# Patient Record
Sex: MALE | Race: WHITE | NOT HISPANIC OR LATINO | Employment: OTHER | URBAN - METROPOLITAN AREA
[De-identification: names, ages, dates, MRNs, and addresses within clinical notes are randomized per-mention and may not be internally consistent; named-entity substitution may affect disease eponyms.]

---

## 2017-01-03 RX ORDER — POTASSIUM CHLORIDE 20 MEQ/1
TABLET, EXTENDED RELEASE ORAL
Qty: 30 TABLET | Refills: 0 | Status: SHIPPED | OUTPATIENT
Start: 2017-01-03 | End: 2017-02-03 | Stop reason: SDUPTHER

## 2017-01-20 ENCOUNTER — PROCEDURE VISIT (OUTPATIENT)
Dept: NEUROLOGY | Facility: CLINIC | Age: 62
End: 2017-01-20
Payer: MEDICARE

## 2017-01-20 DIAGNOSIS — R25.2 SPASTICITY: ICD-10-CM

## 2017-01-20 DIAGNOSIS — G81.90 HEMIPARESIS: ICD-10-CM

## 2017-01-20 DIAGNOSIS — G43.719 CHRONIC MIGRAINE WITHOUT AURA, INTRACTABLE, WITHOUT STATUS MIGRAINOSUS: Primary | ICD-10-CM

## 2017-01-20 PROCEDURE — 64642 CHEMODENERV 1 EXTREMITY 1-4: CPT | Mod: 59,S$GLB,, | Performed by: PSYCHIATRY & NEUROLOGY

## 2017-01-20 PROCEDURE — 64615 CHEMODENERV MUSC MIGRAINE: CPT | Mod: S$GLB,,, | Performed by: PSYCHIATRY & NEUROLOGY

## 2017-01-20 NOTE — PROCEDURES
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 Two patient identifiers were confirmed with the patient prior to performing injections. A time out to determine correct patient and and agreement on procedure performed was conducted prior to the injections  The patient's head and upper neck and upper right arm was cleansed with alcohol rub and 200 Units of Botox (diluted 1:1) was injected in the following bilateral muscles:    10 units in corregator* (total over 2 sites), 5 units in Procerus, 20 units Frontalis* (over 4 sites), 40 units in Temporalis* (over 4 sites), 30 units in occipitalis* (over 6 sites), 20 units in the cervical paraspinal muscles* (over 4 sites), and 30 units in Trapezius* (over 4 sites). 20 units were added to the right deltoid and 20 units in the right levator scapulae for upper limb spasticity from prior CVA. 5 units given to right pectoral major muscle for spasticity as well.    *= denotes bilateral injections        Medications used: botulinum toxin 200 units diluted 1:1 with normal saline used to dilute Botox       The patient tolerated the procedure well with no more than 0.50cc of blood loss. He was observed for several minutes post injection and given a handout from [facility] regarding when and where to seek help if side effects are experienced       Continue Primidone for tremor, mood treatments via PCP continue, and he failed to follow up clinic visit as he states all current treatments are helping well including Botox which helps chronic migraine for 3 months.   Currently Botox is helping symptoms and patient declined sleep study  RTC in 12 weeks or more for more Botox

## 2017-01-31 RX ORDER — METOPROLOL TARTRATE 100 MG/1
TABLET ORAL
Qty: 60 TABLET | Refills: 10 | Status: SHIPPED | OUTPATIENT
Start: 2017-01-31 | End: 2017-12-12 | Stop reason: SDUPTHER

## 2017-02-07 RX ORDER — POTASSIUM CHLORIDE 20 MEQ/1
TABLET, EXTENDED RELEASE ORAL
Qty: 30 TABLET | Refills: 0 | Status: SHIPPED | OUTPATIENT
Start: 2017-02-07 | End: 2017-03-03 | Stop reason: SDUPTHER

## 2017-02-08 ENCOUNTER — OFFICE VISIT (OUTPATIENT)
Dept: INTERNAL MEDICINE | Facility: CLINIC | Age: 62
End: 2017-02-08
Payer: MEDICARE

## 2017-02-08 VITALS
TEMPERATURE: 101 F | DIASTOLIC BLOOD PRESSURE: 82 MMHG | RESPIRATION RATE: 18 BRPM | OXYGEN SATURATION: 95 % | HEART RATE: 78 BPM | SYSTOLIC BLOOD PRESSURE: 122 MMHG | BODY MASS INDEX: 39.76 KG/M2 | WEIGHT: 262.38 LBS | HEIGHT: 68 IN

## 2017-02-08 DIAGNOSIS — J10.1 INFLUENZA A: Primary | ICD-10-CM

## 2017-02-08 DIAGNOSIS — R50.9 FEVER, UNSPECIFIED FEVER CAUSE: ICD-10-CM

## 2017-02-08 PROCEDURE — 99213 OFFICE O/P EST LOW 20 MIN: CPT | Mod: S$GLB,,, | Performed by: NURSE PRACTITIONER

## 2017-02-08 PROCEDURE — 99999 PR PBB SHADOW E&M-EST. PATIENT-LVL IV: CPT | Mod: PBBFAC,,, | Performed by: NURSE PRACTITIONER

## 2017-02-08 PROCEDURE — 3074F SYST BP LT 130 MM HG: CPT | Mod: S$GLB,,, | Performed by: NURSE PRACTITIONER

## 2017-02-08 PROCEDURE — 3079F DIAST BP 80-89 MM HG: CPT | Mod: S$GLB,,, | Performed by: NURSE PRACTITIONER

## 2017-02-08 RX ORDER — OSELTAMIVIR PHOSPHATE 75 MG/1
75 CAPSULE ORAL 2 TIMES DAILY
Qty: 10 CAPSULE | Refills: 0 | Status: SHIPPED | OUTPATIENT
Start: 2017-02-08 | End: 2017-02-13

## 2017-02-08 RX ORDER — PROMETHAZINE HYDROCHLORIDE AND CODEINE PHOSPHATE 6.25; 1 MG/5ML; MG/5ML
5 SOLUTION ORAL EVERY 4 HOURS PRN
Qty: 240 ML | Refills: 0 | Status: SHIPPED | OUTPATIENT
Start: 2017-02-08 | End: 2017-02-14 | Stop reason: SDUPTHER

## 2017-02-08 NOTE — PROGRESS NOTES
Subjective:       Patient ID: Reymundo Dang Sr. is a 61 y.o. male.    Chief Complaint: Sinus Problem and Sore Throat    HPI: Pt presents to clinic today known to me with c/o not feeling well since the weekend. He reports that he has fever. Coughing but nothing coming up. Very sore throat. No N/V/D. He reports that he has no appetite. He is taking robitussin OTC. He reports that he feels weak. He reports that he fell twice. He has no strength. He denies LOC or passing out just feels weak and falls. He reports that he has to have help to get up.   Review of Systems   Constitutional: Positive for chills, fatigue and fever. Negative for activity change.        Low grade fever   HENT: Positive for sore throat. Negative for congestion, postnasal drip and rhinorrhea.    Eyes: Negative for pain, discharge and visual disturbance.   Respiratory: Positive for cough and wheezing. Negative for shortness of breath.    Cardiovascular: Negative for chest pain, palpitations and leg swelling.   Gastrointestinal: Negative for abdominal distention, abdominal pain, constipation, diarrhea, nausea and vomiting.   Musculoskeletal: Negative for back pain and joint swelling.   Skin: Negative for rash and wound.   Neurological: Positive for weakness and headaches. Negative for dizziness, syncope and light-headedness.   Psychiatric/Behavioral: Negative for confusion.       Objective:      Physical Exam   Constitutional: He is oriented to person, place, and time. He appears well-developed and well-nourished.   HENT:   Head: Normocephalic and atraumatic.   Left Ear: External ear normal.   Nose: Nose normal.   Mouth/Throat: Oropharynx is clear and moist. No oropharyngeal exudate.   Right ear with wax   Neck: Normal range of motion. No thyromegaly present.   Cardiovascular: Normal rate, regular rhythm and normal heart sounds.    Pulmonary/Chest: Effort normal and breath sounds normal. He has no wheezes. He has no rales.   Loose cough    Abdominal: Soft. Bowel sounds are normal. There is no tenderness.   Musculoskeletal: He exhibits no edema.   Lymphadenopathy:     He has no cervical adenopathy.   Neurological: He is alert and oriented to person, place, and time.   Skin: Skin is warm and dry.   Psychiatric: He has a normal mood and affect.   Nursing note and vitals reviewed.      Assessment:       1. Influenza A        Plan:   Reymundo was seen today for sinus problem and sore throat.    Diagnoses and all orders for this visit:    Influenza A  -     oseltamivir (TAMIFLU) 75 MG capsule; Take 1 capsule (75 mg total) by mouth 2 (two) times daily.  -     promethazine-codeine 6.25-10 mg/5 ml (PHENERGAN WITH CODEINE) 6.25-10 mg/5 mL syrup; Take 5 mLs by mouth every 4 (four) hours as needed for Cough.  Flu A+    RT 1 week for reassessment of weakness. HYDRATE and REST. Tylenol/ibuprofen for fever

## 2017-02-08 NOTE — MR AVS SNAPSHOT
[facility]  [address] [facility] [address]  Phone: [phone]  Fax: [phone]                  Reymundo Dang .   2017 2:45 PM   Office Visit    Description:  Male : 1955   Provider:  Rosalinda Villalba NP   Department:  [facility]           Reason for Visit     Sinus Problem     Sore Throat           Diagnoses this Visit        Comments    Influenza A    -  Primary     Fever, unspecified fever cause                To Do List           Future Appointments        Provider Department Dept Phone    2017 1:00 PM Kamlesh Ramos MD [place] [facility] - Neurology [phone]      Goals (5 Years of Data)     None      Follow-Up and Disposition     Return in about 1 week (around 2/15/2017), or if symptoms worsen or fail to improve.       These Medications        Disp Refills Start End    oseltamivir (TAMIFLU) 75 MG capsule 10 capsule 0 2017    Take 1 capsule (75 mg total) by mouth 2 (two) times daily. - Oral    Pharmacy: [id number] - [place], [place] - [address] [facility] Ph #: [phone]       promethazine-codeine 6.25-10 mg/5 ml (PHENERGAN WITH CODEINE) 6.25-10 mg/5 mL syrup 240 mL 0 2017    Take 5 mLs by mouth every 4 (four) hours as needed for Cough. - Oral    Pharmacy: [facility]/pharmacy #[id number] - [place], [place] - [address] [address] Ph #: [phone]         [facility]     [facility] On Call Nurse Care Line -  Assistance  Registered nurses in the [facility] provide clinical advisement, health education, appointment booking, and other advisory services.  Call for this free service at [phone].             Medications           Message regarding Medications     Verify the changes and/or additions to your medication regime listed below are the same as discussed with your clinician today.  If any of these changes or additions are incorrect, please notify your healthcare provider.        START taking  these NEW medications        Refills    oseltamivir (TAMIFLU) 75 MG capsule 0    Sig: Take 1 capsule (75 mg total) by mouth 2 (two) times daily.    Class: Normal    Route: Oral    promethazine-codeine 6.25-10 mg/5 ml (PHENERGAN WITH CODEINE) 6.25-10 mg/5 mL syrup 0    Sig: Take 5 mLs by mouth every 4 (four) hours as needed for Cough.    Class: Print    Route: Oral           Verify that the below list of medications is an accurate representation of the medications you are currently taking.  If none reported, the list may be blank. If incorrect, please contact your healthcare provider. Carry this list with you in case of emergency.           Current Medications     ACETAMINOPHEN (TYLENOL 8 HOUR ORAL) Take by mouth.    alprazolam (XANAX) 0.5 MG tablet Take 1 tablet (0.5 mg total) by mouth 3 (three) times daily as needed.    buPROPion (WELLBUTRIN XL) 300 MG 24 hr tablet Take 300 mg by mouth once daily.    diphenoxylate-atropine 2.5-0.025 mg (LOMOTIL) 2.5-0.025 mg per tablet     docusate sodium (STOOL SOFTENER) 100 MG capsule Take 200 mg by mouth every evening.    gentamicin (GARAMYCIN) 0.3 % ophthalmic solution Place 1 drop into both eyes every 4 (four) hours as needed.    GLUCOSAMINE HCL/CHONDR ALVARES A NA (OSTEO BI-FLEX ORAL) Take 2 tablets by mouth once daily.    hydrocodone-acetaminophen 5-325mg (NORCO) 5-325 mg per tablet Take 1-2 tablets by mouth every 6 (six) hours as needed for Pain.    mesalamine (ASACOL) 400 mg EC tablet Take 1 tablet (400 mg total) by mouth 3 (three) times daily.    metoprolol tartrate (LOPRESSOR) 100 MG tablet TAKE 1 TABLET BY MOUTH TWICE A DAY    MULTIVITAMIN W-MINERALS/LUTEIN (CENTRUM SILVER ORAL) Take 1 tablet by mouth once daily.      omeprazole (PRILOSEC) 40 MG capsule Take 40 mg by mouth once daily.    oxycodone-acetaminophen (PERCOCET) 7.5-325 mg per tablet Take 1 tablet by mouth 4 (four) times daily as needed for Pain.    potassium chloride SA (K-DUR,KLOR-CON) 20 MEQ tablet TAKE 1 TABLET  "BY MOUTH EVERY DAY    potassium chloride SA (K-DUR,KLOR-CON) 20 MEQ tablet TAKE 1 TABLET BY MOUTH EVERY DAY    primidone (MYSOLINE) 50 MG Tab TAKE 1 TABLET BY MOUTH 3 TIMES A DAY    simvastatin (ZOCOR) 20 MG tablet Take 1 tablet (20 mg total) by mouth every evening.    VESICARE 5 mg tablet Take 5 mg by mouth once daily.    VITAMIN B COMPLEX ORAL Take 1 tablet by mouth Daily.    vitamin E 1000 UNIT capsule Take 1 capsule by mouth Daily.    vortioxetine (BRINTELLIX) 10 mg Tab Take 10 mg by mouth once daily.    amlodipine (NORVASC) 10 MG tablet Take 1 tablet (10 mg total) by mouth once daily.    hydrochlorothiazide (HYDRODIURIL) 25 MG tablet Take 1 tablet (25 mg total) by mouth once daily.    lisinopril 10 MG tablet Take 2 tablets (20 mg total) by mouth once daily.    oseltamivir (TAMIFLU) 75 MG capsule Take 1 capsule (75 mg total) by mouth 2 (two) times daily.    promethazine-codeine 6.25-10 mg/5 ml (PHENERGAN WITH CODEINE) 6.25-10 mg/5 mL syrup Take 5 mLs by mouth every 4 (four) hours as needed for Cough.           Clinical Reference Information           Your Vitals Were     BP Pulse Temp Resp Height Weight    122/82 78 100.8 °F (38.2 °C) (Tympanic) 18 5' 8" (1.727 m) 119 kg (262 lb 5.6 oz)    SpO2 BMI             95% 39.89 kg/m2         Blood Pressure          Most Recent Value    BP  122/82      Allergies as of 2/8/2017     Aspirin    Sulfa (Sulfonamide Antibiotics)      Immunizations Administered on Date of Encounter - 2/8/2017     None      Orders Placed During Today's Visit      Normal Orders This Visit    POCT Influenza A/B       Language Assistance Services     ATTENTION: Language assistance services are available, free of charge. Please call [phone].      ATENCIÓN: Si habla kimberly, tiene a [place] disposición servicios gratuitos de asistencia lingüística. Llame al [phone].     CHÚ Ý: N?u b?n nói Ti?ng Vi?t, có các d?ch v? h? tr? ngôn ng? mi?n phí dành cho b?n. G?i s? [phone].         [facility] complies with applicable Federal civil rights laws and does not discriminate on the basis of race, color, national origin, age, disability, or sex.

## 2017-02-14 ENCOUNTER — HOSPITAL ENCOUNTER (OUTPATIENT)
Dept: RADIOLOGY | Facility: HOSPITAL | Age: 62
Discharge: HOME | End: 2017-02-14
Attending: NURSE PRACTITIONER
Payer: MEDICARE

## 2017-02-14 ENCOUNTER — OFFICE VISIT (OUTPATIENT)
Dept: INTERNAL MEDICINE | Facility: CLINIC | Age: 62
End: 2017-02-14
Payer: MEDICARE

## 2017-02-14 VITALS
DIASTOLIC BLOOD PRESSURE: 72 MMHG | OXYGEN SATURATION: 95 % | BODY MASS INDEX: 38.76 KG/M2 | HEART RATE: 74 BPM | HEIGHT: 68 IN | RESPIRATION RATE: 18 BRPM | WEIGHT: 255.75 LBS | SYSTOLIC BLOOD PRESSURE: 138 MMHG

## 2017-02-14 DIAGNOSIS — R05.9 COUGH: Primary | ICD-10-CM

## 2017-02-14 DIAGNOSIS — R05.9 COUGH: ICD-10-CM

## 2017-02-14 PROCEDURE — 3075F SYST BP GE 130 - 139MM HG: CPT | Mod: S$GLB,,, | Performed by: NURSE PRACTITIONER

## 2017-02-14 PROCEDURE — 99214 OFFICE O/P EST MOD 30 MIN: CPT | Mod: S$GLB,,, | Performed by: NURSE PRACTITIONER

## 2017-02-14 PROCEDURE — 71020 XR CHEST PA AND LATERAL: CPT | Mod: TC

## 2017-02-14 PROCEDURE — 99999 PR PBB SHADOW E&M-EST. PATIENT-LVL V: CPT | Mod: PBBFAC,,, | Performed by: NURSE PRACTITIONER

## 2017-02-14 PROCEDURE — 71020 XR CHEST PA AND LATERAL: CPT | Mod: 26,,, | Performed by: RADIOLOGY

## 2017-02-14 PROCEDURE — 3078F DIAST BP <80 MM HG: CPT | Mod: S$GLB,,, | Performed by: NURSE PRACTITIONER

## 2017-02-14 RX ORDER — ALBUTEROL SULFATE 1.25 MG/3ML
1.25 SOLUTION RESPIRATORY (INHALATION) EVERY 6 HOURS PRN
Qty: 50 ML | Refills: 0 | Status: SHIPPED | OUTPATIENT
Start: 2017-02-14 | End: 2018-07-17 | Stop reason: SDUPTHER

## 2017-02-14 RX ORDER — PROMETHAZINE HYDROCHLORIDE AND CODEINE PHOSPHATE 6.25; 1 MG/5ML; MG/5ML
5 SOLUTION ORAL EVERY 4 HOURS PRN
Qty: 240 ML | Refills: 0 | Status: SHIPPED | OUTPATIENT
Start: 2017-02-14 | End: 2017-02-24

## 2017-02-14 NOTE — PROGRESS NOTES
Subjective:       Patient ID: Reymundo Dang Sr. is a 61 y.o. male.    Chief Complaint: Follow-up    HPI: Pt presents to clinic today known to me with c.o coughing. I saw him last week with c/o flu. He reports that he is still coughing. Can't bring up anything. Still running low grade fever. No N/V/D.   Review of Systems   Constitutional: Positive for fatigue. Negative for activity change, chills and fever.        Low grade fever   HENT: Negative for congestion, postnasal drip, rhinorrhea and sore throat.    Eyes: Negative for pain, discharge and visual disturbance.   Respiratory: Positive for cough, shortness of breath and wheezing.    Cardiovascular: Negative for chest pain, palpitations and leg swelling.   Gastrointestinal: Negative for abdominal distention, abdominal pain, constipation, diarrhea, nausea and vomiting.   Musculoskeletal: Negative for back pain and joint swelling.   Skin: Negative for rash and wound.   Neurological: Negative for dizziness, syncope, weakness, light-headedness and headaches.   Psychiatric/Behavioral: Negative for confusion.       Objective:      Physical Exam   Constitutional: He appears well-developed and well-nourished.   HENT:   Head: Normocephalic and atraumatic.   Right Ear: External ear normal.   Left Ear: External ear normal.   Nose: Nose normal.   Mouth/Throat: Oropharynx is clear and moist. No oropharyngeal exudate.   Neck: Normal range of motion. Neck supple. No thyromegaly present.   Cardiovascular: Normal rate, regular rhythm, normal heart sounds and intact distal pulses.    No murmur heard.  Pulmonary/Chest: Effort normal and breath sounds normal. No respiratory distress. He has no wheezes. He has no rales.   No wheezing noted but chronic dry cough harsh in exam room   Abdominal: Soft. Bowel sounds are normal. He exhibits no distension and no mass. There is no tenderness. There is no rebound and no guarding.   Musculoskeletal: He exhibits no edema.    Lymphadenopathy:     He has no cervical adenopathy.   Neurological: He is alert.   Skin: Skin is warm and dry.   Psychiatric: He has a normal mood and affect.   Nursing note and vitals reviewed.      Assessment:       1. Cough        Plan:   Reymundo was seen today for follow-up.    Diagnoses and all orders for this visit:    Cough  -     X-Ray Chest PA And Lateral; Future  -     albuterol (ACCUNEB) 1.25 mg/3 mL Nebu; Take 3 mLs (1.25 mg total) by nebulization every 6 (six) hours as needed. Rescue  -     promethazine-codeine 6.25-10 mg/5 ml (PHENERGAN WITH CODEINE) 6.25-10 mg/5 mL syrup; Take 5 mLs by mouth every 4 (four) hours as needed for Cough.  robitussin during the day. Check cxr, no audible pneumonia. If clear cont with nebs and cough meds. F/u 1-2 weeks.

## 2017-02-14 NOTE — MR AVS SNAPSHOT
[facility]  [address] [facility] [address]  Phone: [phone]  Fax: [phone]                  Reymundo Dang .   2017 1:30 PM   Office Visit    Description:  Male : 1955   Provider:  Rosalinda Villalba NP   Department:  [facility]           Reason for Visit     Follow-up           Diagnoses this Visit        Comments    Cough    -  Primary            To Do List           Future Appointments        Provider Department Dept Phone    2017 1:00 PM Kamlesh Ramos MD [facility] - Neurology [phone]      Goals (5 Years of Data)     None      Follow-Up and Disposition     Return in about 2 weeks (around 2017), or if symptoms worsen or fail to improve.       These Medications        Disp Refills Start End    albuterol (ACCUNEB) 1.25 mg/3 mL Nebu 50 mL 0 2017    Take 3 mLs (1.25 mg total) by nebulization every 6 (six) hours as needed. Rescue - Nebulization    Pharmacy: [facility]/pharmacy #[id number] - [place], [place] - [address] [facility] Ph #: [phone]       promethazine-codeine 6.25-10 mg/5 ml (PHENERGAN WITH CODEINE) 6.25-10 mg/5 mL syrup 240 mL 0 2017    Take 5 mLs by mouth every 4 (four) hours as needed for Cough. - Oral    Pharmacy: [facility]/pharmacy #[id number] - [place], [place] - [address] [facility] [facility] Ph #: [phone]         [facility]     [facility] On Call Nurse Care Line -  Assistance  Registered nurses in the [facility] On Call Center provide clinical advisement, health education, appointment booking, and other advisory services.  Call for this free service at [phone].             Medications           Message regarding Medications     Verify the changes and/or additions to your medication regime listed below are the same as discussed with your clinician today.  If any of these changes or additions are incorrect, please notify your healthcare provider.        START taking these NEW medications         Refills    albuterol (ACCUNEB) 1.25 mg/3 mL Nebu 0    Sig: Take 3 mLs (1.25 mg total) by nebulization every 6 (six) hours as needed. Rescue    Class: Normal    Route: Nebulization           Verify that the below list of medications is an accurate representation of the medications you are currently taking.  If none reported, the list may be blank. If incorrect, please contact your healthcare provider. Carry this list with you in case of emergency.           Current Medications     ACETAMINOPHEN (TYLENOL 8 HOUR ORAL) Take by mouth.    alprazolam (XANAX) 0.5 MG tablet Take 1 tablet (0.5 mg total) by mouth 3 (three) times daily as needed.    buPROPion (WELLBUTRIN XL) 300 MG 24 hr tablet Take 300 mg by mouth once daily.    diphenoxylate-atropine 2.5-0.025 mg (LOMOTIL) 2.5-0.025 mg per tablet     docusate sodium (STOOL SOFTENER) 100 MG capsule Take 200 mg by mouth every evening.    gentamicin (GARAMYCIN) 0.3 % ophthalmic solution Place 1 drop into both eyes every 4 (four) hours as needed.    GLUCOSAMINE HCL/CHONDR ALVARES A NA (OSTEO BI-FLEX ORAL) Take 2 tablets by mouth once daily.    hydrocodone-acetaminophen 5-325mg (NORCO) 5-325 mg per tablet Take 1-2 tablets by mouth every 6 (six) hours as needed for Pain.    mesalamine (ASACOL) 400 mg EC tablet Take 1 tablet (400 mg total) by mouth 3 (three) times daily.    metoprolol tartrate (LOPRESSOR) 100 MG tablet TAKE 1 TABLET BY MOUTH TWICE A DAY    MULTIVITAMIN W-MINERALS/LUTEIN (CENTRUM SILVER ORAL) Take 1 tablet by mouth once daily.      omeprazole (PRILOSEC) 40 MG capsule Take 40 mg by mouth once daily.    oxycodone-acetaminophen (PERCOCET) 7.5-325 mg per tablet Take 1 tablet by mouth 4 (four) times daily as needed for Pain.    potassium chloride SA (K-DUR,KLOR-CON) 20 MEQ tablet TAKE 1 TABLET BY MOUTH EVERY DAY    potassium chloride SA (K-DUR,KLOR-CON) 20 MEQ tablet TAKE 1 TABLET BY MOUTH EVERY DAY    primidone (MYSOLINE) 50 MG Tab TAKE 1 TABLET BY MOUTH 3 TIMES A DAY     "promethazine-codeine 6.25-10 mg/5 ml (PHENERGAN WITH CODEINE) 6.25-10 mg/5 mL syrup Take 5 mLs by mouth every 4 (four) hours as needed for Cough.    simvastatin (ZOCOR) 20 MG tablet Take 1 tablet (20 mg total) by mouth every evening.    VESICARE 5 mg tablet Take 5 mg by mouth once daily.    VITAMIN B COMPLEX ORAL Take 1 tablet by mouth Daily.    vitamin E 1000 UNIT capsule Take 1 capsule by mouth Daily.    vortioxetine (BRINTELLIX) 10 mg Tab Take 10 mg by mouth once daily.    albuterol (ACCUNEB) 1.25 mg/3 mL Nebu Take 3 mLs (1.25 mg total) by nebulization every 6 (six) hours as needed. Rescue    amlodipine (NORVASC) 10 MG tablet Take 1 tablet (10 mg total) by mouth once daily.    hydrochlorothiazide (HYDRODIURIL) 25 MG tablet Take 1 tablet (25 mg total) by mouth once daily.    lisinopril 10 MG tablet Take 2 tablets (20 mg total) by mouth once daily.           Clinical Reference Information           Your Vitals Were     BP Pulse Resp Height Weight SpO2    138/72 74 18 5' 8" (1.727 m) 116 kg (255 lb 11.7 oz) 95%    BMI                38.88 kg/m2          Blood Pressure          Most Recent Value    BP  138/72      Allergies as of 2/14/2017     Aspirin    Sulfa (Sulfonamide Antibiotics)      Immunizations Administered on Date of Encounter - 2/14/2017     None      Orders Placed During Today's Visit     Future Labs/Procedures Expected by Expires    X-Ray Chest PA And Lateral  2/14/2017 2/14/2018      Language Assistance Services     ATTENTION: Language assistance services are available, free of charge. Please call [phone].      ATENCIÓN: Si habla español, tiene a [place] disposición servicios gratuitos de asistencia lingüística. Llame al [phone].     [facility] Ý: N?u b?n nói Ti?ng Vi?t, có các d?ch v? h? tr? ngôn ng? mi?n phí dành cho b?n. G?i s? [phone].         [facility] complies with applicable Federal civil rights laws and does not discriminate on the basis of race, color, national " origin, age, disability, or sex.

## 2017-03-01 ENCOUNTER — OFFICE VISIT (OUTPATIENT)
Dept: INTERNAL MEDICINE | Facility: CLINIC | Age: 62
End: 2017-03-01
Payer: MEDICARE

## 2017-03-01 ENCOUNTER — HOSPITAL ENCOUNTER (OUTPATIENT)
Dept: RADIOLOGY | Facility: HOSPITAL | Age: 62
Discharge: HOME | End: 2017-03-01
Attending: NURSE PRACTITIONER
Payer: MEDICARE

## 2017-03-01 VITALS
BODY MASS INDEX: 39.79 KG/M2 | HEART RATE: 68 BPM | RESPIRATION RATE: 22 BRPM | HEIGHT: 68 IN | DIASTOLIC BLOOD PRESSURE: 80 MMHG | TEMPERATURE: 99 F | SYSTOLIC BLOOD PRESSURE: 108 MMHG | OXYGEN SATURATION: 96 % | WEIGHT: 262.56 LBS

## 2017-03-01 DIAGNOSIS — R05.9 COUGH: ICD-10-CM

## 2017-03-01 DIAGNOSIS — R05.9 COUGH: Primary | ICD-10-CM

## 2017-03-01 PROCEDURE — 1160F RVW MEDS BY RX/DR IN RCRD: CPT | Mod: S$GLB,,, | Performed by: NURSE PRACTITIONER

## 2017-03-01 PROCEDURE — 71020 XR CHEST PA AND LATERAL: CPT | Mod: TC

## 2017-03-01 PROCEDURE — 99213 OFFICE O/P EST LOW 20 MIN: CPT | Mod: S$GLB,,, | Performed by: NURSE PRACTITIONER

## 2017-03-01 PROCEDURE — 99999 PR PBB SHADOW E&M-EST. PATIENT-LVL V: CPT | Mod: PBBFAC,,, | Performed by: NURSE PRACTITIONER

## 2017-03-01 PROCEDURE — 3079F DIAST BP 80-89 MM HG: CPT | Mod: S$GLB,,, | Performed by: NURSE PRACTITIONER

## 2017-03-01 PROCEDURE — 71020 XR CHEST PA AND LATERAL: CPT | Mod: 26,,, | Performed by: RADIOLOGY

## 2017-03-01 PROCEDURE — 3074F SYST BP LT 130 MM HG: CPT | Mod: S$GLB,,, | Performed by: NURSE PRACTITIONER

## 2017-03-01 RX ORDER — BENZONATATE 200 MG/1
200 CAPSULE ORAL 3 TIMES DAILY PRN
Qty: 30 CAPSULE | Refills: 0 | Status: SHIPPED | OUTPATIENT
Start: 2017-03-01 | End: 2017-03-11

## 2017-03-01 NOTE — PROGRESS NOTES
Subjective:       Patient ID: Reymundo Dang Sr. is a 61 y.o. male.    Chief Complaint: Follow-up and Cough    HPI: Pt presents to clinic today known to me with c/o still coughing. He reports low grade fever at night. He reports that he is still taking mucinex and cough syrup at night. He reports other than that no sore throat, nasal congestion or headache. Has some chest and back discomfort from being sore from cough  Review of Systems   Constitutional: Positive for fatigue. Negative for activity change, chills and fever.        Low grade fever   HENT: Negative for congestion, postnasal drip, rhinorrhea and sore throat.    Eyes: Negative for pain, discharge and visual disturbance.   Respiratory: Positive for cough, shortness of breath and wheezing.    Cardiovascular: Negative for chest pain, palpitations and leg swelling.   Gastrointestinal: Negative for abdominal distention, abdominal pain, constipation, diarrhea, nausea and vomiting.   Musculoskeletal: Negative for back pain and joint swelling.   Skin: Negative for rash and wound.   Neurological: Negative for dizziness, syncope, weakness, light-headedness and headaches.   Psychiatric/Behavioral: Negative for confusion.       Objective:      Physical Exam   Constitutional: He is oriented to person, place, and time. He appears well-developed and well-nourished.   HENT:   Head: Normocephalic and atraumatic.   Right Ear: External ear normal.   Left Ear: External ear normal.   Nose: Nose normal.   Mouth/Throat: Oropharynx is clear and moist.   Neck: Normal range of motion. Neck supple. No thyromegaly present.   Cardiovascular: Normal rate, regular rhythm and normal heart sounds.    No murmur heard.  Pulmonary/Chest: Effort normal. He has no wheezes. He has no rales.   Abdominal: Soft. Bowel sounds are normal. There is no tenderness.   Musculoskeletal: He exhibits no edema.   Neurological: He is alert and oriented to person, place, and time.   Skin: Skin is warm  and dry.   Psychiatric: He has a normal mood and affect.   Nursing note and vitals reviewed.      Assessment:       1. Cough        Plan:   Reymundo was seen today for follow-up and cough.    Diagnoses and all orders for this visit:    Cough  -     benzonatate (TESSALON) 200 MG capsule; Take 1 capsule (200 mg total) by mouth 3 (three) times daily as needed for Cough.  -     X-Ray Chest PA And Lateral; Future  may need course of abx from bronchitis following the flu. Repeat CXR 3 weeks after flu. May d/c neb treatments and robitussin.

## 2017-03-01 NOTE — MR AVS SNAPSHOT
[place] - Internal Medicine  [address] [facility] [address]  Phone: [phone]  Fax: [phone]                  Reymundo Dang [facility]   3/1/2017 11:00 AM   Office Visit    Description:  Male : 1955   Provider:  Rosalinda Villalba NP   Department:  [facility]           Reason for Visit     Follow-up     Cough           Diagnoses this Visit        Comments    Cough    -  Primary            To Do List           Future Appointments        Provider Department Dept Phone    3/1/2017 11:00 AM Rosalinda Villalba NP [facility] [phone]    2017 1:00 PM Kamlesh Ramos MD [facility]. - Neurology [phone]      Goals (5 Years of Data)     None      Follow-Up and Disposition     Return if symptoms worsen or fail to improve.       These Medications        Disp Refills Start End    benzonatate (TESSALON) 200 MG capsule 30 capsule 0 3/1/2017 3/11/2017    Take 1 capsule (200 mg total) by mouth 3 (three) times daily as needed for Cough. - Oral    Pharmacy: [facility]/pharmacy #[id number] - [place], [place] - [address] #: [phone]         [facility]     [facility] On Call Nurse Care Line -  Assistance  Registered nurses in the [facility] On Call Center provide clinical advisement, health education, appointment booking, and other advisory services.  Call for this free service at [phone].             Medications           Message regarding Medications     Verify the changes and/or additions to your medication regime listed below are the same as discussed with your clinician today.  If any of these changes or additions are incorrect, please notify your healthcare provider.        START taking these NEW medications        Refills    benzonatate (TESSALON) 200 MG capsule 0    Sig: Take 1 capsule (200 mg total) by mouth 3 (three) times daily as needed for Cough.    Class: Normal    Route: Oral           Verify that the below list of medications is  an accurate representation of the medications you are currently taking.  If none reported, the list may be blank. If incorrect, please contact your healthcare provider. Carry this list with you in case of emergency.           Current Medications     ACETAMINOPHEN (TYLENOL 8 HOUR ORAL) Take by mouth.    albuterol (ACCUNEB) 1.25 mg/3 mL Nebu Take 3 mLs (1.25 mg total) by nebulization every 6 (six) hours as needed. Rescue    alprazolam (XANAX) 0.5 MG tablet Take 1 tablet (0.5 mg total) by mouth 3 (three) times daily as needed.    buPROPion (WELLBUTRIN XL) 300 MG 24 hr tablet Take 300 mg by mouth once daily.    diphenoxylate-atropine 2.5-0.025 mg (LOMOTIL) 2.5-0.025 mg per tablet     docusate sodium (STOOL SOFTENER) 100 MG capsule Take 200 mg by mouth every evening.    gentamicin (GARAMYCIN) 0.3 % ophthalmic solution Place 1 drop into both eyes every 4 (four) hours as needed.    GLUCOSAMINE HCL/CHONDR ALVARES A NA (OSTEO BI-FLEX ORAL) Take 2 tablets by mouth once daily.    hydrocodone-acetaminophen 5-325mg (NORCO) 5-325 mg per tablet Take 1-2 tablets by mouth every 6 (six) hours as needed for Pain.    mesalamine (ASACOL) 400 mg EC tablet Take 1 tablet (400 mg total) by mouth 3 (three) times daily.    metoprolol tartrate (LOPRESSOR) 100 MG tablet TAKE 1 TABLET BY MOUTH TWICE A DAY    MULTIVITAMIN W-MINERALS/LUTEIN (CENTRUM SILVER ORAL) Take 1 tablet by mouth once daily.      omeprazole (PRILOSEC) 40 MG capsule Take 40 mg by mouth once daily.    oxycodone-acetaminophen (PERCOCET) 7.5-325 mg per tablet Take 1 tablet by mouth 4 (four) times daily as needed for Pain.    potassium chloride SA (K-DUR,KLOR-CON) 20 MEQ tablet TAKE 1 TABLET BY MOUTH EVERY DAY    potassium chloride SA (K-DUR,KLOR-CON) 20 MEQ tablet TAKE 1 TABLET BY MOUTH EVERY DAY    primidone (MYSOLINE) 50 MG Tab TAKE 1 TABLET BY MOUTH 3 TIMES A DAY    simvastatin (ZOCOR) 20 MG tablet Take 1 tablet (20 mg total) by mouth every evening.    VESICARE 5 mg tablet Take 5  mg by mouth once daily.    VITAMIN B COMPLEX ORAL Take 1 tablet by mouth Daily.    vitamin E 1000 UNIT capsule Take 1 capsule by mouth Daily.    vortioxetine (BRINTELLIX) 10 mg Tab Take 10 mg by mouth once daily.    amlodipine (NORVASC) 10 MG tablet Take 1 tablet (10 mg total) by mouth once daily.    benzonatate (TESSALON) 200 MG capsule Take 1 capsule (200 mg total) by mouth 3 (three) times daily as needed for Cough.    hydrochlorothiazide (HYDRODIURIL) 25 MG tablet Take 1 tablet (25 mg total) by mouth once daily.    lisinopril 10 MG tablet Take 2 tablets (20 mg total) by mouth once daily.           Clinical Reference Information           Your Vitals Were     BP                   108/80           Blood Pressure          Most Recent Value    BP  108/80      Allergies as of 3/1/2017     Aspirin    Sulfa (Sulfonamide Antibiotics)      Immunizations Administered on Date of Encounter - 3/1/2017     None      Orders Placed During Today's Visit     Future Labs/Procedures Expected by Expires    X-Ray Chest PA And Lateral  3/1/2017 3/1/2018      Language Assistance Services     ATTENTION: Language assistance services are available, free of charge. Please call [phone].      ATENCIÓN: Si habla español, tiene a [place] disposición servicios gratuitos de asistencia lingüística. Llame al [phone].     AZALIA Ý: N?u b?n nói Ti?ng Vi?t, có các d?ch v? h? tr? ngôn ng? mi?n phí dành cho b?n. G?i s? [phone].         [facility] complies with applicable Federal civil rights laws and does not discriminate on the basis of race, color, national origin, age, disability, or sex.

## 2017-03-03 RX ORDER — POTASSIUM CHLORIDE 20 MEQ/1
TABLET, EXTENDED RELEASE ORAL
Qty: 30 TABLET | Refills: 0 | Status: SHIPPED | OUTPATIENT
Start: 2017-03-03 | End: 2017-04-03 | Stop reason: SDUPTHER

## 2017-04-04 RX ORDER — POTASSIUM CHLORIDE 20 MEQ/1
TABLET, EXTENDED RELEASE ORAL
Qty: 30 TABLET | Refills: 0 | Status: SHIPPED | OUTPATIENT
Start: 2017-04-04 | End: 2017-05-01 | Stop reason: SDUPTHER

## 2017-04-21 ENCOUNTER — PROCEDURE VISIT (OUTPATIENT)
Dept: NEUROLOGY | Facility: CLINIC | Age: 62
End: 2017-04-21
Payer: MEDICARE

## 2017-04-21 DIAGNOSIS — R25.2 SPASTICITY: ICD-10-CM

## 2017-04-21 DIAGNOSIS — G43.719 CHRONIC MIGRAINE WITHOUT AURA, INTRACTABLE, WITHOUT STATUS MIGRAINOSUS: Primary | ICD-10-CM

## 2017-04-21 PROCEDURE — 64646 CHEMODENERV TRUNK MUSC 1-5: CPT | Mod: 59,S$GLB,, | Performed by: PSYCHIATRY & NEUROLOGY

## 2017-04-21 PROCEDURE — 64615 CHEMODENERV MUSC MIGRAINE: CPT | Mod: S$GLB,,, | Performed by: PSYCHIATRY & NEUROLOGY

## 2017-04-21 NOTE — PROCEDURES
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 Two patient identifiers were confirmed with the patient prior to performing injections. A time out to determine correct patient and and agreement on procedure performed was conducted prior to the injections  The patient's head and upper neck and upper right arm was cleansed with alcohol rub and 200 Units of Botox (diluted 1:1) was injected in the following bilateral muscles:    10 units in corregator* (total over 2 sites), 5 units in Procerus, 20 units Frontalis* (over 4 sites), 40 units in Temporalis* (over 4 sites), 30 units in occipitalis* (over 6 sites), 20 units in the cervical paraspinal muscles* (over 4 sites), and 30 units in Trapezius* (over 4 sites). 20 units were added to the right deltoid and 20 units in the right levator scapulae for upper limb spasticity from prior CVA. 5 units given to right pectoral major muscle for spasticity as well.    *= denotes bilateral injections        Medications used: botulinum toxin 200 units diluted 1:1 with normal saline used to dilute Botox       The patient tolerated the procedure well with no more than 0.50cc of blood loss. He was observed for several minutes post injection and given a handout from [facility] regarding when and where to seek help if side effects are experienced     Patient had a slip and fall out of his bed resulting in 1 cm abrasion on the right dorsal hand. Well healing- I instructed him to apply tipple antibiotic ointment to this area and see PCP for any redness/ drainage/ increased pain  Wife states patient has had a few falls in the past few months due to chronic right sided symptoms: Use can on the left sided and consider PT for any further falls discussed today.   Continue Primidone for tremor, mood  treatments via PCP Botox helps chronic migraine for 3 months and then chronic headache returns.   Currently Botox is helping symptoms (he notes a few more headaches leading up to this Botox and will notify me if not better soon) and patient declined sleep study prior  RTC in 12 weeks or more for more Botox

## 2017-05-01 RX ORDER — POTASSIUM CHLORIDE 20 MEQ/1
TABLET, EXTENDED RELEASE ORAL
Qty: 30 TABLET | Refills: 0 | Status: SHIPPED | OUTPATIENT
Start: 2017-05-01 | End: 2017-06-04 | Stop reason: SDUPTHER

## 2017-06-05 RX ORDER — POTASSIUM CHLORIDE 20 MEQ/1
TABLET, EXTENDED RELEASE ORAL
Qty: 30 TABLET | Refills: 0 | Status: SHIPPED | OUTPATIENT
Start: 2017-06-05 | End: 2017-09-08 | Stop reason: SDUPTHER

## 2017-06-06 RX ORDER — PRIMIDONE 50 MG/1
TABLET ORAL
Qty: 90 TABLET | Refills: 8 | Status: SHIPPED | OUTPATIENT
Start: 2017-06-06 | End: 2018-10-26 | Stop reason: SDUPTHER

## 2017-06-09 ENCOUNTER — TELEPHONE (OUTPATIENT)
Dept: INTERNAL MEDICINE | Facility: CLINIC | Age: 62
End: 2017-06-09

## 2017-06-09 RX ORDER — BACLOFEN 10 MG/1
10 TABLET ORAL 3 TIMES DAILY
Qty: 30 TABLET | Refills: 0 | Status: SHIPPED | OUTPATIENT
Start: 2017-06-09 | End: 2018-10-26

## 2017-06-09 NOTE — TELEPHONE ENCOUNTER
----- Message from Davina Wharton sent at 2017 12:42 PM CDT -----  Contact: yfn wife  Reymundo Dang .  MRN: [id number]  : 1955  PCP: Radha Rhodes  Home Phone      [phone]  Work Phone      Not on file.  Mobile          [phone]      MESSAGE: want meds for hiccups-----[facility]  Cutoff-------985-0

## 2017-06-09 NOTE — TELEPHONE ENCOUNTER
Done. Baclofen sent in. But he hasn't had them for several days he should try to wait it out. If doesn't get better needs to see his PCP

## 2017-07-20 ENCOUNTER — PROCEDURE VISIT (OUTPATIENT)
Dept: NEUROLOGY | Facility: CLINIC | Age: 62
End: 2017-07-20
Payer: MEDICARE

## 2017-07-20 DIAGNOSIS — G43.719 CHRONIC MIGRAINE WITHOUT AURA, INTRACTABLE, WITHOUT STATUS MIGRAINOSUS: Primary | ICD-10-CM

## 2017-07-20 DIAGNOSIS — I69.90 LATE EFFECTS OF CVA (CEREBROVASCULAR ACCIDENT): ICD-10-CM

## 2017-07-20 DIAGNOSIS — G81.90 HEMIPARESIS: ICD-10-CM

## 2017-07-20 DIAGNOSIS — R25.2 SPASTICITY: ICD-10-CM

## 2017-07-20 PROCEDURE — 64615 CHEMODENERV MUSC MIGRAINE: CPT | Mod: S$GLB,,, | Performed by: PSYCHIATRY & NEUROLOGY

## 2017-07-20 NOTE — PROCEDURES
Procedures   BOTOX PROCEDURE NOTE       Date of Procedure: 7/20/17      Reason for Proceedure: Chronic Migraine and post CVA spasticity on the right          Procedure Details:  Informed consent was obtained prior to performing this procedure. Two patient identifiers were confirmed with the patient prior to performing injections. A time out to determine correct patient and and agreement on procedure performed was conducted prior to the injections    The patient's head and upper neck and upper right arm was cleansed with alcohol rub and 200 Units of Botox (diluted 1:1) was injected in the following bilateral muscles:    10 units in corregator* (total over 2 sites), 5 units in Procerus, 20 units Frontalis* (over 4 sites), 40 units in Temporalis* (over 4 sites), 30 units in occipitalis* (over 6 sites), 20 units in the cervical paraspinal muscles* (over 4 sites), and 30 units in Trapezius* (over 4 sites). 20 units were added to the right deltoid and 20 units in the right levator scapulae for upper limb spasticity from prior CVA. 5 units given to right pectoral major muscle for spasticity as well.    *= denotes bilateral injections        Medications used: botulinum toxin 200 units diluted 1:1 with normal saline used to dilute Botox       The patient tolerated the procedure well with no more than 0.50cc of blood loss. He was observed for several minutes post injection and given a handout from [employer] regarding when and where to seek help if side effects are experienced     Continue Primidone for tremor and mood treatments are ongoing per  PCP   Botox helps chronic migraine for 3 months and then chronic headache returns.     RTC in 12 weeks or more for more Botox

## 2017-09-08 RX ORDER — POTASSIUM CHLORIDE 20 MEQ/1
TABLET, EXTENDED RELEASE ORAL
Qty: 30 TABLET | Refills: 0 | Status: SHIPPED | OUTPATIENT
Start: 2017-09-08 | End: 2017-10-05 | Stop reason: SDUPTHER

## 2017-10-02 ENCOUNTER — TELEPHONE (OUTPATIENT)
Dept: INTERNAL MEDICINE | Facility: CLINIC | Age: 62
End: 2017-10-02

## 2017-10-05 RX ORDER — POTASSIUM CHLORIDE 20 MEQ/1
TABLET, EXTENDED RELEASE ORAL
Qty: 30 TABLET | Refills: 0 | Status: SHIPPED | OUTPATIENT
Start: 2017-10-05 | End: 2017-10-06 | Stop reason: SDUPTHER

## 2017-10-09 RX ORDER — POTASSIUM CHLORIDE 20 MEQ/1
TABLET, EXTENDED RELEASE ORAL
Qty: 30 TABLET | Refills: 0 | Status: SHIPPED | OUTPATIENT
Start: 2017-10-09 | End: 2017-11-26 | Stop reason: SDUPTHER

## 2017-10-19 ENCOUNTER — PROCEDURE VISIT (OUTPATIENT)
Dept: NEUROLOGY | Facility: CLINIC | Age: 62
End: 2017-10-19
Payer: MEDICARE

## 2017-10-19 DIAGNOSIS — G43.719 CHRONIC MIGRAINE WITHOUT AURA, INTRACTABLE, WITHOUT STATUS MIGRAINOSUS: Primary | ICD-10-CM

## 2017-10-19 PROCEDURE — 64615 CHEMODENERV MUSC MIGRAINE: CPT | Mod: S$GLB,,, | Performed by: PSYCHIATRY & NEUROLOGY

## 2017-10-19 NOTE — PROCEDURES
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 Two patient identifiers were confirmed with the patient prior to performing injections. A time out to determine correct patient and and agreement on procedure performed was conducted prior to the injections     The patient's head and upper neck and upper right arm was cleansed with alcohol rub and 200 Units of Botox (diluted 1:1) was injected in the following bilateral muscles:    10 units in corregator* (total over 2 sites), 5 units in Procerus, 20 units Frontalis* (over 4 sites), 40 units in Temporalis* (over 4 sites), 30 units in occipitalis* (over 6 sites), 20 units in the cervical paraspinal muscles* (over 4 sites), and 30 units in Trapezius* (over 4 sites). 20 units were added to the right deltoid and 20 units in the right levator scapulae for upper limb spasticity from prior CVA. 5 units given to right pectoral major muscle for spasticity as well.    *= denotes bilateral injections        Medications used: botulinum toxin 200 units diluted 1:1 with normal saline used to dilute Botox       The patient tolerated the procedure well with no more than 0.50cc of blood loss. He was observed for several minutes post injection and given a handout from [employer] regarding when and where to seek help if side effects are experienced      Continue Primidone for tremor and mood treatments are ongoing per  PCP   Botox helps chronic migraine for 3 months and then chronic headache returns per prior pattern- only in the past 2-3 weeks this cycle.    Reviewed CT head and neck from last month (patient reports he had lower facial cellulitis which was treated by PCP). CT head unremarkable. Cellulitis, per report of soft tissue neck CT, was likely from poor dentition. See Dentist advised.    RTC in 12 weeks or more for more Botox

## 2017-11-27 RX ORDER — POTASSIUM CHLORIDE 20 MEQ/1
TABLET, EXTENDED RELEASE ORAL
Qty: 30 TABLET | Refills: 0 | Status: SHIPPED | OUTPATIENT
Start: 2017-11-27 | End: 2018-01-10 | Stop reason: SDUPTHER

## 2017-12-12 RX ORDER — PRIMIDONE 50 MG/1
TABLET ORAL
Qty: 90 TABLET | Refills: 11 | Status: ON HOLD | OUTPATIENT
Start: 2017-12-12 | End: 2019-05-31 | Stop reason: HOSPADM

## 2017-12-12 RX ORDER — METOPROLOL TARTRATE 100 MG/1
TABLET ORAL
Qty: 60 TABLET | Refills: 10 | Status: SHIPPED | OUTPATIENT
Start: 2017-12-12 | End: 2018-08-15 | Stop reason: SDUPTHER

## 2018-01-10 RX ORDER — POTASSIUM CHLORIDE 20 MEQ/1
TABLET, EXTENDED RELEASE ORAL
Qty: 30 TABLET | Refills: 0 | Status: SHIPPED | OUTPATIENT
Start: 2018-01-10 | End: 2018-02-08 | Stop reason: SDUPTHER

## 2018-02-08 RX ORDER — POTASSIUM CHLORIDE 20 MEQ/1
TABLET, EXTENDED RELEASE ORAL
Qty: 30 TABLET | Refills: 0 | Status: SHIPPED | OUTPATIENT
Start: 2018-02-08 | End: 2018-03-17 | Stop reason: SDUPTHER

## 2018-03-18 RX ORDER — POTASSIUM CHLORIDE 20 MEQ/1
TABLET, EXTENDED RELEASE ORAL
Qty: 30 TABLET | Refills: 0 | Status: SHIPPED | OUTPATIENT
Start: 2018-03-18 | End: 2018-04-18 | Stop reason: SDUPTHER

## 2018-04-18 RX ORDER — POTASSIUM CHLORIDE 20 MEQ/1
TABLET, EXTENDED RELEASE ORAL
Qty: 30 TABLET | Refills: 0 | Status: SHIPPED | OUTPATIENT
Start: 2018-04-18 | End: 2018-05-17 | Stop reason: SDUPTHER

## 2018-05-03 ENCOUNTER — PROCEDURE VISIT (OUTPATIENT)
Dept: NEUROLOGY | Facility: CLINIC | Age: 63
End: 2018-05-03
Payer: MEDICARE

## 2018-05-03 DIAGNOSIS — G43.719 CHRONIC MIGRAINE WITHOUT AURA, INTRACTABLE, WITHOUT STATUS MIGRAINOSUS: Primary | ICD-10-CM

## 2018-05-03 PROCEDURE — 64615 CHEMODENERV MUSC MIGRAINE: CPT | Mod: S$GLB,,, | Performed by: PSYCHIATRY & NEUROLOGY

## 2018-05-03 NOTE — PROCEDURES
Procedures     BOTOX PROCEDURE NOTE       Date of Procedure: 5/3/2017      Reason for Proceedure: Chronic Migraine and post CVA spasticity on the right          Procedure Details:  Informed consent was obtained prior to performing this procedure. Two patient identifiers were confirmed with the patient prior to performing injections. A time out to determine correct patient and and agreement on procedure performed was conducted prior to the injections     The patient's head and upper neck and upper right arm was cleansed with alcohol rub and 200 Units of Botox (diluted 1:1) was injected in the following bilateral muscles:    10 units in corregator* (total over 2 sites), 5 units in Procerus, 20 units Frontalis* (over 4 sites), 40 units in Temporalis* (over 4 sites), 30 units in occipitalis* (over 6 sites), 20 units in the cervical paraspinal muscles* (over 4 sites), and 30 units in Trapezius* (over 4 sites). 20 units were added to the right deltoid and 20 units in the right levator scapulae for upper limb spasticity from prior CVA. 5 units given to right pectoral major muscle for spasticity as well.    *= denotes bilateral injections        Medications used: botulinum toxin 200 units diluted 1:1 with normal saline used to dilute Botox       The patient tolerated the procedure well with no more than 0.50cc of blood loss. He was observed for several minutes post injection and given a handout from [employer] regarding when and where to seek help if side effects are experienced      Continue Primidone for tremor which is stable and mood treatments are ongoing per  PCP   Botox helps chronic migraine for 3 months about he is behind on treatments due to his wife's recent illness. Will resume q 3 months treatments  Facial issues resolved with tooth removal at Dentist per patient.   RTC in 12 weeks or more for more Botox

## 2018-05-17 RX ORDER — POTASSIUM CHLORIDE 20 MEQ/1
TABLET, EXTENDED RELEASE ORAL
Qty: 30 TABLET | Refills: 0 | Status: SHIPPED | OUTPATIENT
Start: 2018-05-17 | End: 2018-06-14 | Stop reason: SDUPTHER

## 2018-06-14 RX ORDER — POTASSIUM CHLORIDE 20 MEQ/1
TABLET, EXTENDED RELEASE ORAL
Qty: 30 TABLET | Refills: 0 | Status: SHIPPED | OUTPATIENT
Start: 2018-06-14 | End: 2018-07-24 | Stop reason: SDUPTHER

## 2018-06-14 RX ORDER — PRIMIDONE 50 MG/1
TABLET ORAL
Qty: 90 TABLET | Refills: 11 | Status: SHIPPED | OUTPATIENT
Start: 2018-06-14 | End: 2018-10-26 | Stop reason: SDUPTHER

## 2018-07-17 DIAGNOSIS — R05.9 COUGH: ICD-10-CM

## 2018-07-18 RX ORDER — ALBUTEROL SULFATE 1.25 MG/3ML
SOLUTION RESPIRATORY (INHALATION)
Qty: 75 ML | Refills: 0 | Status: SHIPPED | OUTPATIENT
Start: 2018-07-18 | End: 2018-10-26

## 2018-07-24 RX ORDER — POTASSIUM CHLORIDE 20 MEQ/1
TABLET, EXTENDED RELEASE ORAL
Qty: 30 TABLET | Refills: 0 | Status: SHIPPED | OUTPATIENT
Start: 2018-07-24 | End: 2018-08-15 | Stop reason: SDUPTHER

## 2018-07-26 ENCOUNTER — PROCEDURE VISIT (OUTPATIENT)
Dept: NEUROLOGY | Facility: CLINIC | Age: 63
End: 2018-07-26
Payer: MEDICARE

## 2018-07-26 DIAGNOSIS — R25.2 SPASTICITY: ICD-10-CM

## 2018-07-26 DIAGNOSIS — G81.91 RIGHT HEMIPARESIS: ICD-10-CM

## 2018-07-26 DIAGNOSIS — G43.719 CHRONIC MIGRAINE WITHOUT AURA, INTRACTABLE, WITHOUT STATUS MIGRAINOSUS: Primary | ICD-10-CM

## 2018-07-26 DIAGNOSIS — I69.90 LATE EFFECTS OF CVA (CEREBROVASCULAR ACCIDENT): ICD-10-CM

## 2018-07-26 PROCEDURE — 64615 CHEMODENERV MUSC MIGRAINE: CPT | Mod: S$GLB,,, | Performed by: PSYCHIATRY & NEUROLOGY

## 2018-07-26 NOTE — PROCEDURES
Procedures   BOTOX PROCEDURE NOTE       Date of Procedure: 7/26/18      Reason for Procedure: Chronic Migraine and post CVA spasticity on the right          Procedure Details:  Informed consent was obtained prior to performing this procedure. Two patient identifiers were confirmed with the patient prior to performing injections. A time out to determine correct patient and and agreement on procedure performed was conducted prior to the injections     The patient's head and upper neck and upper right arm was cleansed with alcohol rub and 200 Units of Botox (diluted 1:1) was injected in the following bilateral muscles:    10 units in corregator* (total over 2 sites), 5 units in Procerus, 20 units Frontalis* (over 4 sites), 40 units in Temporalis* (over 4 sites), 30 units in occipitalis* (over 6 sites), 20 units in the cervical paraspinal muscles* (over 4 sites), and 30 units in Trapezius* (over 4 sites). 20 units were added to the right deltoid and 20 units in the right levator scapulae for upper limb spasticity from prior CVA. 5 units given to right pectoral major muscle for spasticity as well.    *= denotes bilateral injections        Medications used: botulinum toxin 200 units diluted 1:1 with normal saline used to dilute Botox       The patient tolerated the procedure well with no more than 0.50cc of blood loss. He was observed for several minutes post injection and given a handout from [employer] regarding when and where to seek help if side effects are experienced      Continue Primidone for tremor which is stable and mood treatments are ongoing per  PCP  Botox helps chronic migraine for 3 months which helps headache prevention and spasticity- symptoms all improved again since last visit  RTC in 12 weeks or more for more Botox

## 2018-08-15 RX ORDER — METOPROLOL TARTRATE 100 MG/1
100 TABLET ORAL 2 TIMES DAILY
Qty: 60 TABLET | Refills: 10 | Status: SHIPPED | OUTPATIENT
Start: 2018-08-15 | End: 2018-08-16 | Stop reason: SDUPTHER

## 2018-08-15 RX ORDER — POTASSIUM CHLORIDE 20 MEQ/1
20 TABLET, EXTENDED RELEASE ORAL DAILY
Qty: 30 TABLET | Refills: 0 | Status: SHIPPED | OUTPATIENT
Start: 2018-08-15 | End: 2018-10-26

## 2018-08-16 RX ORDER — POTASSIUM CHLORIDE 20 MEQ/1
20 TABLET, EXTENDED RELEASE ORAL DAILY
Qty: 90 TABLET | Refills: 1 | Status: SHIPPED | OUTPATIENT
Start: 2018-08-16 | End: 2019-04-02 | Stop reason: SDUPTHER

## 2018-08-16 RX ORDER — METOPROLOL TARTRATE 100 MG/1
100 TABLET ORAL 2 TIMES DAILY
Qty: 90 TABLET | Refills: 3 | Status: SHIPPED | OUTPATIENT
Start: 2018-08-16 | End: 2021-02-25 | Stop reason: SDUPTHER

## 2018-08-16 NOTE — TELEPHONE ENCOUNTER
----- Message from Shiv Ordoñez sent at 2018  2:19 PM CDT -----  Contact: [facility] PHARMACY CUT OFF  Reymundo Martinezhaider Blanco.  MRN: [id number]  : 1955  PCP: Radha Rhodes  Home Phone      [phone]  Work Phone      Not on file.  Mobile          [phone]      MESSAGE: PT'S METOPROLOL AND POTASSIUM NEED TO BE RESENT AS 90 DAY SUPPLY.     PHONE: [phone]    [facility]  PHARMACY CUT OFF

## 2018-10-25 ENCOUNTER — PROCEDURE VISIT (OUTPATIENT)
Dept: NEUROLOGY | Facility: CLINIC | Age: 63
End: 2018-10-25
Payer: MEDICARE

## 2018-10-25 DIAGNOSIS — R25.2 SPASTICITY: ICD-10-CM

## 2018-10-25 DIAGNOSIS — G43.719 CHRONIC MIGRAINE WITHOUT AURA, INTRACTABLE, WITHOUT STATUS MIGRAINOSUS: Primary | ICD-10-CM

## 2018-10-25 PROCEDURE — 64615 CHEMODENERV MUSC MIGRAINE: CPT | Mod: PBBFAC | Performed by: PSYCHIATRY & NEUROLOGY

## 2018-10-25 PROCEDURE — 64615 CHEMODENERV MUSC MIGRAINE: CPT | Mod: S$PBB,,, | Performed by: PSYCHIATRY & NEUROLOGY

## 2018-10-25 RX ADMIN — ONABOTULINUMTOXINA 200 UNITS: 100 INJECTION, POWDER, LYOPHILIZED, FOR SOLUTION INTRADERMAL; INTRAMUSCULAR at 11:10

## 2018-10-25 NOTE — PROCEDURES
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 Two patient identifiers were confirmed with the patient prior to performing injections. A time out to determine correct patient and and agreement on procedure performed was conducted prior to the injections     The patient's head and upper neck and upper right arm was cleansed with alcohol rub and 200 Units of Botox (diluted 1:1) was injected in the following bilateral muscles:    10 units in corregator* (total over 2 sites), 5 units in Procerus, 20 units Frontalis* (over 4 sites), 40 units in Temporalis* (over 4 sites), 30 units in occipitalis* (over 6 sites), 20 units in the cervical paraspinal muscles* (over 4 sites), and 30 units in Trapezius* (over 4 sites). 20 units were added to the right deltoid and 20 units in the right levator scapulae for upper limb spasticity from prior CVA. 5 units given to right pectoral major muscle for spasticity as well.    *= denotes bilateral injections        Medications used: botulinum toxin 200 units diluted 1:1 with normal saline used to dilute Botox       The patient tolerated the procedure well with no more than 0.50cc of blood loss. He was observed for several minutes post injection and given a handout from [employer] regarding when and where to seek help if side effects are experienced      Continue Primidone for tremor which is stable and mood is now treated by PCP and he states is good.   Botox helps chronic migraine for 3 months which helps headache prevention and spasticity- will continue current treatment  Wife states patient has episodic non-daily short term memory loss which seems a bit worse again. His prior neuropsychological testing was consistent with loss consistent with CVA/aneurysm. At this point I asked to review his med list which  is not updated in our chart to be sure polypharmacy is not contributing. Wife states she will get this list to me.   RTC in 12 weeks or more for more Botox

## 2018-10-26 ENCOUNTER — TELEPHONE (OUTPATIENT)
Dept: NEUROLOGY | Facility: CLINIC | Age: 63
End: 2018-10-26

## 2018-10-26 RX ORDER — LANOLIN ALCOHOL/MO/W.PET/CERES
1 CREAM (GRAM) TOPICAL DAILY
COMMUNITY

## 2018-10-26 RX ORDER — LISINOPRIL 20 MG/1
1 TABLET ORAL DAILY
Refills: 3 | Status: ON HOLD | COMMUNITY
Start: 2018-10-05 | End: 2019-05-31 | Stop reason: HOSPADM

## 2018-10-26 RX ORDER — VORTIOXETINE 10 MG/1
1 TABLET, FILM COATED ORAL DAILY
COMMUNITY

## 2018-10-26 RX ORDER — VITAMIN E 268 MG
400 CAPSULE ORAL DAILY
COMMUNITY

## 2018-10-26 NOTE — TELEPHONE ENCOUNTER
Patient brought med list to clinic to review.  No concerns about polypharmacy.  Will monitor memory over time given this is a prior complaint for him.  May need to repeat work up again soon if this worsens but his complaint is the same and he is functioning well/ no restrictions. .

## 2019-01-18 ENCOUNTER — PROCEDURE VISIT (OUTPATIENT)
Dept: NEUROLOGY | Facility: CLINIC | Age: 64
End: 2019-01-18
Payer: MEDICARE

## 2019-01-18 DIAGNOSIS — G43.719 CHRONIC MIGRAINE WITHOUT AURA, INTRACTABLE, WITHOUT STATUS MIGRAINOSUS: Primary | ICD-10-CM

## 2019-01-18 PROCEDURE — 64615 CHEMODENERV MUSC MIGRAINE: CPT | Mod: S$GLB,,, | Performed by: PSYCHIATRY & NEUROLOGY

## 2019-01-18 PROCEDURE — 64615 PR CHEMODENERVATION OF MUSCLE FOR CHRONIC MIGRAINE: ICD-10-PCS | Mod: S$GLB,,, | Performed by: PSYCHIATRY & NEUROLOGY

## 2019-01-18 NOTE — PROCEDURES
BOTOX PROCEDURE NOTE       Date of Procedure: 1/18/19      Reason for Procedure: Chronic Migraine and post CVA spasticity on the right          Procedure Details:  Informed consent was obtained prior to performing this procedure. Two patient identifiers were confirmed with the patient prior to performing injections. A time out to determine correct patient and and agreement on procedure performed was conducted prior to the injections     The patient's head and upper neck and upper right arm was cleansed with alcohol rub and 200 Units of Botox (diluted 1:1) was injected in the following bilateral muscles:    10 units in corregator* (total over 2 sites), 5 units in Procerus, 20 units Frontalis* (over 4 sites), 40 units in Temporalis* (over 4 sites), 30 units in occipitalis* (over 6 sites), 20 units in the cervical paraspinal muscles* (over 4 sites), and 30 units in Trapezius* (over 4 sites). 20 units were added to the right deltoid and 20 units in the right levator scapulae for upper limb spasticity from prior CVA. 5 units given to right pectoral major muscle for spasticity as well.    *= denotes bilateral injections        Medications used: botulinum toxin 200 units diluted 1:1 with normal saline used to dilute Botox       The patient tolerated the procedure well with no more than 0.50cc of blood loss. He was observed for several minutes post injection and given a handout from [employer] regarding when and where to seek help if side effects are experienced      Continue Primidone for tremor which is stable and mood is now treated by PCP and he states is good.   Botox helps chronic migraine for 3 months which helps headache prevention and spasticity- will continue current treatment  Patient has had chronic mild memory loss complaint His prior neuropsychological testing was consistent with loss consistent with CVA/aneurysm- monitor,but his complaint is the same/stable now and he is functioning well/ no restrictions.  .     RTC in 12 weeks or more for more Botox

## 2019-01-30 ENCOUNTER — OFFICE VISIT (OUTPATIENT)
Dept: INTERNAL MEDICINE | Facility: CLINIC | Age: 64
End: 2019-01-30
Payer: MEDICARE

## 2019-01-30 VITALS
BODY MASS INDEX: 42.27 KG/M2 | DIASTOLIC BLOOD PRESSURE: 80 MMHG | SYSTOLIC BLOOD PRESSURE: 124 MMHG | HEART RATE: 62 BPM | RESPIRATION RATE: 20 BRPM | WEIGHT: 278.88 LBS | HEIGHT: 68 IN | OXYGEN SATURATION: 98 %

## 2019-01-30 DIAGNOSIS — I10 HYPERTENSION, UNSPECIFIED TYPE: ICD-10-CM

## 2019-01-30 DIAGNOSIS — G43.719 INTRACTABLE CHRONIC MIGRAINE WITHOUT AURA AND WITHOUT STATUS MIGRAINOSUS: Primary | ICD-10-CM

## 2019-01-30 DIAGNOSIS — I69.359 HEMIPARESIS AFFECTING DOMINANT SIDE AS LATE EFFECT OF CEREBROVASCULAR ACCIDENT (CVA): ICD-10-CM

## 2019-01-30 DIAGNOSIS — I25.119 CORONARY ARTERY DISEASE INVOLVING NATIVE CORONARY ARTERY OF NATIVE HEART WITH ANGINA PECTORIS: ICD-10-CM

## 2019-01-30 DIAGNOSIS — K50.90 CROHN'S DISEASE WITHOUT COMPLICATION, UNSPECIFIED GASTROINTESTINAL TRACT LOCATION: ICD-10-CM

## 2019-01-30 DIAGNOSIS — F32.A ANXIETY AND DEPRESSION: ICD-10-CM

## 2019-01-30 DIAGNOSIS — F41.9 ANXIETY AND DEPRESSION: ICD-10-CM

## 2019-01-30 DIAGNOSIS — R79.9 ABNORMAL FINDING OF BLOOD CHEMISTRY: ICD-10-CM

## 2019-01-30 DIAGNOSIS — R60.9 EDEMA, UNSPECIFIED TYPE: ICD-10-CM

## 2019-01-30 DIAGNOSIS — E78.5 HYPERLIPIDEMIA, UNSPECIFIED HYPERLIPIDEMIA TYPE: ICD-10-CM

## 2019-01-30 DIAGNOSIS — Z12.5 SCREENING FOR PROSTATE CANCER: ICD-10-CM

## 2019-01-30 PROCEDURE — 3079F PR MOST RECENT DIASTOLIC BLOOD PRESSURE 80-89 MM HG: ICD-10-PCS | Mod: CPTII,S$GLB,, | Performed by: NURSE PRACTITIONER

## 2019-01-30 PROCEDURE — 99499 RISK ADDL DX/OHS AUDIT: ICD-10-PCS | Mod: S$GLB,,, | Performed by: NURSE PRACTITIONER

## 2019-01-30 PROCEDURE — 3008F BODY MASS INDEX DOCD: CPT | Mod: CPTII,S$GLB,, | Performed by: NURSE PRACTITIONER

## 2019-01-30 PROCEDURE — 3074F PR MOST RECENT SYSTOLIC BLOOD PRESSURE < 130 MM HG: ICD-10-PCS | Mod: CPTII,S$GLB,, | Performed by: NURSE PRACTITIONER

## 2019-01-30 PROCEDURE — 3079F DIAST BP 80-89 MM HG: CPT | Mod: CPTII,S$GLB,, | Performed by: NURSE PRACTITIONER

## 2019-01-30 PROCEDURE — 99214 OFFICE O/P EST MOD 30 MIN: CPT | Mod: S$GLB,,, | Performed by: NURSE PRACTITIONER

## 2019-01-30 PROCEDURE — 3074F SYST BP LT 130 MM HG: CPT | Mod: CPTII,S$GLB,, | Performed by: NURSE PRACTITIONER

## 2019-01-30 PROCEDURE — 99214 PR OFFICE/OUTPT VISIT, EST, LEVL IV, 30-39 MIN: ICD-10-PCS | Mod: S$GLB,,, | Performed by: NURSE PRACTITIONER

## 2019-01-30 PROCEDURE — 99499 UNLISTED E&M SERVICE: CPT | Mod: S$GLB,,, | Performed by: NURSE PRACTITIONER

## 2019-01-30 PROCEDURE — 99999 PR PBB SHADOW E&M-EST. PATIENT-LVL III: ICD-10-PCS | Mod: PBBFAC,,, | Performed by: NURSE PRACTITIONER

## 2019-01-30 PROCEDURE — 99999 PR PBB SHADOW E&M-EST. PATIENT-LVL III: CPT | Mod: PBBFAC,,, | Performed by: NURSE PRACTITIONER

## 2019-01-30 PROCEDURE — 3008F PR BODY MASS INDEX (BMI) DOCUMENTED: ICD-10-PCS | Mod: CPTII,S$GLB,, | Performed by: NURSE PRACTITIONER

## 2019-01-30 RX ORDER — ALPRAZOLAM 0.5 MG/1
0.5 TABLET ORAL 2 TIMES DAILY PRN
Qty: 60 TABLET | Refills: 0
Start: 2019-01-30 | End: 2019-02-20 | Stop reason: SDUPTHER

## 2019-01-30 RX ORDER — MESALAMINE 800 MG/1
800 TABLET, DELAYED RELEASE ORAL DAILY
Qty: 30 TABLET | Refills: 11
Start: 2019-01-30 | End: 2022-06-06 | Stop reason: SDUPTHER

## 2019-01-30 NOTE — PROGRESS NOTES
Patient, Reymundo Dang Sr. (MRN #[id number]), presented with a recorded BMI of 42.4 kg/m^2 consistent with the definition of morbid obesity (ICD-10 E66.01). The patient's morbid obesity was monitored, evaluated, addressed and/or treated. This addendum to the medical record is made on 01/30/2019.

## 2019-01-30 NOTE — PROGRESS NOTES
Subjective:       Patient ID: Reymundo Dang Sr. is a 63 y.o. male.    Chief Complaint: Establish Care    HPI: Pt presents to clinic today known to me from me seeing his wife. He is here because his PCP is retiring and would like to establish. He has extensive hx. s/p Right Cerebral Craniotomy with Aneurysm Coiling, with chronic migraine headaches post craniotomy and Left CVA with mild right hemiparesis apparently unknown date. Neuopsychological testing showed loss consistent with prior CVA/ aneurysm. - per neurology notes    Sees Dr Ramos neurology every 3 month he has botox for the headaches  Has not seen cardiology in some time. Can not remember whom they saw. Has flunked 2 stress test in past. Never had heart cath per their reports.     Past Medical History:   Diagnosis Date    Aneurysm      Crohn's disease     CVA (cerebral vascular accident)- - used to see GI but not seeing anyone now. Has under control with ascol - does samples for stool yearly     Edema     History of organic brain syndrome     Hyperlipidemia     Hypertension     Restless legs syndrome (RLS)        Past Surgical History:   Procedure Laterality Date    BRAIN SURGERY      Stent Aneurysm    CERVICAL SPINE SURGERY      CHOLECYSTECTOMY      HERNIA REPAIR      SHOULDER SURGERY      Right       Family History   Problem Relation Age of Onset    Glaucoma Mother     Clotting disorder Mother     Aneurysm Father     Lung cancer Brother        Social History     Socioeconomic History    Marital status:      Spouse name: None    Number of children: None    Years of education: None    Highest education level: None   Social Needs    Financial resource strain: None    Food insecurity - worry: None    Food insecurity - inability: None    Transportation needs - medical: None    Transportation needs - non-medical: None   Occupational History    None   Tobacco Use    Smoking status: Never Smoker    Smokeless tobacco:  Never Used   Substance and Sexual Activity    Alcohol use: No    Drug use: No    Sexual activity: None   Other Topics Concern    None   Social History Narrative    None       Current Outpatient Medications   Medication Sig Dispense Refill    acetaminophen/diphenhydramine (ACETAMINOPHEN PM ORAL) Take 2 tablets by mouth nightly as needed.      alprazolam (XANAX) 0.5 MG tablet Take 1 tablet (0.5 mg total) by BID as needed. 90 tablet 0    amlodipine (NORVASC) 10 MG tablet Take 1 tablet (10 mg total) by mouth once daily. 90 tablet 3    buPROPion (WELLBUTRIN XL) 300 MG 24 hr tablet Take 300 mg by mouth once daily.      cyanocobalamin (VITAMIN B-12) 1000 MCG tablet Take 1 tablet by mouth once daily.      hydrochlorothiazide (HYDRODIURIL) 25 MG tablet Take 1 tablet (25 mg total) by mouth once daily. 90 tablet 3    lisinopril (PRINIVIL,ZESTRIL) 20 MG tablet Take 1 tablet by mouth once daily.  3    mesalamine (ASACOL) 400 mg EC tablet Take 1 tablet (400 mg total) by mouth 3 (three) times daily. (Patient taking differently: Take 800 mg by mouth 3 (three) times daily. ) 90 tablet 3    metoprolol tartrate (LOPRESSOR) 100 MG tablet Take 1 tablet (100 mg total) by mouth 2 (two) times daily. 90 tablet 3    MULTIVITAMIN W-MINERALS/LUTEIN (CENTRUM SILVER ORAL) Take 1 tablet by mouth once daily.        omeprazole (PRILOSEC) 40 MG capsule Take 40 mg by mouth once daily.      potassium chloride SA (K-DUR,KLOR-CON) 20 MEQ tablet Take 1 tablet (20 mEq total) by mouth once daily. 90 tablet 1    primidone (MYSOLINE) 50 MG Tab TAKE 1 TABLET BY MOUTH 3 TIMES A DAY (Patient taking differently: TAKE 2 TABLET BY MOUTH BID) 90 tablet 11    pyridoxine HCl, vitamin B6, (VITAMIN B-6 ORAL) Take 1 tablet by mouth once daily.      simvastatin (ZOCOR) 20 MG tablet Take 1 tablet (20 mg total) by mouth every evening. 30 tablet 11    VESICARE 5 mg tablet Take 5 mg by mouth once daily.  0    vitamin E 400 UNIT capsule Take 400 Units by  mouth once daily.      vortioxetine (TRINTELLIX) 10 mg Tab Take 1 tablet by mouth once daily.       No current facility-administered medications for this visit.        Review of patient's allergies indicates:   Allergen Reactions    Aspirin      Other reaction(s): Hx of Aneurysm    Sulfa (sulfonamide antibiotics) Other (See Comments)     Other reaction(s): dizzy       Review of Systems   Constitutional: Negative for chills and fever.   HENT: Negative for congestion, postnasal drip and sore throat.    Eyes: Negative for photophobia.   Respiratory: Negative for chest tightness and shortness of breath.    Cardiovascular: Negative for chest pain.   Gastrointestinal: Negative for abdominal distention, abdominal pain, blood in stool and vomiting.   Genitourinary: Negative for dysuria, flank pain and hematuria.   Musculoskeletal: Negative for back pain.   Skin: Negative for pallor.   Neurological: Negative for dizziness, seizures, facial asymmetry, speech difficulty and numbness.   Hematological: Does not bruise/bleed easily.   Psychiatric/Behavioral: Negative for agitation and suicidal ideas. The patient is not nervous/anxious.        Objective:      Physical Exam   Constitutional: He is oriented to person, place, and time. He appears well-developed and well-nourished.   HENT:   Head: Normocephalic and atraumatic.   Right Ear: External ear normal.   Left Ear: External ear normal.   Nose: Nose normal.   Mouth/Throat: Oropharynx is clear and moist. No oropharyngeal exudate.   Eyes: Conjunctivae and EOM are normal. Pupils are equal, round, and reactive to light.   Neck: Normal range of motion. Neck supple. No JVD present. No thyromegaly present.   Cardiovascular: Normal rate, regular rhythm, normal heart sounds and intact distal pulses.   No murmur heard.  Pulmonary/Chest: Effort normal and breath sounds normal. No respiratory distress.   Abdominal: Soft. Bowel sounds are normal. He exhibits no distension and no mass.  There is no tenderness. There is no guarding.   Musculoskeletal: Normal range of motion. He exhibits edema.   1+ edema bilateral lower ext   Lymphadenopathy:     He has no cervical adenopathy.   Neurological: He is alert and oriented to person, place, and time. He has normal reflexes. No cranial nerve deficit.   No tremor noted  Weak on right side   Skin: Skin is warm and dry. Capillary refill takes less than 2 seconds. No rash noted. No pallor.   Psychiatric: He has a normal mood and affect.   Nursing note and vitals reviewed.      Assessment:       1. Intractable chronic migraine without aura and without status migrainosus    2. Coronary artery disease involving native coronary artery of native heart with angina pectoris    3. Edema, unspecified type    4. Crohn's disease without complication, unspecified gastrointestinal tract location    5. Hyperlipidemia, unspecified hyperlipidemia type    6. Hypertension, unspecified type    7. Hemiparesis affecting dominant side as late effect of cerebrovascular accident (CVA)    8. Anxiety and depression    9. Screening for prostate cancer    10. Abnormal finding of blood chemistry         Plan:     Problem List Items Addressed This Visit     Intractable chronic migraine without aura - Primary    Crohn's disease    Relevant Medications    mesalamine (ASACOL HD) 800 mg TbEC    Other Relevant Orders    Ambulatory Referral to Gastroenterology    Edema    Hemiparesis affecting dominant side as late effect of cerebrovascular accident (CVA)    Hyperlipidemia    Relevant Orders    Lipid panel    Hypertension    Relevant Orders    CBC auto differential    Comprehensive metabolic panel    Coronary artery disease involving native coronary artery of native heart with angina pectoris    Relevant Orders    Hemoglobin A1c      Other Visit Diagnoses     Anxiety and depression        Relevant Medications    ALPRAZolam (XANAX) 0.5 MG tablet    Other Relevant Orders    TSH    Screening for  prostate cancer        Relevant Orders    PSA, Screening    Abnormal finding of blood chemistry         Relevant Orders    Hemoglobin A1c        Check labs and GI, f/u here after labs to review any med changes to be made then

## 2019-01-30 NOTE — PROGRESS NOTES
Demographics, clinic note and referral faxed to Dr. Morrison at [phone]. Fax confirmation received. Patient notified of appointment at [facility] 2/11/19 at 9:30AM. Patient verbalized understanding.

## 2019-02-05 ENCOUNTER — CLINICAL SUPPORT (OUTPATIENT)
Dept: INTERNAL MEDICINE | Facility: CLINIC | Age: 64
End: 2019-02-05
Payer: MEDICARE

## 2019-02-05 DIAGNOSIS — Z12.5 SCREENING FOR PROSTATE CANCER: ICD-10-CM

## 2019-02-05 DIAGNOSIS — F41.9 ANXIETY AND DEPRESSION: ICD-10-CM

## 2019-02-05 DIAGNOSIS — F32.A ANXIETY AND DEPRESSION: ICD-10-CM

## 2019-02-05 DIAGNOSIS — R79.9 ABNORMAL FINDING OF BLOOD CHEMISTRY: ICD-10-CM

## 2019-02-05 DIAGNOSIS — I10 HYPERTENSION, UNSPECIFIED TYPE: ICD-10-CM

## 2019-02-05 DIAGNOSIS — I25.119 CORONARY ARTERY DISEASE INVOLVING NATIVE CORONARY ARTERY OF NATIVE HEART WITH ANGINA PECTORIS: ICD-10-CM

## 2019-02-05 DIAGNOSIS — E78.5 HYPERLIPIDEMIA, UNSPECIFIED HYPERLIPIDEMIA TYPE: ICD-10-CM

## 2019-02-05 LAB
ALBUMIN SERPL BCP-MCNC: 3.8 G/DL
ALP SERPL-CCNC: 69 U/L
ALT SERPL W/O P-5'-P-CCNC: 24 U/L
ANION GAP SERPL CALC-SCNC: 13 MMOL/L
AST SERPL-CCNC: 20 U/L
BASOPHILS # BLD AUTO: 0.03 K/UL
BASOPHILS NFR BLD: 0.5 %
BILIRUB SERPL-MCNC: 0.5 MG/DL
BUN SERPL-MCNC: 17 MG/DL
CALCIUM SERPL-MCNC: 9.8 MG/DL
CHLORIDE SERPL-SCNC: 104 MMOL/L
CHOLEST SERPL-MCNC: 146 MG/DL
CHOLEST/HDLC SERPL: 3 {RATIO}
CO2 SERPL-SCNC: 29 MMOL/L
COMPLEXED PSA SERPL-MCNC: 0.38 NG/ML
CREAT SERPL-MCNC: 1 MG/DL
DIFFERENTIAL METHOD: NORMAL
EOSINOPHIL # BLD AUTO: 0.2 K/UL
EOSINOPHIL NFR BLD: 3.2 %
ERYTHROCYTE [DISTWIDTH] IN BLOOD BY AUTOMATED COUNT: 12.6 %
EST. GFR  (AFRICAN AMERICAN): >60 ML/MIN/1.73 M^2
EST. GFR  (NON AFRICAN AMERICAN): >60 ML/MIN/1.73 M^2
ESTIMATED AVG GLUCOSE: 103 MG/DL
GLUCOSE SERPL-MCNC: 82 MG/DL
HBA1C MFR BLD HPLC: 5.2 %
HCT VFR BLD AUTO: 47.6 %
HDLC SERPL-MCNC: 49 MG/DL
HDLC SERPL: 33.6 %
HGB BLD-MCNC: 15.7 G/DL
LDLC SERPL CALC-MCNC: 69 MG/DL
LYMPHOCYTES # BLD AUTO: 2.3 K/UL
LYMPHOCYTES NFR BLD: 37.4 %
MCH RBC QN AUTO: 30 PG
MCHC RBC AUTO-ENTMCNC: 33 G/DL
MCV RBC AUTO: 91 FL
MONOCYTES # BLD AUTO: 0.8 K/UL
MONOCYTES NFR BLD: 13 %
NEUTROPHILS # BLD AUTO: 2.8 K/UL
NEUTROPHILS NFR BLD: 45.9 %
NONHDLC SERPL-MCNC: 97 MG/DL
PLATELET # BLD AUTO: 244 K/UL
PMV BLD AUTO: 11.3 FL
POTASSIUM SERPL-SCNC: 3.5 MMOL/L
PROT SERPL-MCNC: 7.4 G/DL
RBC # BLD AUTO: 5.24 M/UL
SODIUM SERPL-SCNC: 146 MMOL/L
TRIGL SERPL-MCNC: 140 MG/DL
TSH SERPL DL<=0.005 MIU/L-ACNC: 2.54 UIU/ML
WBC # BLD AUTO: 6.02 K/UL

## 2019-02-05 PROCEDURE — 80061 LIPID PANEL: CPT

## 2019-02-05 PROCEDURE — 85025 COMPLETE CBC W/AUTO DIFF WBC: CPT

## 2019-02-05 PROCEDURE — 99999 PR PBB SHADOW E&M-EST. PATIENT-LVL I: ICD-10-PCS | Mod: PBBFAC,,,

## 2019-02-05 PROCEDURE — 84443 ASSAY THYROID STIM HORMONE: CPT

## 2019-02-05 PROCEDURE — 80053 COMPREHEN METABOLIC PANEL: CPT

## 2019-02-05 PROCEDURE — 99999 PR PBB SHADOW E&M-EST. PATIENT-LVL I: CPT | Mod: PBBFAC,,,

## 2019-02-05 PROCEDURE — 84153 ASSAY OF PSA TOTAL: CPT

## 2019-02-05 PROCEDURE — 83036 HEMOGLOBIN GLYCOSYLATED A1C: CPT

## 2019-02-13 ENCOUNTER — OFFICE VISIT (OUTPATIENT)
Dept: INTERNAL MEDICINE | Facility: CLINIC | Age: 64
End: 2019-02-13
Payer: MEDICARE

## 2019-02-13 VITALS
OXYGEN SATURATION: 98 % | HEART RATE: 62 BPM | WEIGHT: 278.69 LBS | RESPIRATION RATE: 20 BRPM | HEIGHT: 68 IN | BODY MASS INDEX: 42.24 KG/M2 | SYSTOLIC BLOOD PRESSURE: 120 MMHG | DIASTOLIC BLOOD PRESSURE: 74 MMHG

## 2019-02-13 DIAGNOSIS — E78.5 HYPERLIPIDEMIA, UNSPECIFIED HYPERLIPIDEMIA TYPE: ICD-10-CM

## 2019-02-13 DIAGNOSIS — Z13.29 SCREENING FOR HYPOTHYROIDISM: ICD-10-CM

## 2019-02-13 DIAGNOSIS — G25.81 RESTLESS LEGS SYNDROME (RLS): ICD-10-CM

## 2019-02-13 DIAGNOSIS — F41.1 GENERALIZED ANXIETY DISORDER: ICD-10-CM

## 2019-02-13 DIAGNOSIS — I10 HYPERTENSION, UNSPECIFIED TYPE: ICD-10-CM

## 2019-02-13 DIAGNOSIS — E66.01 MORBID OBESITY WITH BMI OF 40.0-44.9, ADULT: ICD-10-CM

## 2019-02-13 DIAGNOSIS — K50.90 CROHN'S DISEASE WITHOUT COMPLICATION, UNSPECIFIED GASTROINTESTINAL TRACT LOCATION: ICD-10-CM

## 2019-02-13 DIAGNOSIS — I69.359 HEMIPARESIS AFFECTING DOMINANT SIDE AS LATE EFFECT OF CEREBROVASCULAR ACCIDENT (CVA): ICD-10-CM

## 2019-02-13 DIAGNOSIS — Z86.59: Primary | ICD-10-CM

## 2019-02-13 PROCEDURE — 99214 PR OFFICE/OUTPT VISIT, EST, LEVL IV, 30-39 MIN: ICD-10-PCS | Mod: S$GLB,,, | Performed by: NURSE PRACTITIONER

## 2019-02-13 PROCEDURE — 3074F SYST BP LT 130 MM HG: CPT | Mod: CPTII,S$GLB,, | Performed by: NURSE PRACTITIONER

## 2019-02-13 PROCEDURE — 3074F PR MOST RECENT SYSTOLIC BLOOD PRESSURE < 130 MM HG: ICD-10-PCS | Mod: CPTII,S$GLB,, | Performed by: NURSE PRACTITIONER

## 2019-02-13 PROCEDURE — 99999 PR PBB SHADOW E&M-EST. PATIENT-LVL III: ICD-10-PCS | Mod: PBBFAC,,, | Performed by: NURSE PRACTITIONER

## 2019-02-13 PROCEDURE — 99999 PR PBB SHADOW E&M-EST. PATIENT-LVL III: CPT | Mod: PBBFAC,,, | Performed by: NURSE PRACTITIONER

## 2019-02-13 PROCEDURE — 3078F PR MOST RECENT DIASTOLIC BLOOD PRESSURE < 80 MM HG: ICD-10-PCS | Mod: CPTII,S$GLB,, | Performed by: NURSE PRACTITIONER

## 2019-02-13 PROCEDURE — 3008F PR BODY MASS INDEX (BMI) DOCUMENTED: ICD-10-PCS | Mod: CPTII,S$GLB,, | Performed by: NURSE PRACTITIONER

## 2019-02-13 PROCEDURE — 3078F DIAST BP <80 MM HG: CPT | Mod: CPTII,S$GLB,, | Performed by: NURSE PRACTITIONER

## 2019-02-13 PROCEDURE — 99214 OFFICE O/P EST MOD 30 MIN: CPT | Mod: S$GLB,,, | Performed by: NURSE PRACTITIONER

## 2019-02-13 PROCEDURE — 3008F BODY MASS INDEX DOCD: CPT | Mod: CPTII,S$GLB,, | Performed by: NURSE PRACTITIONER

## 2019-02-13 NOTE — PROGRESS NOTES
Subjective:       Patient ID: Reymundo Dang Sr. is a 63 y.o. male.    Chief Complaint: Follow-up    HPI: Pt presents to clinic today known to me for f/u. He was last seen and had labs ordered as well as GI referral for Crohn's.      they did see Dr Morrison and he is following up 6 weeks after records reviewed  Lab Results   Component Value Date    WBC 6.02 02/05/2019    HGB 15.7 02/05/2019    HCT 47.6 02/05/2019    MCV 91 02/05/2019     02/05/2019     Lab Results   Component Value Date    TSH 2.538 02/05/2019     Lab Results   Component Value Date    PSA 0.38 02/05/2019     Lab Results   Component Value Date    HGBA1C 5.2 02/05/2019     BMP  Lab Results   Component Value Date     (H) 02/05/2019    K 3.5 02/05/2019     02/05/2019    CO2 29 02/05/2019    BUN 17 02/05/2019    CREATININE 1.0 02/05/2019    CALCIUM 9.8 02/05/2019    ANIONGAP 13 02/05/2019    ESTGFRAFRICA >60 02/05/2019    EGFRNONAA >60 02/05/2019     Lab Results   Component Value Date    ALT 24 02/05/2019    AST 20 02/05/2019    ALKPHOS 69 02/05/2019    BILITOT 0.5 02/05/2019     Lab Results   Component Value Date    CHOL 146 02/05/2019     Lab Results   Component Value Date    HDL 49 02/05/2019     Lab Results   Component Value Date    LDLCALC 69.0 02/05/2019     Lab Results   Component Value Date    TRIG 140 02/05/2019     Lab Results   Component Value Date    CHOLHDL 33.6 02/05/2019         . He has extensive hx. s/p Right Cerebral Craniotomy with Aneurysm Coiling, with chronic migraine headaches post craniotomy and Left CVA with mild right hemiparesis apparently unknown date. Neuopsychological testing showed loss consistent with prior CVA/ aneurysm. - per neurology notes     Sees Dr Ramos neurology every 3 month he has botox for the headaches  Has not seen cardiology in some time. Can not remember whom they saw. Has flunked 2 stress test in past. Never had heart cath per their reports.   Review of Systems   Constitutional:  Negative for chills and fever.   HENT: Negative for congestion, postnasal drip and sore throat.    Eyes: Negative for photophobia.   Respiratory: Negative for chest tightness and shortness of breath.    Cardiovascular: Negative for chest pain.   Gastrointestinal: Negative for abdominal distention, abdominal pain, blood in stool and vomiting.   Genitourinary: Negative for dysuria, flank pain and hematuria.   Musculoskeletal: Negative for back pain.   Skin: Negative for pallor.   Neurological: Negative for dizziness, seizures, facial asymmetry, speech difficulty and numbness.   Hematological: Does not bruise/bleed easily.   Psychiatric/Behavioral: Negative for agitation and suicidal ideas. The patient is not nervous/anxious.        Objective:      Physical Exam   Constitutional: He is oriented to person, place, and time. He appears well-developed and well-nourished.   HENT:   Head: Normocephalic and atraumatic.   Neck: Normal range of motion. Neck supple. No JVD present. No thyromegaly present.   Cardiovascular: Normal rate, regular rhythm and normal heart sounds.   No murmur heard.  Pulmonary/Chest: Effort normal and breath sounds normal. No stridor. No respiratory distress. He has no wheezes. He has no rales.   Abdominal: Soft. Bowel sounds are normal. He exhibits no distension and no mass. There is no tenderness. There is no guarding.   Musculoskeletal: He exhibits no edema.   Neurological: He is alert and oriented to person, place, and time.   Skin: Skin is warm and dry.   Psychiatric: He has a normal mood and affect. His behavior is normal. Judgment and thought content normal.   Nursing note and vitals reviewed.      Assessment:       1. History of organic brain syndrome    2. Crohn's disease without complication, unspecified gastrointestinal tract location    3. Restless legs syndrome (RLS)    4. Hemiparesis affecting dominant side as late effect of cerebrovascular accident (CVA)    5. Hyperlipidemia,  unspecified hyperlipidemia type    6. Hypertension, unspecified type    7. Morbid obesity with BMI of 40.0-44.9, adult    8. Screening for hypothyroidism    9. Generalized anxiety disorder         Plan:     Problem List Items Addressed This Visit     History of organic brain syndrome - Primary    Crohn's disease    Restless legs syndrome (RLS)    Hemiparesis affecting dominant side as late effect of cerebrovascular accident (CVA)    Hyperlipidemia    Relevant Orders    Lipid panel    Hypertension    Relevant Orders    CBC auto differential    Comprehensive metabolic panel    Microalbumin/creatinine urine ratio    Morbid obesity with BMI of 40.0-44.9, adult      Other Visit Diagnoses     Screening for hypothyroidism        Relevant Orders    TSH    Generalized anxiety disorder         Relevant Orders    TSH        Cont same meds and treatment  F/u 6 months with labs.   Cont f/u with Dr Morrison   Labs look great. No SOB/chest pain/SAEED. no cards referral at this time. NEEDS DIET AND EXCERISE

## 2019-02-14 ENCOUNTER — TELEPHONE (OUTPATIENT)
Dept: INTERNAL MEDICINE | Facility: CLINIC | Age: 64
End: 2019-02-14

## 2019-02-14 NOTE — TELEPHONE ENCOUNTER
Patient was prescribed Trintellix 10mg by Dr. Yue Rhodes. States he is still currently taking medication. States Dermatologist would like to prescribe Terbinafine 10mg for fungal infection, however these two medications have a interaction. Patient is requesting recommendations. States he has been on Trintellix for a long time and would not want a medication change. No alternative medications were discussed with Dermatologist. Please advise.

## 2019-02-14 NOTE — TELEPHONE ENCOUNTER
Patient notified per Rosalinda Villalba NP Terbinafine will not be changed because he has been on this medication with relief. Notified since fungus is under nails only her may use OTC Penlac. Patient verbalized understanding. States he will contact Dermatologist with information. No further questions or concerns.

## 2019-02-14 NOTE — TELEPHONE ENCOUNTER
----- Message from Kanika Ray sent at 2019  1:23 PM CST -----  Contact: Yareli (wife)  Reymundo Dang Sr.  MRN: [id number]  : 1955  PCP: Rosalinda Villalba  Home Phone      [phone]  Work Phone      Not on file.  Mobile          [phone]    MESSAGE:   He saw the dermatologist and was prescribed a new medication. The pt was told it would interfere with one of his present medications.  The new med - terbinafine 250 mg / conflict med -  trintellix 10 mg     Phone #  [phone]    [facility] #[id number] - [place], [place] - [address] [facility]

## 2019-02-15 DIAGNOSIS — Z12.11 COLON CANCER SCREENING: ICD-10-CM

## 2019-02-20 ENCOUNTER — TELEPHONE (OUTPATIENT)
Dept: INTERNAL MEDICINE | Facility: CLINIC | Age: 64
End: 2019-02-20

## 2019-02-20 DIAGNOSIS — F41.9 ANXIETY AND DEPRESSION: ICD-10-CM

## 2019-02-20 DIAGNOSIS — F32.A ANXIETY AND DEPRESSION: ICD-10-CM

## 2019-02-20 RX ORDER — ALPRAZOLAM 0.5 MG/1
0.5 TABLET ORAL 2 TIMES DAILY PRN
Qty: 60 TABLET | Refills: 0 | Status: SHIPPED | OUTPATIENT
Start: 2019-02-20 | End: 2019-04-02 | Stop reason: SDUPTHER

## 2019-02-20 NOTE — TELEPHONE ENCOUNTER
Contacted [facility] in [place], stated that pt last refill on xanax was from Dr. Rhodes on 1/7/18 qty 90. Pt is now calling requesting a refill.     I see on his med list you tried to send it on 1/30, but pharmacy did not receive it. Please advise, thank you.

## 2019-02-20 NOTE — TELEPHONE ENCOUNTER
----- Message from Kanika Ray sent at 2019 10:14 AM CST -----  Contact: Yareli (wife)  Reymundo Dang Sr.  MRN: [id number]  : 1955  PCP: Rosalinda Villalba  Home Phone      [phone]  Work Phone      Not on file.  Mobile          [phone]    MESSAGE:   She contacted the pharmacy for refill of medication   ALPRAZolam (XANAX) 0.5 MG tablet - She requested a # 90 day Rx  The pharmacy instructed the pt she had to contact the PCP no request could be sent.     Phone # [phone]    [id number] - [place], [place] - [address] [facility]

## 2019-02-25 ENCOUNTER — TELEPHONE (OUTPATIENT)
Dept: INTERNAL MEDICINE | Facility: CLINIC | Age: 64
End: 2019-02-25

## 2019-02-25 NOTE — TELEPHONE ENCOUNTER
Patient's wife Yareli states Mr. Dwyer received a letter stating Vesicare 5mg will no longer be covered. Last prescription of Vesicae listed in chart is from 2015. Yareli states patient has been taking medication daily prescribed from this office. Per [employer]/Medicare web site no alternatives are covered. Please advise.

## 2019-02-25 NOTE — TELEPHONE ENCOUNTER
----- Message from Kanika Ray sent at 2019 12:27 PM CST -----  Contact: Yareli (wife)  Reymundo Dang Sr.  MRN: [id number]  : 1955  PCP: Rosalinda Villalba  Home Phone      [phone]  Work Phone      Not on file.  Mobile          [phone]    MESSAGE:   The pt's insurance sent a letter to the pt stating they do not cover the medication any longer.   VESICARE 5 mg tablet  She is requesting a substitute of the medication in which the insurance will pay.   90 day Rx requested of new medication.     Phone # [phone]    [id number] - [place], [place] - [address] [facility]

## 2019-03-08 DIAGNOSIS — Z11.59 NEED FOR HEPATITIS C SCREENING TEST: ICD-10-CM

## 2019-03-08 DIAGNOSIS — Z12.11 COLON CANCER SCREENING: ICD-10-CM

## 2019-04-02 ENCOUNTER — OFFICE VISIT (OUTPATIENT)
Dept: INTERNAL MEDICINE | Facility: CLINIC | Age: 64
End: 2019-04-02
Payer: MEDICARE

## 2019-04-02 VITALS
SYSTOLIC BLOOD PRESSURE: 122 MMHG | HEART RATE: 68 BPM | OXYGEN SATURATION: 99 % | HEIGHT: 68 IN | TEMPERATURE: 99 F | WEIGHT: 276.44 LBS | DIASTOLIC BLOOD PRESSURE: 84 MMHG | BODY MASS INDEX: 41.9 KG/M2 | RESPIRATION RATE: 16 BRPM

## 2019-04-02 DIAGNOSIS — R11.14 BILIOUS VOMITING WITH NAUSEA: ICD-10-CM

## 2019-04-02 DIAGNOSIS — F41.9 ANXIETY AND DEPRESSION: ICD-10-CM

## 2019-04-02 DIAGNOSIS — F32.A ANXIETY AND DEPRESSION: ICD-10-CM

## 2019-04-02 DIAGNOSIS — K52.9 GASTROENTERITIS: Primary | ICD-10-CM

## 2019-04-02 PROCEDURE — 3074F SYST BP LT 130 MM HG: CPT | Mod: CPTII,S$GLB,, | Performed by: NURSE PRACTITIONER

## 2019-04-02 PROCEDURE — 3079F DIAST BP 80-89 MM HG: CPT | Mod: CPTII,S$GLB,, | Performed by: NURSE PRACTITIONER

## 2019-04-02 PROCEDURE — 99213 OFFICE O/P EST LOW 20 MIN: CPT | Mod: 25,S$GLB,, | Performed by: NURSE PRACTITIONER

## 2019-04-02 PROCEDURE — 96372 THER/PROPH/DIAG INJ SC/IM: CPT | Mod: S$GLB,,, | Performed by: NURSE PRACTITIONER

## 2019-04-02 PROCEDURE — 3008F BODY MASS INDEX DOCD: CPT | Mod: CPTII,S$GLB,, | Performed by: NURSE PRACTITIONER

## 2019-04-02 PROCEDURE — 99213 PR OFFICE/OUTPT VISIT, EST, LEVL III, 20-29 MIN: ICD-10-PCS | Mod: 25,S$GLB,, | Performed by: NURSE PRACTITIONER

## 2019-04-02 PROCEDURE — 99999 PR PBB SHADOW E&M-EST. PATIENT-LVL III: ICD-10-PCS | Mod: PBBFAC,,, | Performed by: NURSE PRACTITIONER

## 2019-04-02 PROCEDURE — 99999 PR PBB SHADOW E&M-EST. PATIENT-LVL III: CPT | Mod: PBBFAC,,, | Performed by: NURSE PRACTITIONER

## 2019-04-02 PROCEDURE — 3079F PR MOST RECENT DIASTOLIC BLOOD PRESSURE 80-89 MM HG: ICD-10-PCS | Mod: CPTII,S$GLB,, | Performed by: NURSE PRACTITIONER

## 2019-04-02 PROCEDURE — 3074F PR MOST RECENT SYSTOLIC BLOOD PRESSURE < 130 MM HG: ICD-10-PCS | Mod: CPTII,S$GLB,, | Performed by: NURSE PRACTITIONER

## 2019-04-02 PROCEDURE — 3008F PR BODY MASS INDEX (BMI) DOCUMENTED: ICD-10-PCS | Mod: CPTII,S$GLB,, | Performed by: NURSE PRACTITIONER

## 2019-04-02 PROCEDURE — 96372 PR INJECTION,THERAP/PROPH/DIAG2ST, IM OR SUBCUT: ICD-10-PCS | Mod: S$GLB,,, | Performed by: NURSE PRACTITIONER

## 2019-04-02 RX ORDER — PROMETHAZINE HYDROCHLORIDE 25 MG/ML
25 INJECTION, SOLUTION INTRAMUSCULAR; INTRAVENOUS
Status: DISCONTINUED | OUTPATIENT
Start: 2019-04-02 | End: 2019-04-02 | Stop reason: HOSPADM

## 2019-04-02 RX ORDER — PROMETHAZINE HYDROCHLORIDE 25 MG/1
25 TABLET ORAL EVERY 6 HOURS PRN
Qty: 20 TABLET | Refills: 0 | Status: SHIPPED | OUTPATIENT
Start: 2019-04-02 | End: 2022-06-06

## 2019-04-02 RX ORDER — POTASSIUM CHLORIDE 20 MEQ/1
20 TABLET, EXTENDED RELEASE ORAL DAILY
Qty: 90 TABLET | Refills: 1 | Status: ON HOLD | OUTPATIENT
Start: 2019-04-02 | End: 2019-05-31 | Stop reason: HOSPADM

## 2019-04-02 RX ORDER — ONDANSETRON 8 MG/1
8 TABLET, ORALLY DISINTEGRATING ORAL EVERY 8 HOURS PRN
Qty: 20 TABLET | Refills: 0 | Status: SHIPPED | OUTPATIENT
Start: 2019-04-02 | End: 2019-04-09

## 2019-04-02 RX ADMIN — PROMETHAZINE HYDROCHLORIDE 25 MG: 25 INJECTION, SOLUTION INTRAMUSCULAR; INTRAVENOUS at 02:04

## 2019-04-02 NOTE — PROGRESS NOTES
Subjective:       Patient ID: Reymundo Dnag Sr. is a 63 y.o. male.    Chief Complaint: Nausea; Emesis; Hiccups; and Diarrhea    HPI: Pt presents to clinic today known to me with c/o N/V/D. He report sthat yesterday he had doughnuts and coffee and it did not sit right. He reports that was his last meal. He tried zofran under the tongue but it did not help. No fever. No chest pain. SOB with vomiting only. VSS. Also having diarrhea. All started yesterday. Hiccups just prior to vomiting. Denies eating anything out of ordinary or undercooked. No one sick around him that he knows of. No bloody or black stool or vomit.   Review of Systems   Constitutional: Positive for activity change, appetite change and fatigue. Negative for chills and fever.   Respiratory: Negative for cough, chest tightness and shortness of breath.    Cardiovascular: Negative for chest pain and palpitations.   Gastrointestinal: Positive for abdominal distention, abdominal pain, diarrhea, nausea and vomiting.   Genitourinary: Negative for dysuria, frequency and urgency.   Musculoskeletal: Negative for arthralgias and myalgias.   Skin: Negative for rash and wound.   Neurological: Negative for headaches.       Objective:      Physical Exam   Constitutional: He is oriented to person, place, and time. He appears well-developed.   HENT:   Head: Normocephalic and atraumatic.   Neck: Normal range of motion. Neck supple.   Cardiovascular: Normal rate, regular rhythm, normal heart sounds and intact distal pulses.   No murmur heard.  Pulmonary/Chest: Effort normal and breath sounds normal. No stridor. No respiratory distress. He has no wheezes. He has no rales.   Abdominal: Soft. He exhibits no distension and no mass. There is tenderness. There is no rebound and no guarding.   Hyperactive BS, tenderness in right upper and abd mid left abd with deep palp. Always has distended abd. It is soft though   Musculoskeletal: Normal range of motion. He exhibits no edema  or tenderness.   Neurological: He is alert and oriented to person, place, and time.   Skin: Skin is warm and dry.   Nursing note and vitals reviewed.      Assessment:       1. Gastroenteritis    2. Bilious vomiting with nausea        Plan:     Problem List Items Addressed This Visit     None      Visit Diagnoses     Gastroenteritis    -  Primary    Relevant Medications    promethazine (PHENERGAN) 25 MG tablet    ondansetron (ZOFRAN-ODT) 8 MG TbDL    Bilious vomiting with nausea        Relevant Medications    promethazine injection 25 mg (Completed)          Pt vomited in the room after hiccups. Green bilious   After vomiting he report that he felt better. No more abd pain. No more hiccups. Still feels like he needs to have diarrhea. Phenergan given ion office IM. He ambulated out of office NAD with wife at side. I told her if he starts with chest pain or SOB ER. Also if still not able to hold down fluids with phenergan and zofran they will need ER for hydration. Hold HCTZ till holding down fluids. BRAT diet

## 2019-04-02 NOTE — PROGRESS NOTES
Phenergan given LUOQ per order. No reaction noted. Patient instructed to wait 20 minutes after injection administration. Also instructed not to drive. Patient verbalized understanding.

## 2019-04-03 ENCOUNTER — TELEPHONE (OUTPATIENT)
Dept: INTERNAL MEDICINE | Facility: CLINIC | Age: 64
End: 2019-04-03

## 2019-04-03 ENCOUNTER — HOSPITAL ENCOUNTER (INPATIENT)
Facility: HOSPITAL | Age: 64
LOS: 4 days | Discharge: HOME | DRG: 389 | End: 2019-04-07
Attending: SURGERY | Admitting: FAMILY MEDICINE
Payer: MEDICARE

## 2019-04-03 DIAGNOSIS — K56.609 SBO (SMALL BOWEL OBSTRUCTION): Primary | ICD-10-CM

## 2019-04-03 DIAGNOSIS — I25.10 CARDIOVASCULAR DISEASE: ICD-10-CM

## 2019-04-03 PROBLEM — F33.42 RECURRENT MAJOR DEPRESSIVE DISORDER, IN FULL REMISSION: Status: ACTIVE | Noted: 2019-04-03

## 2019-04-03 LAB
ALBUMIN SERPL BCP-MCNC: 3.7 G/DL (ref 3.5–5.2)
ALBUMIN SERPL BCP-MCNC: 4.1 G/DL (ref 3.5–5.2)
ALP SERPL-CCNC: 65 U/L (ref 55–135)
ALP SERPL-CCNC: 73 U/L (ref 55–135)
ALT SERPL W/O P-5'-P-CCNC: 33 U/L (ref 10–44)
ALT SERPL W/O P-5'-P-CCNC: 33 U/L (ref 10–44)
ANION GAP SERPL CALC-SCNC: 13 MMOL/L (ref 8–16)
ANION GAP SERPL CALC-SCNC: 15 MMOL/L (ref 8–16)
AST SERPL-CCNC: 22 U/L (ref 10–40)
AST SERPL-CCNC: 23 U/L (ref 10–40)
BASOPHILS # BLD AUTO: 0.02 K/UL (ref 0–0.2)
BASOPHILS # BLD AUTO: 0.03 K/UL (ref 0–0.2)
BASOPHILS NFR BLD: 0.1 % (ref 0–1.9)
BASOPHILS NFR BLD: 0.2 % (ref 0–1.9)
BILIRUB SERPL-MCNC: 0.8 MG/DL (ref 0.1–1)
BILIRUB SERPL-MCNC: 0.9 MG/DL (ref 0.1–1)
BUN SERPL-MCNC: 23 MG/DL (ref 8–23)
BUN SERPL-MCNC: 23 MG/DL (ref 8–23)
CALCIUM SERPL-MCNC: 10.1 MG/DL (ref 8.7–10.5)
CALCIUM SERPL-MCNC: 9.4 MG/DL (ref 8.7–10.5)
CHLORIDE SERPL-SCNC: 100 MMOL/L (ref 95–110)
CHLORIDE SERPL-SCNC: 103 MMOL/L (ref 95–110)
CO2 SERPL-SCNC: 28 MMOL/L (ref 23–29)
CO2 SERPL-SCNC: 31 MMOL/L (ref 23–29)
CREAT SERPL-MCNC: 1.1 MG/DL (ref 0.5–1.4)
CREAT SERPL-MCNC: 1.2 MG/DL (ref 0.5–1.4)
DIFFERENTIAL METHOD: ABNORMAL
DIFFERENTIAL METHOD: ABNORMAL
EOSINOPHIL # BLD AUTO: 0.1 K/UL (ref 0–0.5)
EOSINOPHIL # BLD AUTO: 0.1 K/UL (ref 0–0.5)
EOSINOPHIL NFR BLD: 0.5 % (ref 0–8)
EOSINOPHIL NFR BLD: 0.9 % (ref 0–8)
ERYTHROCYTE [DISTWIDTH] IN BLOOD BY AUTOMATED COUNT: 12.9 % (ref 11.5–14.5)
ERYTHROCYTE [DISTWIDTH] IN BLOOD BY AUTOMATED COUNT: 13.1 % (ref 11.5–14.5)
EST. GFR  (AFRICAN AMERICAN): >60 ML/MIN/1.73 M^2
EST. GFR  (AFRICAN AMERICAN): >60 ML/MIN/1.73 M^2
EST. GFR  (NON AFRICAN AMERICAN): >60 ML/MIN/1.73 M^2
EST. GFR  (NON AFRICAN AMERICAN): >60 ML/MIN/1.73 M^2
GLUCOSE SERPL-MCNC: 102 MG/DL (ref 70–110)
GLUCOSE SERPL-MCNC: 89 MG/DL (ref 70–110)
HCT VFR BLD AUTO: 46.7 % (ref 40–54)
HCT VFR BLD AUTO: 49.2 % (ref 40–54)
HGB BLD-MCNC: 15.8 G/DL (ref 14–18)
HGB BLD-MCNC: 16.6 G/DL (ref 14–18)
LYMPHOCYTES # BLD AUTO: 2.3 K/UL (ref 1–4.8)
LYMPHOCYTES # BLD AUTO: 2.4 K/UL (ref 1–4.8)
LYMPHOCYTES NFR BLD: 13.2 % (ref 18–48)
LYMPHOCYTES NFR BLD: 14.8 % (ref 18–48)
MCH RBC QN AUTO: 30.5 PG (ref 27–31)
MCH RBC QN AUTO: 30.6 PG (ref 27–31)
MCHC RBC AUTO-ENTMCNC: 33.7 G/DL (ref 32–36)
MCHC RBC AUTO-ENTMCNC: 33.8 G/DL (ref 32–36)
MCV RBC AUTO: 90 FL (ref 82–98)
MCV RBC AUTO: 91 FL (ref 82–98)
MONOCYTES # BLD AUTO: 1.9 K/UL (ref 0.3–1)
MONOCYTES # BLD AUTO: 2.3 K/UL (ref 0.3–1)
MONOCYTES NFR BLD: 10.9 % (ref 4–15)
MONOCYTES NFR BLD: 14.1 % (ref 4–15)
NEUTROPHILS # BLD AUTO: 11.2 K/UL (ref 1.8–7.7)
NEUTROPHILS # BLD AUTO: 13 K/UL (ref 1.8–7.7)
NEUTROPHILS NFR BLD: 70 % (ref 38–73)
NEUTROPHILS NFR BLD: 75.3 % (ref 38–73)
PLATELET # BLD AUTO: 217 K/UL (ref 150–350)
PLATELET # BLD AUTO: 251 K/UL (ref 150–350)
PMV BLD AUTO: 10.4 FL (ref 9.2–12.9)
PMV BLD AUTO: 11.1 FL (ref 9.2–12.9)
POTASSIUM SERPL-SCNC: 3.7 MMOL/L (ref 3.5–5.1)
POTASSIUM SERPL-SCNC: 3.7 MMOL/L (ref 3.5–5.1)
PROT SERPL-MCNC: 7 G/DL (ref 6–8.4)
PROT SERPL-MCNC: 7.8 G/DL (ref 6–8.4)
RBC # BLD AUTO: 5.16 M/UL (ref 4.6–6.2)
RBC # BLD AUTO: 5.44 M/UL (ref 4.6–6.2)
SODIUM SERPL-SCNC: 144 MMOL/L (ref 136–145)
SODIUM SERPL-SCNC: 146 MMOL/L (ref 136–145)
WBC # BLD AUTO: 16.06 K/UL (ref 3.9–12.7)
WBC # BLD AUTO: 17.21 K/UL (ref 3.9–12.7)

## 2019-04-03 PROCEDURE — 85027 COMPLETE CBC AUTOMATED: CPT

## 2019-04-03 PROCEDURE — 36415 COLL VENOUS BLD VENIPUNCTURE: CPT

## 2019-04-03 PROCEDURE — 63600175 PHARM REV CODE 636 W HCPCS: Performed by: FAMILY MEDICINE

## 2019-04-03 PROCEDURE — 99223 PR INITIAL HOSPITAL CARE,LEVL III: ICD-10-PCS | Mod: AI,,, | Performed by: FAMILY MEDICINE

## 2019-04-03 PROCEDURE — 99223 1ST HOSP IP/OBS HIGH 75: CPT | Mod: AI,,, | Performed by: FAMILY MEDICINE

## 2019-04-03 PROCEDURE — 96374 THER/PROPH/DIAG INJ IV PUSH: CPT

## 2019-04-03 PROCEDURE — 25500020 PHARM REV CODE 255: Performed by: SURGERY

## 2019-04-03 PROCEDURE — 94760 N-INVAS EAR/PLS OXIMETRY 1: CPT

## 2019-04-03 PROCEDURE — 85025 COMPLETE CBC W/AUTO DIFF WBC: CPT

## 2019-04-03 PROCEDURE — 80053 COMPREHEN METABOLIC PANEL: CPT | Mod: 91

## 2019-04-03 PROCEDURE — 85007 BL SMEAR W/DIFF WBC COUNT: CPT

## 2019-04-03 PROCEDURE — 25000003 PHARM REV CODE 250: Performed by: SURGERY

## 2019-04-03 PROCEDURE — 25000003 PHARM REV CODE 250: Performed by: FAMILY MEDICINE

## 2019-04-03 PROCEDURE — 80053 COMPREHEN METABOLIC PANEL: CPT

## 2019-04-03 PROCEDURE — 63600175 PHARM REV CODE 636 W HCPCS: Performed by: SURGERY

## 2019-04-03 PROCEDURE — 11000001 HC ACUTE MED/SURG PRIVATE ROOM

## 2019-04-03 PROCEDURE — 96361 HYDRATE IV INFUSION ADD-ON: CPT

## 2019-04-03 PROCEDURE — 99285 EMERGENCY DEPT VISIT HI MDM: CPT | Mod: 25

## 2019-04-03 RX ORDER — MORPHINE SULFATE 2 MG/ML
2 INJECTION, SOLUTION INTRAMUSCULAR; INTRAVENOUS EVERY 4 HOURS PRN
Status: DISCONTINUED | OUTPATIENT
Start: 2019-04-03 | End: 2019-04-07 | Stop reason: HOSPADM

## 2019-04-03 RX ORDER — AMLODIPINE BESYLATE 10 MG/1
10 TABLET ORAL DAILY
Status: DISCONTINUED | OUTPATIENT
Start: 2019-04-04 | End: 2019-04-07 | Stop reason: HOSPADM

## 2019-04-03 RX ORDER — ALPRAZOLAM 0.5 MG/1
TABLET ORAL
Qty: 60 TABLET | Refills: 0 | Status: SHIPPED | OUTPATIENT
Start: 2019-04-03 | End: 2019-05-17 | Stop reason: SDUPTHER

## 2019-04-03 RX ORDER — ONDANSETRON 2 MG/ML
4 INJECTION INTRAMUSCULAR; INTRAVENOUS
Status: COMPLETED | OUTPATIENT
Start: 2019-04-03 | End: 2019-04-03

## 2019-04-03 RX ORDER — SODIUM CHLORIDE 9 MG/ML
INJECTION, SOLUTION INTRAVENOUS CONTINUOUS
Status: DISCONTINUED | OUTPATIENT
Start: 2019-04-03 | End: 2019-04-03

## 2019-04-03 RX ORDER — MESALAMINE 400 MG/1
800 CAPSULE, DELAYED RELEASE ORAL DAILY
Status: DISCONTINUED | OUTPATIENT
Start: 2019-04-04 | End: 2019-04-07 | Stop reason: HOSPADM

## 2019-04-03 RX ORDER — BUPROPION HYDROCHLORIDE 150 MG/1
300 TABLET ORAL DAILY
Status: DISCONTINUED | OUTPATIENT
Start: 2019-04-04 | End: 2019-04-07 | Stop reason: HOSPADM

## 2019-04-03 RX ORDER — LISINOPRIL 20 MG/1
20 TABLET ORAL DAILY
Status: DISCONTINUED | OUTPATIENT
Start: 2019-04-04 | End: 2019-04-07 | Stop reason: HOSPADM

## 2019-04-03 RX ORDER — METOPROLOL TARTRATE 100 MG/1
100 TABLET ORAL 2 TIMES DAILY
Status: DISCONTINUED | OUTPATIENT
Start: 2019-04-03 | End: 2019-04-07 | Stop reason: HOSPADM

## 2019-04-03 RX ORDER — SODIUM CHLORIDE 9 MG/ML
1000 INJECTION, SOLUTION INTRAVENOUS
Status: COMPLETED | OUTPATIENT
Start: 2019-04-03 | End: 2019-04-03

## 2019-04-03 RX ORDER — ENOXAPARIN SODIUM 100 MG/ML
40 INJECTION SUBCUTANEOUS EVERY 24 HOURS
Status: DISCONTINUED | OUTPATIENT
Start: 2019-04-03 | End: 2019-04-07 | Stop reason: HOSPADM

## 2019-04-03 RX ORDER — SODIUM CHLORIDE 0.9 % (FLUSH) 0.9 %
10 SYRINGE (ML) INJECTION
Status: DISCONTINUED | OUTPATIENT
Start: 2019-04-03 | End: 2019-04-07 | Stop reason: HOSPADM

## 2019-04-03 RX ORDER — SIMVASTATIN 10 MG/1
20 TABLET, FILM COATED ORAL NIGHTLY
Status: DISCONTINUED | OUTPATIENT
Start: 2019-04-03 | End: 2019-04-07 | Stop reason: HOSPADM

## 2019-04-03 RX ORDER — ONDANSETRON 2 MG/ML
4 INJECTION INTRAMUSCULAR; INTRAVENOUS EVERY 8 HOURS PRN
Status: DISCONTINUED | OUTPATIENT
Start: 2019-04-03 | End: 2019-04-07 | Stop reason: HOSPADM

## 2019-04-03 RX ORDER — SODIUM CHLORIDE AND POTASSIUM CHLORIDE 150; 900 MG/100ML; MG/100ML
INJECTION, SOLUTION INTRAVENOUS CONTINUOUS
Status: DISCONTINUED | OUTPATIENT
Start: 2019-04-03 | End: 2019-04-06

## 2019-04-03 RX ADMIN — ONDANSETRON 4 MG: 2 INJECTION INTRAMUSCULAR; INTRAVENOUS at 12:04

## 2019-04-03 RX ADMIN — METOPROLOL TARTRATE 100 MG: 100 TABLET ORAL at 08:04

## 2019-04-03 RX ADMIN — IOHEXOL 100 ML: 350 INJECTION, SOLUTION INTRAVENOUS at 03:04

## 2019-04-03 RX ADMIN — SODIUM CHLORIDE 1000 ML: 0.9 INJECTION, SOLUTION INTRAVENOUS at 12:04

## 2019-04-03 RX ADMIN — MORPHINE SULFATE 2 MG: 2 INJECTION, SOLUTION INTRAMUSCULAR; INTRAVENOUS at 08:04

## 2019-04-03 RX ADMIN — SIMVASTATIN 20 MG: 10 TABLET, FILM COATED ORAL at 08:04

## 2019-04-03 RX ADMIN — SODIUM CHLORIDE: 0.9 INJECTION, SOLUTION INTRAVENOUS at 06:04

## 2019-04-03 RX ADMIN — SODIUM CHLORIDE AND POTASSIUM CHLORIDE: .9; .15 SOLUTION INTRAVENOUS at 08:04

## 2019-04-03 RX ADMIN — ENOXAPARIN SODIUM 40 MG: 100 INJECTION SUBCUTANEOUS at 06:04

## 2019-04-03 RX ADMIN — IOHEXOL 30 ML: 350 INJECTION, SOLUTION INTRAVENOUS at 02:04

## 2019-04-03 NOTE — PROGRESS NOTES
Staff Handoff  Patient up to room 309 via wheelchair. Bedside report per SAKINA Duff. No distress noted. Room air. Tele normal sinus. Awake, alert, oriented. NG tube to suction. Spouse at bedside. Call bell in reach. Encouraged to call for assistance.     Resident Handoff

## 2019-04-03 NOTE — ED PROVIDER NOTES
Encounter Date: 4/3/2019       History     Chief Complaint   Patient presents with    Emesis     Patient is 63-year-old white male who started having epigastric abdominal pain associated with nausea and vomiting 2 days ago.  He initially had some diarrhea but now is not had a bowel movement in about 24 hr or so.  Pain is non radiating, rated as moderate in severity.  He denies cough or nasal congestion.        Review of patient's allergies indicates:   Allergen Reactions    Aspirin      Other reaction(s): Hx of Aneurysm    Sulfa (sulfonamide antibiotics) Other (See Comments)     Other reaction(s): dizzy     Past Medical History:   Diagnosis Date    Aneurysm     s/p craniotomy prior to rupture 1990    Crohn's disease     CVA (cerebral vascular accident)     unknown date, left sided with right sided hemiparesis    Edema     History of organic brain syndrome     Hyperlipidemia     Hypertension     Restless legs syndrome (RLS)      Past Surgical History:   Procedure Laterality Date    BRAIN SURGERY      Stent Aneurysm    CERVICAL SPINE SURGERY      CHOLECYSTECTOMY      HERNIA REPAIR      SHOULDER SURGERY      Right     Family History   Problem Relation Age of Onset    Glaucoma Mother     Clotting disorder Mother     Aneurysm Father     Lung cancer Brother      Social History     Tobacco Use    Smoking status: Never Smoker    Smokeless tobacco: Never Used   Substance Use Topics    Alcohol use: No    Drug use: No     Review of Systems   Constitutional: Negative for fever.   HENT: Negative for sore throat.    Respiratory: Negative for shortness of breath.    Cardiovascular: Negative for chest pain.   Gastrointestinal: Positive for abdominal distention, abdominal pain, constipation, nausea and vomiting.   Genitourinary: Negative for dysuria.   Musculoskeletal: Negative for back pain.   Skin: Negative for rash.   Neurological: Negative for weakness.   Hematological: Does not bruise/bleed easily.        Physical Exam     Initial Vitals   BP Pulse Resp Temp SpO2   04/03/19 1152 04/03/19 1152 04/03/19 1155 04/03/19 1150 04/03/19 1152   124/82 68 18 99.1 °F (37.3 °C) 98 %      MAP       --                Physical Exam    Nursing note and vitals reviewed.  Constitutional: He appears well-developed and well-nourished.   HENT:   Head: Normocephalic and atraumatic.   Eyes: EOM are normal. Pupils are equal, round, and reactive to light.   Neck: Normal range of motion. Neck supple.   Cardiovascular: Normal rate.   Pulmonary/Chest: Breath sounds normal. No respiratory distress. He has no wheezes. He has no rales.   Abdominal: Soft. He exhibits distension. There is tenderness. There is no rebound and no guarding.   Musculoskeletal: Normal range of motion.   Neurological: He is alert and oriented to person, place, and time.   Skin: Skin is warm and dry.   Psychiatric: He has a normal mood and affect. Thought content normal.         ED Course   Procedures  Labs Reviewed   CBC W/ AUTO DIFFERENTIAL   COMPREHENSIVE METABOLIC PANEL          Imaging Results    None         CT scan of the abdomen shows what appears to be small bowel obstruction primarily originating in the left lower quadrant, likely secondary to adhesions from prior surgery.                       Clinical Impression:       ICD-10-CM ICD-9-CM   1. SBO (small bowel obstruction) K56.609 560.9         Disposition:   Disposition: Admitted  Condition: Ceci Man Jr., MD  04/03/19 5825

## 2019-04-03 NOTE — ED TRIAGE NOTES
Patient reports nausea and vomiting for 4 days now. Seen PCP yesterday. Symptoms have not improved.

## 2019-04-03 NOTE — TELEPHONE ENCOUNTER
Per Rosalinda's note yesterday, patient is to go to ER if unable to hold down fluids. I notified the patient's wife of this. She will take him to ER.

## 2019-04-03 NOTE — ED NOTES
Notable swelling noted to right cheek and above eye brow, spouse states she noticed the swelling, when pt arrived at the hoptal  reported swelling to Dr Man, He LINDSAY  Patient lying in bed, awake, alert, and calm, NADN, RR even and unlabored, call bell within reach, bed in lowest position and locked. Patient denies needs at this time, family at bedside, instructed to call for needs, patient verbalized understanding

## 2019-04-03 NOTE — TELEPHONE ENCOUNTER
----- Message from Bria Knox sent at 4/3/2019 10:35 AM CDT -----  Contact: Yareli/Wife  Reymundo Dang Sr.  MRN: [id number]  : 1955  PCP: Rosalinda Villalba  Home Phone      [phone]  Work Phone      Not on file.  Mobile          [phone]    MESSAGE:   Calling because the medication Rosalinda gave him yesterday for vomiting is not working and she would like to know what Rosalinda would suggest he do. The injection that she gave him yesterday helped, but now that it's worn off he is having continuing problems with vomitting and the medication is not helping.  Please call to advise.    Phone: [phone]

## 2019-04-04 LAB
ALBUMIN SERPL BCP-MCNC: 3.4 G/DL (ref 3.5–5.2)
ALP SERPL-CCNC: 60 U/L (ref 55–135)
ALT SERPL W/O P-5'-P-CCNC: 30 U/L (ref 10–44)
ANION GAP SERPL CALC-SCNC: 11 MMOL/L (ref 8–16)
AST SERPL-CCNC: 22 U/L (ref 10–40)
BASOPHILS # BLD AUTO: 0.04 K/UL (ref 0–0.2)
BASOPHILS NFR BLD: 0.2 % (ref 0–1.9)
BILIRUB SERPL-MCNC: 0.9 MG/DL (ref 0.1–1)
BUN SERPL-MCNC: 21 MG/DL (ref 8–23)
CALCIUM SERPL-MCNC: 8.8 MG/DL (ref 8.7–10.5)
CHLORIDE SERPL-SCNC: 106 MMOL/L (ref 95–110)
CO2 SERPL-SCNC: 28 MMOL/L (ref 23–29)
CREAT SERPL-MCNC: 0.9 MG/DL (ref 0.5–1.4)
DIFFERENTIAL METHOD: ABNORMAL
EOSINOPHIL # BLD AUTO: 0.3 K/UL (ref 0–0.5)
EOSINOPHIL NFR BLD: 1.7 % (ref 0–8)
ERYTHROCYTE [DISTWIDTH] IN BLOOD BY AUTOMATED COUNT: 12.7 % (ref 11.5–14.5)
EST. GFR  (AFRICAN AMERICAN): >60 ML/MIN/1.73 M^2
EST. GFR  (NON AFRICAN AMERICAN): >60 ML/MIN/1.73 M^2
GLUCOSE SERPL-MCNC: 82 MG/DL (ref 70–110)
HCT VFR BLD AUTO: 43.4 % (ref 40–54)
HGB BLD-MCNC: 14.7 G/DL (ref 14–18)
LYMPHOCYTES # BLD AUTO: 3.4 K/UL (ref 1–4.8)
LYMPHOCYTES NFR BLD: 19.1 % (ref 18–48)
MCH RBC QN AUTO: 30.3 PG (ref 27–31)
MCHC RBC AUTO-ENTMCNC: 33.9 G/DL (ref 32–36)
MCV RBC AUTO: 90 FL (ref 82–98)
MONOCYTES # BLD AUTO: 3.6 K/UL (ref 0.3–1)
MONOCYTES NFR BLD: 20.2 % (ref 4–15)
NEUTROPHILS # BLD AUTO: 10.5 K/UL (ref 1.8–7.7)
NEUTROPHILS NFR BLD: 59.1 % (ref 38–73)
PLATELET # BLD AUTO: 196 K/UL (ref 150–350)
PLATELET BLD QL SMEAR: ABNORMAL
PMV BLD AUTO: 10.6 FL (ref 9.2–12.9)
POTASSIUM SERPL-SCNC: 3.6 MMOL/L (ref 3.5–5.1)
PROT SERPL-MCNC: 6.4 G/DL (ref 6–8.4)
RBC # BLD AUTO: 4.85 M/UL (ref 4.6–6.2)
SODIUM SERPL-SCNC: 145 MMOL/L (ref 136–145)
WBC # BLD AUTO: 17.81 K/UL (ref 3.9–12.7)

## 2019-04-04 PROCEDURE — 93005 ELECTROCARDIOGRAM TRACING: CPT

## 2019-04-04 PROCEDURE — 94760 N-INVAS EAR/PLS OXIMETRY 1: CPT

## 2019-04-04 PROCEDURE — 94761 N-INVAS EAR/PLS OXIMETRY MLT: CPT

## 2019-04-04 PROCEDURE — 25000003 PHARM REV CODE 250: Performed by: FAMILY MEDICINE

## 2019-04-04 PROCEDURE — 63600175 PHARM REV CODE 636 W HCPCS: Performed by: FAMILY MEDICINE

## 2019-04-04 PROCEDURE — 85025 COMPLETE CBC W/AUTO DIFF WBC: CPT

## 2019-04-04 PROCEDURE — 99223 1ST HOSP IP/OBS HIGH 75: CPT | Mod: ,,, | Performed by: SURGERY

## 2019-04-04 PROCEDURE — 36415 COLL VENOUS BLD VENIPUNCTURE: CPT

## 2019-04-04 PROCEDURE — 99232 SBSQ HOSP IP/OBS MODERATE 35: CPT | Mod: ,,, | Performed by: FAMILY MEDICINE

## 2019-04-04 PROCEDURE — 93010 ELECTROCARDIOGRAM REPORT: CPT | Mod: ,,, | Performed by: INTERNAL MEDICINE

## 2019-04-04 PROCEDURE — 99223 PR INITIAL HOSPITAL CARE,LEVL III: ICD-10-PCS | Mod: ,,, | Performed by: SURGERY

## 2019-04-04 PROCEDURE — 11000001 HC ACUTE MED/SURG PRIVATE ROOM

## 2019-04-04 PROCEDURE — 99232 PR SUBSEQUENT HOSPITAL CARE,LEVL II: ICD-10-PCS | Mod: ,,, | Performed by: FAMILY MEDICINE

## 2019-04-04 PROCEDURE — 80053 COMPREHEN METABOLIC PANEL: CPT

## 2019-04-04 PROCEDURE — 93010 EKG 12-LEAD: ICD-10-PCS | Mod: ,,, | Performed by: INTERNAL MEDICINE

## 2019-04-04 RX ADMIN — VORTIOXETINE 10 MG: 10 TABLET, FILM COATED ORAL at 11:04

## 2019-04-04 RX ADMIN — ENOXAPARIN SODIUM 40 MG: 100 INJECTION SUBCUTANEOUS at 06:04

## 2019-04-04 RX ADMIN — SIMVASTATIN 20 MG: 10 TABLET, FILM COATED ORAL at 08:04

## 2019-04-04 RX ADMIN — MESALAMINE 800 MG: 400 CAPSULE, DELAYED RELEASE ORAL at 11:04

## 2019-04-04 RX ADMIN — AMLODIPINE BESYLATE 10 MG: 10 TABLET ORAL at 11:04

## 2019-04-04 RX ADMIN — SODIUM CHLORIDE AND POTASSIUM CHLORIDE: .9; .15 SOLUTION INTRAVENOUS at 05:04

## 2019-04-04 RX ADMIN — BUPROPION HYDROCHLORIDE 300 MG: 150 TABLET, EXTENDED RELEASE ORAL at 11:04

## 2019-04-04 RX ADMIN — LISINOPRIL 20 MG: 20 TABLET ORAL at 11:04

## 2019-04-04 RX ADMIN — METOPROLOL TARTRATE 100 MG: 100 TABLET ORAL at 08:04

## 2019-04-04 RX ADMIN — METOPROLOL TARTRATE 100 MG: 100 TABLET ORAL at 11:04

## 2019-04-04 RX ADMIN — SODIUM CHLORIDE AND POTASSIUM CHLORIDE: .9; .15 SOLUTION INTRAVENOUS at 08:04

## 2019-04-04 NOTE — PLAN OF CARE
04/04/19 0913   Discharge Assessment   Assessment Type Discharge Planning Assessment   Confirmed/corrected address and phone number on facesheet? Yes   Assessment information obtained from? Patient   Expected Length of Stay (days) 2   Communicated expected length of stay with patient/caregiver yes   Prior to hospitilization cognitive status: Alert/Oriented   Prior to hospitalization functional status: Independent   Current cognitive status: Alert/Oriented   Current Functional Status: Independent   Facility Arrived From: home   Lives With spouse   Able to Return to Prior Arrangements yes   Is patient able to care for self after discharge? Yes   Who are your caregiver(s) and their phone number(s)? [phone]   Patient's perception of discharge disposition home or selfcare   Readmission Within the Last 30 Days no previous admission in last 30 days   Patient currently being followed by outpatient case management? No   Patient currently receives any other outside agency services? No   Equipment Currently Used at Home cane, straight;walker, rolling;rollator;nebulizer;bedside commode;shower chair   Do you have any problems affording any of your prescribed medications? No   Is the patient taking medications as prescribed? yes   Does the patient have transportation home? Yes   Transportation Anticipated family or friend will provide   Does the patient receive services at the [facility]? No   Discharge Plan A Home   Discharge Plan B Home   DME Needed Upon Discharge  none  (no new needs at this time.)   Patient/Family in Agreement with Plan yes     Mr phillips will discharge home with his wife when stable. Post acute needs to be determined. CM contact information and discharge brochure given. Discharge information sheet for small bowel obstruction given including signs and symptoms indicating return to ED or call to PCP. Compliance and understanding voiced.

## 2019-04-04 NOTE — NURSING
Suction turned back on .  Patient verbalized pain medication is working and he is more comfortable.

## 2019-04-04 NOTE — H&P
[facility] Medicine  History & Physical    Patient Name: Reymundo Dang Sr.  MRN: [id number]  Admission Date: 4/3/2019  Attending Physician: Ki Bradley MD   Primary Care Provider: Rosalinda Villalba NP         Patient information was obtained from patient and ER records.     Subjective:     Principal Problem:<principal problem not specified>    Chief Complaint:   Chief Complaint   Patient presents with    Emesis        HPI: 62 y/o male presents to ED today with 2 day history of bilious vomiting. Also some loose bowels which made him think might be virus. Loose bowels stopped yesterday, but vomiting continues along with hiccupps.  Got an injection of phenergan as outpt that helped for a while, but eventually vomiting returned and he ended up here.  Work up in ED significant for SBO on CT with transition point in LLQ. Many adhesions seen on CT(has h/o umbilical hernia surgery x 2)  Admitted for NGT, bowel rest, IVF  Last meal was coffee and donuts Monday am     Past Medical History:   Diagnosis Date    Aneurysm     s/p craniotomy prior to rupture 1990    Crohn's disease     CVA (cerebral vascular accident)     unknown date, left sided with right sided hemiparesis    Edema     History of organic brain syndrome     Hyperlipidemia     Hypertension     Restless legs syndrome (RLS)        Past Surgical History:   Procedure Laterality Date    BRAIN SURGERY      Stent Aneurysm    CERVICAL SPINE SURGERY      CHOLECYSTECTOMY      HERNIA REPAIR      SHOULDER SURGERY      Right       Review of patient's allergies indicates:   Allergen Reactions    Aspirin      Other reaction(s): Hx of Aneurysm    Sulfa (sulfonamide antibiotics) Other (See Comments)     Other reaction(s): dizzy       No current facility-administered medications on file prior to encounter.      Current Outpatient Medications on File Prior to Encounter   Medication Sig    ALPRAZolam (XANAX) 0.5 MG tablet TAKE 1  TABLET BY MOUTH TWICE A DAY AS NEEDED    buPROPion (WELLBUTRIN XL) 300 MG 24 hr tablet Take 300 mg by mouth once daily.    cyanocobalamin (VITAMIN B-12) 1000 MCG tablet Take 1 tablet by mouth once daily.    lisinopril (PRINIVIL,ZESTRIL) 20 MG tablet Take 1 tablet by mouth once daily.    mesalamine (ASACOL HD) 800 mg TbEC Take 1 tablet (800 mg total) by mouth once daily.    metoprolol tartrate (LOPRESSOR) 100 MG tablet Take 1 tablet (100 mg total) by mouth 2 (two) times daily.    acetaminophen/diphenhydramine (ACETAMINOPHEN PM ORAL) Take 2 tablets by mouth nightly as needed.    amlodipine (NORVASC) 10 MG tablet Take 1 tablet (10 mg total) by mouth once daily.    hydrochlorothiazide (HYDRODIURIL) 25 MG tablet Take 1 tablet (25 mg total) by mouth once daily.    mirabegron (MYRBETRIQ) 25 mg Tb24 ER tablet Take 1 tablet (25 mg total) by mouth once daily.    MULTIVITAMIN W-MINERALS/LUTEIN (CENTRUM SILVER ORAL) Take 1 tablet by mouth once daily.      omeprazole (PRILOSEC) 40 MG capsule Take 40 mg by mouth once daily.    ondansetron (ZOFRAN-ODT) 8 MG TbDL Take 1 tablet (8 mg total) by mouth every 8 (eight) hours as needed.    potassium chloride SA (K-DUR,KLOR-CON) 20 MEQ tablet TAKE 1 TABLET (20 MEQ TOTAL) BY MOUTH ONCE DAILY.    primidone (MYSOLINE) 50 MG Tab TAKE 1 TABLET BY MOUTH 3 TIMES A DAY (Patient taking differently: TAKE 2 TABLET BY MOUTH BID)    promethazine (PHENERGAN) 25 MG tablet Take 1 tablet (25 mg total) by mouth every 6 (six) hours as needed for Nausea.    pyridoxine HCl, vitamin B6, (VITAMIN B-6 ORAL) Take 1 tablet by mouth once daily.    simvastatin (ZOCOR) 20 MG tablet Take 1 tablet (20 mg total) by mouth every evening.    vitamin E 400 UNIT capsule Take 400 Units by mouth once daily.    vortioxetine (TRINTELLIX) 10 mg Tab Take 1 tablet by mouth once daily.     Family History     Problem Relation (Age of Onset)    Aneurysm Father    Clotting disorder Mother    Glaucoma Mother    Lung  cancer Brother        Tobacco Use    Smoking status: Never Smoker    Smokeless tobacco: Never Used   Substance and Sexual Activity    Alcohol use: No    Drug use: No    Sexual activity: Yes     Partners: Female     Review of Systems   Constitutional: Negative for activity change, appetite change, chills, diaphoresis, fatigue, fever and unexpected weight change.   HENT: Negative for congestion, dental problem, drooling, ear discharge, ear pain, facial swelling, mouth sores, nosebleeds, postnasal drip, rhinorrhea, sinus pressure, sneezing, sore throat, trouble swallowing and voice change.    Eyes: Negative for photophobia, pain, discharge, redness, itching and visual disturbance.   Respiratory: Positive for cough and shortness of breath. Negative for apnea, chest tightness and wheezing.         Hiccups since Monday     Hard to take a deep breath(big distended belly)    Cardiovascular: Negative for chest pain, palpitations and leg swelling.   Gastrointestinal: Positive for abdominal pain, nausea and vomiting. Negative for abdominal distention, blood in stool, constipation and diarrhea.   Genitourinary: Negative for difficulty urinating, dysuria, enuresis, flank pain, frequency, hematuria and urgency.   Musculoskeletal: Negative for arthralgias, back pain, gait problem, joint swelling, myalgias, neck pain and neck stiffness.   Skin: Negative for color change, rash and wound.   Neurological: Positive for dizziness and light-headedness. Negative for tremors, seizures, syncope, facial asymmetry, speech difficulty, weakness, numbness and headaches.   Hematological: Negative for adenopathy. Does not bruise/bleed easily.   Psychiatric/Behavioral: Positive for dysphoric mood. Negative for agitation, behavioral problems, confusion, decreased concentration, sleep disturbance and suicidal ideas. The patient is nervous/anxious.      Objective:     Vital Signs (Most Recent):  Temp: 96.7 °F (35.9 °C) (04/03/19 1741)  Pulse: 73  (04/03/19 1827)  Resp: 20 (04/03/19 1741)  BP: (!) 148/69 (04/03/19 1741)  SpO2: 98 % (04/03/19 1908) Vital Signs (24h Range):  Temp:  [96.7 °F (35.9 °C)-99.1 °F (37.3 °C)] 96.7 °F (35.9 °C)  Pulse:  [65-76] 73  Resp:  [18-20] 20  SpO2:  [92 %-100 %] 98 %  BP: (124-182)/(69-91) 148/69     Weight: 125.7 kg (277 lb 1.9 oz)  Body mass index is 44.73 kg/m².    Physical Exam   Constitutional: He is oriented to person, place, and time. He appears well-developed and well-nourished.   Morbidly obese male in bed with NGT to wall suction.   Hiccups    HENT:   Head: Normocephalic and atraumatic.   Right Ear: External ear normal.   Left Ear: External ear normal.   Nose: Nose normal.   Mouth/Throat: Oropharynx is clear and moist.   Eyes: Pupils are equal, round, and reactive to light. Conjunctivae and EOM are normal. Right eye exhibits no discharge. Left eye exhibits no discharge. No scleral icterus.   Neck: Normal range of motion. Neck supple. No JVD present. No tracheal deviation present. No thyromegaly present.   Cardiovascular: Normal rate, regular rhythm, normal heart sounds and intact distal pulses.   No murmur heard.  Pulmonary/Chest: Effort normal and breath sounds normal. No respiratory distress. He has no wheezes. He has no rales. He exhibits no tenderness.   Abdominal: Soft. He exhibits distension. He exhibits no mass. There is tenderness. There is no rebound and no guarding.   LLQ TTP    Musculoskeletal: Normal range of motion.   Lymphadenopathy:     He has no cervical adenopathy.   Neurological: He is alert and oriented to person, place, and time. He has normal reflexes. He displays normal reflexes. No cranial nerve deficit. He exhibits normal muscle tone. Coordination normal.   Skin: Skin is warm and dry.   Psychiatric: He has a normal mood and affect. His behavior is normal. Judgment and thought content normal.         CRANIAL NERVES     CN III, IV, VI   Pupils are equal, round, and reactive to light.  Extraocular  motions are normal.        Significant Labs:   CBC:   Recent Labs   Lab 04/03/19  1221   WBC 17.21*   HGB 16.6   HCT 49.2        CMP:   Recent Labs   Lab 04/03/19  1221   *   K 3.7      CO2 31*      BUN 23   CREATININE 1.2   CALCIUM 10.1   PROT 7.8   ALBUMIN 4.1   BILITOT 0.8   ALKPHOS 73   AST 22   ALT 33   ANIONGAP 15   EGFRNONAA >60     Urine Studies: No results for input(s): COLORU, APPEARANCEUA, PHUR, SPECGRAV, PROTEINUA, GLUCUA, KETONESU, BILIRUBINUA, OCCULTUA, NITRITE, UROBILINOGEN, LEUKOCYTESUR, RBCUA, WBCUA, BACTERIA, SQUAMEPITHEL, HYALINECASTS in the last 48 hours.    Invalid input(s): YOMI    Significant Imaging: I have reviewed and interpreted all pertinent imaging results/findings within the past 24 hours.    Assessment/Plan:     Recurrent major depressive disorder, in full remission  Continue wellbutrin and home Trintellix         SBO (small bowel obstruction)  NPO x meds (clamp suction 30 minutes)   NGT to wall suction        Hypokalemia  Takes po 20 daily at home   Will add to his IVF       Hypertension  Continue ACE and BB  Hold HCT       Hyperlipidemia  Continue statin      Crohn's disease  He says he has UC?  Continue azocol        VTE Risk Mitigation (From admission, onward)        Ordered     IP VTE HIGH RISK PATIENT  Once      04/03/19 1744     enoxaparin injection 40 mg  Daily      04/03/19 1744             Ki Bradley MD  Department of Hospital Medicine   [facility]

## 2019-04-04 NOTE — PLAN OF CARE
Problem: Adult Inpatient Plan of Care  Goal: Plan of Care Review  Outcome: Ongoing (interventions implemented as appropriate)  Patient admitted with small bowel obstruction.  Awaiting surgical consult in the morning to determine surgical plan going forward.  Heart monitor in place, NSR on monitor.  VSS, afebrile on room air.  Pain managed with IV morphine.  IV fluids continuously running with 20 Potassium additive.  NG tube placed to left nare; continuous suction; liquid brown output to canister.  Lovenox used for VTE prophylaxis.  Wife at bedside; attentive to needs of patient.  Patient has active bowel sounds in all quadrants. Remains NPO except small sips with meds.  Informed of risk for falling and instructed to call with needs of getting up.  Both patient and spouse verbalize understanding.

## 2019-04-04 NOTE — SUBJECTIVE & OBJECTIVE
Past Medical History:   Diagnosis Date    Aneurysm     s/p craniotomy prior to rupture 1990    Crohn's disease     CVA (cerebral vascular accident)     unknown date, left sided with right sided hemiparesis    Edema     History of organic brain syndrome     Hyperlipidemia     Hypertension     Restless legs syndrome (RLS)        Past Surgical History:   Procedure Laterality Date    BRAIN SURGERY      Stent Aneurysm    CERVICAL SPINE SURGERY      CHOLECYSTECTOMY      HERNIA REPAIR      SHOULDER SURGERY      Right       Review of patient's allergies indicates:   Allergen Reactions    Aspirin      Other reaction(s): Hx of Aneurysm    Sulfa (sulfonamide antibiotics) Other (See Comments)     Other reaction(s): dizzy       No current facility-administered medications on file prior to encounter.      Current Outpatient Medications on File Prior to Encounter   Medication Sig    ALPRAZolam (XANAX) 0.5 MG tablet TAKE 1 TABLET BY MOUTH TWICE A DAY AS NEEDED    buPROPion (WELLBUTRIN XL) 300 MG 24 hr tablet Take 300 mg by mouth once daily.    cyanocobalamin (VITAMIN B-12) 1000 MCG tablet Take 1 tablet by mouth once daily.    lisinopril (PRINIVIL,ZESTRIL) 20 MG tablet Take 1 tablet by mouth once daily.    mesalamine (ASACOL HD) 800 mg TbEC Take 1 tablet (800 mg total) by mouth once daily.    metoprolol tartrate (LOPRESSOR) 100 MG tablet Take 1 tablet (100 mg total) by mouth 2 (two) times daily.    acetaminophen/diphenhydramine (ACETAMINOPHEN PM ORAL) Take 2 tablets by mouth nightly as needed.    amlodipine (NORVASC) 10 MG tablet Take 1 tablet (10 mg total) by mouth once daily.    hydrochlorothiazide (HYDRODIURIL) 25 MG tablet Take 1 tablet (25 mg total) by mouth once daily.    mirabegron (MYRBETRIQ) 25 mg Tb24 ER tablet Take 1 tablet (25 mg total) by mouth once daily.    MULTIVITAMIN W-MINERALS/LUTEIN (CENTRUM SILVER ORAL) Take 1 tablet by mouth once daily.      omeprazole (PRILOSEC) 40 MG capsule Take 40  mg by mouth once daily.    ondansetron (ZOFRAN-ODT) 8 MG TbDL Take 1 tablet (8 mg total) by mouth every 8 (eight) hours as needed.    potassium chloride SA (K-DUR,KLOR-CON) 20 MEQ tablet TAKE 1 TABLET (20 MEQ TOTAL) BY MOUTH ONCE DAILY.    primidone (MYSOLINE) 50 MG Tab TAKE 1 TABLET BY MOUTH 3 TIMES A DAY (Patient taking differently: TAKE 2 TABLET BY MOUTH BID)    promethazine (PHENERGAN) 25 MG tablet Take 1 tablet (25 mg total) by mouth every 6 (six) hours as needed for Nausea.    pyridoxine HCl, vitamin B6, (VITAMIN B-6 ORAL) Take 1 tablet by mouth once daily.    simvastatin (ZOCOR) 20 MG tablet Take 1 tablet (20 mg total) by mouth every evening.    vitamin E 400 UNIT capsule Take 400 Units by mouth once daily.    vortioxetine (TRINTELLIX) 10 mg Tab Take 1 tablet by mouth once daily.     Family History     Problem Relation (Age of Onset)    Aneurysm Father    Clotting disorder Mother    Glaucoma Mother    Lung cancer Brother        Tobacco Use    Smoking status: Never Smoker    Smokeless tobacco: Never Used   Substance and Sexual Activity    Alcohol use: No    Drug use: No    Sexual activity: Yes     Partners: Female     Review of Systems   Constitutional: Negative for activity change, appetite change, chills, diaphoresis, fatigue, fever and unexpected weight change.   HENT: Negative for congestion, dental problem, drooling, ear discharge, ear pain, facial swelling, mouth sores, nosebleeds, postnasal drip, rhinorrhea, sinus pressure, sneezing, sore throat, trouble swallowing and voice change.    Eyes: Negative for photophobia, pain, discharge, redness, itching and visual disturbance.   Respiratory: Positive for cough and shortness of breath. Negative for apnea, chest tightness and wheezing.         Hiccups since Monday     Hard to take a deep breath(big distended belly)    Cardiovascular: Negative for chest pain, palpitations and leg swelling.   Gastrointestinal: Positive for abdominal pain, nausea  and vomiting. Negative for abdominal distention, blood in stool, constipation and diarrhea.   Genitourinary: Negative for difficulty urinating, dysuria, enuresis, flank pain, frequency, hematuria and urgency.   Musculoskeletal: Negative for arthralgias, back pain, gait problem, joint swelling, myalgias, neck pain and neck stiffness.   Skin: Negative for color change, rash and wound.   Neurological: Positive for dizziness and light-headedness. Negative for tremors, seizures, syncope, facial asymmetry, speech difficulty, weakness, numbness and headaches.   Hematological: Negative for adenopathy. Does not bruise/bleed easily.   Psychiatric/Behavioral: Positive for dysphoric mood. Negative for agitation, behavioral problems, confusion, decreased concentration, sleep disturbance and suicidal ideas. The patient is nervous/anxious.      Objective:     Vital Signs (Most Recent):  Temp: 96.7 °F (35.9 °C) (04/03/19 1741)  Pulse: 73 (04/03/19 1827)  Resp: 20 (04/03/19 1741)  BP: (!) 148/69 (04/03/19 1741)  SpO2: 98 % (04/03/19 1908) Vital Signs (24h Range):  Temp:  [96.7 °F (35.9 °C)-99.1 °F (37.3 °C)] 96.7 °F (35.9 °C)  Pulse:  [65-76] 73  Resp:  [18-20] 20  SpO2:  [92 %-100 %] 98 %  BP: (124-182)/(69-91) 148/69     Weight: 125.7 kg (277 lb 1.9 oz)  Body mass index is 44.73 kg/m².    Physical Exam   Constitutional: He is oriented to person, place, and time. He appears well-developed and well-nourished.   Morbidly obese male in bed with NGT to wall suction.   Hiccups    HENT:   Head: Normocephalic and atraumatic.   Right Ear: External ear normal.   Left Ear: External ear normal.   Nose: Nose normal.   Mouth/Throat: Oropharynx is clear and moist.   Eyes: Pupils are equal, round, and reactive to light. Conjunctivae and EOM are normal. Right eye exhibits no discharge. Left eye exhibits no discharge. No scleral icterus.   Neck: Normal range of motion. Neck supple. No JVD present. No tracheal deviation present. No thyromegaly  present.   Cardiovascular: Normal rate, regular rhythm, normal heart sounds and intact distal pulses.   No murmur heard.  Pulmonary/Chest: Effort normal and breath sounds normal. No respiratory distress. He has no wheezes. He has no rales. He exhibits no tenderness.   Abdominal: Soft. He exhibits distension. He exhibits no mass. There is tenderness. There is no rebound and no guarding.   LLQ TTP    Musculoskeletal: Normal range of motion.   Lymphadenopathy:     He has no cervical adenopathy.   Neurological: He is alert and oriented to person, place, and time. He has normal reflexes. He displays normal reflexes. No cranial nerve deficit. He exhibits normal muscle tone. Coordination normal.   Skin: Skin is warm and dry.   Psychiatric: He has a normal mood and affect. His behavior is normal. Judgment and thought content normal.         CRANIAL NERVES     CN III, IV, VI   Pupils are equal, round, and reactive to light.  Extraocular motions are normal.        Significant Labs:   CBC:   Recent Labs   Lab 04/03/19  1221   WBC 17.21*   HGB 16.6   HCT 49.2        CMP:   Recent Labs   Lab 04/03/19  1221   *   K 3.7      CO2 31*      BUN 23   CREATININE 1.2   CALCIUM 10.1   PROT 7.8   ALBUMIN 4.1   BILITOT 0.8   ALKPHOS 73   AST 22   ALT 33   ANIONGAP 15   EGFRNONAA >60     Urine Studies: No results for input(s): COLORU, APPEARANCEUA, PHUR, SPECGRAV, PROTEINUA, GLUCUA, KETONESU, BILIRUBINUA, OCCULTUA, NITRITE, UROBILINOGEN, LEUKOCYTESUR, RBCUA, WBCUA, BACTERIA, SQUAMEPITHEL, HYALINECASTS in the last 48 hours.    Invalid input(s): YOMI    Significant Imaging: I have reviewed and interpreted all pertinent imaging results/findings within the past 24 hours.

## 2019-04-04 NOTE — HOSPITAL COURSE
Pt is here on bowel rest. With NGT to suction. He is still belching. Only 100ml in canister. Has hx of crohns dz. General surgery consulted for SBO due to adhesions. He has no fever. WBC stable 63046>21996. KUB flat and errect ordered for this am. Passing gas today but still not feeling good.  Bp has been elevated. Has htn. Meds have been held thus far.     4/5/19 NGT d/cd yesterday; Tolerated clear liquids through day ; no nausea or vomiting with PM shift. Only c/o pain to lower abd with palpation. Afebrile. WBC 17.81>8.26. BP  184/89, so will add a Clonidine Patch;  KUB this am, to determine status.    4-6 KUB shows contrast into the rectum this am; having BMs too    4-7 Pt is having BMs and is doing better & wants to go home

## 2019-04-04 NOTE — ASSESSMENT & PLAN NOTE
NPO x meds (clamp suction 30 minutes)   NGT to wall suction  Surgery consulted.  Has several potential reasons for SBO - crohns? Adhesions?

## 2019-04-04 NOTE — PROGRESS NOTES
Vitals remained stable, afebrile. No complaints of pain unless palpating. NG tube removed. tolerated well. 100 ml of gastric content drained total. Abdomen still distended and slightly tender. Passing gas and pt had a small formed BM today. tolerated clear liquids well. Discussed plan of care with pt, stated understanding

## 2019-04-04 NOTE — PLAN OF CARE
04/04/19 0921   Advance Directives (For Healthcare)   Advance Directive  (If Adv Dir status is received, view document under Code in header or Chart Review Media tab) Patient does not have Advance Directive, declines information.     Patient wishes to be a full code.

## 2019-04-04 NOTE — PLAN OF CARE
04/04/19 0825   Medicare Message   Important Message from Medicare regarding Discharge Appeal Rights Given to patient/caregiver;Signed/date by patient/caregiver;Explained to patient/caregiver   Date IMM was signed 04/03/19   Time IMM was signed 1992

## 2019-04-04 NOTE — ASSESSMENT & PLAN NOTE
He says he has UC - this diagnosis is questionable. He and his wife cannot give history of how or when it was dx'd.   Continue azocol  needs to cont to f/u with GI

## 2019-04-04 NOTE — HPI
62 y/o male presents to ED today with 2 day history of bilious vomiting. Also some loose bowels which made him think might be virus. Loose bowels stopped yesterday, but vomiting continues along with hiccupps.  Got an injection of phenergan as outpt that helped for a while, but eventually vomiting returned and he ended up here.  Work up in ED significant for SBO on CT with transition point in LLQ. Many adhesions seen on CT(has h/o umbilical hernia surgery x 2)  Admitted for NGT, bowel rest, IVF  Last meal was coffee and donuts Monday am

## 2019-04-04 NOTE — SUBJECTIVE & OBJECTIVE
Review of Systems   Constitutional: Positive for fatigue. Negative for chills and fever.   HENT: Negative for congestion, ear pain, postnasal drip, rhinorrhea, sore throat and trouble swallowing.    Eyes: Negative for redness and itching.   Respiratory: Positive for shortness of breath. Negative for cough and wheezing.         Hiccups   Cardiovascular: Negative for chest pain and palpitations.   Gastrointestinal: Positive for abdominal pain and nausea. Negative for diarrhea and vomiting.   Genitourinary: Negative for dysuria and frequency.   Skin: Negative for rash.   Neurological: Negative for weakness and headaches.     Objective:     Vital Signs (Most Recent):  Temp: 97.9 °F (36.6 °C) (04/04/19 0711)  Pulse: 78 (04/04/19 0756)  Resp: 20 (04/04/19 0711)  BP: (!) 142/69 (04/04/19 0711)  SpO2: 97 % (04/04/19 0715) Vital Signs (24h Range):  Temp:  [96.7 °F (35.9 °C)-99.5 °F (37.5 °C)] 97.9 °F (36.6 °C)  Pulse:  [65-83] 78  Resp:  [18-20] 20  SpO2:  [92 %-100 %] 97 %  BP: (124-197)/(69-93) 142/69     Weight: 125.7 kg (277 lb 1.9 oz)  Body mass index is 44.73 kg/m².    Physical Exam   Constitutional: He is oriented to person, place, and time. He appears well-developed and well-nourished.   Morbidly obese male in bed with NGT to wall suction.   Hiccups    HENT:   Head: Normocephalic and atraumatic.   Right Ear: External ear normal.   Left Ear: External ear normal.   Nose: Nose normal.   Mouth/Throat: Oropharynx is clear and moist.   NG in place   Eyes: Pupils are equal, round, and reactive to light. Conjunctivae and EOM are normal. Right eye exhibits no discharge. Left eye exhibits no discharge.   Neck: Normal range of motion. Neck supple. No thyromegaly present.   Cardiovascular: Normal rate, regular rhythm, normal heart sounds and intact distal pulses.   No murmur heard.  Pulmonary/Chest: Effort normal and breath sounds normal. No respiratory distress.   Abdominal: Soft. He exhibits distension. He exhibits no mass.  There is tenderness. There is guarding. There is no rebound.   Tender throughout.    No BS   Musculoskeletal: Normal range of motion.   Neurological: He is alert and oriented to person, place, and time. He has normal reflexes. No cranial nerve deficit.   Skin: Skin is warm and dry.   Psychiatric: He has a normal mood and affect. His behavior is normal. Judgment and thought content normal.         CRANIAL NERVES     CN III, IV, VI   Pupils are equal, round, and reactive to light.  Extraocular motions are normal.        Significant Labs:   CBC:   Recent Labs   Lab 04/03/19  1221 04/03/19 1946 04/04/19  0634   WBC 17.21* 16.06* 17.81*   HGB 16.6 15.8 14.7   HCT 49.2 46.7 43.4    217 196     CMP:   Recent Labs   Lab 04/03/19 1221 04/03/19 1946 04/04/19  0634   * 144 145   K 3.7 3.7 3.6    103 106   CO2 31* 28 28    89 82   BUN 23 23 21   CREATININE 1.2 1.1 0.9   CALCIUM 10.1 9.4 8.8   PROT 7.8 7.0 6.4   ALBUMIN 4.1 3.7 3.4*   BILITOT 0.8 0.9 0.9   ALKPHOS 73 65 60   AST 22 23 22   ALT 33 33 30   ANIONGAP 15 13 11   EGFRNONAA >60 >60 >60       Significant Imaging:     CT abd and pelvis Findings compatible with a mid small bowel obstruction likely secondary to adhesions.  Small bowel is distended up to 3.5 cm in diameter.  No free air or free fluid.    EKG Normal sinus rhythm  Minimal voltage criteria for LVH, may be normal variant  Nonspecific ST and T wave abnormality  Abnormal ECG  When compared with ECG of 18-DEC-2014 09:12,  Non-specific change in ST segment in Inferior leads

## 2019-04-04 NOTE — NURSING
BP was assessed with automatic machine and results showed 197/93.  BP was reassessed utilizing manual cuff in left arm and result was 172/90.

## 2019-04-04 NOTE — PROGRESS NOTES
[facility] Hospital Medicine  Progress Note    Patient Name: Reymundo Dang Sr.  MRN: [id number]  Patient Class: IP- Inpatient   Admission Date: 4/3/2019  Length of Stay: 1 days  Attending Physician: Ki Bradley MD  Primary Care Provider: Rosalinda Villalba NP        Subjective:     Principal Problem:<principal problem not specified>    HPI:  62 y/o male presents to ED today with 2 day history of bilious vomiting. Also some loose bowels which made him think might be virus. Loose bowels stopped yesterday, but vomiting continues along with hiccupps.  Got an injection of phenergan as outpt that helped for a while, but eventually vomiting returned and he ended up here.  Work up in ED significant for SBO on CT with transition point in LLQ. Many adhesions seen on CT(has h/o umbilical hernia surgery x 2)  Admitted for NGT, bowel rest, IVF  Last meal was coffee and donuts Monday am     Hospital Course:  Pt is here on bowel rest. With NGT to suction. He is still belching. Only 100ml in canister. Has hx of crohns dz. General surgery consulted for SBO due to adhesions. He has no fever. WBC stable [id number]. KUB flat and errect ordered for this am. Passing gas today but still not feeling good.    Bp has been elevated. Has htn. Meds have been held thus far.       Review of Systems   Constitutional: Positive for fatigue. Negative for chills and fever.   HENT: Negative for congestion, ear pain, postnasal drip, rhinorrhea, sore throat and trouble swallowing.    Eyes: Negative for redness and itching.   Respiratory: Positive for shortness of breath. Negative for cough and wheezing.         Hiccups   Cardiovascular: Negative for chest pain and palpitations.   Gastrointestinal: Positive for abdominal pain and nausea. Negative for diarrhea and vomiting.   Genitourinary: Negative for dysuria and frequency.   Skin: Negative for rash.   Neurological: Negative for weakness and headaches.     Objective:     Vital  Signs (Most Recent):  Temp: 97.9 °F (36.6 °C) (04/04/19 0711)  Pulse: 78 (04/04/19 0756)  Resp: 20 (04/04/19 0711)  BP: (!) 142/69 (04/04/19 0711)  SpO2: 97 % (04/04/19 0715) Vital Signs (24h Range):  Temp:  [96.7 °F (35.9 °C)-99.5 °F (37.5 °C)] 97.9 °F (36.6 °C)  Pulse:  [65-83] 78  Resp:  [18-20] 20  SpO2:  [92 %-100 %] 97 %  BP: (124-197)/(69-93) 142/69     Weight: 125.7 kg (277 lb 1.9 oz)  Body mass index is 44.73 kg/m².    Physical Exam   Constitutional: He is oriented to person, place, and time. He appears well-developed and well-nourished.   Morbidly obese male in bed with NGT to wall suction.   Hiccups    HENT:   Head: Normocephalic and atraumatic.   Right Ear: External ear normal.   Left Ear: External ear normal.   Nose: Nose normal.   Mouth/Throat: Oropharynx is clear and moist.   NG in place   Eyes: Pupils are equal, round, and reactive to light. Conjunctivae and EOM are normal. Right eye exhibits no discharge. Left eye exhibits no discharge.   Neck: Normal range of motion. Neck supple. No thyromegaly present.   Cardiovascular: Normal rate, regular rhythm, normal heart sounds and intact distal pulses.   No murmur heard.  Pulmonary/Chest: Effort normal and breath sounds normal. No respiratory distress.   Abdominal: Soft. He exhibits distension. He exhibits no mass. There is tenderness. There is guarding. There is no rebound.   Tender throughout.    No BS   Musculoskeletal: Normal range of motion.   Neurological: He is alert and oriented to person, place, and time. He has normal reflexes. No cranial nerve deficit.   Skin: Skin is warm and dry.   Psychiatric: He has a normal mood and affect. His behavior is normal. Judgment and thought content normal.         CRANIAL NERVES     CN III, IV, VI   Pupils are equal, round, and reactive to light.  Extraocular motions are normal.        Significant Labs:   CBC:   Recent Labs   Lab 04/03/19  1221 04/03/19  1946 04/04/19  0634   WBC 17.21* 16.06* 17.81*   HGB 16.6  15.8 14.7   HCT 49.2 46.7 43.4    217 196     CMP:   Recent Labs   Lab 04/03/19  1221 04/03/19  1946 04/04/19  0634   * 144 145   K 3.7 3.7 3.6    103 106   CO2 31* 28 28    89 82   BUN 23 23 21   CREATININE 1.2 1.1 0.9   CALCIUM 10.1 9.4 8.8   PROT 7.8 7.0 6.4   ALBUMIN 4.1 3.7 3.4*   BILITOT 0.8 0.9 0.9   ALKPHOS 73 65 60   AST 22 23 22   ALT 33 33 30   ANIONGAP 15 13 11   EGFRNONAA >60 >60 >60       Significant Imaging:     CT abd and pelvis Findings compatible with a mid small bowel obstruction likely secondary to adhesions.  Small bowel is distended up to 3.5 cm in diameter.  No free air or free fluid.    EKG Normal sinus rhythm  Minimal voltage criteria for LVH, may be normal variant  Nonspecific ST and T wave abnormality  Abnormal ECG  When compared with ECG of 18-DEC-2014 09:12,  Non-specific change in ST segment in Inferior leads    Assessment/Plan:      Recurrent major depressive disorder, in full remission  Continue wellbutrin and home Trintellix         SBO (small bowel obstruction)  NPO x meds (clamp suction 30 minutes)   NGT to wall suction  Surgery consulted.  Has several potential reasons for SBO - crohns? Adhesions?        Hypokalemia  Takes po 20 daily at home   Will add to his IVF       Hypertension  Continue ACE and BB  Hold HCT       Hyperlipidemia  Continue statin      Crohn's disease  He says he has UC - this diagnosis is questionable. He and his wife cannot give history of how or when it was dx'd.   Continue azocol  needs to cont to f/u with GI      VTE Risk Mitigation (From admission, onward)        Ordered     IP VTE HIGH RISK PATIENT  Once      04/03/19 1744     enoxaparin injection 40 mg  Daily      04/03/19 1744              Lori Perez MD  Department of Hospital Medicine   [facility]

## 2019-04-04 NOTE — CONSULTS
[facility]  General Surgery  Consult Note    Consults  Subjective:     Chief Complaint/Reason for Admission:     History of Present Illness:  63-year-old male admitted for small-bowel obstruction. Patient started with some pain and nausea vomiting a few days ago.  He came to the emergency room. He underwent CT scan which showed small-bowel obstruction. His past abdominal surgery history is significant for a laparoscopic cholecystectomy and 2 umbilical hernia repairs.  This morning, patient still complains of some distention and bloating.  NG tube with only 200 mL of output since it was placed. Patient is passing gas. No bowel movements.  I will order flat and erect films.  I suspect that is NG tube may need to be advanced a little.    No current facility-administered medications on file prior to encounter.      Current Outpatient Medications on File Prior to Encounter   Medication Sig    ALPRAZolam (XANAX) 0.5 MG tablet TAKE 1 TABLET BY MOUTH TWICE A DAY AS NEEDED    buPROPion (WELLBUTRIN XL) 300 MG 24 hr tablet Take 300 mg by mouth once daily.    cyanocobalamin (VITAMIN B-12) 1000 MCG tablet Take 1 tablet by mouth once daily.    lisinopril (PRINIVIL,ZESTRIL) 20 MG tablet Take 1 tablet by mouth once daily.    mesalamine (ASACOL HD) 800 mg TbEC Take 1 tablet (800 mg total) by mouth once daily.    metoprolol tartrate (LOPRESSOR) 100 MG tablet Take 1 tablet (100 mg total) by mouth 2 (two) times daily.    acetaminophen/diphenhydramine (ACETAMINOPHEN PM ORAL) Take 2 tablets by mouth nightly as needed.    amlodipine (NORVASC) 10 MG tablet Take 1 tablet (10 mg total) by mouth once daily.    hydrochlorothiazide (HYDRODIURIL) 25 MG tablet Take 1 tablet (25 mg total) by mouth once daily.    mirabegron (MYRBETRIQ) 25 mg Tb24 ER tablet Take 1 tablet (25 mg total) by mouth once daily.    MULTIVITAMIN W-MINERALS/LUTEIN (CENTRUM SILVER ORAL) Take 1 tablet by mouth once daily.      omeprazole  (PRILOSEC) 40 MG capsule Take 40 mg by mouth once daily.    ondansetron (ZOFRAN-ODT) 8 MG TbDL Take 1 tablet (8 mg total) by mouth every 8 (eight) hours as needed.    potassium chloride SA (K-DUR,KLOR-CON) 20 MEQ tablet TAKE 1 TABLET (20 MEQ TOTAL) BY MOUTH ONCE DAILY.    primidone (MYSOLINE) 50 MG Tab TAKE 1 TABLET BY MOUTH 3 TIMES A DAY (Patient taking differently: TAKE 2 TABLET BY MOUTH BID)    promethazine (PHENERGAN) 25 MG tablet Take 1 tablet (25 mg total) by mouth every 6 (six) hours as needed for Nausea.    pyridoxine HCl, vitamin B6, (VITAMIN B-6 ORAL) Take 1 tablet by mouth once daily.    simvastatin (ZOCOR) 20 MG tablet Take 1 tablet (20 mg total) by mouth every evening.    vitamin E 400 UNIT capsule Take 400 Units by mouth once daily.    vortioxetine (TRINTELLIX) 10 mg Tab Take 1 tablet by mouth once daily.       Review of patient's allergies indicates:   Allergen Reactions    Aspirin      Other reaction(s): Hx of Aneurysm    Sulfa (sulfonamide antibiotics) Other (See Comments)     Other reaction(s): dizzy       Past Medical History:   Diagnosis Date    Aneurysm     s/p craniotomy prior to rupture 1990    Crohn's disease     CVA (cerebral vascular accident)     unknown date, left sided with right sided hemiparesis    Edema     History of organic brain syndrome     Hyperlipidemia     Hypertension     Restless legs syndrome (RLS)      Past Surgical History:   Procedure Laterality Date    BRAIN SURGERY      Stent Aneurysm    CERVICAL SPINE SURGERY      CHOLECYSTECTOMY      HERNIA REPAIR      SHOULDER SURGERY      Right     Family History     Problem Relation (Age of Onset)    Aneurysm Father    Clotting disorder Mother    Glaucoma Mother    Lung cancer Brother        Tobacco Use    Smoking status: Never Smoker    Smokeless tobacco: Never Used   Substance and Sexual Activity    Alcohol use: No    Drug use: No    Sexual activity: Yes     Partners: Female     Review of Systems    Gastrointestinal: Positive for abdominal distention and abdominal pain.   All other systems reviewed and are negative.    Objective:     Vital Signs (Most Recent):  Temp: 97.9 °F (36.6 °C) (04/04/19 0711)  Pulse: 78 (04/04/19 0756)  Resp: 20 (04/04/19 0711)  BP: (!) 142/69 (04/04/19 0711)  SpO2: 97 % (04/04/19 0711) Vital Signs (24h Range):  Temp:  [96.7 °F (35.9 °C)-99.5 °F (37.5 °C)] 97.9 °F (36.6 °C)  Pulse:  [65-83] 78  Resp:  [18-20] 20  SpO2:  [92 %-100 %] 97 %  BP: (124-197)/(69-93) 142/69     Weight: 125.7 kg (277 lb 1.9 oz)  Body mass index is 44.73 kg/m².      Intake/Output Summary (Last 24 hours) at 4/4/2019 0856  Last data filed at 4/4/2019 0514  Gross per 24 hour   Intake 1077.08 ml   Output 400 ml   Net 677.08 ml       Physical Exam   Constitutional: He is oriented to person, place, and time. He appears well-developed and well-nourished.   Morbidly obese   HENT:   Head: Normocephalic.   Eyes: Pupils are equal, round, and reactive to light.   Neck: Normal range of motion.   Pulmonary/Chest: Effort normal.   Abdominal: Soft.   Abdomen is morbidly obese.  Appears to be distended.  No rebound or guarding.   Musculoskeletal: Normal range of motion.   Neurological: He is alert and oriented to person, place, and time.   Skin: Skin is warm and dry.   Psychiatric: He has a normal mood and affect.   Nursing note and vitals reviewed.      Significant Labs:  All pertinent labs from the last 24 hours have been reviewed.    Significant Diagnostics:  I have reviewed all pertinent imaging results/findings within the past 24 hours.    Assessment/Plan:     Active Diagnoses:    Diagnosis Date Noted POA    SBO (small bowel obstruction) [K56.609] 04/03/2019 Yes    Recurrent major depressive disorder, in full remission [F33.42] 04/03/2019 Yes    Hypokalemia [E87.6] 12/20/2014 Yes    Crohn's disease [K50.90]  Yes    Hyperlipidemia [E78.5]  Yes    Hypertension [I10]  Yes      Problems Resolved During this Admission:      Flat and erect today.  I will continue to follow.  Patient will hopefully resolve without surgical intervention.  Thank you for your consult.     Jake Cline Jr, MD  General Surgery  [facility]

## 2019-04-05 LAB
ALBUMIN SERPL BCP-MCNC: 3.1 G/DL (ref 3.5–5.2)
ALP SERPL-CCNC: 60 U/L (ref 55–135)
ALT SERPL W/O P-5'-P-CCNC: 29 U/L (ref 10–44)
ANION GAP SERPL CALC-SCNC: 10 MMOL/L (ref 8–16)
AST SERPL-CCNC: 22 U/L (ref 10–40)
BASOPHILS # BLD AUTO: 0.02 K/UL (ref 0–0.2)
BASOPHILS NFR BLD: 0.2 % (ref 0–1.9)
BILIRUB SERPL-MCNC: 0.8 MG/DL (ref 0.1–1)
BUN SERPL-MCNC: 14 MG/DL (ref 8–23)
CALCIUM SERPL-MCNC: 8.5 MG/DL (ref 8.7–10.5)
CHLORIDE SERPL-SCNC: 107 MMOL/L (ref 95–110)
CO2 SERPL-SCNC: 26 MMOL/L (ref 23–29)
CREAT SERPL-MCNC: 0.8 MG/DL (ref 0.5–1.4)
DIFFERENTIAL METHOD: ABNORMAL
EOSINOPHIL # BLD AUTO: 0.3 K/UL (ref 0–0.5)
EOSINOPHIL NFR BLD: 3.1 % (ref 0–8)
ERYTHROCYTE [DISTWIDTH] IN BLOOD BY AUTOMATED COUNT: 12.4 % (ref 11.5–14.5)
EST. GFR  (AFRICAN AMERICAN): >60 ML/MIN/1.73 M^2
EST. GFR  (NON AFRICAN AMERICAN): >60 ML/MIN/1.73 M^2
GLUCOSE SERPL-MCNC: 79 MG/DL (ref 70–110)
HCT VFR BLD AUTO: 40 % (ref 40–54)
HGB BLD-MCNC: 13.6 G/DL (ref 14–18)
LYMPHOCYTES # BLD AUTO: 2.5 K/UL (ref 1–4.8)
LYMPHOCYTES NFR BLD: 29.7 % (ref 18–48)
MCH RBC QN AUTO: 30.7 PG (ref 27–31)
MCHC RBC AUTO-ENTMCNC: 34 G/DL (ref 32–36)
MCV RBC AUTO: 90 FL (ref 82–98)
MONOCYTES # BLD AUTO: 1.3 K/UL (ref 0.3–1)
MONOCYTES NFR BLD: 16.1 % (ref 4–15)
NEUTROPHILS # BLD AUTO: 4.2 K/UL (ref 1.8–7.7)
NEUTROPHILS NFR BLD: 51.1 % (ref 38–73)
PLATELET # BLD AUTO: 186 K/UL (ref 150–350)
PMV BLD AUTO: 10.5 FL (ref 9.2–12.9)
POTASSIUM SERPL-SCNC: 3.6 MMOL/L (ref 3.5–5.1)
PROT SERPL-MCNC: 6.1 G/DL (ref 6–8.4)
RBC # BLD AUTO: 4.43 M/UL (ref 4.6–6.2)
SODIUM SERPL-SCNC: 143 MMOL/L (ref 136–145)
WBC # BLD AUTO: 8.26 K/UL (ref 3.9–12.7)

## 2019-04-05 PROCEDURE — 94761 N-INVAS EAR/PLS OXIMETRY MLT: CPT

## 2019-04-05 PROCEDURE — 99232 SBSQ HOSP IP/OBS MODERATE 35: CPT | Mod: ,,, | Performed by: FAMILY MEDICINE

## 2019-04-05 PROCEDURE — 85025 COMPLETE CBC W/AUTO DIFF WBC: CPT

## 2019-04-05 PROCEDURE — 63600175 PHARM REV CODE 636 W HCPCS: Performed by: FAMILY MEDICINE

## 2019-04-05 PROCEDURE — 36415 COLL VENOUS BLD VENIPUNCTURE: CPT

## 2019-04-05 PROCEDURE — 25000003 PHARM REV CODE 250: Performed by: NURSE PRACTITIONER

## 2019-04-05 PROCEDURE — 11000001 HC ACUTE MED/SURG PRIVATE ROOM

## 2019-04-05 PROCEDURE — 25000003 PHARM REV CODE 250: Performed by: FAMILY MEDICINE

## 2019-04-05 PROCEDURE — 63600175 PHARM REV CODE 636 W HCPCS: Performed by: NURSE PRACTITIONER

## 2019-04-05 PROCEDURE — 99232 PR SUBSEQUENT HOSPITAL CARE,LEVL II: ICD-10-PCS | Mod: ,,, | Performed by: FAMILY MEDICINE

## 2019-04-05 PROCEDURE — 80053 COMPREHEN METABOLIC PANEL: CPT

## 2019-04-05 RX ORDER — TRAMADOL HYDROCHLORIDE 50 MG/1
50 TABLET ORAL EVERY 8 HOURS PRN
Status: DISCONTINUED | OUTPATIENT
Start: 2019-04-05 | End: 2019-04-07 | Stop reason: HOSPADM

## 2019-04-05 RX ORDER — CLONIDINE 0.1 MG/24H
1 PATCH, EXTENDED RELEASE TRANSDERMAL
Status: DISCONTINUED | OUTPATIENT
Start: 2019-04-05 | End: 2019-04-05

## 2019-04-05 RX ADMIN — BUPROPION HYDROCHLORIDE 300 MG: 150 TABLET, EXTENDED RELEASE ORAL at 09:04

## 2019-04-05 RX ADMIN — SODIUM CHLORIDE AND POTASSIUM CHLORIDE: .9; .15 SOLUTION INTRAVENOUS at 06:04

## 2019-04-05 RX ADMIN — LISINOPRIL 20 MG: 20 TABLET ORAL at 09:04

## 2019-04-05 RX ADMIN — VORTIOXETINE 10 MG: 10 TABLET, FILM COATED ORAL at 09:04

## 2019-04-05 RX ADMIN — MESALAMINE 800 MG: 400 CAPSULE, DELAYED RELEASE ORAL at 09:04

## 2019-04-05 RX ADMIN — TRAMADOL HYDROCHLORIDE 50 MG: 50 TABLET, FILM COATED ORAL at 08:04

## 2019-04-05 RX ADMIN — TRAMADOL HYDROCHLORIDE 50 MG: 50 TABLET, FILM COATED ORAL at 11:04

## 2019-04-05 RX ADMIN — METOPROLOL TARTRATE 100 MG: 100 TABLET ORAL at 09:04

## 2019-04-05 RX ADMIN — SIMVASTATIN 20 MG: 10 TABLET, FILM COATED ORAL at 08:04

## 2019-04-05 RX ADMIN — METOPROLOL TARTRATE 100 MG: 100 TABLET ORAL at 08:04

## 2019-04-05 RX ADMIN — SODIUM CHLORIDE AND POTASSIUM CHLORIDE: .9; .15 SOLUTION INTRAVENOUS at 07:04

## 2019-04-05 RX ADMIN — ENOXAPARIN SODIUM 40 MG: 100 INJECTION SUBCUTANEOUS at 04:04

## 2019-04-05 RX ADMIN — AMLODIPINE BESYLATE 10 MG: 10 TABLET ORAL at 09:04

## 2019-04-05 NOTE — PLAN OF CARE
04/05/19 1118   Discharge Assessment   Assessment Type Discharge Planning Reassessment     Mr phillips will ramain here for acute care.

## 2019-04-05 NOTE — PROGRESS NOTES
[facility] Hospital Medicine  Progress Note    Patient Name: Reymundo Dang Sr.  MRN: [id number]  Patient Class: IP- Inpatient   Admission Date: 4/3/2019  Length of Stay: 2 days  Attending Physician: Ki Bradley MD  Primary Care Provider: Rosalinda Villalba NP        Subjective:     Principal Problem:SBO (small bowel obstruction)    HPI:  64 y/o male presents to ED today with 2 day history of bilious vomiting. Also some loose bowels which made him think might be virus. Loose bowels stopped yesterday, but vomiting continues along with hiccupps.  Got an injection of phenergan as outpt that helped for a while, but eventually vomiting returned and he ended up here.  Work up in ED significant for SBO on CT with transition point in LLQ. Many adhesions seen on CT(has h/o umbilical hernia surgery x 2)  Admitted for NGT, bowel rest, IVF  Last meal was coffee and donuts Monday am     Hospital Course:  Pt is here on bowel rest. With NGT to suction. He is still belching. Only 100ml in canister. Has hx of crohns dz. General surgery consulted for SBO due to adhesions. He has no fever. WBC stable [id number]. KUB flat and errect ordered for this am. Passing gas today but still not feeling good.  Bp has been elevated. Has htn. Meds have been held thus far.     4/5/19 NGT d/cd yesterday; Tolerated clear liquids through day ; no nausea or vomiting with PM shift. Only c/o pain to lower abd with palpation. Afebrile. WBC 17.81>8.26. BP  184/89, so will add a Clonidine Patch;  KUB this am, to determine status.      Review of Systems   Constitutional: Positive for fatigue. Negative for chills and fever.   HENT: Negative for congestion, ear pain, postnasal drip, rhinorrhea, sore throat and trouble swallowing.    Eyes: Negative for redness and itching.   Respiratory: Positive for shortness of breath and stridor. Negative for cough and wheezing.         Hiccups   Cardiovascular: Negative for chest pain and  palpitations.   Gastrointestinal: Positive for abdominal pain and nausea. Negative for diarrhea and vomiting.   Genitourinary: Negative for dysuria and frequency.   Skin: Negative for rash.   Neurological: Negative for weakness and headaches.     Objective:     Vital Signs (Most Recent):  Temp: 98.1 °F (36.7 °C) (04/05/19 0458)  Pulse: 68 (04/05/19 0556)  Resp: 18 (04/05/19 0458)  BP: 133/63 (04/05/19 0458)  SpO2: 97 % (04/05/19 0458) Vital Signs (24h Range):  Temp:  [97.1 °F (36.2 °C)-98.9 °F (37.2 °C)] 98.1 °F (36.7 °C)  Pulse:  [63-87] 68  Resp:  [18] 18  SpO2:  [95 %-98 %] 97 %  BP: (133-169)/(63-86) 133/63     Weight: 125.7 kg (277 lb 1.9 oz)  Body mass index is 44.73 kg/m².    Physical Exam   Constitutional: He is oriented to person, place, and time. He appears well-developed and well-nourished.   64 y/o W M in NAD this AM   HENT:   Head: Normocephalic and atraumatic.   Right Ear: External ear normal.   Left Ear: External ear normal.   Nose: Nose normal.   Mouth/Throat: Oropharynx is clear and moist.   Eyes: Pupils are equal, round, and reactive to light. Conjunctivae and EOM are normal. Right eye exhibits no discharge. Left eye exhibits no discharge.   Neck: Normal range of motion. Neck supple. No thyromegaly present.   Cardiovascular: Normal rate, regular rhythm, normal heart sounds and intact distal pulses.   No murmur heard.  Pulmonary/Chest: Effort normal and breath sounds normal. No respiratory distress.   Abdominal: Soft. He exhibits distension. He exhibits no mass. There is tenderness. There is guarding. There is no rebound.   BS are almost absent with 2+ rebound on exam   Musculoskeletal: Normal range of motion.   Neurological: He is alert and oriented to person, place, and time. He has normal reflexes. No cranial nerve deficit.   Skin: Skin is warm and dry.   Psychiatric: He has a normal mood and affect. His behavior is normal. Judgment and thought content normal.         CRANIAL NERVES     CN III,  IV, VI   Pupils are equal, round, and reactive to light.  Extraocular motions are normal.        Significant Labs:   CBC:   Recent Labs   Lab 04/03/19 1946 04/04/19  0634 04/05/19  0625   WBC 16.06* 17.81* 8.26   HGB 15.8 14.7 13.6*   HCT 46.7 43.4 40.0    196 186     CMP:   Recent Labs   Lab 04/03/19 1946 04/04/19  0634 04/05/19  0625    145 143   K 3.7 3.6 3.6    106 107   CO2 28 28 26   GLU 89 82 79   BUN 23 21 14   CREATININE 1.1 0.9 0.8   CALCIUM 9.4 8.8 8.5*   PROT 7.0 6.4 6.1   ALBUMIN 3.7 3.4* 3.1*   BILITOT 0.9 0.9 0.8   ALKPHOS 65 60 60   AST 23 22 22   ALT 33 30 29   ANIONGAP 13 11 10   EGFRNONAA >60 >60 >60       Significant Imaging:     CT abd and pelvis Findings compatible with a mid small bowel obstruction likely secondary to adhesions.  Small bowel is distended up to 3.5 cm in diameter.  No free air or free fluid.    EKG Normal sinus rhythm  Minimal voltage criteria for LVH, may be normal variant  Nonspecific ST and T wave abnormality  Abnormal ECG  When compared with ECG of 18-DEC-2014 09:12,  Non-specific change in ST segment in Inferior leads    Assessment/Plan:      * SBO (small bowel obstruction)  NPO x meds (clamp suction 30 minutes)   NGT to wall suction  Surgery consulted.  Has several potential reasons for SBO - crohns? Adhesions?  4/5/19 NGT out; tolerating liquids; still with rebound and 2+ tenderness on exam- will continue present course & reassess in the am        Recurrent major depressive disorder, in full remission  Continue wellbutrin and home Trintellix         Hypokalemia  Takes po 20 daily at home   Will add to his IVF; NS with KCL 20meq @125ml/hr      Hypertension  Continue ACE and BB  Hold HCT       Hyperlipidemia  Continue statin      Crohn's disease  He says he has UC - this diagnosis is questionable. He and his wife cannot give history of how or when it was dx'd.   Continue azocol  needs to cont to f/u with GI      VTE Risk Mitigation (From admission,  onward)        Ordered     IP VTE HIGH RISK PATIENT  Once      04/03/19 1744     enoxaparin injection 40 mg  Daily      04/03/19 1744              Cooper Feng MD  Department of Hospital Medicine   [facility]

## 2019-04-05 NOTE — PROGRESS NOTES
Mr. Dang is well aware of all medications being given.  We discussed all meds, what they are for, potential side effects, pain control and fall precautions.  He reports abdominal pain at a 7/8 on the pain scale.  I notified the nurse.  He is also requesting medication for sleep.  We thoroughly discussed his anticoagulation med and risks.  He did not have any further questions or concerns at this time.     Nahomy Davis, PharmD

## 2019-04-05 NOTE — SUBJECTIVE & OBJECTIVE
Review of Systems   Constitutional: Positive for fatigue. Negative for chills and fever.   HENT: Negative for congestion, ear pain, postnasal drip, rhinorrhea, sore throat and trouble swallowing.    Eyes: Negative for redness and itching.   Respiratory: Positive for shortness of breath and stridor. Negative for cough and wheezing.         Hiccups   Cardiovascular: Negative for chest pain and palpitations.   Gastrointestinal: Positive for abdominal pain and nausea. Negative for diarrhea and vomiting.   Genitourinary: Negative for dysuria and frequency.   Skin: Negative for rash.   Neurological: Negative for weakness and headaches.     Objective:     Vital Signs (Most Recent):  Temp: 98.1 °F (36.7 °C) (04/05/19 0458)  Pulse: 68 (04/05/19 0556)  Resp: 18 (04/05/19 0458)  BP: 133/63 (04/05/19 0458)  SpO2: 97 % (04/05/19 0458) Vital Signs (24h Range):  Temp:  [97.1 °F (36.2 °C)-98.9 °F (37.2 °C)] 98.1 °F (36.7 °C)  Pulse:  [63-87] 68  Resp:  [18] 18  SpO2:  [95 %-98 %] 97 %  BP: (133-169)/(63-86) 133/63     Weight: 125.7 kg (277 lb 1.9 oz)  Body mass index is 44.73 kg/m².    Physical Exam   Constitutional: He is oriented to person, place, and time. He appears well-developed and well-nourished.   64 y/o W M in NAD this AM   HENT:   Head: Normocephalic and atraumatic.   Right Ear: External ear normal.   Left Ear: External ear normal.   Nose: Nose normal.   Mouth/Throat: Oropharynx is clear and moist.   Eyes: Pupils are equal, round, and reactive to light. Conjunctivae and EOM are normal. Right eye exhibits no discharge. Left eye exhibits no discharge.   Neck: Normal range of motion. Neck supple. No thyromegaly present.   Cardiovascular: Normal rate, regular rhythm, normal heart sounds and intact distal pulses.   No murmur heard.  Pulmonary/Chest: Effort normal and breath sounds normal. No respiratory distress.   Abdominal: Soft. He exhibits distension. He exhibits no mass. There is tenderness. There is guarding. There is  no rebound.   BS are almost absent with 2+ rebound on exam   Musculoskeletal: Normal range of motion.   Neurological: He is alert and oriented to person, place, and time. He has normal reflexes. No cranial nerve deficit.   Skin: Skin is warm and dry.   Psychiatric: He has a normal mood and affect. His behavior is normal. Judgment and thought content normal.         CRANIAL NERVES     CN III, IV, VI   Pupils are equal, round, and reactive to light.  Extraocular motions are normal.        Significant Labs:   CBC:   Recent Labs   Lab 04/03/19 1946 04/04/19 0634 04/05/19 0625   WBC 16.06* 17.81* 8.26   HGB 15.8 14.7 13.6*   HCT 46.7 43.4 40.0    196 186     CMP:   Recent Labs   Lab 04/03/19 1946 04/04/19 0634 04/05/19 0625    145 143   K 3.7 3.6 3.6    106 107   CO2 28 28 26   GLU 89 82 79   BUN 23 21 14   CREATININE 1.1 0.9 0.8   CALCIUM 9.4 8.8 8.5*   PROT 7.0 6.4 6.1   ALBUMIN 3.7 3.4* 3.1*   BILITOT 0.9 0.9 0.8   ALKPHOS 65 60 60   AST 23 22 22   ALT 33 30 29   ANIONGAP 13 11 10   EGFRNONAA >60 >60 >60       Significant Imaging:     CT abd and pelvis Findings compatible with a mid small bowel obstruction likely secondary to adhesions.  Small bowel is distended up to 3.5 cm in diameter.  No free air or free fluid.    EKG Normal sinus rhythm  Minimal voltage criteria for LVH, may be normal variant  Nonspecific ST and T wave abnormality  Abnormal ECG  When compared with ECG of 18-DEC-2014 09:12,  Non-specific change in ST segment in Inferior leads

## 2019-04-05 NOTE — PLAN OF CARE
Problem: Adult Inpatient Plan of Care  Goal: Plan of Care Review  Outcome: Ongoing (interventions implemented as appropriate)  Patient afebrile. Flat & erect of abdomen today. Remains on clear liquids. IVF's decreased to 50 ML/hr.  Distended abdomen; hypo-Bowel sounds lower abd quadrants with hyper-bowel sounds upper qbd quadrants. Patient reports he is passing flatus. Constant belching. No N/V. Tramadol prn pain. Clonidine patch discontinued; never adm to patient. Wife here earlier at bedside. Up in chair. Ambulating to bathroom with stand by assist. No resp distress. Call bell within reach of patient. Agrees with plan of care.

## 2019-04-05 NOTE — PROGRESS NOTES
Nursing Notes  Bedside handoff completed with SAKINA Banuelos. Patient sleeping between care. Wife at bedside. Call bell within reach of patient.       Huddle Comments

## 2019-04-05 NOTE — ASSESSMENT & PLAN NOTE
NPO x meds (clamp suction 30 minutes)   NGT to wall suction  Surgery consulted.  Has several potential reasons for SBO - crohns? Adhesions?  4/5/19 NGT out; tolerating liquids; still with rebound and 2+ tenderness on exam- will continue present course & reassess in the am

## 2019-04-05 NOTE — PLAN OF CARE
Problem: Adult Inpatient Plan of Care  Goal: Plan of Care Review  Outcome: Ongoing (interventions implemented as appropriate)  Patient had a good night.  Denies any pain unless abdomen palpated in right/left lower quadrants.  Denies any shortness of breath.  Patient had clear liquids through day and tolerated well; no nausea or vomiting with evening shift.  Patient pulled IV out in his sleep so new IV started; continuous fluids administered through shift.  Heart monitor in place, NSR on monitor.  Wife at bedside throughout night; attentive to patient's needs.  Plan is to advance diet today and see how patient tolerates.  Discussed plan with patient and wife; verbalized understanding.

## 2019-04-06 LAB
ALBUMIN SERPL BCP-MCNC: 3.4 G/DL (ref 3.5–5.2)
ALBUMIN SERPL BCP-MCNC: 3.4 G/DL (ref 3.5–5.2)
ALP SERPL-CCNC: 63 U/L (ref 55–135)
ALP SERPL-CCNC: 63 U/L (ref 55–135)
ALT SERPL W/O P-5'-P-CCNC: 30 U/L (ref 10–44)
ALT SERPL W/O P-5'-P-CCNC: 30 U/L (ref 10–44)
ANION GAP SERPL CALC-SCNC: 11 MMOL/L (ref 8–16)
ANION GAP SERPL CALC-SCNC: 11 MMOL/L (ref 8–16)
AST SERPL-CCNC: 24 U/L (ref 10–40)
AST SERPL-CCNC: 24 U/L (ref 10–40)
BASOPHILS # BLD AUTO: 0.03 K/UL (ref 0–0.2)
BASOPHILS # BLD AUTO: 0.03 K/UL (ref 0–0.2)
BASOPHILS NFR BLD: 0.4 % (ref 0–1.9)
BASOPHILS NFR BLD: 0.4 % (ref 0–1.9)
BILIRUB SERPL-MCNC: 0.6 MG/DL (ref 0.1–1)
BILIRUB SERPL-MCNC: 0.6 MG/DL (ref 0.1–1)
BUN SERPL-MCNC: 7 MG/DL (ref 8–23)
BUN SERPL-MCNC: 7 MG/DL (ref 8–23)
CALCIUM SERPL-MCNC: 8.7 MG/DL (ref 8.7–10.5)
CALCIUM SERPL-MCNC: 8.7 MG/DL (ref 8.7–10.5)
CHLORIDE SERPL-SCNC: 107 MMOL/L (ref 95–110)
CHLORIDE SERPL-SCNC: 107 MMOL/L (ref 95–110)
CO2 SERPL-SCNC: 25 MMOL/L (ref 23–29)
CO2 SERPL-SCNC: 25 MMOL/L (ref 23–29)
CREAT SERPL-MCNC: 0.7 MG/DL (ref 0.5–1.4)
CREAT SERPL-MCNC: 0.7 MG/DL (ref 0.5–1.4)
DIFFERENTIAL METHOD: NORMAL
DIFFERENTIAL METHOD: NORMAL
EOSINOPHIL # BLD AUTO: 0.3 K/UL (ref 0–0.5)
EOSINOPHIL # BLD AUTO: 0.3 K/UL (ref 0–0.5)
EOSINOPHIL NFR BLD: 4.6 % (ref 0–8)
EOSINOPHIL NFR BLD: 4.6 % (ref 0–8)
ERYTHROCYTE [DISTWIDTH] IN BLOOD BY AUTOMATED COUNT: 12.5 % (ref 11.5–14.5)
ERYTHROCYTE [DISTWIDTH] IN BLOOD BY AUTOMATED COUNT: 12.5 % (ref 11.5–14.5)
EST. GFR  (AFRICAN AMERICAN): >60 ML/MIN/1.73 M^2
EST. GFR  (AFRICAN AMERICAN): >60 ML/MIN/1.73 M^2
EST. GFR  (NON AFRICAN AMERICAN): >60 ML/MIN/1.73 M^2
EST. GFR  (NON AFRICAN AMERICAN): >60 ML/MIN/1.73 M^2
GLUCOSE SERPL-MCNC: 87 MG/DL (ref 70–110)
GLUCOSE SERPL-MCNC: 87 MG/DL (ref 70–110)
HCT VFR BLD AUTO: 41.1 % (ref 40–54)
HCT VFR BLD AUTO: 41.1 % (ref 40–54)
HGB BLD-MCNC: 14.2 G/DL (ref 14–18)
HGB BLD-MCNC: 14.2 G/DL (ref 14–18)
LYMPHOCYTES # BLD AUTO: 1.5 K/UL (ref 1–4.8)
LYMPHOCYTES # BLD AUTO: 1.5 K/UL (ref 1–4.8)
LYMPHOCYTES NFR BLD: 22 % (ref 18–48)
LYMPHOCYTES NFR BLD: 22 % (ref 18–48)
MCH RBC QN AUTO: 30.6 PG (ref 27–31)
MCH RBC QN AUTO: 30.6 PG (ref 27–31)
MCHC RBC AUTO-ENTMCNC: 34.5 G/DL (ref 32–36)
MCHC RBC AUTO-ENTMCNC: 34.5 G/DL (ref 32–36)
MCV RBC AUTO: 89 FL (ref 82–98)
MCV RBC AUTO: 89 FL (ref 82–98)
MONOCYTES # BLD AUTO: 0.8 K/UL (ref 0.3–1)
MONOCYTES # BLD AUTO: 0.8 K/UL (ref 0.3–1)
MONOCYTES NFR BLD: 11.7 % (ref 4–15)
MONOCYTES NFR BLD: 11.7 % (ref 4–15)
NEUTROPHILS # BLD AUTO: 4.2 K/UL (ref 1.8–7.7)
NEUTROPHILS # BLD AUTO: 4.2 K/UL (ref 1.8–7.7)
NEUTROPHILS NFR BLD: 61.3 % (ref 38–73)
NEUTROPHILS NFR BLD: 61.3 % (ref 38–73)
PLATELET # BLD AUTO: 210 K/UL (ref 150–350)
PLATELET # BLD AUTO: 210 K/UL (ref 150–350)
PMV BLD AUTO: 11 FL (ref 9.2–12.9)
PMV BLD AUTO: 11 FL (ref 9.2–12.9)
POTASSIUM SERPL-SCNC: 3.3 MMOL/L (ref 3.5–5.1)
POTASSIUM SERPL-SCNC: 3.3 MMOL/L (ref 3.5–5.1)
PROT SERPL-MCNC: 6.6 G/DL (ref 6–8.4)
PROT SERPL-MCNC: 6.6 G/DL (ref 6–8.4)
RBC # BLD AUTO: 4.64 M/UL (ref 4.6–6.2)
RBC # BLD AUTO: 4.64 M/UL (ref 4.6–6.2)
SODIUM SERPL-SCNC: 143 MMOL/L (ref 136–145)
SODIUM SERPL-SCNC: 143 MMOL/L (ref 136–145)
WBC # BLD AUTO: 6.78 K/UL (ref 3.9–12.7)
WBC # BLD AUTO: 6.78 K/UL (ref 3.9–12.7)

## 2019-04-06 PROCEDURE — 25000003 PHARM REV CODE 250: Performed by: FAMILY MEDICINE

## 2019-04-06 PROCEDURE — 94761 N-INVAS EAR/PLS OXIMETRY MLT: CPT

## 2019-04-06 PROCEDURE — 99232 SBSQ HOSP IP/OBS MODERATE 35: CPT | Mod: ,,, | Performed by: FAMILY MEDICINE

## 2019-04-06 PROCEDURE — 36415 COLL VENOUS BLD VENIPUNCTURE: CPT

## 2019-04-06 PROCEDURE — 63600175 PHARM REV CODE 636 W HCPCS: Performed by: FAMILY MEDICINE

## 2019-04-06 PROCEDURE — 85025 COMPLETE CBC W/AUTO DIFF WBC: CPT

## 2019-04-06 PROCEDURE — 11000001 HC ACUTE MED/SURG PRIVATE ROOM

## 2019-04-06 PROCEDURE — 99232 PR SUBSEQUENT HOSPITAL CARE,LEVL II: ICD-10-PCS | Mod: ,,, | Performed by: FAMILY MEDICINE

## 2019-04-06 PROCEDURE — 80053 COMPREHEN METABOLIC PANEL: CPT

## 2019-04-06 RX ADMIN — METOPROLOL TARTRATE 100 MG: 100 TABLET ORAL at 11:04

## 2019-04-06 RX ADMIN — MESALAMINE 800 MG: 400 CAPSULE, DELAYED RELEASE ORAL at 11:04

## 2019-04-06 RX ADMIN — ONDANSETRON 4 MG: 2 INJECTION INTRAMUSCULAR; INTRAVENOUS at 09:04

## 2019-04-06 RX ADMIN — LISINOPRIL 20 MG: 20 TABLET ORAL at 11:04

## 2019-04-06 RX ADMIN — ENOXAPARIN SODIUM 40 MG: 100 INJECTION SUBCUTANEOUS at 05:04

## 2019-04-06 RX ADMIN — SIMVASTATIN 20 MG: 10 TABLET, FILM COATED ORAL at 08:04

## 2019-04-06 RX ADMIN — BUPROPION HYDROCHLORIDE 300 MG: 150 TABLET, EXTENDED RELEASE ORAL at 11:04

## 2019-04-06 RX ADMIN — METOPROLOL TARTRATE 100 MG: 100 TABLET ORAL at 08:04

## 2019-04-06 RX ADMIN — VORTIOXETINE 10 MG: 10 TABLET, FILM COATED ORAL at 12:04

## 2019-04-06 RX ADMIN — AMLODIPINE BESYLATE 10 MG: 10 TABLET ORAL at 11:04

## 2019-04-06 NOTE — PLAN OF CARE
Patient returned to room 309 via wheelchair from x-ray with X-ray Tech. Ticket to ride signed and patient back in bed. Will monitor.

## 2019-04-06 NOTE — PLAN OF CARE
0933 Per staff nurse, Florence HAMILTON RN, patient with c/o nausea; therefore, zofran administered as ordered.   0955 Upon arrival to patient's room, patient states he is still feeling nauseated and will take meds when feeling better. Will monitor.

## 2019-04-06 NOTE — PLAN OF CARE
Problem: Adult Inpatient Plan of Care  Goal: Plan of Care Review  Outcome: Ongoing (interventions implemented as appropriate)  Quiet hours, rested well. Wife at bedside. Ambulating to bathroom, having BM's. Bowel sounds WNL's. IFV's infusing as ordered. SR on tele. Received pain med X1 dose last pm. Maintained on clear liquid diet. Plan of care reviewed with pt and spouse, voiced understanding. SR up X3, call bell in reach. Instructed to call for needs or assistance.

## 2019-04-06 NOTE — PROGRESS NOTES
[facility] Medicine  Progress Note    Patient Name: Reymundo Dang Sr.  MRN: [id number]  Patient Class: IP- Inpatient   Admission Date: 4/3/2019  Length of Stay: 3 days  Attending Physician: Ki Bradley MD  Primary Care Provider: Rosalinda Villalba NP        Subjective:     Principal Problem:SBO (small bowel obstruction)    HPI:  62 y/o male presents to ED today with 2 day history of bilious vomiting. Also some loose bowels which made him think might be virus. Loose bowels stopped yesterday, but vomiting continues along with hiccupps.  Got an injection of phenergan as outpt that helped for a while, but eventually vomiting returned and he ended up here.  Work up in ED significant for SBO on CT with transition point in LLQ. Many adhesions seen on CT(has h/o umbilical hernia surgery x 2)  Admitted for NGT, bowel rest, IVF  Last meal was coffee and donuts Monday am     Hospital Course:  Pt is here on bowel rest. With NGT to suction. He is still belching. Only 100ml in canister. Has hx of crohns dz. General surgery consulted for SBO due to adhesions. He has no fever. WBC stable [id number]. KUB flat and errect ordered for this am. Passing gas today but still not feeling good.  Bp has been elevated. Has htn. Meds have been held thus far.     4/5/19 NGT d/cd yesterday; Tolerated clear liquids through day ; no nausea or vomiting with PM shift. Only c/o pain to lower abd with palpation. Afebrile. WBC 17.81>8.26. BP  184/89, so will add a Clonidine Patch;  KUB this am, to determine status.    4-6 KUB shows contrast into the rectum this am; having BMs too    Interval History: Resolving SBO and tolerating oral intake    Review of Systems   Constitutional: Negative for appetite change.   HENT: Negative for congestion, ear pain, sneezing and sore throat.    Eyes: Negative for redness and visual disturbance.   Respiratory: Negative for cough, chest tightness and stridor.    Cardiovascular:  Negative for chest pain.   Gastrointestinal: Positive for abdominal distention and abdominal pain. Negative for blood in stool, diarrhea, nausea and vomiting.        Having BMs and KUB shows dye in the rectum this am   Genitourinary: Negative for difficulty urinating, dysuria and hematuria.   Musculoskeletal: Negative for arthralgias, back pain, joint swelling, myalgias and neck pain.   Skin: Negative for rash.   Neurological: Positive for facial asymmetry and weakness. Negative for dizziness.        R hemiparesis   Psychiatric/Behavioral: Negative for agitation. The patient is not nervous/anxious.      Objective:     Vital Signs (Most Recent):  Temp: 96.9 °F (36.1 °C) (04/06/19 0729)  Pulse: 73 (04/06/19 0729)  Resp: 20 (04/06/19 0729)  BP: (!) 183/77 (04/06/19 0729)  SpO2: 99 % (04/06/19 0800) Vital Signs (24h Range):  Temp:  [96.9 °F (36.1 °C)-97.9 °F (36.6 °C)] 96.9 °F (36.1 °C)  Pulse:  [62-85] 73  Resp:  [18-20] 20  SpO2:  [96 %-99 %] 99 %  BP: (129-183)/(67-87) 183/77     Weight: 125.7 kg (277 lb 1.9 oz)  Body mass index is 44.73 kg/m².    Intake/Output Summary (Last 24 hours) at 4/6/2019 1141  Last data filed at 4/6/2019 0600  Gross per 24 hour   Intake 1760 ml   Output --   Net 1760 ml      Physical Exam   Constitutional: He is oriented to person, place, and time. He appears well-developed and well-nourished.   HENT:   Head: Normocephalic.   Eyes: Pupils are equal, round, and reactive to light.   Neck: Normal range of motion. Neck supple. No thyromegaly present.   Cardiovascular: Normal rate and regular rhythm. Exam reveals no friction rub.   No murmur heard.  Pulmonary/Chest: No respiratory distress. He has no wheezes.   Abdominal: Bowel sounds are normal. He exhibits distension. There is tenderness. There is no rebound and no guarding.   Abd is less swollen & less distended with better BS+   Musculoskeletal: Normal range of motion. He exhibits no edema or tenderness.   Lymphadenopathy:     He has no  cervical adenopathy.   Neurological: He is alert and oriented to person, place, and time. He has normal reflexes. A cranial nerve deficit is present. Coordination abnormal.   Skin: Skin is warm and dry.   Psychiatric: He has a normal mood and affect. Judgment and thought content normal.       Significant Labs:   CBC:   Recent Labs   Lab 04/05/19 0625 04/06/19  0652   WBC 8.26 6.78  6.78   HGB 13.6* 14.2  14.2   HCT 40.0 41.1  41.1    210  210     CMP:   Recent Labs   Lab 04/05/19 0625 04/06/19  0652    143  143   K 3.6 3.3*  3.3*    107  107   CO2 26 25  25   GLU 79 87  87   BUN 14 7*  7*   CREATININE 0.8 0.7  0.7   CALCIUM 8.5* 8.7  8.7   PROT 6.1 6.6  6.6   ALBUMIN 3.1* 3.4*  3.4*   BILITOT 0.8 0.6  0.6   ALKPHOS 60 63  63   AST 22 24  24   ALT 29 30  30   ANIONGAP 10 11  11   EGFRNONAA >60 >60  >60       Significant Imaging: I have reviewed all pertinent imaging results/findings within the past 24 hours.    Assessment/Plan:      * SBO (small bowel obstruction)  NPO x meds (clamp suction 30 minutes)   NGT to wall suction  Surgery consulted.  Has several potential reasons for SBO - crohns? Adhesions?  4/5/19 NGT out; tolerating liquids; still with rebound and 2+ tenderness on exam- will continue present course & reassess in the am    4-6 BS + and pt wants to eat, so will progress his diet        Recurrent major depressive disorder, in full remission  Continue wellbutrin and home Trintellix         Hypokalemia  Takes po 20 daily at home   Will add to his IVF; NS with KCL 20meq @125ml/hr      Hypertension  Continue ACE and BB  Hold HCT       Hyperlipidemia  Continue statin      Crohn's disease  He says he has UC - this diagnosis is questionable. He and his wife cannot give history of how or when it was dx'd.   Continue azocol  needs to cont to f/u with GI      VTE Risk Mitigation (From admission, onward)        Ordered     IP VTE HIGH RISK PATIENT  Once      04/03/19 6799      enoxaparin injection 40 mg  Daily      04/03/19 1744              Cooper Feng MD  Department of [facility] Medicine   [facility]

## 2019-04-06 NOTE — NURSING
Bedside report complete. Pt up, ambulating to bathroom. Wife at bedside. IVF's infusing as ordered. Denies C/O at this time. Instructed to call for needs or assistance.

## 2019-04-06 NOTE — PLAN OF CARE
Problem: Adult Inpatient Plan of Care  Goal: Plan of Care Review  Outcome: Ongoing (interventions implemented as appropriate)     04/06/19 1804   Plan of Care Review   Plan of Care Reviewed With patient;spouse   Progress improving   Plan of care reviewed with Mr. Dang and his wife regarding diet and food regimen, medication, and tests. Per Dr. Barajas, questionable discharge on tomorrow.Verbalized an understanding regarding diet restrictions as it relates to caffeine, fried foods, eating 6 small meals as opposed to 3 large meals, and avoiding food close to bedtime. IV fluids discontinued and patient verbalized an understanding of diet advance in preparation for discharge home. Mashed potatoes ordered this p.m. Per Florence PRESLEY per patient's request to assure tolerance.   Goal: Patient-Specific Goal (Individualization)  Outcome: Ongoing (interventions implemented as appropriate)     04/06/19 1804   OTHER   Anxieties, Fears or Concerns Concerns regarding discharge without tolerance of solid foods.    Individualized Care Needs Ordered small mashed potatoes to confirm tolerance. Education provided regarding GERD diet restrictions.   Patient-Specific Goal (Individualization)   Patient-Specific Goals (Include Timeframe) Patient to tolerate mashed potatoes with supper just brought to him in preparation for discharge tomorrow.

## 2019-04-06 NOTE — ASSESSMENT & PLAN NOTE
NPO x meds (clamp suction 30 minutes)   NGT to wall suction  Surgery consulted.  Has several potential reasons for SBO - crohns? Adhesions?  4/5/19 NGT out; tolerating liquids; still with rebound and 2+ tenderness on exam- will continue present course & reassess in the am    4-6 BS + and pt wants to eat, so will progress his diet

## 2019-04-06 NOTE — PLAN OF CARE
Dr. Crook phoned in to check on Mr. Dang's status. Questions answered. No new orders given. Will Monitor.

## 2019-04-07 VITALS
WEIGHT: 277.13 LBS | OXYGEN SATURATION: 97 % | TEMPERATURE: 99 F | BODY MASS INDEX: 44.54 KG/M2 | HEART RATE: 76 BPM | DIASTOLIC BLOOD PRESSURE: 67 MMHG | HEIGHT: 66 IN | SYSTOLIC BLOOD PRESSURE: 135 MMHG | RESPIRATION RATE: 18 BRPM

## 2019-04-07 LAB
ALBUMIN SERPL BCP-MCNC: 3.2 G/DL (ref 3.5–5.2)
ALP SERPL-CCNC: 61 U/L (ref 55–135)
ALT SERPL W/O P-5'-P-CCNC: 34 U/L (ref 10–44)
ANION GAP SERPL CALC-SCNC: 11 MMOL/L (ref 8–16)
ANION GAP SERPL CALC-SCNC: 11 MMOL/L (ref 8–16)
AST SERPL-CCNC: 26 U/L (ref 10–40)
BASOPHILS # BLD AUTO: 0.03 K/UL (ref 0–0.2)
BASOPHILS # BLD AUTO: 0.03 K/UL (ref 0–0.2)
BASOPHILS NFR BLD: 0.4 % (ref 0–1.9)
BASOPHILS NFR BLD: 0.4 % (ref 0–1.9)
BILIRUB SERPL-MCNC: 0.5 MG/DL (ref 0.1–1)
BUN SERPL-MCNC: 6 MG/DL (ref 8–23)
BUN SERPL-MCNC: 6 MG/DL (ref 8–23)
CALCIUM SERPL-MCNC: 8.7 MG/DL (ref 8.7–10.5)
CALCIUM SERPL-MCNC: 8.7 MG/DL (ref 8.7–10.5)
CHLORIDE SERPL-SCNC: 106 MMOL/L (ref 95–110)
CHLORIDE SERPL-SCNC: 106 MMOL/L (ref 95–110)
CO2 SERPL-SCNC: 26 MMOL/L (ref 23–29)
CO2 SERPL-SCNC: 26 MMOL/L (ref 23–29)
CREAT SERPL-MCNC: 0.7 MG/DL (ref 0.5–1.4)
CREAT SERPL-MCNC: 0.7 MG/DL (ref 0.5–1.4)
DIFFERENTIAL METHOD: ABNORMAL
DIFFERENTIAL METHOD: ABNORMAL
EOSINOPHIL # BLD AUTO: 0.3 K/UL (ref 0–0.5)
EOSINOPHIL # BLD AUTO: 0.3 K/UL (ref 0–0.5)
EOSINOPHIL NFR BLD: 3.6 % (ref 0–8)
EOSINOPHIL NFR BLD: 3.6 % (ref 0–8)
ERYTHROCYTE [DISTWIDTH] IN BLOOD BY AUTOMATED COUNT: 12.3 % (ref 11.5–14.5)
ERYTHROCYTE [DISTWIDTH] IN BLOOD BY AUTOMATED COUNT: 12.3 % (ref 11.5–14.5)
EST. GFR  (AFRICAN AMERICAN): >60 ML/MIN/1.73 M^2
EST. GFR  (AFRICAN AMERICAN): >60 ML/MIN/1.73 M^2
EST. GFR  (NON AFRICAN AMERICAN): >60 ML/MIN/1.73 M^2
EST. GFR  (NON AFRICAN AMERICAN): >60 ML/MIN/1.73 M^2
GLUCOSE SERPL-MCNC: 91 MG/DL (ref 70–110)
GLUCOSE SERPL-MCNC: 91 MG/DL (ref 70–110)
HCT VFR BLD AUTO: 39.2 % (ref 40–54)
HCT VFR BLD AUTO: 39.2 % (ref 40–54)
HGB BLD-MCNC: 13.4 G/DL (ref 14–18)
HGB BLD-MCNC: 13.4 G/DL (ref 14–18)
LYMPHOCYTES # BLD AUTO: 2.1 K/UL (ref 1–4.8)
LYMPHOCYTES # BLD AUTO: 2.1 K/UL (ref 1–4.8)
LYMPHOCYTES NFR BLD: 28.8 % (ref 18–48)
LYMPHOCYTES NFR BLD: 28.8 % (ref 18–48)
MCH RBC QN AUTO: 30.2 PG (ref 27–31)
MCH RBC QN AUTO: 30.2 PG (ref 27–31)
MCHC RBC AUTO-ENTMCNC: 34.2 G/DL (ref 32–36)
MCHC RBC AUTO-ENTMCNC: 34.2 G/DL (ref 32–36)
MCV RBC AUTO: 89 FL (ref 82–98)
MCV RBC AUTO: 89 FL (ref 82–98)
MONOCYTES # BLD AUTO: 1 K/UL (ref 0.3–1)
MONOCYTES # BLD AUTO: 1 K/UL (ref 0.3–1)
MONOCYTES NFR BLD: 14.1 % (ref 4–15)
MONOCYTES NFR BLD: 14.1 % (ref 4–15)
NEUTROPHILS # BLD AUTO: 3.9 K/UL (ref 1.8–7.7)
NEUTROPHILS # BLD AUTO: 3.9 K/UL (ref 1.8–7.7)
NEUTROPHILS NFR BLD: 53.1 % (ref 38–73)
NEUTROPHILS NFR BLD: 53.1 % (ref 38–73)
PLATELET # BLD AUTO: 225 K/UL (ref 150–350)
PLATELET # BLD AUTO: 225 K/UL (ref 150–350)
PMV BLD AUTO: 10.6 FL (ref 9.2–12.9)
PMV BLD AUTO: 10.6 FL (ref 9.2–12.9)
POTASSIUM SERPL-SCNC: 3.4 MMOL/L (ref 3.5–5.1)
POTASSIUM SERPL-SCNC: 3.4 MMOL/L (ref 3.5–5.1)
PROT SERPL-MCNC: 6.2 G/DL (ref 6–8.4)
RBC # BLD AUTO: 4.43 M/UL (ref 4.6–6.2)
RBC # BLD AUTO: 4.43 M/UL (ref 4.6–6.2)
SODIUM SERPL-SCNC: 143 MMOL/L (ref 136–145)
SODIUM SERPL-SCNC: 143 MMOL/L (ref 136–145)
WBC # BLD AUTO: 7.3 K/UL (ref 3.9–12.7)
WBC # BLD AUTO: 7.3 K/UL (ref 3.9–12.7)

## 2019-04-07 PROCEDURE — 99231 SBSQ HOSP IP/OBS SF/LOW 25: CPT | Mod: HCNC,,, | Performed by: SURGERY

## 2019-04-07 PROCEDURE — 36415 COLL VENOUS BLD VENIPUNCTURE: CPT

## 2019-04-07 PROCEDURE — 25000003 PHARM REV CODE 250: Performed by: FAMILY MEDICINE

## 2019-04-07 PROCEDURE — 94760 N-INVAS EAR/PLS OXIMETRY 1: CPT

## 2019-04-07 PROCEDURE — 80053 COMPREHEN METABOLIC PANEL: CPT

## 2019-04-07 PROCEDURE — 99231 PR SUBSEQUENT HOSPITAL CARE,LEVL I: ICD-10-PCS | Mod: HCNC,,, | Performed by: SURGERY

## 2019-04-07 PROCEDURE — 85025 COMPLETE CBC W/AUTO DIFF WBC: CPT

## 2019-04-07 PROCEDURE — 99238 PR HOSPITAL DISCHARGE DAY,<30 MIN: ICD-10-PCS | Mod: ,,, | Performed by: FAMILY MEDICINE

## 2019-04-07 PROCEDURE — 99238 HOSP IP/OBS DSCHRG MGMT 30/<: CPT | Mod: ,,, | Performed by: FAMILY MEDICINE

## 2019-04-07 RX ADMIN — LISINOPRIL 20 MG: 20 TABLET ORAL at 09:04

## 2019-04-07 RX ADMIN — BUPROPION HYDROCHLORIDE 300 MG: 150 TABLET, EXTENDED RELEASE ORAL at 09:04

## 2019-04-07 RX ADMIN — METOPROLOL TARTRATE 100 MG: 100 TABLET ORAL at 09:04

## 2019-04-07 RX ADMIN — MESALAMINE 800 MG: 400 CAPSULE, DELAYED RELEASE ORAL at 09:04

## 2019-04-07 RX ADMIN — AMLODIPINE BESYLATE 10 MG: 10 TABLET ORAL at 09:04

## 2019-04-07 RX ADMIN — VORTIOXETINE 10 MG: 10 TABLET, FILM COATED ORAL at 09:04

## 2019-04-07 NOTE — ASSESSMENT & PLAN NOTE
NPO x meds (clamp suction 30 minutes)   NGT to wall suction  Surgery consulted.  Has several potential reasons for SBO - crohns? Adhesions?  4/5/19 NGT out; tolerating liquids; still with rebound and 2+ tenderness on exam- will continue present course & reassess in the am    4-6 BS + and pt wants to eat, so will progress his diet    4-7 Doing well on progressed diet

## 2019-04-07 NOTE — PROGRESS NOTES
Staff Handoff  Bedside handoff report received from Georgiana PRESLEY. Pt is AAOX3 without any complaints; wife at bedside. Will monitor.      Resident Handoff

## 2019-04-07 NOTE — NURSING
Report received from Biju.  VS stable.  Wife at bedside.  Call bell in reach.  Will continue to monitor.  No complaints of pain noted.

## 2019-04-07 NOTE — SUBJECTIVE & OBJECTIVE
Interval History: Resolving SBO and tolerating oral intake    Review of Systems   Constitutional: Negative for appetite change.   HENT: Negative for congestion, ear pain, sneezing and sore throat.    Eyes: Negative for redness and visual disturbance.   Respiratory: Negative for cough, chest tightness and stridor.    Cardiovascular: Negative for chest pain.   Gastrointestinal: Positive for abdominal distention and abdominal pain. Negative for blood in stool, diarrhea, nausea and vomiting.        Having BMs and KUB shows dye in the rectum this am   Genitourinary: Negative for difficulty urinating, dysuria and hematuria.   Musculoskeletal: Negative for arthralgias, back pain, joint swelling, myalgias and neck pain.   Skin: Negative for rash.   Neurological: Positive for facial asymmetry and weakness. Negative for dizziness.        R hemiparesis   Psychiatric/Behavioral: Negative for agitation. The patient is not nervous/anxious.      Objective:     Vital Signs (Most Recent):  Temp: 98.5 °F (36.9 °C) (04/07/19 1134)  Pulse: 67 (04/07/19 1134)  Resp: 18 (04/07/19 0830)  BP: 134/68 (04/07/19 1134)  SpO2: 98 % (04/07/19 1134) Vital Signs (24h Range):  Temp:  [96.6 °F (35.9 °C)-98.5 °F (36.9 °C)] 98.5 °F (36.9 °C)  Pulse:  [64-86] 67  Resp:  [18] 18  SpO2:  [96 %-99 %] 98 %  BP: (134-170)/(67-97) 134/68     Weight: 125.7 kg (277 lb 1.9 oz)  Body mass index is 44.73 kg/m².  No intake or output data in the 24 hours ending 04/07/19 1211   Physical Exam   Constitutional: He is oriented to person, place, and time. He appears well-developed and well-nourished.   HENT:   Head: Normocephalic.   Eyes: Pupils are equal, round, and reactive to light.   Neck: Normal range of motion. Neck supple. No thyromegaly present.   Cardiovascular: Normal rate and regular rhythm. Exam reveals no friction rub.   No murmur heard.  Pulmonary/Chest: No respiratory distress. He has no wheezes.   Abdominal: Bowel sounds are normal. He exhibits  distension. There is tenderness. There is no rebound and no guarding.   Abd is less swollen & less distended with better BS+   Musculoskeletal: Normal range of motion. He exhibits no edema or tenderness.   Lymphadenopathy:     He has no cervical adenopathy.   Neurological: He is alert and oriented to person, place, and time. He has normal reflexes. A cranial nerve deficit is present. Coordination abnormal.   Skin: Skin is warm and dry.   Psychiatric: He has a normal mood and affect. Judgment and thought content normal.     Interval History: Resolving SBO and tolerating oral intake    Review of Systems   Constitutional: Negative for appetite change.   HENT: Negative for congestion, ear pain, sneezing and sore throat.    Eyes: Negative for redness and visual disturbance.   Respiratory: Negative for cough, chest tightness and stridor.    Cardiovascular: Negative for chest pain.   Gastrointestinal: Positive for abdominal distention and abdominal pain. Negative for blood in stool, diarrhea, nausea and vomiting.        Having BMs and KUB shows dye in the rectum this am   Genitourinary: Negative for difficulty urinating, dysuria and hematuria.   Musculoskeletal: Negative for arthralgias, back pain, joint swelling, myalgias and neck pain.   Skin: Negative for rash.   Neurological: Positive for facial asymmetry and weakness. Negative for dizziness.        R hemiparesis   Psychiatric/Behavioral: Negative for agitation. The patient is not nervous/anxious.      Objective:     Vital Signs (Most Recent):  Temp: 98.5 °F (36.9 °C) (04/07/19 1134)  Pulse: 67 (04/07/19 1134)  Resp: 18 (04/07/19 0830)  BP: 134/68 (04/07/19 1134)  SpO2: 98 % (04/07/19 1134) Vital Signs (24h Range):  Temp:  [96.6 °F (35.9 °C)-98.5 °F (36.9 °C)] 98.5 °F (36.9 °C)  Pulse:  [64-86] 67  Resp:  [18] 18  SpO2:  [96 %-99 %] 98 %  BP: (134-170)/(67-97) 134/68     Weight: 125.7 kg (277 lb 1.9 oz)  Body mass index is 44.73 kg/m².  No intake or output data in the  24 hours ending 04/07/19 1211   Physical Exam   Constitutional: He is oriented to person, place, and time. He appears well-developed and well-nourished.   HENT:   Head: Normocephalic.   Eyes: Pupils are equal, round, and reactive to light.   Neck: Normal range of motion. Neck supple. No thyromegaly present.   Cardiovascular: Normal rate and regular rhythm. Exam reveals no friction rub.   No murmur heard.  Pulmonary/Chest: No respiratory distress. He has no wheezes.   Abdominal: Bowel sounds are normal. He exhibits distension. There is tenderness. There is no rebound and no guarding.   Abd is less swollen & less distended with better BS+   Musculoskeletal: Normal range of motion. He exhibits no edema or tenderness.   Lymphadenopathy:     He has no cervical adenopathy.   Neurological: He is alert and oriented to person, place, and time. He has normal reflexes. A cranial nerve deficit is present. Coordination abnormal.   Skin: Skin is warm and dry.   Psychiatric: He has a normal mood and affect. Judgment and thought content normal.       Significant Labs:   CBC:   Recent Labs   Lab 04/06/19 0652 04/07/19  0609   WBC 6.78  6.78 7.30  7.30   HGB 14.2  14.2 13.4*  13.4*   HCT 41.1  41.1 39.2*  39.2*     210 225  225     CMP:   Recent Labs   Lab 04/06/19 0652 04/07/19  0609     143 143  143   K 3.3*  3.3* 3.4*  3.4*     107 106  106   CO2 25  25 26  26   GLU 87  87 91  91   BUN 7*  7* 6*  6*   CREATININE 0.7  0.7 0.7  0.7   CALCIUM 8.7  8.7 8.7  8.7   PROT 6.6  6.6 6.2   ALBUMIN 3.4*  3.4* 3.2*   BILITOT 0.6  0.6 0.5   ALKPHOS 63  63 61   AST 24  24 26   ALT 30  30 34   ANIONGAP 11  11 11  11   EGFRNONAA >60  >60 >60  >60       Significant Imaging: I have reviewed all pertinent imaging results/findings within the past 24 hours.    Significant Labs:   CBC:   Recent Labs   Lab 04/06/19 0652 04/07/19  0609   WBC 6.78  6.78 7.30  7.30   HGB 14.2  14.2 13.4*  13.4*    HCT 41.1  41.1 39.2*  39.2*     210 225  225     CMP:   Recent Labs   Lab 04/06/19  0652 04/07/19  0609     143 143  143   K 3.3*  3.3* 3.4*  3.4*     107 106  106   CO2 25  25 26  26   GLU 87  87 91  91   BUN 7*  7* 6*  6*   CREATININE 0.7  0.7 0.7  0.7   CALCIUM 8.7  8.7 8.7  8.7   PROT 6.6  6.6 6.2   ALBUMIN 3.4*  3.4* 3.2*   BILITOT 0.6  0.6 0.5   ALKPHOS 63  63 61   AST 24  24 26   ALT 30  30 34   ANIONGAP 11  11 11  11   EGFRNONAA >60  >60 >60  >60       Significant Imaging: I have reviewed all pertinent imaging results/findings within the past 24 hours.

## 2019-04-07 NOTE — PLAN OF CARE
Patient discharged to home with his wife. PIV and telemetry discontinued. Vital signs stable and patient is aaox4. Discharge instructions via AVS given to pt's wife. Both verbalized an understanding regarding follow-up visits, diet, medications, and diagnosis. Demanded he would like to ambulate to his family vehicle; therefore, SAKINA Orozco walked with him. No complaints or distress noted.

## 2019-04-07 NOTE — DISCHARGE SUMMARY
[facility] Hospital Medicine  Discharge Summary      Patient Name: Reymundo Dang Sr.  MRN: [id number]  Admission Date: 4/3/2019  Hospital Length of Stay: 4 days  Discharge Date and Time:  04/07/2019 12:16 PM  Attending Physician: Jamal Barrientos MD   Discharging Provider: Cooper Feng MD  Primary Care Provider: Rosalinda Villalba NP      HPI:   64 y/o male presents to ED today with 2 day history of bilious vomiting. Also some loose bowels which made him think might be virus. Loose bowels stopped yesterday, but vomiting continues along with hiccupps.  Got an injection of phenergan as outpt that helped for a while, but eventually vomiting returned and he ended up here.  Work up in ED significant for SBO on CT with transition point in LLQ. Many adhesions seen on CT(has h/o umbilical hernia surgery x 2)  Admitted for NGT, bowel rest, IVF  Last meal was coffee and donuts Monday am     * No surgery found *      Hospital Course:   Pt is here on bowel rest. With NGT to suction. He is still belching. Only 100ml in canister. Has hx of crohns dz. General surgery consulted for SBO due to adhesions. He has no fever. WBC stable [id number]>41567. KUB flat and errect ordered for this am. Passing gas today but still not feeling good.  Bp has been elevated. Has htn. Meds have been held thus far.     4/5/19 NGT d/cd yesterday; Tolerated clear liquids through day ; no nausea or vomiting with PM shift. Only c/o pain to lower abd with palpation. Afebrile. WBC 17.81>8.26. BP  184/89, so will add a Clonidine Patch;  KUB this am, to determine status.    4-6 KUB shows contrast into the rectum this am; having BMs too    4-7 Pt is having BMs and is doing better & wants to go home     Consults:   Consults (From admission, onward)        Status Ordering Provider     Inpatient consult to General Surgery  Once     Provider:  Jake Cline Jr., MD    Completed JAMAL BARRIENTOS.          * SBO (small bowel  obstruction)  NPO x meds (clamp suction 30 minutes)   NGT to wall suction  Surgery consulted.  Has several potential reasons for SBO - crohns? Adhesions?  4/5/19 NGT out; tolerating liquids; still with rebound and 2+ tenderness on exam- will continue present course & reassess in the am    4-6 BS + and pt wants to eat, so will progress his diet    4-7 Doing well on progressed diet        Final Active Diagnoses:    Diagnosis Date Noted POA    PRINCIPAL PROBLEM:  SBO (small bowel obstruction) [K56.609] 04/03/2019 Yes    Recurrent major depressive disorder, in full remission [F33.42] 04/03/2019 Yes    Hypokalemia [E87.6] 12/20/2014 Yes    Crohn's disease [K50.90]  Yes    Hyperlipidemia [E78.5]  Yes    Hypertension [I10]  Yes      Problems Resolved During this Admission:       Discharged Condition: good    Disposition: Home or Self Care    Follow Up:  Follow-up Information     Rosalinda Villalba NP In 3 days.    Specialty:  Family Medicine  Contact information:  [address] [place] [address] [id number] [phone]                 Patient Instructions:   No discharge procedures on file.    Significant Diagnostic Studies: Labs:   CMP   Recent Labs   Lab 04/06/19  0652 04/07/19  0609     143 143  143   K 3.3*  3.3* 3.4*  3.4*     107 106  106   CO2 25  25 26  26   GLU 87  87 91  91   BUN 7*  7* 6*  6*   CREATININE 0.7  0.7 0.7  0.7   CALCIUM 8.7  8.7 8.7  8.7   PROT 6.6  6.6 6.2   ALBUMIN 3.4*  3.4* 3.2*   BILITOT 0.6  0.6 0.5   ALKPHOS 63  63 61   AST 24  24 26   ALT 30  30 34   ANIONGAP 11  11 11  11   ESTGFRAFRICA >60  >60 >60  >60   EGFRNONAA >60  >60 >60  >60    and CBC   Recent Labs   Lab 04/06/19  0652 04/07/19  0609   WBC 6.78  6.78 7.30  7.30   HGB 14.2  14.2 13.4*  13.4*   HCT 41.1  41.1 39.2*  39.2*     210 225  225     Microbiology:   Blood Culture   Lab Results   Component Value Date    LABBLOO No growth after 5 days. 12/18/2014    LABBLOO No growth after 5  days. 12/18/2014   , Sputum Culture No results found for: GSRESP, RESPIRATORYC, Urine Culture  No results found for: LABURIN and Wound Culture: negative  Radiology: X-Ray: CXR: X-Ray Chest PA and Lateral (CXR): No results found for this visit on 04/03/19.    Pending Diagnostic Studies:     None         Medications:  Reconciled Home Medications:      Medication List      CHANGE how you take these medications    primidone 50 MG Tab  Commonly known as:  MYSOLINE  TAKE 1 TABLET BY MOUTH 3 TIMES A DAY  What changed:    · how much to take  · how to take this  · when to take this        CONTINUE taking these medications    ACETAMINOPHEN PM ORAL  Take 2 tablets by mouth nightly as needed.     ALPRAZolam 0.5 MG tablet  Commonly known as:  XANAX  TAKE 1 TABLET BY MOUTH TWICE A DAY AS NEEDED     amLODIPine 10 MG tablet  Commonly known as:  NORVASC  Take 1 tablet (10 mg total) by mouth once daily.     buPROPion 300 MG 24 hr tablet  Commonly known as:  WELLBUTRIN XL  Take 300 mg by mouth once daily.     CENTRUM SILVER ORAL  Take 1 tablet by mouth once daily.     hydroCHLOROthiazide 25 MG tablet  Commonly known as:  HYDRODIURIL  Take 1 tablet (25 mg total) by mouth once daily.     lisinopril 20 MG tablet  Commonly known as:  PRINIVIL,ZESTRIL  Take 1 tablet by mouth once daily.     mesalamine 800 mg Tbec  Commonly known as:  ASACOL HD  Take 1 tablet (800 mg total) by mouth once daily.     metoprolol tartrate 100 MG tablet  Commonly known as:  LOPRESSOR  Take 1 tablet (100 mg total) by mouth 2 (two) times daily.     mirabegron 25 mg Tb24 ER tablet  Commonly known as:  MYRBETRIQ  Take 1 tablet (25 mg total) by mouth once daily.     omeprazole 40 MG capsule  Commonly known as:  PRILOSEC  Take 40 mg by mouth once daily.     ondansetron 8 MG Tbdl  Commonly known as:  ZOFRAN-ODT  Take 1 tablet (8 mg total) by mouth every 8 (eight) hours as needed.     potassium chloride SA 20 MEQ tablet  Commonly known as:  K-DUR,KLOR-CON  TAKE 1 TABLET  (20 MEQ TOTAL) BY MOUTH ONCE DAILY.     promethazine 25 MG tablet  Commonly known as:  PHENERGAN  Take 1 tablet (25 mg total) by mouth every 6 (six) hours as needed for Nausea.     simvastatin 20 MG tablet  Commonly known as:  ZOCOR  Take 1 tablet (20 mg total) by mouth every evening.     TRINTELLIX 10 mg Tab  Generic drug:  vortioxetine  Take 1 tablet by mouth once daily.     VITAMIN B-12 1000 MCG tablet  Generic drug:  cyanocobalamin  Take 1 tablet by mouth once daily.     vitamin E 400 UNIT capsule  Take 400 Units by mouth once daily.        ASK your doctor about these medications    VITAMIN B-6 ORAL  Take 1 tablet by mouth once daily.            Indwelling Lines/Drains at time of discharge:   Lines/Drains/Airways          None          Time spent on the discharge of patient: 25 minutes  Patient was seen and examined on the date of discharge and determined to be suitable for discharge.         Cooper Feng MD  Department of Hospital Medicine  [facility]

## 2019-04-07 NOTE — PLAN OF CARE
Problem: Adult Inpatient Plan of Care  Goal: Plan of Care Review  Outcome: Ongoing (interventions implemented as appropriate)  Pt agrees with plan of care.  VS stable.  No complaints of pain or nausea noted.  Family at bedside.  Will continue to monitor.  Call bell in reach.

## 2019-04-07 NOTE — PROGRESS NOTES
[facility]  General Surgery  Progress Note    Subjective:     Interval History:   No complaints.  Denies abdominal pain.  Denies fever or chills.  Patient tolerating diet and reports passing flatus and bowel movements.    Post-Op Info:  * No surgery found *          Medications:  Continuous Infusions:  Scheduled Meds:   amLODIPine  10 mg Oral Daily    buPROPion  300 mg Oral Daily    enoxaparin  40 mg Subcutaneous Daily    lisinopril  20 mg Oral Daily    mesalamine  800 mg Oral Daily    metoprolol tartrate  100 mg Oral BID    simvastatin  20 mg Oral QHS    vortioxetine  1 tablet Oral Daily     PRN Meds:morphine, ondansetron, promethazine (PHENERGAN) IVPB, sodium chloride 0.9%, traMADol     Objective:     Vital Signs (Most Recent):  Temp: 98.5 °F (36.9 °C) (04/07/19 1134)  Pulse: 67 (04/07/19 1134)  Resp: 18 (04/07/19 0830)  BP: 134/68 (04/07/19 1134)  SpO2: 98 % (04/07/19 1134) Vital Signs (24h Range):  Temp:  [96.6 °F (35.9 °C)-98.5 °F (36.9 °C)] 98.5 °F (36.9 °C)  Pulse:  [64-86] 67  Resp:  [18] 18  SpO2:  [96 %-99 %] 98 %  BP: (134-170)/(67-97) 134/68     No intake or output data in the 24 hours ending 04/07/19 1413    Physical Exam  Patient is sitting up on the side of the bed.  He is in no distress.  He appears comfortable.  Abdomen is extremely protuberant.  There is no tenderness hernias or masses palpated.  Significant Labs:  CBC:   Recent Labs   Lab 04/07/19  0609   WBC 7.30  7.30   RBC 4.43*  4.43*   HGB 13.4*  13.4*   HCT 39.2*  39.2*     225   MCV 89  89   MCH 30.2  30.2   MCHC 34.2  34.2       Significant Diagnostics:  U/S: I have reviewed all pertinent results/findings within the past 24 hours. .  I have reviewed all pertinent imaging results/findings within the past 24 hours.    Assessment/Plan:     Active Diagnoses:    Diagnosis Date Noted POA    PRINCIPAL PROBLEM:  SBO (small bowel obstruction) [K56.609] 04/03/2019 Yes    Recurrent major depressive  disorder, in full remission [F33.42] 04/03/2019 Yes    Hypokalemia [E87.6] 12/20/2014 Yes    Crohn's disease [K50.90]  Yes    Hyperlipidemia [E78.5]  Yes    Hypertension [I10]  Yes      Problems Resolved During this Admission:    Patient acute symptoms has resolved.  Patient be discharged from my standpoint.  No surgical follow-up needed at this time.      Alex Crook MD  General Surgery  [facility]

## 2019-04-07 NOTE — PROGRESS NOTES
[facility] Hospital Medicine  Progress Note    Patient Name: Reymundo Dang Sr.  MRN: [id number]  Patient Class: IP- Inpatient   Admission Date: 4/3/2019  Length of Stay: 4 days  Attending Physician: Ki Bradley MD  Primary Care Provider: Rosalinda Villalba NP        Subjective:     Principal Problem:SBO (small bowel obstruction)    HPI:  64 y/o male presents to ED today with 2 day history of bilious vomiting. Also some loose bowels which made him think might be virus. Loose bowels stopped yesterday, but vomiting continues along with hiccupps.  Got an injection of phenergan as outpt that helped for a while, but eventually vomiting returned and he ended up here.  Work up in ED significant for SBO on CT with transition point in LLQ. Many adhesions seen on CT(has h/o umbilical hernia surgery x 2)  Admitted for NGT, bowel rest, IVF  Last meal was coffee and donuts Monday am     Hospital Course:  Pt is here on bowel rest. With NGT to suction. He is still belching. Only 100ml in canister. Has hx of crohns dz. General surgery consulted for SBO due to adhesions. He has no fever. WBC stable 42048>39031. KUB flat and errect ordered for this am. Passing gas today but still not feeling good.  Bp has been elevated. Has htn. Meds have been held thus far.     4/5/19 NGT d/cd yesterday; Tolerated clear liquids through day ; no nausea or vomiting with PM shift. Only c/o pain to lower abd with palpation. Afebrile. WBC 17.81>8.26. BP  184/89, so will add a Clonidine Patch;  KUB this am, to determine status.    4-6 KUB shows contrast into the rectum this am; having BMs too    4-7 Pt is having BMs and is doing better & wants to go home    Interval History: Resolving SBO and tolerating oral intake    Review of Systems   Constitutional: Negative for appetite change.   HENT: Negative for congestion, ear pain, sneezing and sore throat.    Eyes: Negative for redness and visual disturbance.   Respiratory: Negative for  cough, chest tightness and stridor.    Cardiovascular: Negative for chest pain.   Gastrointestinal: Positive for abdominal distention and abdominal pain. Negative for blood in stool, diarrhea, nausea and vomiting.        Having BMs and KUB shows dye in the rectum this am   Genitourinary: Negative for difficulty urinating, dysuria and hematuria.   Musculoskeletal: Negative for arthralgias, back pain, joint swelling, myalgias and neck pain.   Skin: Negative for rash.   Neurological: Positive for facial asymmetry and weakness. Negative for dizziness.        R hemiparesis   Psychiatric/Behavioral: Negative for agitation. The patient is not nervous/anxious.      Objective:     Vital Signs (Most Recent):  Temp: 98.5 °F (36.9 °C) (04/07/19 1134)  Pulse: 67 (04/07/19 1134)  Resp: 18 (04/07/19 0830)  BP: 134/68 (04/07/19 1134)  SpO2: 98 % (04/07/19 1134) Vital Signs (24h Range):  Temp:  [96.6 °F (35.9 °C)-98.5 °F (36.9 °C)] 98.5 °F (36.9 °C)  Pulse:  [64-86] 67  Resp:  [18] 18  SpO2:  [96 %-99 %] 98 %  BP: (134-170)/(67-97) 134/68     Weight: 125.7 kg (277 lb 1.9 oz)  Body mass index is 44.73 kg/m².  No intake or output data in the 24 hours ending 04/07/19 1211   Physical Exam   Constitutional: He is oriented to person, place, and time. He appears well-developed and well-nourished.   HENT:   Head: Normocephalic.   Eyes: Pupils are equal, round, and reactive to light.   Neck: Normal range of motion. Neck supple. No thyromegaly present.   Cardiovascular: Normal rate and regular rhythm. Exam reveals no friction rub.   No murmur heard.  Pulmonary/Chest: No respiratory distress. He has no wheezes.   Abdominal: Bowel sounds are normal. He exhibits distension. There is tenderness. There is no rebound and no guarding.   Abd is less swollen & less distended with better BS+   Musculoskeletal: Normal range of motion. He exhibits no edema or tenderness.   Lymphadenopathy:     He has no cervical adenopathy.   Neurological: He is alert  and oriented to person, place, and time. He has normal reflexes. A cranial nerve deficit is present. Coordination abnormal.   Skin: Skin is warm and dry.   Psychiatric: He has a normal mood and affect. Judgment and thought content normal.     Interval History: Resolving SBO and tolerating oral intake    Review of Systems   Constitutional: Negative for appetite change.   HENT: Negative for congestion, ear pain, sneezing and sore throat.    Eyes: Negative for redness and visual disturbance.   Respiratory: Negative for cough, chest tightness and stridor.    Cardiovascular: Negative for chest pain.   Gastrointestinal: Positive for abdominal distention and abdominal pain. Negative for blood in stool, diarrhea, nausea and vomiting.        Having BMs and KUB shows dye in the rectum this am   Genitourinary: Negative for difficulty urinating, dysuria and hematuria.   Musculoskeletal: Negative for arthralgias, back pain, joint swelling, myalgias and neck pain.   Skin: Negative for rash.   Neurological: Positive for facial asymmetry and weakness. Negative for dizziness.        R hemiparesis   Psychiatric/Behavioral: Negative for agitation. The patient is not nervous/anxious.      Objective:     Vital Signs (Most Recent):  Temp: 98.5 °F (36.9 °C) (04/07/19 1134)  Pulse: 67 (04/07/19 1134)  Resp: 18 (04/07/19 0830)  BP: 134/68 (04/07/19 1134)  SpO2: 98 % (04/07/19 1134) Vital Signs (24h Range):  Temp:  [96.6 °F (35.9 °C)-98.5 °F (36.9 °C)] 98.5 °F (36.9 °C)  Pulse:  [64-86] 67  Resp:  [18] 18  SpO2:  [96 %-99 %] 98 %  BP: (134-170)/(67-97) 134/68     Weight: 125.7 kg (277 lb 1.9 oz)  Body mass index is 44.73 kg/m².  No intake or output data in the 24 hours ending 04/07/19 1211   Physical Exam   Constitutional: He is oriented to person, place, and time. He appears well-developed and well-nourished.   HENT:   Head: Normocephalic.   Eyes: Pupils are equal, round, and reactive to light.   Neck: Normal range of motion. Neck supple.  No thyromegaly present.   Cardiovascular: Normal rate and regular rhythm. Exam reveals no friction rub.   No murmur heard.  Pulmonary/Chest: No respiratory distress. He has no wheezes.   Abdominal: Bowel sounds are normal. He exhibits distension. There is tenderness. There is no rebound and no guarding.   Abd is less swollen & less distended with better BS+   Musculoskeletal: Normal range of motion. He exhibits no edema or tenderness.   Lymphadenopathy:     He has no cervical adenopathy.   Neurological: He is alert and oriented to person, place, and time. He has normal reflexes. A cranial nerve deficit is present. Coordination abnormal.   Skin: Skin is warm and dry.   Psychiatric: He has a normal mood and affect. Judgment and thought content normal.       Significant Labs:   CBC:   Recent Labs   Lab 04/06/19 0652 04/07/19 0609   WBC 6.78  6.78 7.30  7.30   HGB 14.2  14.2 13.4*  13.4*   HCT 41.1  41.1 39.2*  39.2*     210 225  225     CMP:   Recent Labs   Lab 04/06/19 0652 04/07/19  0609     143 143  143   K 3.3*  3.3* 3.4*  3.4*     107 106  106   CO2 25  25 26  26   GLU 87  87 91  91   BUN 7*  7* 6*  6*   CREATININE 0.7  0.7 0.7  0.7   CALCIUM 8.7  8.7 8.7  8.7   PROT 6.6  6.6 6.2   ALBUMIN 3.4*  3.4* 3.2*   BILITOT 0.6  0.6 0.5   ALKPHOS 63  63 61   AST 24  24 26   ALT 30  30 34   ANIONGAP 11  11 11  11   EGFRNONAA >60  >60 >60  >60       Significant Imaging: I have reviewed all pertinent imaging results/findings within the past 24 hours.    Significant Labs:   CBC:   Recent Labs   Lab 04/06/19 0652 04/07/19 0609   WBC 6.78  6.78 7.30  7.30   HGB 14.2  14.2 13.4*  13.4*   HCT 41.1  41.1 39.2*  39.2*     210 225  225     CMP:   Recent Labs   Lab 04/06/19 0652 04/07/19  0609     143 143  143   K 3.3*  3.3* 3.4*  3.4*     107 106  106   CO2 25  25 26  26   GLU 87  87 91  91   BUN 7*  7* 6*  6*   CREATININE 0.7  0.7 0.7   0.7   CALCIUM 8.7  8.7 8.7  8.7   PROT 6.6  6.6 6.2   ALBUMIN 3.4*  3.4* 3.2*   BILITOT 0.6  0.6 0.5   ALKPHOS 63  63 61   AST 24  24 26   ALT 30  30 34   ANIONGAP 11  11 11  11   EGFRNONAA >60  >60 >60  >60       Significant Imaging: I have reviewed all pertinent imaging results/findings within the past 24 hours.    Assessment/Plan:      * SBO (small bowel obstruction)  NPO x meds (clamp suction 30 minutes)   NGT to wall suction  Surgery consulted.  Has several potential reasons for SBO - crohns? Adhesions?  4/5/19 NGT out; tolerating liquids; still with rebound and 2+ tenderness on exam- will continue present course & reassess in the am    4-6 BS + and pt wants to eat, so will progress his diet    4-7 Doing well on progressed diet      Recurrent major depressive disorder, in full remission  Continue wellbutrin and home Trintellix         Hypokalemia  Takes po 20 daily at home   Will add to his IVF; NS with KCL 20meq @125ml/hr      Hypertension  Continue ACE and BB  Hold HCT       Hyperlipidemia  Continue statin      Crohn's disease  He says he has UC - this diagnosis is questionable. He and his wife cannot give history of how or when it was dx'd.   Continue azocol  needs to cont to f/u with GI      VTE Risk Mitigation (From admission, onward)        Ordered     IP VTE HIGH RISK PATIENT  Once      04/03/19 1744     enoxaparin injection 40 mg  Daily      04/03/19 1744              Cooper Feng MD  Department of Hospital Medicine   [facility]

## 2019-04-08 NOTE — PLAN OF CARE
04/08/19 1512   Final Note   Assessment Type Final Discharge Note   Anticipated Discharge Disposition Home   What phone number can be called within the next 1-3 days to see how you are doing after discharge? [phone]   Hospital Follow Up  Appt(s) scheduled? Yes   Discharge plans and expectations educations in teach back method with documentation complete? Yes   Right Care Referral Info   Post Acute Recommendation No Care

## 2019-04-08 NOTE — PHYSICIAN QUERY
PT Name: Reymundo Dang Sr.  MR #: [id number]     Physician Query Form - Bowel Obstruction Clarification     Zora Hinton RN, CCDS  Desk # [phone]; [facility] # [phone] [email or web address]    This form is a permanent document in the medical record.     Query Date: April 8, 2019    By submitting this query, we are merely seeking further clarification of documentation to reflect the severity of illness of your patient. Please utilize your independent clinical judgment when addressing the question(s) below.    The Medical record reflects the following:     Indicators   Supporting Clinical Findings Location in Medical Record   x Bowel obstruction, intestinal obstruction, LBO or SBO documented SBO H&P; DCS   x Radiology findings SBO on CT with transition point in LLQ. Many adhesions seen on CT(has h/o umbilical hernia surgery x 2) H&P   x Treatment/Medication NPO x meds (clamp suction 30 minutes)   NGT to wall suction  Gen Sx Consult H&P    Orders    Procedure/Surgery     x Other Has several potential reasons for SBO - crohns? Adhesions?    Patient acute symptoms has resolved.  Patient be discharged from my standpoint.  No surgical follow-up needed at this time. DCS        Gen Sx LETI Crook 4/7       Provider, please further specify the bowel obstruction diagnosis:    [   ] Partial or incomplete intestinal obstruction, due to adhesions   [   ] Partial or incomplete intestinal obstruction, due to other (please specify): ____________   [  x ] Partial or incomplete intestinal obstruction, unknown or unspecified etiology   [   ] Complete intestinal obstruction, due to adhesions   [   ] Complete intestinal obstruction, due to other (please specify): ____________   [   ] Complete intestinal obstruction, unknown or unspecified etiology   [   ] Other intestinal condition (please specify): _____________________   [   ]  Clinically Undetermined     Please document in your progress notes daily for the duration of  treatment until resolved, and include in your discharge summary.

## 2019-04-09 ENCOUNTER — OFFICE VISIT (OUTPATIENT)
Dept: INTERNAL MEDICINE | Facility: CLINIC | Age: 64
End: 2019-04-09
Payer: MEDICARE

## 2019-04-09 ENCOUNTER — HOSPITAL ENCOUNTER (OUTPATIENT)
Dept: RADIOLOGY | Facility: HOSPITAL | Age: 64
Discharge: HOME | End: 2019-04-09
Attending: NURSE PRACTITIONER
Payer: MEDICARE

## 2019-04-09 VITALS
WEIGHT: 272.25 LBS | HEIGHT: 66 IN | BODY MASS INDEX: 43.75 KG/M2 | SYSTOLIC BLOOD PRESSURE: 130 MMHG | HEART RATE: 71 BPM | DIASTOLIC BLOOD PRESSURE: 78 MMHG | OXYGEN SATURATION: 97 %

## 2019-04-09 DIAGNOSIS — K56.600 PARTIAL SMALL BOWEL OBSTRUCTION: ICD-10-CM

## 2019-04-09 DIAGNOSIS — K56.600 PARTIAL SMALL BOWEL OBSTRUCTION: Primary | ICD-10-CM

## 2019-04-09 PROCEDURE — 3008F BODY MASS INDEX DOCD: CPT | Mod: CPTII,S$GLB,, | Performed by: NURSE PRACTITIONER

## 2019-04-09 PROCEDURE — 99214 OFFICE O/P EST MOD 30 MIN: CPT | Mod: S$GLB,,, | Performed by: NURSE PRACTITIONER

## 2019-04-09 PROCEDURE — 3078F PR MOST RECENT DIASTOLIC BLOOD PRESSURE < 80 MM HG: ICD-10-PCS | Mod: CPTII,S$GLB,, | Performed by: NURSE PRACTITIONER

## 2019-04-09 PROCEDURE — 3078F DIAST BP <80 MM HG: CPT | Mod: CPTII,S$GLB,, | Performed by: NURSE PRACTITIONER

## 2019-04-09 PROCEDURE — 3075F PR MOST RECENT SYSTOLIC BLOOD PRESS GE 130-139MM HG: ICD-10-PCS | Mod: CPTII,S$GLB,, | Performed by: NURSE PRACTITIONER

## 2019-04-09 PROCEDURE — 74019 RADEX ABDOMEN 2 VIEWS: CPT | Mod: TC

## 2019-04-09 PROCEDURE — 99214 PR OFFICE/OUTPT VISIT, EST, LEVL IV, 30-39 MIN: ICD-10-PCS | Mod: S$GLB,,, | Performed by: NURSE PRACTITIONER

## 2019-04-09 PROCEDURE — 99999 PR PBB SHADOW E&M-EST. PATIENT-LVL III: ICD-10-PCS | Mod: PBBFAC,,, | Performed by: NURSE PRACTITIONER

## 2019-04-09 PROCEDURE — 3008F PR BODY MASS INDEX (BMI) DOCUMENTED: ICD-10-PCS | Mod: CPTII,S$GLB,, | Performed by: NURSE PRACTITIONER

## 2019-04-09 PROCEDURE — 74019 XR ABDOMEN FLAT AND ERECT: ICD-10-PCS | Mod: 26,,, | Performed by: RADIOLOGY

## 2019-04-09 PROCEDURE — 3075F SYST BP GE 130 - 139MM HG: CPT | Mod: CPTII,S$GLB,, | Performed by: NURSE PRACTITIONER

## 2019-04-09 PROCEDURE — 74019 RADEX ABDOMEN 2 VIEWS: CPT | Mod: 26,,, | Performed by: RADIOLOGY

## 2019-04-09 PROCEDURE — 99999 PR PBB SHADOW E&M-EST. PATIENT-LVL III: CPT | Mod: PBBFAC,,, | Performed by: NURSE PRACTITIONER

## 2019-04-09 NOTE — PROGRESS NOTES
Subjective:       Patient ID: Reymundo Dang Sr. is a 63 y.o. male.    Chief Complaint: Follow-up (PO hosp.)    HPI: Pt presents to clinic today known to me with c/o needing hospital f/u. He is eating soft diet. He is not having any nausea. No vomiting. Having BM soft, no diarrhea. No fever. Still with abd pain off and on. None now, but some with eating and definitely with palpation. Last x ray still showed partial small bowel obstruction   Review of Systems   Constitutional: Negative for chills and fever.   HENT: Negative for congestion, postnasal drip and sore throat.    Eyes: Negative for photophobia.   Respiratory: Negative for chest tightness and shortness of breath.    Cardiovascular: Negative for chest pain.   Gastrointestinal: Positive for abdominal distention and abdominal pain. Negative for blood in stool, constipation, diarrhea, nausea and vomiting.   Genitourinary: Negative for dysuria, flank pain and hematuria.   Musculoskeletal: Negative for back pain.   Skin: Negative for pallor.   Neurological: Negative for dizziness, seizures, facial asymmetry, speech difficulty and numbness.   Hematological: Does not bruise/bleed easily.   Psychiatric/Behavioral: Negative for agitation and suicidal ideas. The patient is not nervous/anxious.        Objective:      Physical Exam   Constitutional: He is oriented to person, place, and time. He appears well-developed and well-nourished.   HENT:   Head: Normocephalic and atraumatic.   Nose: Nose normal.   Mouth/Throat: Oropharynx is clear and moist.   Eyes: Pupils are equal, round, and reactive to light. Conjunctivae and EOM are normal.   Neck: Normal range of motion. Neck supple. No JVD present. No thyromegaly present.   Cardiovascular: Normal rate, regular rhythm, normal heart sounds and intact distal pulses.   No murmur heard.  Pulmonary/Chest: Effort normal and breath sounds normal. No stridor. No respiratory distress. He has no wheezes. He has no rales.    Abdominal: Soft. Bowel sounds are normal. He exhibits distension. He exhibits no mass. There is tenderness (epigastric and left lower quad). There is no guarding.   + BS x 4 quad   Musculoskeletal: Normal range of motion. He exhibits no edema.   Lymphadenopathy:     He has no cervical adenopathy.   Neurological: He is alert and oriented to person, place, and time. He has normal reflexes. No cranial nerve deficit.   Skin: Skin is warm and dry. No rash noted. No pallor.   Psychiatric: He has a normal mood and affect.   Nursing note and vitals reviewed.      Assessment:       1. Partial small bowel obstruction        Plan:     Problem List Items Addressed This Visit     None      Visit Diagnoses     Partial small bowel obstruction    -  Primary    Relevant Orders    X-Ray Abdomen Flat And Erect      Check x ray. If still with partial will need GI/general surgery f/u. Soft diet till pain resolves

## 2019-04-18 ENCOUNTER — PROCEDURE VISIT (OUTPATIENT)
Dept: NEUROLOGY | Facility: CLINIC | Age: 64
End: 2019-04-18
Payer: MEDICARE

## 2019-04-18 DIAGNOSIS — R25.2 SPASTICITY: ICD-10-CM

## 2019-04-18 DIAGNOSIS — G43.719 CHRONIC MIGRAINE WITHOUT AURA, INTRACTABLE, WITHOUT STATUS MIGRAINOSUS: Primary | ICD-10-CM

## 2019-04-18 PROCEDURE — 64615 CHEMODENERV MUSC MIGRAINE: CPT | Mod: S$GLB,,, | Performed by: PSYCHIATRY & NEUROLOGY

## 2019-04-18 PROCEDURE — 64615 PR CHEMODENERVATION OF MUSCLE FOR CHRONIC MIGRAINE: ICD-10-PCS | Mod: S$GLB,,, | Performed by: PSYCHIATRY & NEUROLOGY

## 2019-04-18 NOTE — PROCEDURES
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 Two patient identifiers were confirmed with the patient prior to performing injections. A time out to determine correct patient and and agreement on procedure performed was conducted prior to the injections     The patient's head and upper neck and upper right arm was cleansed with alcohol rub and 200 Units of Botox (diluted 1:1) was injected in the following bilateral muscles:    10 units in corregator* (total over 2 sites), 5 units in Procerus, 20 units Frontalis* (over 4 sites), 40 units in Temporalis* (over 4 sites), 30 units in occipitalis* (over 6 sites), 20 units in the cervical paraspinal muscles* (over 4 sites), and 30 units in Trapezius* (over 4 sites). 20 units were added to the right deltoid and 20 units in the right levator scapulae for upper limb spasticity from prior CVA. 5 units given to right pectoral major muscle for spasticity as well.    *= denotes bilateral injections        Medications used: botulinum toxin 200 units diluted 1:1 with normal saline used to dilute Botox       The patient tolerated the procedure well with no more than 0.50cc of blood loss. He was observed for several minutes post injection and given a handout from [employer] regarding when and where to seek help if side effects are experienced      Continue Primidone for tremor which is stable and mood is now treated by PCP and he states is good.   Botox helps chronic migraine for 3 months which helps headache prevention and spasticity- will continue current treatment  Patient has had chronic mild memory loss complaint His prior neuropsychological testing was consistent with loss consistent with CVA/aneurysm- monitor,but his complaint is the same/stable now and he is functioning well/ no  restrictions. .     RTC in 12 weeks or more for more Botox

## 2019-04-24 ENCOUNTER — TELEPHONE (OUTPATIENT)
Dept: INTERNAL MEDICINE | Facility: CLINIC | Age: 64
End: 2019-04-24

## 2019-04-24 NOTE — TELEPHONE ENCOUNTER
"----- Message from Kanika Ray sent at 2019  3:41 PM CDT -----  Contact: Yareli (wife)  Reymundo Dang Sr.  MRN: [id number]  : 1955  PCP: Rosalinda Villalba  Home Phone      [phone]  Work Phone      Not on file.  Mobile          [phone]    MESSAGE:    Pt began "vomiting again" and the "last time this began he ended up in the hospital". He also c/o hoarseness due to vomiting. She requested appointment access tomorrow.     Phone # [phone]    [id number] - [place], [place] - [id number] [facility]"

## 2019-04-24 NOTE — TELEPHONE ENCOUNTER
Patient complaining of nausea, vomiting, hiccups x2 days. Unable to hold down liquids or solids today. Patient states he feels exactly like he did when he was diagnosed with a partial bowel obstruction at the beginning of the month. Patient and wife Yareli instructed to report to ER now for evaluation per Rosalinda Villalba NP. Patient notified of appointment information with Dr. Morrison in [place] 4/29/19 at 11:30AM. Last clinic note and x-ray faxed to [phone]. Fax confirmation received.

## 2019-04-26 ENCOUNTER — TELEPHONE (OUTPATIENT)
Dept: INTERNAL MEDICINE | Facility: CLINIC | Age: 64
End: 2019-04-26

## 2019-04-27 ENCOUNTER — HOSPITAL ENCOUNTER (INPATIENT)
Facility: HOSPITAL | Age: 64
LOS: 7 days | Discharge: HOME | DRG: 872 | End: 2019-05-04
Attending: SURGERY | Admitting: INTERNAL MEDICINE
Payer: MEDICARE

## 2019-04-27 DIAGNOSIS — R11.10 EMESIS: ICD-10-CM

## 2019-04-27 DIAGNOSIS — A41.51 SEPSIS DUE TO ESCHERICHIA COLI: ICD-10-CM

## 2019-04-27 DIAGNOSIS — N30.00 ACUTE CYSTITIS WITHOUT HEMATURIA: ICD-10-CM

## 2019-04-27 DIAGNOSIS — A41.9 SEPSIS, DUE TO UNSPECIFIED ORGANISM: Primary | ICD-10-CM

## 2019-04-27 LAB
ALBUMIN SERPL BCP-MCNC: 4 G/DL (ref 3.5–5.2)
ALP SERPL-CCNC: 65 U/L (ref 55–135)
ALT SERPL W/O P-5'-P-CCNC: 24 U/L (ref 10–44)
ANION GAP SERPL CALC-SCNC: 11 MMOL/L (ref 8–16)
APTT BLDCRRT: 24.7 SEC (ref 21–32)
APTT BLDCRRT: 25.9 SEC (ref 21–32)
AST SERPL-CCNC: 18 U/L (ref 10–40)
BACTERIA #/AREA URNS HPF: ABNORMAL /HPF
BASOPHILS # BLD AUTO: 0.02 K/UL (ref 0–0.2)
BASOPHILS NFR BLD: 0.1 % (ref 0–1.9)
BILIRUB SERPL-MCNC: 0.5 MG/DL (ref 0.1–1)
BILIRUB UR QL STRIP: ABNORMAL
BNP SERPL-MCNC: 67 PG/ML (ref 0–99)
BUN SERPL-MCNC: 10 MG/DL (ref 8–23)
CALCIUM SERPL-MCNC: 9.4 MG/DL (ref 8.7–10.5)
CHLORIDE SERPL-SCNC: 102 MMOL/L (ref 95–110)
CK MB SERPL-MCNC: 1.8 NG/ML (ref 0.1–6.5)
CK MB SERPL-RTO: 1.3 % (ref 0–5)
CK SERPL-CCNC: 137 U/L (ref 20–200)
CK SERPL-CCNC: 137 U/L (ref 20–200)
CLARITY UR: ABNORMAL
CO2 SERPL-SCNC: 28 MMOL/L (ref 23–29)
COLOR UR: YELLOW
CREAT SERPL-MCNC: 1 MG/DL (ref 0.5–1.4)
DIFFERENTIAL METHOD: ABNORMAL
EOSINOPHIL # BLD AUTO: 0 K/UL (ref 0–0.5)
EOSINOPHIL NFR BLD: 0.1 % (ref 0–8)
ERYTHROCYTE [DISTWIDTH] IN BLOOD BY AUTOMATED COUNT: 12.5 % (ref 11.5–14.5)
EST. GFR  (AFRICAN AMERICAN): >60 ML/MIN/1.73 M^2
EST. GFR  (NON AFRICAN AMERICAN): >60 ML/MIN/1.73 M^2
GLUCOSE SERPL-MCNC: 104 MG/DL (ref 70–110)
GLUCOSE UR QL STRIP: NEGATIVE
HCT VFR BLD AUTO: 44.5 % (ref 40–54)
HGB BLD-MCNC: 15.4 G/DL (ref 14–18)
HGB UR QL STRIP: ABNORMAL
INFLUENZA A, MOLECULAR: NEGATIVE
INFLUENZA B, MOLECULAR: NEGATIVE
INR PPP: 1 (ref 0.8–1.2)
KETONES UR QL STRIP: ABNORMAL
LACTATE SERPL-SCNC: 0.9 MMOL/L (ref 0.5–2.2)
LACTATE SERPL-SCNC: 1.1 MMOL/L (ref 0.5–2.2)
LACTATE SERPL-SCNC: 1.7 MMOL/L (ref 0.5–2.2)
LEUKOCYTE ESTERASE UR QL STRIP: ABNORMAL
LIPASE SERPL-CCNC: 21 U/L (ref 4–60)
LIPASE SERPL-CCNC: 34 U/L (ref 4–60)
LYMPHOCYTES # BLD AUTO: 1.3 K/UL (ref 1–4.8)
LYMPHOCYTES NFR BLD: 8 % (ref 18–48)
MAGNESIUM SERPL-MCNC: 1.8 MG/DL (ref 1.6–2.6)
MCH RBC QN AUTO: 30.6 PG (ref 27–31)
MCHC RBC AUTO-ENTMCNC: 34.6 G/DL (ref 32–36)
MCV RBC AUTO: 88 FL (ref 82–98)
MICROSCOPIC COMMENT: ABNORMAL
MONOCYTES # BLD AUTO: 1.5 K/UL (ref 0.3–1)
MONOCYTES NFR BLD: 9.2 % (ref 4–15)
NEUTROPHILS # BLD AUTO: 13.4 K/UL (ref 1.8–7.7)
NEUTROPHILS NFR BLD: 82.6 % (ref 38–73)
NITRITE UR QL STRIP: NEGATIVE
PH UR STRIP: 5 [PH] (ref 5–8)
PHOSPHATE SERPL-MCNC: 2.3 MG/DL (ref 2.7–4.5)
PLATELET # BLD AUTO: 232 K/UL (ref 150–350)
PMV BLD AUTO: 10.5 FL (ref 9.2–12.9)
POTASSIUM SERPL-SCNC: 3.6 MMOL/L (ref 3.5–5.1)
PROCALCITONIN SERPL IA-MCNC: 0.14 NG/ML
PROT SERPL-MCNC: 7.2 G/DL (ref 6–8.4)
PROT UR QL STRIP: NEGATIVE
PROTHROMBIN TIME: 10.3 SEC (ref 9–12.5)
RBC # BLD AUTO: 5.04 M/UL (ref 4.6–6.2)
RBC #/AREA URNS HPF: 5 /HPF (ref 0–4)
SODIUM SERPL-SCNC: 141 MMOL/L (ref 136–145)
SP GR UR STRIP: 1.02 (ref 1–1.03)
SPECIMEN SOURCE: NORMAL
SQUAMOUS #/AREA URNS HPF: 0 /HPF
TROPONIN I SERPL DL<=0.01 NG/ML-MCNC: <0.006 NG/ML (ref 0–0.03)
URN SPEC COLLECT METH UR: ABNORMAL
UROBILINOGEN UR STRIP-ACNC: NEGATIVE EU/DL
WBC # BLD AUTO: 16.22 K/UL (ref 3.9–12.7)
WBC #/AREA URNS HPF: >100 /HPF (ref 0–5)
WBC CLUMPS URNS QL MICRO: ABNORMAL

## 2019-04-27 PROCEDURE — 11000001 HC ACUTE MED/SURG PRIVATE ROOM

## 2019-04-27 PROCEDURE — 36415 COLL VENOUS BLD VENIPUNCTURE: CPT

## 2019-04-27 PROCEDURE — 83880 ASSAY OF NATRIURETIC PEPTIDE: CPT

## 2019-04-27 PROCEDURE — 84100 ASSAY OF PHOSPHORUS: CPT

## 2019-04-27 PROCEDURE — 87077 CULTURE AEROBIC IDENTIFY: CPT

## 2019-04-27 PROCEDURE — 87502 INFLUENZA DNA AMP PROBE: CPT

## 2019-04-27 PROCEDURE — 25000003 PHARM REV CODE 250: Performed by: SURGERY

## 2019-04-27 PROCEDURE — 82553 CREATINE MB FRACTION: CPT

## 2019-04-27 PROCEDURE — 87186 SC STD MICRODIL/AGAR DIL: CPT

## 2019-04-27 PROCEDURE — 85025 COMPLETE CBC W/AUTO DIFF WBC: CPT

## 2019-04-27 PROCEDURE — 81000 URINALYSIS NONAUTO W/SCOPE: CPT

## 2019-04-27 PROCEDURE — 87040 BLOOD CULTURE FOR BACTERIA: CPT

## 2019-04-27 PROCEDURE — 80053 COMPREHEN METABOLIC PANEL: CPT

## 2019-04-27 PROCEDURE — 83690 ASSAY OF LIPASE: CPT

## 2019-04-27 PROCEDURE — 93010 EKG 12-LEAD: ICD-10-PCS | Mod: ,,, | Performed by: INTERNAL MEDICINE

## 2019-04-27 PROCEDURE — 94760 N-INVAS EAR/PLS OXIMETRY 1: CPT

## 2019-04-27 PROCEDURE — 25500020 PHARM REV CODE 255: Performed by: SURGERY

## 2019-04-27 PROCEDURE — 96361 HYDRATE IV INFUSION ADD-ON: CPT

## 2019-04-27 PROCEDURE — 85730 THROMBOPLASTIN TIME PARTIAL: CPT

## 2019-04-27 PROCEDURE — 84145 PROCALCITONIN (PCT): CPT

## 2019-04-27 PROCEDURE — 99285 EMERGENCY DEPT VISIT HI MDM: CPT | Mod: 25

## 2019-04-27 PROCEDURE — 93010 ELECTROCARDIOGRAM REPORT: CPT | Mod: ,,, | Performed by: INTERNAL MEDICINE

## 2019-04-27 PROCEDURE — 63600175 PHARM REV CODE 636 W HCPCS: Performed by: SURGERY

## 2019-04-27 PROCEDURE — 63600175 PHARM REV CODE 636 W HCPCS: Performed by: NURSE PRACTITIONER

## 2019-04-27 PROCEDURE — 84484 ASSAY OF TROPONIN QUANT: CPT

## 2019-04-27 PROCEDURE — 93005 ELECTROCARDIOGRAM TRACING: CPT

## 2019-04-27 PROCEDURE — 82550 ASSAY OF CK (CPK): CPT

## 2019-04-27 PROCEDURE — 87086 URINE CULTURE/COLONY COUNT: CPT

## 2019-04-27 PROCEDURE — 83735 ASSAY OF MAGNESIUM: CPT

## 2019-04-27 PROCEDURE — 85610 PROTHROMBIN TIME: CPT

## 2019-04-27 PROCEDURE — 83605 ASSAY OF LACTIC ACID: CPT

## 2019-04-27 PROCEDURE — 25000003 PHARM REV CODE 250: Performed by: INTERNAL MEDICINE

## 2019-04-27 PROCEDURE — 96374 THER/PROPH/DIAG INJ IV PUSH: CPT

## 2019-04-27 PROCEDURE — 87088 URINE BACTERIA CULTURE: CPT

## 2019-04-27 RX ORDER — SIMVASTATIN 10 MG/1
20 TABLET, FILM COATED ORAL NIGHTLY
Status: DISCONTINUED | OUTPATIENT
Start: 2019-04-27 | End: 2019-05-04 | Stop reason: HOSPADM

## 2019-04-27 RX ORDER — ONDANSETRON 2 MG/ML
4 INJECTION INTRAMUSCULAR; INTRAVENOUS EVERY 8 HOURS PRN
Status: DISCONTINUED | OUTPATIENT
Start: 2019-04-27 | End: 2019-05-04 | Stop reason: HOSPADM

## 2019-04-27 RX ORDER — PANTOPRAZOLE SODIUM 40 MG/1
40 TABLET, DELAYED RELEASE ORAL DAILY
Status: DISCONTINUED | OUTPATIENT
Start: 2019-04-28 | End: 2019-05-04 | Stop reason: HOSPADM

## 2019-04-27 RX ORDER — DIPHENHYDRAMINE HCL 25 MG
25 CAPSULE ORAL NIGHTLY PRN
Status: DISCONTINUED | OUTPATIENT
Start: 2019-04-27 | End: 2019-05-04 | Stop reason: HOSPADM

## 2019-04-27 RX ORDER — HYDROCODONE BITARTRATE AND ACETAMINOPHEN 5; 325 MG/1; MG/1
1 TABLET ORAL EVERY 4 HOURS PRN
Status: DISCONTINUED | OUTPATIENT
Start: 2019-04-27 | End: 2019-04-30

## 2019-04-27 RX ORDER — ACETAMINOPHEN 500 MG
1000 TABLET ORAL
Status: COMPLETED | OUTPATIENT
Start: 2019-04-27 | End: 2019-04-27

## 2019-04-27 RX ORDER — LEVOFLOXACIN 5 MG/ML
750 INJECTION, SOLUTION INTRAVENOUS
Status: DISCONTINUED | OUTPATIENT
Start: 2019-04-28 | End: 2019-04-29

## 2019-04-27 RX ORDER — LEVOFLOXACIN 5 MG/ML
750 INJECTION, SOLUTION INTRAVENOUS
Status: COMPLETED | OUTPATIENT
Start: 2019-04-27 | End: 2019-04-27

## 2019-04-27 RX ORDER — SODIUM CHLORIDE 9 MG/ML
INJECTION, SOLUTION INTRAVENOUS CONTINUOUS
Status: DISCONTINUED | OUTPATIENT
Start: 2019-04-27 | End: 2019-04-28

## 2019-04-27 RX ORDER — BUPROPION HYDROCHLORIDE 150 MG/1
300 TABLET ORAL DAILY
Status: DISCONTINUED | OUTPATIENT
Start: 2019-04-28 | End: 2019-05-04 | Stop reason: HOSPADM

## 2019-04-27 RX ORDER — ALPRAZOLAM 0.5 MG/1
0.5 TABLET ORAL 2 TIMES DAILY PRN
Status: DISCONTINUED | OUTPATIENT
Start: 2019-04-27 | End: 2019-05-04 | Stop reason: HOSPADM

## 2019-04-27 RX ORDER — ACETAMINOPHEN 500 MG
1000 TABLET ORAL EVERY 8 HOURS PRN
Status: DISCONTINUED | OUTPATIENT
Start: 2019-04-27 | End: 2019-04-28

## 2019-04-27 RX ORDER — MESALAMINE 400 MG/1
800 CAPSULE, DELAYED RELEASE ORAL DAILY
Status: DISCONTINUED | OUTPATIENT
Start: 2019-04-28 | End: 2019-05-04 | Stop reason: HOSPADM

## 2019-04-27 RX ORDER — PRIMIDONE 50 MG/1
100 TABLET ORAL 2 TIMES DAILY
Status: DISCONTINUED | OUTPATIENT
Start: 2019-04-27 | End: 2019-05-04 | Stop reason: HOSPADM

## 2019-04-27 RX ORDER — SODIUM CHLORIDE 0.9 % (FLUSH) 0.9 %
10 SYRINGE (ML) INJECTION
Status: DISCONTINUED | OUTPATIENT
Start: 2019-04-27 | End: 2019-05-04 | Stop reason: HOSPADM

## 2019-04-27 RX ADMIN — ONDANSETRON 4 MG: 2 INJECTION INTRAMUSCULAR; INTRAVENOUS at 06:04

## 2019-04-27 RX ADMIN — ALPRAZOLAM 0.5 MG: 0.5 TABLET ORAL at 08:04

## 2019-04-27 RX ADMIN — SODIUM CHLORIDE 1000 ML: 0.9 INJECTION, SOLUTION INTRAVENOUS at 05:04

## 2019-04-27 RX ADMIN — SIMVASTATIN 20 MG: 10 TABLET, FILM COATED ORAL at 08:04

## 2019-04-27 RX ADMIN — PROMETHAZINE HYDROCHLORIDE 12.5 MG: 25 INJECTION INTRAMUSCULAR; INTRAVENOUS at 11:04

## 2019-04-27 RX ADMIN — IOHEXOL 30 ML: 350 INJECTION, SOLUTION INTRAVENOUS at 02:04

## 2019-04-27 RX ADMIN — SODIUM CHLORIDE 500 ML: 0.9 INJECTION, SOLUTION INTRAVENOUS at 01:04

## 2019-04-27 RX ADMIN — SODIUM CHLORIDE: 0.9 INJECTION, SOLUTION INTRAVENOUS at 06:04

## 2019-04-27 RX ADMIN — IOHEXOL 100 ML: 350 INJECTION, SOLUTION INTRAVENOUS at 02:04

## 2019-04-27 RX ADMIN — ACETAMINOPHEN 1000 MG: 500 TABLET, FILM COATED ORAL at 01:04

## 2019-04-27 RX ADMIN — HYDROCODONE BITARTRATE AND ACETAMINOPHEN 1 TABLET: 5; 325 TABLET ORAL at 08:04

## 2019-04-27 RX ADMIN — LEVOFLOXACIN 750 MG: 750 INJECTION, SOLUTION INTRAVENOUS at 04:04

## 2019-04-27 RX ADMIN — PRIMIDONE 100 MG: 50 TABLET ORAL at 08:04

## 2019-04-27 NOTE — ED PROVIDER NOTES
[facility]                                                 Chief Complaint  64 y.o. male with Abdominal Pain    History of Present Illness  Reymundo Dang  presents to the emergency room with fever and chills today  Patient had fever and chills with lower abdominal cramps, no vomiting noted  Patient was recently admitted in early April 2019 with small-bowel obstruction  Patient however had a bowel movement this morning, denies any diarrhea today  Patient had a 102 degree Fahrenheit temperature today, tachycardic on arrival  Patient on assessment has urinary tract infection with CT showing cystitis as well  Patient is meeting sepsis criteria based on white blood cell count/RR/Pulse    The history is provided by the patient   device was not used during this ER visit    Sepsis Criteria  -- Temperature > 100.9° or < 96.8° F:  Yes  -- HR > 90: Yes  -- RR > 20: Yes  -- WBC > 12,000 or <4,000: Yes  -- 2 above criteria and infection source: Yes  -- Severe sepsis: sepsis with end-organ dysfunction & lactic acid >2: No     Past Medical History   -- Aneurysm    -- Crohn's disease    -- CVA (cerebral vascular accident)    -- Edema    -- History of organic brain syndrome    -- Hyperlipidemia    -- Hypertension    -- Restless legs syndrome (RLS)      Past Surgical History   -- BRAIN SURGERY     -- CERVICAL SPINE SURGERY     -- CHOLECYSTECTOMY     -- HERNIA REPAIR     -- SHOULDER SURGERY        Review of patient's allergies    -- Aspirin    -- Sulfa (sulfonamide antibiotics)       Review of Systems and Physical Exam      Review of Systems  -- Constitution - fever and chills with fatigue & weakness  -- Eyes - no tearing or redness, no visual disturbance  -- Ear, Nose - no tinnitus or earache, no nasal congestion or discharge  -- Mouth,Throat - no sore throat, no toothache, normal voice, normal swallowing  -- Respiratory - denies cough and congestion, no shortness of breath, no SAEED  --  Cardiovascular - denies chest pain, no palpitations, denies claudication  -- Gastrointestinal - denies abdominal pain, nausea, vomiting, or diarrhea  -- Genitourinary - no dysuria, denies flank pain, no hematuria, no STD risk  -- Musculoskeletal - denies back pain, negative for trauma or injury  -- Neurological - no headache, denies weakness or seizure; no LOC  -- Skin - denies pallor, rash, or changes in skin. no hives or welts noted  -- Psychiatric - Denies SI or HI, no psychosis or fractured thought noted     Vital Signs  Oral temperature is 100 °F (37.8 °C).   His blood pressure is 160/73 and his pulse is 86.   His respiration is 22 and oxygen saturation is 93%    Physical Exam  -- Nursing note and vitals reviewed  -- Constitutional: Appears well-developed and well-nourished  -- Head: Atraumatic. Normocephalic. No obvious abnormality  -- Eyes: Pupils are equal and reactive to light. Normal conjunctiva and lids  -- Nose: Nose normal in appearance, nares grossly normal. No discharge  -- Throat: Mucous membranes moist, pharynx normal, normal tonsils. No lesions   -- Ears: External ears and TM normal bilaterally. Normal hearing and no drainage  -- Neck: Normal range of motion. Neck supple. No masses, trachea midline  -- Cardiac: Normal rate, regular rhythm and normal heart sounds  -- Pulmonary: Normal respiratory effort, breath sounds clear to auscultation  -- Abdominal: Soft, no tenderness. Normal bowel sounds. Normal liver edge  -- Genitourinary: no flank pain on exam, no suprapubic pain by palpation   -- Musculoskeletal: Normal range of motion, no effusions. Joints stable   -- Neurological: No focal deficits. Showed good interaction with staff  -- Vascular: Posterior tibial, dorsalis pedis and radial pulses 2+ bilaterally      Emergency Room Course      Lab Results     K 3.6      CO2 28   BUN 10   CREATININE 1.0      ALKPHOS 65   AST 18   ALT 24   BILITOT 0.5   ALBUMIN 4.0   PROT 7.2   WBC  16.22 (H)   HGB 15.4   HCT 44.5            CPKMB 1.8   TROPONINI <0.006   INR 1.0   BNP 67   LACTATE 1.7   TSH 2.538     Urinalysis  -- Urinalysis performed during this ER visit showed signs of infection  -- The urine today has been sent for lab culture, results pending      EKG  -- The EKG findings today were without concerning findings from baseline    CT abdomen pelvis  Mild diffuse bladder wall thickening suggested accentuated by the incomplete distention and recommend correlation clinically if there is clinical consideration for cystitis or chronic bladder outlet obstruction.  Prostate gland does appear mildly enlarged.     Additional Work up  -- Blood cultures have also been drawn, results are pending    Medications Given  levoFLOXacin 750 mg/150 mL IVPB 750 mg (750 mg Intravenous New Bag 4/27/19 1640)   sodium chloride 0.9% bolus 1,000 mL (has no administration in time range)   sodium chloride 0.9% bolus 500 mL (0 mLs Intravenous Stopped 4/27/19 1414)   acetaminophen tablet 1,000 mg (1,000 mg Oral Given 4/27/19 1318)   iohexol (OMNIPAQUE 350) injection 100 mL (100 mLs Intravenous Given 4/27/19 1418)   iohexol (OMNIPAQUE 350) injection 30 mL (30 mLs Oral Given 4/27/19 1418)     Diagnosis  -- The primary encounter diagnosis was Sepsis, due to unspecified organism.   -- Diagnoses of Emesis and Acute cystitis without hematuria were also pertinent to this visit.    Disposition and Plan  -- Disposition: admit  -- Condition: stable    This note is dictated on M*Modal word recognition program.  There are word recognition mistakes that are occasionally missed on review.         Praveen High MD  04/27/19 9702

## 2019-04-27 NOTE — PLAN OF CARE
Problem: Adult Inpatient Plan of Care  Goal: Plan of Care Review  Outcome: Ongoing (interventions implemented as appropriate)  Patient arrived from ER at 1730 with acute cystitis and UTI. Complaints of abdominal pain and nausea/vomiting at home.  Denies any pain. Short of breath on exertion.  Upon arrival, offered dinner tray and nausea verbalized. Zofran given; symptoms improved.  IV fluids continuous as ordered.  IV antibiotics ordered.  Heart monitor in place; NSR.  VSS, RA, afebrile.  Dinner tray provided but encouraged patient to refrain from eating until GI symptoms completely resolved.  Discussed plan of care, verbalized understanding.

## 2019-04-28 PROBLEM — N30.00 ACUTE CYSTITIS WITHOUT HEMATURIA: Status: ACTIVE | Noted: 2019-04-28

## 2019-04-28 PROBLEM — A41.51 SEPSIS DUE TO ESCHERICHIA COLI: Status: ACTIVE | Noted: 2019-04-27

## 2019-04-28 LAB
ALBUMIN SERPL BCP-MCNC: 3.2 G/DL (ref 3.5–5.2)
ALP SERPL-CCNC: 53 U/L (ref 55–135)
ALT SERPL W/O P-5'-P-CCNC: 19 U/L (ref 10–44)
ANION GAP SERPL CALC-SCNC: 10 MMOL/L (ref 8–16)
AST SERPL-CCNC: 16 U/L (ref 10–40)
BASOPHILS # BLD AUTO: 0.02 K/UL (ref 0–0.2)
BASOPHILS NFR BLD: 0.1 % (ref 0–1.9)
BILIRUB SERPL-MCNC: 0.8 MG/DL (ref 0.1–1)
BUN SERPL-MCNC: 7 MG/DL (ref 8–23)
CALCIUM SERPL-MCNC: 8.6 MG/DL (ref 8.7–10.5)
CHLORIDE SERPL-SCNC: 105 MMOL/L (ref 95–110)
CO2 SERPL-SCNC: 27 MMOL/L (ref 23–29)
CREAT SERPL-MCNC: 0.9 MG/DL (ref 0.5–1.4)
DIFFERENTIAL METHOD: ABNORMAL
EOSINOPHIL # BLD AUTO: 0.1 K/UL (ref 0–0.5)
EOSINOPHIL NFR BLD: 0.5 % (ref 0–8)
ERYTHROCYTE [DISTWIDTH] IN BLOOD BY AUTOMATED COUNT: 12.5 % (ref 11.5–14.5)
EST. GFR  (AFRICAN AMERICAN): >60 ML/MIN/1.73 M^2
EST. GFR  (NON AFRICAN AMERICAN): >60 ML/MIN/1.73 M^2
GLUCOSE SERPL-MCNC: 85 MG/DL (ref 70–110)
HCT VFR BLD AUTO: 39.2 % (ref 40–54)
HGB BLD-MCNC: 13.2 G/DL (ref 14–18)
LYMPHOCYTES # BLD AUTO: 1.9 K/UL (ref 1–4.8)
LYMPHOCYTES NFR BLD: 14.1 % (ref 18–48)
MCH RBC QN AUTO: 30.1 PG (ref 27–31)
MCHC RBC AUTO-ENTMCNC: 33.7 G/DL (ref 32–36)
MCV RBC AUTO: 90 FL (ref 82–98)
MONOCYTES # BLD AUTO: 1.7 K/UL (ref 0.3–1)
MONOCYTES NFR BLD: 12.6 % (ref 4–15)
NEUTROPHILS # BLD AUTO: 9.8 K/UL (ref 1.8–7.7)
NEUTROPHILS NFR BLD: 72.7 % (ref 38–73)
PLATELET # BLD AUTO: 172 K/UL (ref 150–350)
PMV BLD AUTO: 11.1 FL (ref 9.2–12.9)
POTASSIUM SERPL-SCNC: 3.1 MMOL/L (ref 3.5–5.1)
PROT SERPL-MCNC: 6.2 G/DL (ref 6–8.4)
RBC # BLD AUTO: 4.38 M/UL (ref 4.6–6.2)
SODIUM SERPL-SCNC: 142 MMOL/L (ref 136–145)
WBC # BLD AUTO: 13.49 K/UL (ref 3.9–12.7)

## 2019-04-28 PROCEDURE — 99223 1ST HOSP IP/OBS HIGH 75: CPT | Mod: AI,,, | Performed by: INTERNAL MEDICINE

## 2019-04-28 PROCEDURE — 25000003 PHARM REV CODE 250: Performed by: SURGERY

## 2019-04-28 PROCEDURE — 97162 PT EVAL MOD COMPLEX 30 MIN: CPT

## 2019-04-28 PROCEDURE — 63600175 PHARM REV CODE 636 W HCPCS: Performed by: SURGERY

## 2019-04-28 PROCEDURE — 99223 PR INITIAL HOSPITAL CARE,LEVL III: ICD-10-PCS | Mod: AI,,, | Performed by: INTERNAL MEDICINE

## 2019-04-28 PROCEDURE — 97530 THERAPEUTIC ACTIVITIES: CPT

## 2019-04-28 PROCEDURE — 36415 COLL VENOUS BLD VENIPUNCTURE: CPT

## 2019-04-28 PROCEDURE — 63600175 PHARM REV CODE 636 W HCPCS: Performed by: INTERNAL MEDICINE

## 2019-04-28 PROCEDURE — 25000003 PHARM REV CODE 250: Performed by: INTERNAL MEDICINE

## 2019-04-28 PROCEDURE — 11000001 HC ACUTE MED/SURG PRIVATE ROOM

## 2019-04-28 PROCEDURE — 85025 COMPLETE CBC W/AUTO DIFF WBC: CPT

## 2019-04-28 PROCEDURE — 94761 N-INVAS EAR/PLS OXIMETRY MLT: CPT

## 2019-04-28 PROCEDURE — 80053 COMPREHEN METABOLIC PANEL: CPT

## 2019-04-28 RX ORDER — HYDROCHLOROTHIAZIDE 25 MG/1
25 TABLET ORAL DAILY
Status: DISCONTINUED | OUTPATIENT
Start: 2019-04-28 | End: 2019-05-02

## 2019-04-28 RX ORDER — POTASSIUM CHLORIDE 20 MEQ/1
40 TABLET, EXTENDED RELEASE ORAL ONCE
Status: COMPLETED | OUTPATIENT
Start: 2019-04-28 | End: 2019-04-28

## 2019-04-28 RX ORDER — LISINOPRIL 20 MG/1
20 TABLET ORAL DAILY
Status: DISCONTINUED | OUTPATIENT
Start: 2019-04-28 | End: 2019-05-04 | Stop reason: HOSPADM

## 2019-04-28 RX ORDER — ACETAMINOPHEN 500 MG
1000 TABLET ORAL EVERY 6 HOURS PRN
Status: DISCONTINUED | OUTPATIENT
Start: 2019-04-28 | End: 2019-05-04 | Stop reason: HOSPADM

## 2019-04-28 RX ORDER — METOPROLOL TARTRATE 100 MG/1
100 TABLET ORAL 2 TIMES DAILY
Status: DISCONTINUED | OUTPATIENT
Start: 2019-04-28 | End: 2019-05-04 | Stop reason: HOSPADM

## 2019-04-28 RX ORDER — PROCHLORPERAZINE EDISYLATE 5 MG/ML
10 INJECTION INTRAMUSCULAR; INTRAVENOUS EVERY 8 HOURS PRN
Status: DISCONTINUED | OUTPATIENT
Start: 2019-04-28 | End: 2019-05-04 | Stop reason: HOSPADM

## 2019-04-28 RX ORDER — HYDROMORPHONE HYDROCHLORIDE 1 MG/ML
0.5 INJECTION, SOLUTION INTRAMUSCULAR; INTRAVENOUS; SUBCUTANEOUS EVERY 4 HOURS PRN
Status: DISCONTINUED | OUTPATIENT
Start: 2019-04-28 | End: 2019-04-30

## 2019-04-28 RX ORDER — AMLODIPINE BESYLATE 10 MG/1
10 TABLET ORAL DAILY
Status: DISCONTINUED | OUTPATIENT
Start: 2019-04-28 | End: 2019-05-04 | Stop reason: HOSPADM

## 2019-04-28 RX ADMIN — MESALAMINE 800 MG: 400 CAPSULE, DELAYED RELEASE ORAL at 08:04

## 2019-04-28 RX ADMIN — HYDROCODONE BITARTRATE AND ACETAMINOPHEN 1 TABLET: 5; 325 TABLET ORAL at 10:04

## 2019-04-28 RX ADMIN — HYDROCHLOROTHIAZIDE 25 MG: 25 TABLET ORAL at 11:04

## 2019-04-28 RX ADMIN — METOPROLOL TARTRATE 100 MG: 100 TABLET ORAL at 11:04

## 2019-04-28 RX ADMIN — PRIMIDONE 100 MG: 50 TABLET ORAL at 08:04

## 2019-04-28 RX ADMIN — LISINOPRIL 20 MG: 20 TABLET ORAL at 11:04

## 2019-04-28 RX ADMIN — ACETAMINOPHEN 1000 MG: 500 TABLET, FILM COATED ORAL at 03:04

## 2019-04-28 RX ADMIN — LEVOFLOXACIN 750 MG: 750 INJECTION, SOLUTION INTRAVENOUS at 02:04

## 2019-04-28 RX ADMIN — SIMVASTATIN 20 MG: 10 TABLET, FILM COATED ORAL at 10:04

## 2019-04-28 RX ADMIN — HYDROMORPHONE HYDROCHLORIDE 0.5 MG: 1 INJECTION, SOLUTION INTRAMUSCULAR; INTRAVENOUS; SUBCUTANEOUS at 12:04

## 2019-04-28 RX ADMIN — ACETAMINOPHEN 1000 MG: 500 TABLET, FILM COATED ORAL at 08:04

## 2019-04-28 RX ADMIN — PANTOPRAZOLE SODIUM 40 MG: 40 TABLET, DELAYED RELEASE ORAL at 08:04

## 2019-04-28 RX ADMIN — POTASSIUM CHLORIDE 40 MEQ: 1500 TABLET, EXTENDED RELEASE ORAL at 08:04

## 2019-04-28 RX ADMIN — PROCHLORPERAZINE EDISYLATE 10 MG: 5 INJECTION INTRAMUSCULAR; INTRAVENOUS at 12:04

## 2019-04-28 RX ADMIN — PRIMIDONE 100 MG: 50 TABLET ORAL at 10:04

## 2019-04-28 RX ADMIN — AMLODIPINE BESYLATE 10 MG: 10 TABLET ORAL at 11:04

## 2019-04-28 RX ADMIN — HYDROMORPHONE HYDROCHLORIDE 0.5 MG: 1 INJECTION, SOLUTION INTRAMUSCULAR; INTRAVENOUS; SUBCUTANEOUS at 06:04

## 2019-04-28 RX ADMIN — BUPROPION HYDROCHLORIDE 300 MG: 150 TABLET, EXTENDED RELEASE ORAL at 08:04

## 2019-04-28 RX ADMIN — PROCHLORPERAZINE EDISYLATE 10 MG: 5 INJECTION INTRAMUSCULAR; INTRAVENOUS at 06:04

## 2019-04-28 RX ADMIN — METOPROLOL TARTRATE 100 MG: 100 TABLET ORAL at 10:04

## 2019-04-28 NOTE — NURSING
"After administering compazine, patient's wife called to say patient had vomited in bed.  Patient found with 300mL  Of watery emesis in bag with some traces of food in it.  Assisted patient to chair where patient was cleaned off and linen were changed.  Patient states he feels "better" after.  Patient left in chair at his request.   "

## 2019-04-28 NOTE — NURSING
Called to room pt still vomiting/hiccups/abd pain very uncomfortable tearful, house supervisor on floor to help assess unable to keep anything down dr caceres notified orders given compazine for nausea vomiting and dilaudid for pain now and prn /noted pt assisted to sitting position vomitus now at 200ml straw colored  Frothy  Thick fluid in emesis basin, hiccups causing sore throat /reflux in back of throat according to patient

## 2019-04-28 NOTE — PT/OT/SLP EVAL
"Physical Therapy Evaluation    Patient Name:  Reymundo Dang Sr.   MRN:  [id number]    Recommendations:     Discharge Recommendations:  home   Discharge Equipment Recommendations: none   Barriers to discharge: Inaccessible home    Assessment:     Reymundo Dang Sr. is a 64 y.o. male admitted with a medical diagnosis of <principal problem not specified>.  He presents with the following impairments/functional limitations:  impaired self care skills, impaired functional mobilty, gait instability, decreased ROM, decreased upper extremity function, decreased lower extremity function. Pt presents with good tolerance to mobility activity with pt needing assistance with self care ADL due to dec. R UE function and dec. Sensation to R UE/LE. Pt also presents with dec. Foot clearance to R foot during swing phase and slow hollsi which leads to an inc.risk for falls. Pt will benefit with skilled PT services to promote a more independent functional .    Rehab Prognosis: Fair; patient would benefit from acute skilled PT services to address these deficits and reach maximum level of function.    Recent Surgery: * No surgery found *      Plan:     During this hospitalization, patient to be seen 5 x/week to address the identified rehab impairments via gait training, therapeutic activities, therapeutic exercises and progress toward the following goals:    · Plan of Care Expires:  05/03/19    Subjective     Chief Complaint: "tired from the hiccups and stomach ache"  Patient/Family Comments/goals: to get better  Pain/Comfort:  · Pain Rating 1: 0/10  · Pain Rating Post-Intervention 1: 0/10    Patients cultural, spiritual, Buddhist conflicts given the current situation:      Living Environment:  Pt lives with spouse in a 2 story home with 13 steps to get to second floor.   Prior to admission, patients level of function was ambulatory without assistive device, able to complete transfers but requires assistance with ADLs from his wife.  " Equipment used at home: shower chair.  DME owned (not currently used): none.  Upon discharge, patient will have assistance from spouse.    Objective:     Communicated with nurse and patient  prior to session.  Patient found supine with peripheral IV  upon PT entry to room.    General Precautions: Standard, fall   Orthopedic Precautions:N/A   Braces: N/A     Exams:  · Cognitive Exam:  Patient is oriented to Person, Place, Time and Situation  · Gross Motor Coordination:  WFL  · Sensation:    · -       Impaired  light/touch to R distal upper and lower extremity   · RUE ROM: Deficits: pt wtih dec. shoulder Active and passive ROM  · RUE Strength: Deficits: grossly graded as 2/5  · LUE ROM: WFL  · LUE Strength: WFL  · RLE ROM: WFL  · RLE Strength: Deficits: grossly graded as 3+/5  · LLE ROM: WFL  · LLE Strength: WFL    Functional Mobility:  · Bed Mobility:     · Rolling Left:  stand by assistance  · Rolling Right: stand by assistance  · Supine to Sit: contact guard assistance  · Sit to Supine: contact guard assistance  · Transfers:     · Sit to Stand:  contact guard assistance with no AD  · Toilet Transfer: contact guard assistance with  no AD  using  Step Transfer   · Patient ambulated FWB/WBAT: bilateral lower extremity 150  feet on level tile with Hand held assist with Contact Guard Assistance.  Pt with demonstarting a  step to pattern  with decreased hollis, increased time in double stance, decreased step length, decreased stride length, decreased swing-to-stance ratio, decreased toe-to-floor clearance and decreased weight-shifting ability.Impairments contributing to gait deviations include decreased flexibility, decreased sensation and decreased strength  Balance:   Static Sit: GOOD-: Takes MODERATE challenges from all directions but inconsistently  Dynamic Sit: GOOD-: Incosistently Maintains balance through MODERATE excursions of active trunk movement,     Static Stand: FAIR+: Takes MINIMAL challenges from all  directions  · Dynamic stand: FAIR+: Needs CLOSE SUPERVISION during gait and is able to right self with minor LOB        Therapeutic Activities and Exercises:   Pt is able to complete mobility task with pt able to complete ambulation and transfer activity with CGA. Pt able to perform transfer task from bed to toilet and toilet to chair with CGA with pt being left up in chair with spouse present.    AM-PAC 6 CLICK MOBILITY  Total Score:19     Patient left up in chair with all lines intact, call button in reach and nurse present.    GOALS:   Multidisciplinary Problems     Physical Therapy Goals        Problem: Physical Therapy Goal    Goal Priority Disciplines Outcome Goal Variances Interventions   Physical Therapy Goal     PT, PT/OT      Description:  Goals to be met by: 5/3/19    Patient will increase functional independence with mobility by performin. Supine to sit with Modified Independent  2. Sit to supine with Modified Independent  3. Bed to chair transfer with Modified Independentwith or without rolling walker using Step Transfer TECHNIQUE  4. Gait  x 150  feet with Independent with or without quad cane  5. Lower extremity exercise program x10 reps per handout, with assistance as needed                    History:     Past Medical History:   Diagnosis Date    Aneurysm     s/p craniotomy prior to rupture     Crohn's disease     CVA (cerebral vascular accident)     unknown date, left sided with right sided hemiparesis    Edema     History of organic brain syndrome     Hyperlipidemia     Hypertension     Restless legs syndrome (RLS)        Past Surgical History:   Procedure Laterality Date    BRAIN SURGERY      Stent Aneurysm    CERVICAL SPINE SURGERY      CHOLECYSTECTOMY      HERNIA REPAIR      SHOULDER SURGERY      Right       Time Tracking:     PT Received On: 19  PT Start Time: 847     PT Stop Time: 922  PT Total Time (min): 35 min     Billable Minutes: Evaluation mod and  Therapeutic Activity 10      Jayden Goncalves, PT  04/28/2019

## 2019-04-28 NOTE — ASSESSMENT & PLAN NOTE
WBC 16K, HR >/= 90, RR > 20; tmax reported as 102F though I am not seeing that documented in the chart  Source is UTI--seen on UA and CT abd pelvis  Started on IV levaquin and IVF  Vitals are stable this AM  Taking in PO  Will await cx results and monitor over next 24 hours to ensure stability as just arrived overnight.   WBC and fever curve trending down

## 2019-04-28 NOTE — HPI
Patient presented to ER with  Nausea and vomiting. Sx started 2 days ago. He reports abdominal pain around the umbilicus. Pain described as aching sensation. Pain was constant for last 2 days. He reports fever at home to 100.6 F. Did not take anything OTC. He denies diarrhea or constipation. No blood in stool. He denies dysuria, hematuria, foul smelling urine. He reports no h/o bladder infections. No rashes, wounds. Denies chest pains, SOB, LE edema.

## 2019-04-28 NOTE — NURSING
Called to room per wife pt c/o pain to lower abd pain med administered as per md order, aao nad has hiccups c/o abd discomfort secondary to hiccups requesting that once he sleeps that he not be awakened states he will call during bathroom times for vital signs, abdomen round firm bs positive, also c/o nausea states he feels like its in his throat but refuses nausea med at this time iv fluids continous as orderd pt ambulatory bathoom assistance x 1,  Will cont to monitor

## 2019-04-28 NOTE — H&P
[facility]  History & Physical    Patient Name: Reymundo Dang Sr.  MRN: [id number]  Admission Date: 4/27/2019  Attending Physician: Genny Duval MD   Primary Care Provider: Rosalinda Villalba NP         Patient information was obtained from patient and ER records.     Subjective:     Principal Problem:Recurrent major depressive disorder, in full remission    Chief Complaint:   Chief Complaint   Patient presents with    Abdominal Pain        HPI: Patient presented to ER with  Nausea and vomiting. Sx started 2 days ago. He reports abdominal pain around the umbilicus. Pain described as aching sensation. Pain was constant for last 2 days. He reports fever at home to 100.6 F. Did not take anything OTC. He denies diarrhea or constipation. No blood in stool. He denies dysuria, hematuria, foul smelling urine. He reports no h/o bladder infections. No rashes, wounds. Denies chest pains, SOB, LE edema.     No new subjective & objective note has been filed under this hospital service since the last note was generated.    Assessment/Plan:     * Recurrent major depressive disorder, in full remission  Cont home wellbutrin and can take home trintellix       Acute cystitis without hematuria  IV levaquin  Urine and blood cx sent, results pending  Fever curve and WBC trending down      Sepsis due to Escherichia coli  WBC 16K, HR >/= 90, RR > 20; tmax reported as 102F though I am not seeing that documented in the chart  Source is UTI--seen on UA and CT abd pelvis  Started on IV levaquin and IVF  Vitals are stable this AM  Taking in PO  Will await cx results and monitor over next 24 hours to ensure stability as just arrived overnight.   WBC and fever curve trending down      Hypertension  Cont home amlodipine, lisinopril, HCTZ and metoprolol  Held on initial admit but sepsis sx are resolving and BP is rising so will resume home meds with hold orders for SBP < 90 in place      Hyperlipidemia  Cont  home simvastatin      Hemiparesis affecting dominant side as late effect of cerebrovascular accident (CVA)  PT/OT ordered  Seems to be at baseline status      Restless legs syndrome (RLS)  Cont home primidone      Crohn's disease  Cont home mesalamine  CT without acute findings other than UTI  Did have another episode of vomiting overnight responded to compazine. Diarrhea sx are at baseline  No clinical evidence of repeat SBO today; tolerated PO diet this morning. If sx reoccur may repeat KUB to ensure no changes since admit CT        VTE Risk Mitigation (From admission, onward)        Ordered     IP VTE HIGH RISK PATIENT  Once      04/27/19 1743     Place sequential compression device  Until discontinued      04/27/19 1743             Genny Duval MD  Department of Hospital Medicine   [facility]

## 2019-04-28 NOTE — NURSING
Called to room pt cont to c/o hiccups/abd discomfort and spitting up small amts of bile c/o nausea and sore throat  Phenergan administered as per md order for nausea, wife at bedside, hob elevated pt positioned for comfort expresses extreme discomfort as the hiccups constantly causes stomach content to come as far as his throat causing soreness will cont to monitor

## 2019-04-28 NOTE — NURSING
Upon entry to recheck temperature, patient found diaphoretic. Temperature higher than initial of 101.  Now 101.4.  Patient states nausea is back and he just had a period of frothy spit up.  Compazine given.

## 2019-04-28 NOTE — ASSESSMENT & PLAN NOTE
Cont home amlodipine, lisinopril, HCTZ and metoprolol  Held on initial admit but sepsis sx are resolving and BP is rising so will resume home meds with hold orders for SBP < 90 in place

## 2019-04-28 NOTE — ASSESSMENT & PLAN NOTE
Cont home mesalamine  CT without acute findings other than UTI  Did have another episode of vomiting overnight responded to compazine. Diarrhea sx are at baseline  No clinical evidence of repeat SBO today; tolerated PO diet this morning. If sx reoccur may repeat KUB to ensure no changes since admit CT

## 2019-04-28 NOTE — PLAN OF CARE
Problem: Adult Inpatient Plan of Care  Goal: Plan of Care Review  Outcome: Ongoing (interventions implemented as appropriate)  Patient had no more nausea or vomiting today.  Heart monitor in place, NSR. IV continuous fluids were discontinued as patient tolerating oral intake.  Febrile in the am as well as the afternoon, fever treated with Tylenol and temperature did come down.  Physical therapy did evaluate patient and he did well.  Occupation therapy evaluation still pending.  Non-formulary medications ordered by MD, home meds brought by wife.  Wife at bedside and attentive to patient needs.  Potassium low of 3.1; supplemented with 40mEq of oral potassium chloride.  Patient denied any pain or shortness of breath.  IV antibiotics given as ordered. Discussed plan of care with patient; verbalized understanding.

## 2019-04-29 LAB
ALBUMIN SERPL BCP-MCNC: 3.2 G/DL (ref 3.5–5.2)
ALP SERPL-CCNC: 62 U/L (ref 55–135)
ALT SERPL W/O P-5'-P-CCNC: 20 U/L (ref 10–44)
ANION GAP SERPL CALC-SCNC: 10 MMOL/L (ref 8–16)
AST SERPL-CCNC: 16 U/L (ref 10–40)
BASOPHILS # BLD AUTO: 0.02 K/UL (ref 0–0.2)
BASOPHILS NFR BLD: 0.2 % (ref 0–1.9)
BILIRUB SERPL-MCNC: 0.6 MG/DL (ref 0.1–1)
BUN SERPL-MCNC: 7 MG/DL (ref 8–23)
CALCIUM SERPL-MCNC: 8.9 MG/DL (ref 8.7–10.5)
CHLORIDE SERPL-SCNC: 99 MMOL/L (ref 95–110)
CO2 SERPL-SCNC: 29 MMOL/L (ref 23–29)
CREAT SERPL-MCNC: 0.9 MG/DL (ref 0.5–1.4)
DIFFERENTIAL METHOD: ABNORMAL
EOSINOPHIL # BLD AUTO: 0 K/UL (ref 0–0.5)
EOSINOPHIL NFR BLD: 0.2 % (ref 0–8)
ERYTHROCYTE [DISTWIDTH] IN BLOOD BY AUTOMATED COUNT: 12.6 % (ref 11.5–14.5)
EST. GFR  (AFRICAN AMERICAN): >60 ML/MIN/1.73 M^2
EST. GFR  (NON AFRICAN AMERICAN): >60 ML/MIN/1.73 M^2
GLUCOSE SERPL-MCNC: 99 MG/DL (ref 70–110)
HCT VFR BLD AUTO: 38.7 % (ref 40–54)
HGB BLD-MCNC: 13.2 G/DL (ref 14–18)
LYMPHOCYTES # BLD AUTO: 1.1 K/UL (ref 1–4.8)
LYMPHOCYTES NFR BLD: 10 % (ref 18–48)
MCH RBC QN AUTO: 30.5 PG (ref 27–31)
MCHC RBC AUTO-ENTMCNC: 34.1 G/DL (ref 32–36)
MCV RBC AUTO: 89 FL (ref 82–98)
MONOCYTES # BLD AUTO: 1.2 K/UL (ref 0.3–1)
MONOCYTES NFR BLD: 11.4 % (ref 4–15)
NEUTROPHILS # BLD AUTO: 8.2 K/UL (ref 1.8–7.7)
NEUTROPHILS NFR BLD: 78.2 % (ref 38–73)
PLATELET # BLD AUTO: 139 K/UL (ref 150–350)
PMV BLD AUTO: 10.7 FL (ref 9.2–12.9)
POTASSIUM SERPL-SCNC: 3 MMOL/L (ref 3.5–5.1)
PROT SERPL-MCNC: 6.6 G/DL (ref 6–8.4)
RBC # BLD AUTO: 4.33 M/UL (ref 4.6–6.2)
SODIUM SERPL-SCNC: 138 MMOL/L (ref 136–145)
WBC # BLD AUTO: 10.51 K/UL (ref 3.9–12.7)

## 2019-04-29 PROCEDURE — 99900035 HC TECH TIME PER 15 MIN (STAT)

## 2019-04-29 PROCEDURE — 25000003 PHARM REV CODE 250: Performed by: INTERNAL MEDICINE

## 2019-04-29 PROCEDURE — 97530 THERAPEUTIC ACTIVITIES: CPT

## 2019-04-29 PROCEDURE — 25000003 PHARM REV CODE 250: Performed by: SURGERY

## 2019-04-29 PROCEDURE — 63600175 PHARM REV CODE 636 W HCPCS: Performed by: SURGERY

## 2019-04-29 PROCEDURE — 94761 N-INVAS EAR/PLS OXIMETRY MLT: CPT

## 2019-04-29 PROCEDURE — 99233 PR SUBSEQUENT HOSPITAL CARE,LEVL III: ICD-10-PCS | Mod: ,,, | Performed by: INTERNAL MEDICINE

## 2019-04-29 PROCEDURE — 63600175 PHARM REV CODE 636 W HCPCS: Performed by: INTERNAL MEDICINE

## 2019-04-29 PROCEDURE — 85025 COMPLETE CBC W/AUTO DIFF WBC: CPT

## 2019-04-29 PROCEDURE — 36415 COLL VENOUS BLD VENIPUNCTURE: CPT

## 2019-04-29 PROCEDURE — 11000001 HC ACUTE MED/SURG PRIVATE ROOM

## 2019-04-29 PROCEDURE — 80053 COMPREHEN METABOLIC PANEL: CPT

## 2019-04-29 PROCEDURE — 99233 SBSQ HOSP IP/OBS HIGH 50: CPT | Mod: ,,, | Performed by: INTERNAL MEDICINE

## 2019-04-29 RX ADMIN — LEVOFLOXACIN 750 MG: 750 INJECTION, SOLUTION INTRAVENOUS at 02:04

## 2019-04-29 RX ADMIN — SIMVASTATIN 20 MG: 10 TABLET, FILM COATED ORAL at 09:04

## 2019-04-29 RX ADMIN — ACETAMINOPHEN 1000 MG: 500 TABLET, FILM COATED ORAL at 04:04

## 2019-04-29 RX ADMIN — PANTOPRAZOLE SODIUM 40 MG: 40 TABLET, DELAYED RELEASE ORAL at 10:04

## 2019-04-29 RX ADMIN — LISINOPRIL 20 MG: 20 TABLET ORAL at 10:04

## 2019-04-29 RX ADMIN — PRIMIDONE 100 MG: 50 TABLET ORAL at 10:04

## 2019-04-29 RX ADMIN — PRIMIDONE 100 MG: 50 TABLET ORAL at 09:04

## 2019-04-29 RX ADMIN — ALPRAZOLAM 0.5 MG: 0.5 TABLET ORAL at 09:04

## 2019-04-29 RX ADMIN — METOPROLOL TARTRATE 100 MG: 100 TABLET ORAL at 09:04

## 2019-04-29 RX ADMIN — BUPROPION HYDROCHLORIDE 300 MG: 150 TABLET, EXTENDED RELEASE ORAL at 10:04

## 2019-04-29 RX ADMIN — PROMETHAZINE HYDROCHLORIDE 12.5 MG: 25 INJECTION INTRAMUSCULAR; INTRAVENOUS at 05:04

## 2019-04-29 RX ADMIN — PIPERACILLIN AND TAZOBACTAM 4.5 G: 4; .5 INJECTION, POWDER, LYOPHILIZED, FOR SOLUTION INTRAVENOUS; PARENTERAL at 05:04

## 2019-04-29 RX ADMIN — AMLODIPINE BESYLATE 10 MG: 10 TABLET ORAL at 10:04

## 2019-04-29 RX ADMIN — MESALAMINE 800 MG: 400 CAPSULE, DELAYED RELEASE ORAL at 10:04

## 2019-04-29 RX ADMIN — METOPROLOL TARTRATE 100 MG: 100 TABLET ORAL at 10:04

## 2019-04-29 RX ADMIN — HYDROCHLOROTHIAZIDE 25 MG: 25 TABLET ORAL at 10:04

## 2019-04-29 RX ADMIN — ONDANSETRON 4 MG: 2 INJECTION INTRAMUSCULAR; INTRAVENOUS at 09:04

## 2019-04-29 RX ADMIN — HYDROMORPHONE HYDROCHLORIDE 0.5 MG: 1 INJECTION, SOLUTION INTRAMUSCULAR; INTRAVENOUS; SUBCUTANEOUS at 08:04

## 2019-04-29 NOTE — NURSING
Notified Dr. Duval via secure chat  of increase in temperature. Will monitor V/S Q 2 hours for 4 occurrences.

## 2019-04-29 NOTE — PLAN OF CARE
04/29/19 1144   Readmission Questionnaire   At the time of your discharge, did someone talk to you about what your health problems were? Yes   At the time of discharge, did someone talk to you about what to watch out for regarding worsening of your health problem? Yes   At the time of discharge, did someone talk to you about what to do if you experienced worsening of your health problem? Yes   At the time of discharge, did someone talk to you about which medication to take when you left the hospital and which ones to stop taking? Yes   At the time of discharge, did someone talk to you about when and where to follow up with a doctor after you left the hospital? Yes   What do you believe caused you to be sick enough to be re-admitted? i waitied too long to call the doctor when i started feeling bad again   How often do you need to have someone help you when you read instructions, pamphlets, or other written material from your doctor or pharmacy? Always   Do you have problems taking your medications as prescribed? No   Do you have any problems affording any of  your prescribed medications? No   Do you have problems obtaining/receiving your medications? No   Does the patient have transportation to healthcare appointments? Yes   Lives With spouse   Living Arrangements house   Does the patient have family/friends to help with healtcare needs after discharge? yes   Who are your caregiver(s) and their phone number(s)? Yareli [phone]   Does your caregiver provide all the help you need? Yes   Are you currently feeling confused? No   Are you currently having problems thinking? No   Are you currently having memory problems? No   Have you felt down, depressed, or hopeless? 0   Have you felt little interest or pleasure in doing things? 0   In the last 7 days, my sleep quality was: good

## 2019-04-29 NOTE — NURSING
"Patient given tylenol for temp of 102.4. Patient slightly diaphoretic in face. Cool towel given. Offered to change hospital gown. Patient states" I just did". Will continue to follow.   "

## 2019-04-29 NOTE — SUBJECTIVE & OBJECTIVE
Interval History: remains with nausea and vomiting overnight, no BM since admission    Review of Systems   Constitutional: Negative for activity change, fatigue, fever and unexpected weight change.   HENT: Positive for sore throat. Negative for congestion, ear pain, hearing loss and rhinorrhea.    Eyes: Negative for pain, redness and visual disturbance.   Respiratory: Negative for cough, shortness of breath and wheezing.    Cardiovascular: Negative for chest pain, palpitations and leg swelling.   Gastrointestinal: Positive for constipation (no BM since admission), nausea and vomiting. Negative for abdominal pain, blood in stool and diarrhea.   Genitourinary: Negative for decreased urine volume, dysuria, frequency and urgency.   Musculoskeletal: Negative for back pain, joint swelling and neck pain.   Skin: Negative for color change, rash and wound.   Neurological: Negative for dizziness, tremors, weakness, light-headedness and headaches.     Objective:     Vital Signs (Most Recent):  Temp: (!) 100.4 °F (38 °C) (04/29/19 0716)  Pulse: 81 (04/29/19 0928)  Resp: 20 (04/29/19 0716)  BP: (!) 141/66 (04/29/19 0716)  SpO2: 96 % (04/29/19 0721) Vital Signs (24h Range):  Temp:  [97.8 °F (36.6 °C)-101.7 °F (38.7 °C)] 100.4 °F (38 °C)  Pulse:  [74-95] 81  Resp:  [18-20] 20  SpO2:  [96 %-98 %] 96 %  BP: (126-171)/(64-86) 141/66     Weight: 118 kg (260 lb 2.3 oz)  Body mass index is 40.74 kg/m².    Intake/Output Summary (Last 24 hours) at 4/29/2019 1024  Last data filed at 4/28/2019 1800  Gross per 24 hour   Intake 480 ml   Output --   Net 480 ml      Physical Exam   Constitutional: He is oriented to person, place, and time. He appears well-developed and well-nourished. No distress.   HENT:   Head: Normocephalic and atraumatic.   Right Ear: Tympanic membrane and external ear normal.   Left Ear: Tympanic membrane and external ear normal.   Mouth/Throat: No oropharyngeal exudate.   Eyes: Pupils are equal, round, and reactive to  light. Conjunctivae and EOM are normal. Right eye exhibits no discharge. Left eye exhibits no discharge.   Neck: Neck supple. No tracheal deviation present.   Cardiovascular: Normal rate and regular rhythm.   No murmur heard.  Pulmonary/Chest: Effort normal and breath sounds normal. No respiratory distress. He has no wheezes. He has no rales.   Abdominal: Soft. Bowel sounds are normal. He exhibits distension. There is tenderness (periumbilical). There is no rebound and no guarding.   Neurological: He is alert and oriented to person, place, and time. No cranial nerve deficit.   Skin: Skin is warm and dry.   Psychiatric: He has a normal mood and affect. His behavior is normal.   Nursing note and vitals reviewed.      Significant Labs:   CBC:   Recent Labs   Lab 04/27/19  1319 04/28/19  0520   WBC 16.22* 13.49*   HGB 15.4 13.2*   HCT 44.5 39.2*    172     CMP:   Recent Labs   Lab 04/27/19  1319 04/28/19  0520    142   K 3.6 3.1*    105   CO2 28 27    85   BUN 10 7*   CREATININE 1.0 0.9   CALCIUM 9.4 8.6*   PROT 7.2 6.2   ALBUMIN 4.0 3.2*   BILITOT 0.5 0.8   ALKPHOS 65 53*   AST 18 16   ALT 24 19   ANIONGAP 11 10   EGFRNONAA >60 >60   mag 1.8  Pro calcitonin 0.14    Cardiac Markers:   Recent Labs   Lab 04/27/19  1319   BNP 67     Coagulation:   Recent Labs   Lab 04/27/19  1319 04/27/19  1704   INR 1.0  --    APTT 24.7 25.9     Lactic Acid:   Recent Labs   Lab 04/27/19  1319 04/27/19  1704 04/27/19  2128   LACTATE 1.7 1.1 0.9     Lipase:   Recent Labs   Lab 04/27/19  1319 04/27/19  1704   LIPASE 34 21     BNP  Recent Labs   Lab 04/27/19  1319   BNP 67       Troponin:   Recent Labs   Lab 04/27/19 1319   TROPONINI <0.006     TSH:   Recent Labs   Lab 02/05/19  0823   TSH 2.538     Urine Culture:   Recent Labs   Lab 04/27/19  1316   LABURIN PRESUMPTIVE E COLI  >100,000 cfu/ml  Identification and susceptibility pending       Urine Studies:   Recent Labs   Lab 04/27/19  1316   COLORU Yellow    APPEARANCEUA Hazy*   PHUR 5.0   SPECGRAV 1.025   PROTEINUA Negative   GLUCUA Negative   KETONESU Trace*   BILIRUBINUA 1+*   OCCULTUA 1+*   NITRITE Negative   UROBILINOGEN Negative   LEUKOCYTESUR 2+*   RBCUA 5*   WBCUA >100*   BACTERIA Many*   SQUAMEPITHEL 0     Flu negative  Blood cultures NGTD x 2    Significant Imaging:     CT abd and pelvis Mild diffuse bladder wall thickening suggested accentuated by the incomplete distention and recommend correlation clinically if there is clinical consideration for cystitis or chronic bladder outlet obstruction.  Prostate gland does appear mildly enlarged.    EKG Normal sinus rhythm  Left axis deviation  Moderate voltage criteria for LVH, may be normal variant  Nonspecific ST and/or T wave abnormalities  Abnormal ECG  When compared with ECG of 04-APR-2019 07:52,  No significant change was found  Confirmed by Sacha Cooper MD ([id number]) on 4/28/2019 4:03:54 PM

## 2019-04-29 NOTE — PT/OT/SLP PROGRESS
"Physical Therapy Treatment    Patient Name:  Reymundo Dang Sr.   MRN:  [id number]    Recommendations:     Discharge Recommendations:  home   Discharge Equipment Recommendations: none   Barriers to discharge: Inaccessible home and Decreased caregiver support    Assessment:     Reymundo Dang Sr. is a 64 y.o. male admitted with a medical diagnosis of Recurrent major depressive disorder, in full remission.  He presents with the following impairments/functional limitations:  impaired self care skills, impaired functional mobilty, gait instability, decreased upper extremity function, decreased lower extremity function, decreased ROM. Patient seen sitting at edge of the bed complain vomiting upon eating lunch meal and having stomach ache. Notified the nurse Florence and patient is scheduled for MRI today. Worked on standing activity for body repositioning and body mechanics with mobility and trasnfers. Ambulated patient with no assistive device x 150 fee twicet with close SBA. No sign of distress and fatigue.     Rehab Prognosis:; patient would benefit from acute skilled PT services to address these deficits and reach maximum level of function.    Recent Surgery: * No surgery found *      Plan:     During this hospitalization, patient to be seen 5 x/week to address the identified rehab impairments via gait training, therapeutic activities, therapeutic exercises and progress toward the following goals:    · Plan of Care Expires:  05/03/19    Subjective     Chief Complaint: Vomited upon eating and abdominal pain  Patient/Family Comments/goals: "to get better and able to take care myself" - per patient.  Pain/Comfort:  · Pain Rating 1: 8/10  · Location 1: abdomen  · Pain Addressed 1: Reposition, Cessation of Activity, Distraction, Nurse notified  · Pain Rating Post-Intervention 1: 8/10      Objective:     Communicated with patient  prior to session.  Patient found sitting at edge of the bed with peripheral IV upon PT entry to " room.     General Precautions: Standard, fall   Orthopedic Precautions:N/A   Braces: N/A     Functional Mobility:  · Bed Mobility:     · Rolling Left:  stand by assistance  · Rolling Right: stand by assistance  · Scooting: stand by assistance  · Supine to Sit: stand by assistance  · Sit to Supine: stand by assistance  · Transfers:     · Sit to Stand:  supervision with no AD  · Bed to Chair: supervision with  no AD  using  Step Transfer  · Gait: Ambulated x 150 feet twice with no assistive device and with SBA.  Exhibits Right LE Circumduction gait, decrease Right Ankle dorsiflexion and decrease Right Foot/Floor clearaance due to residual hemiparesis on Right side from old stroke hx.  · Balance: Dynamic Standing with Fair grade      AM-PAC 6 CLICK MOBILITY  Turning over in bed (including adjusting bedclothes, sheets and blankets)?: 3  Sitting down on and standing up from a chair with arms (e.g., wheelchair, bedside commode, etc.): 4  Moving from lying on back to sitting on the side of the bed?: 3  Moving to and from a bed to a chair (including a wheelchair)?: 3  Need to walk in hospital room?: 3  Climbing 3-5 steps with a railing?: 3  Basic Mobility Total Score: 19       Therapeutic Activities and Exercises:   Worked on body mechanics on mobility, out of bed activity and transfers. Implemented gait trng with no assistive device for safe ambulation tasks anjd safe toilet activity.     Patient left sitting on edge of the bed with all lines intact, call button in reach and nurse Florence notified..    GOALS:   Multidisciplinary Problems     Physical Therapy Goals        Problem: Physical Therapy Goal    Goal Priority Disciplines Outcome Goal Variances Interventions   Physical Therapy Goal     PT, PT/OT Ongoing (interventions implemented as appropriate)     Description:  Goals to be met by: 5/3/19    Patient will increase functional independence with mobility by performin. Supine to sit with Modified Independent  2.  Sit to supine with Modified Independent  3. Bed to chair transfer with Modified Independentwith or without rolling walker using Step Transfer TECHNIQUE  4. Gait  x 150  feet with Independent with or without quad cane  5. Lower extremity exercise program x10 reps per handout, with assistance as needed                    Time Tracking:     PT Received On: 04/29/19  PT Start Time: 1246     PT Stop Time: 1316  PT Total Time (min): 30 min     Billable Minutes: Therapeutic Activity 30    Treatment Type: Treatment  PT/PTA: PT           Tray Paez, PT  04/29/2019

## 2019-04-29 NOTE — PROGRESS NOTES
Mr. Dang is well aware and informed of all medications given, what they are for and potential side effects.  He reported pain as 8-9/10 in his lower abdominal area with nausea and vomiting.  The nurse was notified.  We also discussed fall precautions.  He had no further questions or concerns at that time.     Nahomy Davis, PharmD  [phone]

## 2019-04-29 NOTE — PLAN OF CARE
04/29/19 1156   Medicare Message   Important Message from Medicare regarding Discharge Appeal Rights Given to patient/caregiver;Explained to patient/caregiver;Signed/date by patient/caregiver   Date IMM was signed 04/27/19   Time IMM was signed [id number]

## 2019-04-29 NOTE — ASSESSMENT & PLAN NOTE
WBC 16K, HR >/= 90, RR > 20; tmax reported as 102F though I am not seeing that documented in the chart  Source is UTI--seen on UA and CT abd pelvis  Started on IV levaquin and IVF  Vitals are stable this AM  Taking in PO  Will await cx results and monitor over next 24 hours to ensure stability as just arrived overnight.   WBC and fever curve trending down    Still with low grade fever. Also with nausea and no BM. Had recent SBO, repeat KUB this am. Also await urine cultures and cont levaquin for now

## 2019-04-29 NOTE — PT/OT/SLP PROGRESS
Occupational Therapy      Patient Name:  Reymundo Dang .   MRN:  [id number]    Patient not seen today secondary to busy with testing (MRI) and with high temperature/ vomiting  . Will follow-up tomorrow as able to participate.    Peace Welch OT  4/29/2019

## 2019-04-29 NOTE — NURSING
Bedside report received from Lakisha.  Vs stable.  No complaints of pain at this time.  States Nausea at times.  Pt wife at bedside.  Call bell in reach.  Will continue to monitor.

## 2019-04-29 NOTE — PLAN OF CARE
04/29/19 1156   Advance Directives (For Healthcare)   Advance Directive  (If Adv Dir status is received, view document under Code in header or Chart Review Media tab) Patient does not have Advance Directive, declines information.

## 2019-04-29 NOTE — PLAN OF CARE
Problem: Adult Inpatient Plan of Care  Goal: Plan of Care Review  Outcome: Ongoing (interventions implemented as appropriate)  Pt agrees with plan of care.  VS stable.  No complaints of pain at this time. No nausea noted.   Pt slept well this shift.  Family at bedside.  Call bell in reach.  Will continue to monitor.  Pt had temp this shift 101.7.  Tylenol given as ordered.

## 2019-04-29 NOTE — ASSESSMENT & PLAN NOTE
Cont home mesalamine  CT without acute findings other than UTI  Did have another episode of vomiting overnight responded to compazine. Diarrhea sx are at baseline  No clinical evidence of repeat SBO today; tolerated PO diet this morning. If sx reoccur may repeat KUB to ensure no changes since admit CT    4/29 Today he complains again of vomiting, no BM since prior to admission. Abdomen more distended. Will check KUB

## 2019-04-29 NOTE — PLAN OF CARE
Problem: Adult Inpatient Plan of Care  Goal: Plan of Care Review  Outcome: Ongoing (interventions implemented as appropriate)  Patient with distended firm abdomen. KUB with no significant findings. Patient with N/V. zofran and phenergan given. Small amount of emesis witnessed earlier in shift; brown in color. Patient c/o sore throat-discussed with Dr. Duval on rounds. Febrile today. Notified Dr. Duval of temp 102.4. BC no growth  to date. Will take vitals Q 2 hours for 4 occurrences. Will continue to monitor. Safety and comfort maintained. Agrees with plan of care.

## 2019-04-29 NOTE — HOSPITAL COURSE
Ct of abd on admission consistiant with UTI. t max 101.7 this am . WBC 35598 on admission was down to 475[address] yesterday. ecoli noted on urine culture sensitivity pending.on levaquin day 3. He is still vomiting and nauseated. Given zofran. No BM since prior to admission. Also complains of sore throat. That started prior to arrival.     4/30 he is in bed this am. Seems a little delayed. He had KUB yesterday that did not show any SBO. He is still having intermittent N/V and abd pain. He did get up last night and fell. His wife at bedside reports that he seems a little confused after the fall. CT of head done does not show acute pathology.     He had last BM 4/27 prior to admission. Was able to eat biscuit this am. No Nausea.       Urine is still pending. ecoli with sensitivities pending. Was changed to zosyn yesterday after the 102 temp from levaquin after 2 days. 102.4 t max. WBC [id number]>[id number].     5/1 patients urine culture came back yesterday and he is sensitive to the zosyn but was resistant to the levaquin which explains why he was not getting better. He is mentally back to baseline. Low grade fever. WBC back to normal    K+ still very low after 2 riders yesterday. Tolerating more Po today.     5/2 pt is on day 3 of zosyn (started last Monday evening with 1st dose) for ESBL. He is now afebrile/normal WBC. AMS resolved. Did have 3 diarrhea stools over night. Has hx of hypokalemia and takes potassium daily. Given IV riders Tuesday due to N/v. No longer with N/V. 80meq po given yesterday K+2.8>>2.9 today.     5/3/19    Day 4 of zosyn for ESBL E coli   UTI. Blood cultures remain without growth. Afebrile/ no elevated WBC. AMS continues to be resolved. Had 5 stools over last 24hr. Cont mesalamine and lactobacillus. K given x 2 doses yesterday. Improved from 2.9>3.0.   Anxious to go home   Reassured if urine clear in am can go home ; continue IV antibiotics for today     5/4/19  Day 5 of zosyn for ESBL E coli   UTI. Blood  cultures remain without growth. Afebrile/ no elevated WBC. AMS continues to be resolved. Had 5 stools over last 24hr. Cont mesalamine and lactobacillus. K given x 2 doses yesterday. Improved from 2.9>3.0.   Anxious to go home   Urine clear . DC home .  No more antibiotics

## 2019-04-29 NOTE — PROGRESS NOTES
Nursing Notes  Bedside handoff completed with Georgiana Brock RN. Patient c/o sore throat. Wife if concerned it may be strep throat. Informed wife that I would alert MD and NP that would be rounding today. Call bell within reach of patient.       Huddle Comments

## 2019-04-29 NOTE — PROGRESS NOTES
[facility] Medicine  Progress Note    Patient Name: Reymundo Dang Sr.  MRN: [id number]  Patient Class: IP- Inpatient   Admission Date: 4/27/2019  Length of Stay: 2 days  Attending Physician: Genny Duval MD  Primary Care Provider: Rosalinda Villalba NP        Subjective:     Principal Problem:Recurrent major depressive disorder, in full remission    HPI:  Patient presented to ER with  Nausea and vomiting. Sx started 2 days ago. He reports abdominal pain around the umbilicus. Pain described as aching sensation. Pain was constant for last 2 days. He reports fever at home to 100.6 F. Did not take anything OTC. He denies diarrhea or constipation. No blood in stool. He denies dysuria, hematuria, foul smelling urine. He reports no h/o bladder infections. No rashes, wounds. Denies chest pains, SOB, LE edema.     Hospital Course:  Ct of abd on admission consistiant with UTI. t max 101.7 this am . WBC 07389 on admission [place] 83[address] yesterday. ecoli noted on urine culture sensitivity pending.on levaquin day 3. He is still vomiting and nauseated. Given zofran. No BM since prior to admission. Also complains of sore throat. That started prior to arrival.     Interval History: remains with nausea and vomiting overnight, no BM since admission    Review of Systems   Constitutional: Negative for activity change, fatigue, fever and unexpected weight change.   HENT: Positive for sore throat. Negative for congestion, ear pain, hearing loss and rhinorrhea.    Eyes: Negative for pain, redness and visual disturbance.   Respiratory: Negative for cough, shortness of breath and wheezing.    Cardiovascular: Negative for chest pain, palpitations and leg swelling.   Gastrointestinal: Positive for constipation (no BM since admission), nausea and vomiting. Negative for abdominal pain, blood in stool and diarrhea.   Genitourinary: Negative for decreased urine volume, dysuria, frequency and urgency.    Musculoskeletal: Negative for back pain, joint swelling and neck pain.   Skin: Negative for color change, rash and wound.   Neurological: Negative for dizziness, tremors, weakness, light-headedness and headaches.     Objective:     Vital Signs (Most Recent):  Temp: (!) 100.4 °F (38 °C) (04/29/19 0716)  Pulse: 81 (04/29/19 0928)  Resp: 20 (04/29/19 0716)  BP: (!) 141/66 (04/29/19 0716)  SpO2: 96 % (04/29/19 0721) Vital Signs (24h Range):  Temp:  [97.8 °F (36.6 °C)-101.7 °F (38.7 °C)] 100.4 °F (38 °C)  Pulse:  [74-95] 81  Resp:  [18-20] 20  SpO2:  [96 %-98 %] 96 %  BP: (126-171)/(64-86) 141/66     Weight: 118 kg (260 lb 2.3 oz)  Body mass index is 40.74 kg/m².    Intake/Output Summary (Last 24 hours) at 4/29/2019 1024  Last data filed at 4/28/2019 1800  Gross per 24 hour   Intake 480 ml   Output --   Net 480 ml      Physical Exam   Constitutional: He is oriented to person, place, and time. He appears well-developed and well-nourished. No distress.   HENT:   Head: Normocephalic and atraumatic.   Right Ear: Tympanic membrane and external ear normal.   Left Ear: Tympanic membrane and external ear normal.   Mouth/Throat: No oropharyngeal exudate.   Eyes: Pupils are equal, round, and reactive to light. Conjunctivae and EOM are normal. Right eye exhibits no discharge. Left eye exhibits no discharge.   Neck: Neck supple. No tracheal deviation present.   Cardiovascular: Normal rate and regular rhythm.   No murmur heard.  Pulmonary/Chest: Effort normal and breath sounds normal. No respiratory distress. He has no wheezes. He has no rales.   Abdominal: Soft. Bowel sounds are normal. He exhibits distension. There is tenderness (periumbilical). There is no rebound and no guarding.   Neurological: He is alert and oriented to person, place, and time. No cranial nerve deficit.   Skin: Skin is warm and dry.   Psychiatric: He has a normal mood and affect. His behavior is normal.   Nursing note and vitals reviewed.      Significant  Labs:   CBC:   Recent Labs   Lab 04/27/19  1319 04/28/19  0520   WBC 16.22* 13.49*   HGB 15.4 13.2*   HCT 44.5 39.2*    172     CMP:   Recent Labs   Lab 04/27/19  1319 04/28/19  0520    142   K 3.6 3.1*    105   CO2 28 27    85   BUN 10 7*   CREATININE 1.0 0.9   CALCIUM 9.4 8.6*   PROT 7.2 6.2   ALBUMIN 4.0 3.2*   BILITOT 0.5 0.8   ALKPHOS 65 53*   AST 18 16   ALT 24 19   ANIONGAP 11 10   EGFRNONAA >60 >60   mag 1.8  Pro calcitonin 0.14    Cardiac Markers:   Recent Labs   Lab 04/27/19  1319   BNP 67     Coagulation:   Recent Labs   Lab 04/27/19  1319 04/27/19  1704   INR 1.0  --    APTT 24.7 25.9     Lactic Acid:   Recent Labs   Lab 04/27/19  1319 04/27/19  1704 04/27/19  2128   LACTATE 1.7 1.1 0.9     Lipase:   Recent Labs   Lab 04/27/19  1319 04/27/19  1704   LIPASE 34 21     BNP  Recent Labs   Lab 04/27/19  1319   BNP 67       Troponin:   Recent Labs   Lab 04/27/19  1319   TROPONINI <0.006     TSH:   Recent Labs   Lab 02/05/19  0823   TSH 2.538     Urine Culture:   Recent Labs   Lab 04/27/19  1316   LABURIN PRESUMPTIVE E COLI  >100,000 cfu/ml  Identification and susceptibility pending       Urine Studies:   Recent Labs   Lab 04/27/19  1316   COLORU Yellow   APPEARANCEUA Hazy*   PHUR 5.0   SPECGRAV 1.025   PROTEINUA Negative   GLUCUA Negative   KETONESU Trace*   BILIRUBINUA 1+*   OCCULTUA 1+*   NITRITE Negative   UROBILINOGEN Negative   LEUKOCYTESUR 2+*   RBCUA 5*   WBCUA >100*   BACTERIA Many*   SQUAMEPITHEL 0     Flu negative  Blood cultures NGTD x 2    Significant Imaging:     CT abd and pelvis Mild diffuse bladder wall thickening suggested accentuated by the incomplete distention and recommend correlation clinically if there is clinical consideration for cystitis or chronic bladder outlet obstruction.  Prostate gland does appear mildly enlarged.    EKG Normal sinus rhythm  Left axis deviation  Moderate voltage criteria for LVH, may be normal variant  Nonspecific ST and/or T wave  abnormalities  Abnormal ECG  When compared with ECG of 04-APR-2019 07:52,  No significant change was found  Confirmed by Sacha Cooper MD ([id number]) on 4/28/2019 4:03:54 PM    Assessment/Plan:      * Recurrent major depressive disorder, in full remission  Cont home wellbutrin and can take home trintellix       Acute cystitis without hematuria  IV levaquin  Urine and blood cx sent, results pending  Fever curve and WBC trending down      Sepsis due to Escherichia coli  WBC 16K, HR >/= 90, RR > 20; tmax reported as 102F though I am not seeing that documented in the chart  Source is UTI--seen on UA and CT abd pelvis  Started on IV levaquin and IVF  Vitals are stable this AM  Taking in PO  Will await cx results and monitor over next 24 hours to ensure stability as just arrived overnight.   WBC and fever curve trending down    Still with low grade fever. Also with nausea and no BM. Had recent SBO, repeat KUB this am. Also await urine cultures and cont levaquin for now    Hypertension  Cont home amlodipine, lisinopril, HCTZ and metoprolol  Held on initial admit but sepsis sx are resolving and BP is rising so will resume home meds with hold orders for SBP < 90 in place   bp this am 126//86    Hyperlipidemia  Cont home simvastatin      Hemiparesis affecting dominant side as late effect of cerebrovascular accident (CVA)  PT/OT ordered  Seems to be at baseline status      Restless legs syndrome (RLS)  Cont home primidone      Crohn's disease  Cont home mesalamine  CT without acute findings other than UTI  Did have another episode of vomiting overnight responded to compazine. Diarrhea sx are at baseline  No clinical evidence of repeat SBO today; tolerated PO diet this morning. If sx reoccur may repeat KUB to ensure no changes since admit CT    4/29 Today he complains again of vomiting, no BM since prior to admission. Abdomen more distended. Will check KUB        VTE Risk Mitigation (From admission, onward)        Ordered      IP VTE HIGH RISK PATIENT  Once      04/27/19 1743     Place sequential compression device  Until discontinued      04/27/19 1743              Genny Duval MD  Department of Hospital Medicine   [facility]
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
## 2019-04-30 LAB
ALBUMIN SERPL BCP-MCNC: 3 G/DL (ref 3.5–5.2)
ALP SERPL-CCNC: 61 U/L (ref 55–135)
ALT SERPL W/O P-5'-P-CCNC: 22 U/L (ref 10–44)
ANION GAP SERPL CALC-SCNC: 12 MMOL/L (ref 8–16)
ANION GAP SERPL CALC-SCNC: 9 MMOL/L (ref 8–16)
AST SERPL-CCNC: 22 U/L (ref 10–40)
BACTERIA UR CULT: NORMAL
BASOPHILS # BLD AUTO: 0.01 K/UL (ref 0–0.2)
BASOPHILS NFR BLD: 0.1 % (ref 0–1.9)
BILIRUB SERPL-MCNC: 0.7 MG/DL (ref 0.1–1)
BUN SERPL-MCNC: 8 MG/DL (ref 8–23)
BUN SERPL-MCNC: 8 MG/DL (ref 8–23)
CALCIUM SERPL-MCNC: 8.7 MG/DL (ref 8.7–10.5)
CALCIUM SERPL-MCNC: 8.8 MG/DL (ref 8.7–10.5)
CHLORIDE SERPL-SCNC: 97 MMOL/L (ref 95–110)
CHLORIDE SERPL-SCNC: 98 MMOL/L (ref 95–110)
CO2 SERPL-SCNC: 27 MMOL/L (ref 23–29)
CO2 SERPL-SCNC: 34 MMOL/L (ref 23–29)
CREAT SERPL-MCNC: 1 MG/DL (ref 0.5–1.4)
CREAT SERPL-MCNC: 1 MG/DL (ref 0.5–1.4)
DIFFERENTIAL METHOD: ABNORMAL
EOSINOPHIL # BLD AUTO: 0 K/UL (ref 0–0.5)
EOSINOPHIL NFR BLD: 0 % (ref 0–8)
ERYTHROCYTE [DISTWIDTH] IN BLOOD BY AUTOMATED COUNT: 12.3 % (ref 11.5–14.5)
EST. GFR  (AFRICAN AMERICAN): >60 ML/MIN/1.73 M^2
EST. GFR  (AFRICAN AMERICAN): >60 ML/MIN/1.73 M^2
EST. GFR  (NON AFRICAN AMERICAN): >60 ML/MIN/1.73 M^2
EST. GFR  (NON AFRICAN AMERICAN): >60 ML/MIN/1.73 M^2
GLUCOSE SERPL-MCNC: 103 MG/DL (ref 70–110)
GLUCOSE SERPL-MCNC: 99 MG/DL (ref 70–110)
HCT VFR BLD AUTO: 37.6 % (ref 40–54)
HGB BLD-MCNC: 13 G/DL (ref 14–18)
LYMPHOCYTES # BLD AUTO: 1.4 K/UL (ref 1–4.8)
LYMPHOCYTES NFR BLD: 13.6 % (ref 18–48)
MCH RBC QN AUTO: 30.7 PG (ref 27–31)
MCHC RBC AUTO-ENTMCNC: 34.6 G/DL (ref 32–36)
MCV RBC AUTO: 89 FL (ref 82–98)
MONOCYTES # BLD AUTO: 1.3 K/UL (ref 0.3–1)
MONOCYTES NFR BLD: 12.1 % (ref 4–15)
NEUTROPHILS # BLD AUTO: 7.9 K/UL (ref 1.8–7.7)
NEUTROPHILS NFR BLD: 74.2 % (ref 38–73)
PLATELET # BLD AUTO: 141 K/UL (ref 150–350)
PMV BLD AUTO: 11 FL (ref 9.2–12.9)
POTASSIUM SERPL-SCNC: 2.8 MMOL/L (ref 3.5–5.1)
POTASSIUM SERPL-SCNC: 3.2 MMOL/L (ref 3.5–5.1)
PROT SERPL-MCNC: 6.6 G/DL (ref 6–8.4)
RBC # BLD AUTO: 4.23 M/UL (ref 4.6–6.2)
SODIUM SERPL-SCNC: 137 MMOL/L (ref 136–145)
SODIUM SERPL-SCNC: 140 MMOL/L (ref 136–145)
WBC # BLD AUTO: 10.6 K/UL (ref 3.9–12.7)

## 2019-04-30 PROCEDURE — 97167 OT EVAL HIGH COMPLEX 60 MIN: CPT

## 2019-04-30 PROCEDURE — 36415 COLL VENOUS BLD VENIPUNCTURE: CPT

## 2019-04-30 PROCEDURE — 80053 COMPREHEN METABOLIC PANEL: CPT

## 2019-04-30 PROCEDURE — 11000001 HC ACUTE MED/SURG PRIVATE ROOM

## 2019-04-30 PROCEDURE — 97535 SELF CARE MNGMENT TRAINING: CPT

## 2019-04-30 PROCEDURE — 85025 COMPLETE CBC W/AUTO DIFF WBC: CPT

## 2019-04-30 PROCEDURE — 63600175 PHARM REV CODE 636 W HCPCS: Performed by: NURSE PRACTITIONER

## 2019-04-30 PROCEDURE — 25000003 PHARM REV CODE 250: Performed by: INTERNAL MEDICINE

## 2019-04-30 PROCEDURE — 63600175 PHARM REV CODE 636 W HCPCS: Performed by: INTERNAL MEDICINE

## 2019-04-30 PROCEDURE — 99233 SBSQ HOSP IP/OBS HIGH 50: CPT | Mod: ,,, | Performed by: INTERNAL MEDICINE

## 2019-04-30 PROCEDURE — 25000003 PHARM REV CODE 250: Performed by: SURGERY

## 2019-04-30 PROCEDURE — 99233 PR SUBSEQUENT HOSPITAL CARE,LEVL III: ICD-10-PCS | Mod: ,,, | Performed by: INTERNAL MEDICINE

## 2019-04-30 PROCEDURE — 97530 THERAPEUTIC ACTIVITIES: CPT

## 2019-04-30 PROCEDURE — 97112 NEUROMUSCULAR REEDUCATION: CPT

## 2019-04-30 PROCEDURE — 94760 N-INVAS EAR/PLS OXIMETRY 1: CPT

## 2019-04-30 PROCEDURE — 80048 BASIC METABOLIC PNL TOTAL CA: CPT

## 2019-04-30 PROCEDURE — 63600175 PHARM REV CODE 636 W HCPCS: Performed by: SURGERY

## 2019-04-30 PROCEDURE — 97116 GAIT TRAINING THERAPY: CPT

## 2019-04-30 RX ORDER — DOCUSATE SODIUM 100 MG/1
100 CAPSULE, LIQUID FILLED ORAL 2 TIMES DAILY
Status: DISCONTINUED | OUTPATIENT
Start: 2019-04-30 | End: 2019-05-04 | Stop reason: HOSPADM

## 2019-04-30 RX ORDER — HYDROCODONE BITARTRATE AND ACETAMINOPHEN 5; 325 MG/1; MG/1
1 TABLET ORAL EVERY 4 HOURS PRN
Status: DISCONTINUED | OUTPATIENT
Start: 2019-04-30 | End: 2019-05-04 | Stop reason: HOSPADM

## 2019-04-30 RX ORDER — HYDROMORPHONE HYDROCHLORIDE 1 MG/ML
0.5 INJECTION, SOLUTION INTRAMUSCULAR; INTRAVENOUS; SUBCUTANEOUS EVERY 4 HOURS PRN
Status: DISCONTINUED | OUTPATIENT
Start: 2019-04-30 | End: 2019-05-04 | Stop reason: HOSPADM

## 2019-04-30 RX ORDER — POTASSIUM CHLORIDE 7.45 MG/ML
10 INJECTION INTRAVENOUS ONCE
Status: COMPLETED | OUTPATIENT
Start: 2019-04-30 | End: 2019-04-30

## 2019-04-30 RX ORDER — BISACODYL 10 MG
10 SUPPOSITORY, RECTAL RECTAL ONCE
Status: DISCONTINUED | OUTPATIENT
Start: 2019-04-30 | End: 2019-05-04 | Stop reason: HOSPADM

## 2019-04-30 RX ADMIN — PIPERACILLIN AND TAZOBACTAM 4.5 G: 4; .5 INJECTION, POWDER, LYOPHILIZED, FOR SOLUTION INTRAVENOUS; PARENTERAL at 03:04

## 2019-04-30 RX ADMIN — DOCUSATE SODIUM 100 MG: 100 CAPSULE, LIQUID FILLED ORAL at 09:04

## 2019-04-30 RX ADMIN — PIPERACILLIN AND TAZOBACTAM 4.5 G: 4; .5 INJECTION, POWDER, LYOPHILIZED, FOR SOLUTION INTRAVENOUS; PARENTERAL at 10:04

## 2019-04-30 RX ADMIN — METOPROLOL TARTRATE 100 MG: 100 TABLET ORAL at 08:04

## 2019-04-30 RX ADMIN — LISINOPRIL 20 MG: 20 TABLET ORAL at 08:04

## 2019-04-30 RX ADMIN — PRIMIDONE 100 MG: 50 TABLET ORAL at 08:04

## 2019-04-30 RX ADMIN — PIPERACILLIN AND TAZOBACTAM 4.5 G: 4; .5 INJECTION, POWDER, LYOPHILIZED, FOR SOLUTION INTRAVENOUS; PARENTERAL at 06:04

## 2019-04-30 RX ADMIN — METOPROLOL TARTRATE 100 MG: 100 TABLET ORAL at 09:04

## 2019-04-30 RX ADMIN — POTASSIUM CHLORIDE 10 MEQ: 7.46 INJECTION, SOLUTION INTRAVENOUS at 10:04

## 2019-04-30 RX ADMIN — PANTOPRAZOLE SODIUM 40 MG: 40 TABLET, DELAYED RELEASE ORAL at 08:04

## 2019-04-30 RX ADMIN — ALPRAZOLAM 0.5 MG: 0.5 TABLET ORAL at 09:04

## 2019-04-30 RX ADMIN — HYDROMORPHONE HYDROCHLORIDE 0.5 MG: 1 INJECTION, SOLUTION INTRAMUSCULAR; INTRAVENOUS; SUBCUTANEOUS at 03:04

## 2019-04-30 RX ADMIN — AMLODIPINE BESYLATE 10 MG: 10 TABLET ORAL at 08:04

## 2019-04-30 RX ADMIN — ONDANSETRON 4 MG: 2 INJECTION INTRAMUSCULAR; INTRAVENOUS at 12:04

## 2019-04-30 RX ADMIN — HYDROCHLOROTHIAZIDE 25 MG: 25 TABLET ORAL at 08:04

## 2019-04-30 RX ADMIN — BUPROPION HYDROCHLORIDE 300 MG: 150 TABLET, EXTENDED RELEASE ORAL at 08:04

## 2019-04-30 RX ADMIN — PROCHLORPERAZINE EDISYLATE 10 MG: 5 INJECTION INTRAMUSCULAR; INTRAVENOUS at 06:04

## 2019-04-30 RX ADMIN — MESALAMINE 800 MG: 400 CAPSULE, DELAYED RELEASE ORAL at 08:04

## 2019-04-30 RX ADMIN — SIMVASTATIN 20 MG: 10 TABLET, FILM COATED ORAL at 09:04

## 2019-04-30 RX ADMIN — POTASSIUM CHLORIDE 10 MEQ: 7.46 INJECTION, SOLUTION INTRAVENOUS at 12:04

## 2019-04-30 RX ADMIN — PROMETHAZINE HYDROCHLORIDE 12.5 MG: 25 INJECTION INTRAMUSCULAR; INTRAVENOUS at 05:04

## 2019-04-30 RX ADMIN — PRIMIDONE 100 MG: 50 TABLET ORAL at 09:04

## 2019-04-30 NOTE — ASSESSMENT & PLAN NOTE
Cont home mesalamine.  CT without acute findings other than UTI  Did have another episode of vomiting overnight responded to compazine. Diarrhea sx are at baseline  No clinical evidence of repeat SBO today; tolerated PO diet this morning. If sx reoccur may repeat KUB to ensure no changes since admit CT    4/29 Today he complains again of vomiting, no BM since prior to admission. Abdomen more distended. Will check KUB    4/30 no obstruction on KUB yesterday. Continues to eat without issue then vomiting randomly. Stomach still distended but hard to tell how much because at baseline has large abd. No Bm since 4/27. Repeat KUB today

## 2019-04-30 NOTE — PT/OT/SLP PROGRESS
Physical Therapy Treatment    Patient Name:  Reymundo Dang Sr.   MRN:  [id number]    Recommendations:     Discharge Recommendations:  home with home health   Discharge Equipment Recommendations: none   Barriers to discharge: Inaccessible home and Decreased caregiver support    Assessment:     Reymundo Dang Sr. is a 64 y.o. male admitted with a medical diagnosis of Sepsis due to Escherichia coli.  He presents with the following impairments/functional limitations:  impaired self care skills, impaired functional mobilty, gait instability, decreased upper extremity function, decreased lower extremity function, decreased ROM, weakness. Patient seen sitting at edge of the bed with wife at bed side. Patient had a fall yesterday 4/29/19 in the bathroom with wife present. Right foot twisted and leg gave out per wife. No injury from the fall. Noted patient with unsteady gait with difficulty advancing Right Foot. Ambulated patient using RW x 40 feet inside room with min Assistance  And cues for balance and inc safety. Advised patient and wife to ask for nursing staff assistance when needed especially during toilet activity. Patient and wife verbalized understanding.    Rehab Prognosis: Good; patient would benefit from acute skilled PT services to address these deficits and reach maximum level of function.    Recent Surgery: * No surgery found *      Plan:     During this hospitalization, patient to be seen 5 x/week to address the identified rehab impairments via gait training, therapeutic activities, therapeutic exercises and progress toward the following goals:    · Plan of Care Expires:  05/03/19    Subjective     Chief Complaint: muscle weakness on right side of the body due to old stroke  Patient/Family Comments/goals: to gain strength and able to walk better  Pain/Comfort:  · Pain Rating 1: 0/10  · Pain Rating Post-Intervention 1: 0/10      Objective:     Communicated with patient and wife prior to session.  Patient  found HOB elevated with peripheral IV upon PT entry to room.     General Precautions: Standard, fall   Orthopedic Precautions:N/A   Braces: N/A     Functional Mobility:  · Bed Mobility:     · Rolling Left:  contact guard assistance  · Rolling Right: contact guard assistance  · Supine to Sit: contact guard assistance  · Sit to Supine: contact guard assistance  · Transfers:     · Sit to Stand:  minimum assistance with rolling walker  · Bed to Chair: minimum assistance with  rolling walker  using  Step Transfer  · Gait: RW Ambulation x 40 feet with min Assistance and cues for balance and safety. Exhibits Right LE Circumduction/hemiplegic gait, dec weight shifting, dec Right LE stride, dec Right Foot/Floor clearance and Right ankle Inversion.  · Balance: Stand Dynamic with Fair- grade using RW.      AM-PAC 6 CLICK MOBILITY  Turning over in bed (including adjusting bedclothes, sheets and blankets)?: 3  Sitting down on and standing up from a chair with arms (e.g., wheelchair, bedside commode, etc.): 3  Moving from lying on back to sitting on the side of the bed?: 3  Moving to and from a bed to a chair (including a wheelchair)?: 3  Need to walk in hospital room?: 3  Climbing 3-5 steps with a railing?: 2  Basic Mobility Total Score: 17       Therapeutic Activities and Exercises:   Worked on bilat LE strengthening and coordination ex x 10 reps on each at sitting such as Ankle pumps, LAQ, Marching at sitting and tip toe ex at Standing and Sit to Stand Ex. Performed gait trng using RW x 40 feet with min Assistance.    Patient left HOB elevated with all lines intact, call button in reach, bed alarm on, Nurse Lakisha notified and wife present..    GOALS:   Multidisciplinary Problems     Physical Therapy Goals        Problem: Physical Therapy Goal    Goal Priority Disciplines Outcome Goal Variances Interventions   Physical Therapy Goal     PT, PT/OT Ongoing (interventions implemented as appropriate)     Description:  Goals to be  met by: 5/3/19    Patient will increase functional independence with mobility by performin. Supine to sit with Modified Independent  2. Sit to supine with Modified Independent  3. Bed to chair transfer with Modified Independentwith or without rolling walker using Step Transfer TECHNIQUE  4. Gait  x 150  feet with Independent with or without quad cane  5. Lower extremity exercise program x10 reps per handout, with assistance as needed                    Time Tracking:     PT Received On: 19  PT Start Time: 1300     PT Stop Time: 1335  PT Total Time (min): 35 min     Billable Minutes: Gait Training 15 and Therapeutic Activity 15    Treatment Type: Treatment  PT/PTA: PT           Tray Paez, PT  2019

## 2019-04-30 NOTE — NURSING
On entry to room, patient sitting in chair.  Asked how his nausea was doing, informed me had just thrown up. Emesis bag had 250ml of watery emesis with undigested food in it.  Phenergan given.

## 2019-04-30 NOTE — PT/OT/SLP EVAL
Occupational Therapy   Evaluation    Name: Reymundo Dang Sr.  MRN: [id number]  Admitting Diagnosis:  Sepsis due to Escherichia coli      Recommendations:     Discharge Recommendations: home with home health  Discharge Equipment Recommendations:  none  Barriers to discharge:  None    Assessment:     Reymundo Dang Sr. is a 64 y.o. male with a medical diagnosis of Sepsis due to Escherichia coli.  He presents with right weakness from old rotator cuff injury and old CVA with right sided weakness, inverted RIGHT ankle with difficulty lifting right leg/ foot (drags foot and circumducts hip to advance leg when walking= fall risk), constant hiccups causing emesis, has not had a bowel movement since admit and abdomen is hard/ distended making eating in bed especially difficult. Per chart notes he was vomiting for several days and last night had an episode where he was assisted to the floor. Mild cognitive impairment will benefit from formal testing - suggest LILI level and MOCa to guide d/c planning / safety/ patient / family education. Performance deficits affecting function: weakness, impaired endurance, impaired self care skills, gait instability, impaired functional mobilty, impaired balance, impaired cognition, decreased upper extremity function, decreased safety awareness, abnormal tone, impaired coordination, decreased coordination, decreased lower extremity function, pain, decreased ROM, impaired fine motor, edema, impaired cardiopulmonary response to activity.      Rehab Prognosis: Fair; patient would benefit from acute skilled OT services to address these deficits and reach maximum level of function.       Plan:     Patient to be seen 5 x/week to address the above listed problems via self-care/home management, therapeutic activities, therapeutic exercises, neuromuscular re-education  · Plan of Care Expires:  5/6/2019  · Plan of Care Reviewed with: spouse, patient    Subjective     Chief Complaint: I hiccup all the  time, it seems the only way to stop the hiccups is to throw up. Look, my right ankle rolls, I walk on the outside of my foot, I have trouble lifting/ moving my right leg and arm.  Patient/Family Comments/goals: to get home, safer and able to eat/ have bowel movements like before    Occupational Profile:  Living Environment: lives in [place] with his wife, no steps to the first floor which does have a bathroom and he could stay on 1st floor if needed. He goes up a flight of 13 steps to the upstairs bedroom/ bathroom. He and wife sleep in same bed. He occasionally falls when getting out of bed to use the bathroom, he turns to the side but the right foot slips away out from under him. He uses a walker to get into the bathroom.   Previous level of function:  He wears crock slip on shoes.   wife helps min A with most BADL and Max A with most IADL like housework, transportation, meals.   He manages and sets up his own medboxes AM & PM. Wife says he does not forget, and does not make mistakes . He does not exactly know which medications he takes, why or how or the side effects and would benefit from creating a sheet for better understanding of his medications which OT can assist them in doing.  He has difficulty getting to sleep- so takes PM tylenol. Once asleep usually sleeps from 9pm to 9 am.  Breakfast: coffee ( a big container of 4 cups )  & donuts  Lunch: wife makes a stew/ soup/ or casserole or sandwhich  Dinner: same  They don't eat many vegetables or fruits.  NOTE: chart review reveals he has Chrones dz  He takes his medications with coke. He does not drink much water, He does not drink milk, but does sometimes eat ice cream.  Roles and Routines:impaired  Equipment Used at Home:  walker, rolling, cane, quad, shower chair  Assistance upon Discharge: wife    Pain/Comfort:  · Pain Rating 1: 1/10  · Location - Side 1: Right  · Location - Orientation 1: generalized  · Location 1: shoulder  · Pain Addressed 1:  Reposition, Pre-medicate for activity, Distraction  · Pain Rating Post-Intervention 1: 0/10    Patients cultural, spiritual, Mormonism conflicts given the current situation: no    Objective:     Communicated with: nursing/ PT prior to session.  Patient found supine with peripheral IV upon OT entry to room.    General Precautions: Standard, fall   Orthopedic Precautions:N/A   Braces: N/A     Occupational Performance:    Bed Mobility:    · Patient completed Rolling/Turning to Right with minimum assistance  · Patient completed Scooting/Bridging with minimum assistance  · Patient completed Supine to Sit with minimum assistance  · Patient completed Sit to Supine with minimum assistance    Functional Mobility/Transfers:  · Patient completed Sit <> Stand Transfer with contact guard assistance  with  rolling walker   · Patient completed Bed <> Chair Transfer using Stand Pivot technique with contact guard assistance with rolling walker  · Functional Mobility: from bed to chair for supper per his request, he and wife were admonished not to get up and move about the room without calling for RN help. They promise to call for help.     Activities of Daily Living:  · Feeding:  supervision using left hand, although he is afraid to eat due to not being able to eat without vomiting lately, he ate a biscuit and pudding this am and vomited the pudding afterwards.  · Grooming: supervision to brush teeth before supper using left hand seated EOB  · Bathing: NT  · Upper Body Dressing: minimum assistance lina style, right arm in fist  · Lower Body Dressing: moderate assistance to thread underwear seated and pull it up standing at walker, needed max A help to don socks- normally wears slip on crock shoes, walks with right foot inverted on the outside edge of the foot and has difficulty lifting right leg to march/ kick seated EOB  · Toileting: NT- using bathroom with nursing help- possibly has a UTI, nursing asking MD if bowel softener  might be helpful as he has not had a bowel movement since admit (but has also not been able to eat without emesis since admit)    Cognitive/Visual Perceptual:  Cognitive/Psychosocial Skills:     -       Follows Commands/attention:Follows one-step commands will benefit from formal cognitive testing. Suggest LILI and moca to guide d/d planning and wife - patient caregiver education to reduce fall risk      [facility] 6 Click ADL:  [facility] Total Score: 20    Treatment & Education:  eval and goals setting and self care/ completed 20 reps LEFT UE bicep / tricep against yellow theraband resistance and LEFT LE march / kick against same.   Completed 20 reps AAROM RIGHT LE march/ kick and 10 reps PROM to RIGT UE which has limited strength / ROM and edema, shoulder pain  Education:    Patient left up in chair with all lines intact and call button in reach    GOALS:   Multidisciplinary Problems     Occupational Therapy Goals        Problem: Occupational Therapy Goal    Goal Priority Disciplines Outcome Interventions   Occupational Therapy Goal     OT, PT/OT     Description:  Goals to be met by: 5/6/209     Patient will increase functional independence with ADLs by performing:    Feeding with [place].- uses left hand which is now dominant, right as a helper hand  UE Dressing with [place].lina style (right UE in first)  LE Dressing with Minimal Assistance.  Grooming while seated with Modified [place].- using left hand, uses regular toothbrush.  Toileting from toilet with Minimal Assistance for hygiene and clothing management.   Upper extremity exercise program x 2 sets of 10 reps per handout, with assistance as needed for LEFT UE (bicep/ tricep) and LEFT LE, (march/ kick)  with PROM on the RIGHT UE &  AROM LE against yellow theraband resistance. (march/ kick seated eob) band left in room for wife to help him to improve strength/ activity tolerance/ appetite/ ability to pass bowels ect.   NOTE: he has an old RIGHT UE  injury from when he hung on to an [place] carla wmith RIGHT UE when he tripped on a loose angle iron on the deck  to keep from falling onto an engine resulting in a rotator cuff injury followed by a CVA and now has very limited movement in the R UE,   AAROM  Shoulder abduction ~ 30 degrees  Shoulder flexion ~ 30 degrees  Shoulder extension ~ 10 degrees  External rotation- none to speak of  Supination- unable- keeps arm pronated to use as a helper hand on walker  Wrist- very limited motion  Able to wiggle fingers  He says he went to 3 years of therapy 3x week but RIGHT UE really did not improve much, today, he has right UE swelling and shoulder pain with movement.                      History:     Past Medical History:   Diagnosis Date    Aneurysm     s/p craniotomy prior to rupture 1990    Crohn's disease     CVA (cerebral vascular accident)     unknown date, left sided with right sided hemiparesis    Edema     History of organic brain syndrome     Hyperlipidemia     Hypertension     Restless legs syndrome (RLS)        Past Surgical History:   Procedure Laterality Date    BRAIN SURGERY      Stent Aneurysm    CERVICAL SPINE SURGERY      CHOLECYSTECTOMY      HERNIA REPAIR      SHOULDER SURGERY      Right       Time Tracking:     OT Date of Treatment: 04/30/19  OT Start Time: 1530  OT Stop Time: 1630  OT Total Time (min): 60 min    Billable Minutes:Evaluation 15  Self Care/Home Management 15  Neuromuscular Re-education 30    Peace Welch OT  4/30/2019

## 2019-04-30 NOTE — PLAN OF CARE
04/30/19 1110   Discharge Assessment   Assessment Type Discharge Planning Reassessment     Mr Dang will remain here on acute care until stable. CM will assess needs daily.

## 2019-04-30 NOTE — NURSING
Patient had suppository ordered for constipation. Refused at this time.  Called Dr. Yoder, informed him patient has been vomiting.  Will not order anything for constipation further than tonight's colace and will reassess tomorrow.

## 2019-04-30 NOTE — NURSING
Called to room pt c/o pain to right side, no bruising redness or swelling noted s/p fall pain med given ivp as per md order wife at bedside, pt rates pain 8/10 hob elevated call bell w/in reach will cont to monitor

## 2019-04-30 NOTE — ASSESSMENT & PLAN NOTE
WBC 16K, HR >/= 90, RR > 20; tmax reported as 102F though I am not seeing that documented in the chart  Source is UTI--seen on UA and CT abd pelvis  Started on IV levaquin and IVF  Vitals are stable this AM  Taking in PO  Will await cx results and monitor over next 24 hours to ensure stability as just arrived overnight.   WBC and fever curve trending down    Still with low grade fever. Also with nausea and no BM. Had recent SBO, repeat KUB this am. Also await urine cultures and cont levaquin for now.    4/30 yesterday spiked 102.4 temp. levaquin stopped and zosyn started. Urine culture reviewed not sensitive to levaquin but is to zosyn. Blood cultures no growth to date. Check KUB again today and cxr.

## 2019-04-30 NOTE — NURSING
Called to room pt vomiting/dry heaving wife at bedside pt c/o nausea , compazine given as per md order vomitus straw colored pt c/o hiccups unreleived by any med given

## 2019-04-30 NOTE — ASSESSMENT & PLAN NOTE
Did have a fall last night. Wife reports that he falls at home due to hemiparesis. He was instructed not to get up without help. He also had CT head that showed no new strokes

## 2019-04-30 NOTE — PROGRESS NOTES
[facility] Medicine  Progress Note    Patient Name: Reymundo Dang Sr.  MRN: [id number]  Patient Class: IP- Inpatient   Admission Date: 4/27/2019  Length of Stay: 3 days  Attending Physician: Genny Duval MD  Primary Care Provider: Rosalinda Villalba NP        Subjective:     Principal Problem:Sepsis due to Escherichia coli    HPI:  Patient presented to ER with  Nausea and vomiting. Sx started 2 days ago. He reports abdominal pain around the umbilicus. Pain described as aching sensation. Pain was constant for last 2 days. He reports fever at home to 100.6 F. Did not take anything OTC. He denies diarrhea or constipation. No blood in stool. He denies dysuria, hematuria, foul smelling urine. He reports no h/o bladder infections. No rashes, wounds. Denies chest pains, SOB, LE edema.     Hospital Course:  Ct of abd on admission consistiant with UTI. t max 101.7 this am . WBC 27789 on admission was down to 74138 yesterday. ecoli noted on urine culture sensitivity pending.on levaquin day 3. He is still vomiting and nauseated. Given zofran. No BM since prior to admission. Also complains of sore throat. That started prior to arrival.     4/30 he is in bed this am. Seems a little delayed. He had KUB yesterday that did not show any SBO. He is still having intermittent N/V and abd pain. He did get up last night and fell. His wife at bedside reports that he seems a little confused after the fall. CT of head done does not show acute pathology.     He had last BM 4/27 prior to admission. Was able to eat biscuit this am. No Nausea.       Urine is still pending. ecoli with sensitivities pending. Was changed to zosyn yesterday after the 102 temp from levaquin after 2 days. 102.4 t max. WBC 89912>54049.     Interval History: remains with nausea and vomiting overnight, no BM since admission    Review of Systems   Constitutional: Negative for activity change, fatigue, fever and unexpected weight change.    HENT: Negative for congestion, ear pain, hearing loss and rhinorrhea.    Eyes: Negative for pain, discharge, redness, itching and visual disturbance.   Respiratory: Negative for cough, shortness of breath and wheezing.    Cardiovascular: Negative for chest pain, palpitations and leg swelling.   Gastrointestinal: Positive for constipation (no BM since admission), nausea and vomiting. Negative for abdominal pain, blood in stool and diarrhea.   Genitourinary: Negative for decreased urine volume, dysuria, frequency and urgency.   Musculoskeletal: Negative for back pain, joint swelling and neck pain.   Skin: Negative for color change, rash and wound.   Neurological: Negative for dizziness, tremors, weakness, light-headedness and headaches.     Objective:     Vital Signs (Most Recent):  Temp: 99.8 °F (37.7 °C) (04/29/19 1930)  Pulse: 75 (04/30/19 1000)  Resp: 20 (04/29/19 1930)  BP: 135/62 (04/29/19 1930)  SpO2: 97 % (04/30/19 0144) Vital Signs (24h Range):  Temp:  [99.8 °F (37.7 °C)-102.4 °F (39.1 °C)] 99.8 °F (37.7 °C)  Pulse:  [] 75  Resp:  [18-20] 20  SpO2:  [93 %-97 %] 97 %  BP: (130-143)/(62-74) 135/62     Weight: 118 kg (260 lb 2.3 oz)  Body mass index is 40.74 kg/m².    Intake/Output Summary (Last 24 hours) at 4/30/2019 1010  Last data filed at 4/30/2019 0830  Gross per 24 hour   Intake 360 ml   Output --   Net 360 ml      Physical Exam   Constitutional: He is oriented to person, place, and time. He appears well-developed and well-nourished. No distress.   HENT:   Head: Normocephalic and atraumatic.   Right Ear: Tympanic membrane and external ear normal.   Left Ear: Tympanic membrane and external ear normal.   Mouth/Throat: No oropharyngeal exudate.   Eyes: Pupils are equal, round, and reactive to light. Conjunctivae and EOM are normal. Right eye exhibits no discharge. Left eye exhibits no discharge.   Neck: Neck supple. No tracheal deviation present.   Cardiovascular: Normal rate and regular rhythm.   No  murmur heard.  Pulmonary/Chest: Effort normal and breath sounds normal. No respiratory distress. He has no wheezes. He has no rales.   Abdominal: Soft. Bowel sounds are normal. He exhibits distension. There is tenderness (periumbilical). There is no rebound and no guarding. No hernia.   Neurological: He is alert and oriented to person, place, and time. No cranial nerve deficit.   Skin: Skin is warm and dry.   Psychiatric: He has a normal mood and affect. His behavior is normal.   Nursing note and vitals reviewed.      Significant Labs:   CBC:   Recent Labs   Lab 04/29/19  1153 04/30/19  0611   WBC 10.51 10.60   HGB 13.2* 13.0*   HCT 38.7* 37.6*   * 141*     CMP:   Recent Labs   Lab 04/29/19  1153 04/30/19  0611    137   K 3.0* 2.8*   CL 99 98   CO2 29 27   GLU 99 103   BUN 7* 8   CREATININE 0.9 1.0   CALCIUM 8.9 8.7   PROT 6.6 6.6   ALBUMIN 3.2* 3.0*   BILITOT 0.6 0.7   ALKPHOS 62 61   AST 16 22   ALT 20 22   ANIONGAP 10 12   EGFRNONAA >60 >60   mag 1.8  Pro calcitonin 0.14    Lactic acid 1.7>0.9    BNP  Recent Labs   Lab 04/27/19  1319   BNP 67     Lab Results   Component Value Date    LIPASE 21 04/27/2019     Lab Results   Component Value Date    INR 1.0 04/27/2019    INR 1.0 12/18/2014     Recent Labs   Lab 04/27/19  1319     137   CPKMB 1.8   TROPONINI <0.006   MB 1.3         TSH:   Recent Labs   Lab 02/05/19  0823   TSH 2.538       Flu negative  Blood cultures NGTD x 2    Urine Culture, Routine PRESUMPTIVE E COLI   >100,000 cfu/ml   Identification and susceptibility pending  P        Urine Culture, Routine ESCHERICHIA COLI ESBL   >100,000 cfu/ml       Resulting Agency [facility]   Susceptibility      Escherichia coli esbl     CULTURE, URINE     Amikacin <=16 mcg/mL Sensitive     Amox/K Clav'ate 16/8 mcg/mL Intermediate     Amp/Sulbactam >16/8 mcg/mL Resistant     Ampicillin >16 mcg/mL Resistant     Cefazolin >16 mcg/mL Resistant     Cefepime >16 mcg/mL Resistant     Ceftriaxone >32 mcg/mL  Resistant     Ciprofloxacin >2 mcg/mL Resistant     Ertapenem <=2 mcg/mL Sensitive     Gentamicin >8 mcg/mL Resistant     Levofloxacin >4 mcg/mL Resistant     Meropenem <=4 mcg/mL Sensitive     Nitrofurantoin >64 mcg/mL Resistant     Piperacillin/Tazo <=16 mcg/mL Sensitive     Tetracycline >8 mcg/mL Resistant     Tobramycin >8 mcg/mL Resistant     Trimeth/Sulfa >2/38 mcg/mL Resistant             Significant Imaging:     CT head   1.  No CT evidence of an acute intracranial abnormality.    2.  Prior right frontotemporal craniotomy and chronic encephalomalacia overlying the left cerebral hemisphere with compensatory enlargement of the left lateral ventricular system.  Atrophy and small vessel ischemic changes of the periventricular white matter.  Findings are similar to the previous study of January 13, 2015.    CT abd and pelvis Mild diffuse bladder wall thickening suggested accentuated by the incomplete distention and recommend correlation clinically if there is clinical consideration for cystitis or chronic bladder outlet obstruction.  Prostate gland does appear mildly enlarged.    KUB    Nonspecific bowel gas pattern without evidence of focal obstruction.    EKG Normal sinus rhythm  Left axis deviation  Moderate voltage criteria for LVH, may be normal variant  Nonspecific ST and/or T wave abnormalities  Abnormal ECG  When compared with ECG of 04-APR-2019 07:52,  No significant change was found  Confirmed by Sacha Cooper MD ([id number]) on 4/28/2019 4:03:54 PM    Assessment/Plan:      * Sepsis due to Escherichia coli  WBC 16K, HR >/= 90, RR > 20; tmax reported as 102F though I am not seeing that documented in the chart  Source is UTI--seen on UA and CT abd pelvis  Started on IV levaquin and IVF  Vitals are stable this AM  Taking in PO  Will await cx results and monitor over next 24 hours to ensure stability as just arrived overnight.   WBC and fever curve trending down    Still with low grade fever. Also with nausea  and no BM. Had recent SBO, repeat KUB this am. Also await urine cultures and cont levaquin for now.    4/30 yesterday spiked 102.4 temp. levaquin stopped and zosyn started. Urine culture reviewed not sensitive to levaquin but is to zosyn. Blood cultures no growth to date. Check KUB again today and cxr.     Fall  Did have a fall last night. Wife reports that he falls at home due to hemiparesis. He was instructed not to get up without help. He also had CT head that showed no new strokes      Acute cystitis without hematuria  IV levaquin  Urine and blood cx sent, results pending  Fever curve and WBC trending down.      4/30 yesterday spiked 102.4 temp. levaquin stopped and zosyn started. Urine culture reviewed he was not sensitive to levaquin, but is to zosyn. Blood cultures no growth to date. Check KUB again today and cxr.     Recurrent major depressive disorder, in full remission  Cont home wellbutrin and can take home trintellix .      Hypertension  Cont home amlodipine, lisinopril, HCTZ and metoprolol  Held on initial admit but sepsis sx are resolving and BP is rising so will resume home meds with hold orders for SBP < 90 in place   bp this am 126//86.    Hyperlipidemia  Cont home simvastatin.      Hemiparesis affecting dominant side as late effect of cerebrovascular accident (CVA)  PT/OT ordered  Seems to be at baseline status.      Does f/u with Dr Ramos had aneurysm and was rec for no asa     Restless legs syndrome (RLS)  Cont home primidone.      Crohn's disease  Cont home mesalamine.  CT without acute findings other than UTI  Did have another episode of vomiting overnight responded to compazine. Diarrhea sx are at baseline  No clinical evidence of repeat SBO today; tolerated PO diet this morning. If sx reoccur may repeat KUB to ensure no changes since admit CT    4/29 Today he complains again of vomiting, no BM since prior to admission. Abdomen more distended. Will check KUB    4/30 no obstruction  on KUB yesterday. Continues to eat without issue then vomiting randomly. Stomach still distended but hard to tell how much because at baseline has large abd. No Bm since 4/27. Repeat KUB today      VTE Risk Mitigation (From admission, onward)        Ordered     IP VTE HIGH RISK PATIENT  Once      04/27/19 1743     Place sequential compression device  Until discontinued      04/27/19 1743              Iram Yoder MD  Department of Hospital Medicine   [facility]

## 2019-04-30 NOTE — PLAN OF CARE
Problem: Adult Inpatient Plan of Care  Goal: Plan of Care Review  Outcome: Ongoing (interventions implemented as appropriate)  Pt and wife at bedside, plan of care discussed w/ patient/wife bother agree/verbalizes understanding pt w/ hiccups c/o abdominal discomfort relieved by pain meds as per md order, call bell w/in reach will cont to monitor

## 2019-04-30 NOTE — ASSESSMENT & PLAN NOTE
PT/OT ordered  Seems to be at baseline status.      Does f/u with Dr Ramos had aneurysm and was rec for no asa

## 2019-04-30 NOTE — ASSESSMENT & PLAN NOTE
Cont home amlodipine, lisinopril, HCTZ and metoprolol  Held on initial admit but sepsis sx are resolving and BP is rising so will resume home meds with hold orders for SBP < 90 in place   bp this am 126//86.

## 2019-04-30 NOTE — NURSING
Called to room per wife pt unable to move rle after wife assisted w/ bath and to bathroom this nurse went to get wheelchair to assist pt back to bed pt unable to bear weight or transfer to wheelchair from bathroom this nurse holding on to pt assisted him to the floor slowly, staff called and assist x4 up to wheelchair required, pt then transported to bed side in wheelchair and assisted to bed x2, pt c/o right side never having been numb/or unable to move as per occurrence, right side tremors noted secondary to previous stroke , right side facial droop noted, pt speech not slurred but slower than usual to respond dr caceres notified of the above orders to do blood glucose  118, vss 99.0, 92, 22, 131/67, 97% order for ct of head w/o contrast if bg normal, orders given/noted/carried out, will cont to monitor wife educated on calling for assistance prior to transfers/nonskid slippers should be in use at all times  Both pt and wife verbalizes understanding,  Hob elevated will cont to monitor

## 2019-04-30 NOTE — SUBJECTIVE & OBJECTIVE
Interval History: remains with nausea and vomiting overnight, no BM since admission    Review of Systems   Constitutional: Negative for activity change, fatigue, fever and unexpected weight change.   HENT: Negative for congestion, ear pain, hearing loss and rhinorrhea.    Eyes: Negative for pain, discharge, redness, itching and visual disturbance.   Respiratory: Negative for cough, shortness of breath and wheezing.    Cardiovascular: Negative for chest pain, palpitations and leg swelling.   Gastrointestinal: Positive for constipation (no BM since admission), nausea and vomiting. Negative for abdominal pain, blood in stool and diarrhea.   Genitourinary: Negative for decreased urine volume, dysuria, frequency and urgency.   Musculoskeletal: Negative for back pain, joint swelling and neck pain.   Skin: Negative for color change, rash and wound.   Neurological: Negative for dizziness, tremors, weakness, light-headedness and headaches.     Objective:     Vital Signs (Most Recent):  Temp: 99.8 °F (37.7 °C) (04/29/19 1930)  Pulse: 75 (04/30/19 1000)  Resp: 20 (04/29/19 1930)  BP: 135/62 (04/29/19 1930)  SpO2: 97 % (04/30/19 0144) Vital Signs (24h Range):  Temp:  [99.8 °F (37.7 °C)-102.4 °F (39.1 °C)] 99.8 °F (37.7 °C)  Pulse:  [] 75  Resp:  [18-20] 20  SpO2:  [93 %-97 %] 97 %  BP: (130-143)/(62-74) 135/62     Weight: 118 kg (260 lb 2.3 oz)  Body mass index is 40.74 kg/m².    Intake/Output Summary (Last 24 hours) at 4/30/2019 1010  Last data filed at 4/30/2019 0830  Gross per 24 hour   Intake 360 ml   Output --   Net 360 ml      Physical Exam   Constitutional: He is oriented to person, place, and time. He appears well-developed and well-nourished. No distress.   HENT:   Head: Normocephalic and atraumatic.   Right Ear: Tympanic membrane and external ear normal.   Left Ear: Tympanic membrane and external ear normal.   Mouth/Throat: No oropharyngeal exudate.   Eyes: Pupils are equal, round, and reactive to light.  Conjunctivae and EOM are normal. Right eye exhibits no discharge. Left eye exhibits no discharge.   Neck: Neck supple. No tracheal deviation present.   Cardiovascular: Normal rate and regular rhythm.   No murmur heard.  Pulmonary/Chest: Effort normal and breath sounds normal. No respiratory distress. He has no wheezes. He has no rales.   Abdominal: Soft. Bowel sounds are normal. He exhibits distension. There is tenderness (periumbilical). There is no rebound and no guarding. No hernia.   Neurological: He is alert and oriented to person, place, and time. No cranial nerve deficit.   Skin: Skin is warm and dry.   Psychiatric: He has a normal mood and affect. His behavior is normal.   Nursing note and vitals reviewed.      Significant Labs:   CBC:   Recent Labs   Lab 04/29/19  1153 04/30/19  0611   WBC 10.51 10.60   HGB 13.2* 13.0*   HCT 38.7* 37.6*   * 141*     CMP:   Recent Labs   Lab 04/29/19  1153 04/30/19  0611    137   K 3.0* 2.8*   CL 99 98   CO2 29 27   GLU 99 103   BUN 7* 8   CREATININE 0.9 1.0   CALCIUM 8.9 8.7   PROT 6.6 6.6   ALBUMIN 3.2* 3.0*   BILITOT 0.6 0.7   ALKPHOS 62 61   AST 16 22   ALT 20 22   ANIONGAP 10 12   EGFRNONAA >60 >60   mag 1.8  Pro calcitonin 0.14    Lactic acid 1.7>0.9    BNP  Recent Labs   Lab 04/27/19  1319   BNP 67     Lab Results   Component Value Date    LIPASE 21 04/27/2019     Lab Results   Component Value Date    INR 1.0 04/27/2019    INR 1.0 12/18/2014     Recent Labs   Lab 04/27/19  1319     137   CPKMB 1.8   TROPONINI <0.006   MB 1.3         TSH:   Recent Labs   Lab 02/05/19  0823   TSH 2.538       Flu negative  Blood cultures NGTD x 2    Urine Culture, Routine PRESUMPTIVE E COLI   >100,000 cfu/ml   Identification and susceptibility pending  P        Urine Culture, Routine ESCHERICHIA COLI ESBL   >100,000 cfu/ml       Resulting Agency [facility]   Susceptibility      Escherichia coli esbl     CULTURE, URINE     Amikacin <=16 mcg/mL Sensitive     Amox/K Clav'ate  16/8 mcg/mL Intermediate     Amp/Sulbactam >16/8 mcg/mL Resistant     Ampicillin >16 mcg/mL Resistant     Cefazolin >16 mcg/mL Resistant     Cefepime >16 mcg/mL Resistant     Ceftriaxone >32 mcg/mL Resistant     Ciprofloxacin >2 mcg/mL Resistant     Ertapenem <=2 mcg/mL Sensitive     Gentamicin >8 mcg/mL Resistant     Levofloxacin >4 mcg/mL Resistant     Meropenem <=4 mcg/mL Sensitive     Nitrofurantoin >64 mcg/mL Resistant     Piperacillin/Tazo <=16 mcg/mL Sensitive     Tetracycline >8 mcg/mL Resistant     Tobramycin >8 mcg/mL Resistant     Trimeth/Sulfa >2/38 mcg/mL Resistant             Significant Imaging:     CT head   1.  No CT evidence of an acute intracranial abnormality.    2.  Prior right frontotemporal craniotomy and chronic encephalomalacia overlying the left cerebral hemisphere with compensatory enlargement of the left lateral ventricular system.  Atrophy and small vessel ischemic changes of the periventricular white matter.  Findings are similar to the previous study of January 13, 2015.    CT abd and pelvis Mild diffuse bladder wall thickening suggested accentuated by the incomplete distention and recommend correlation clinically if there is clinical consideration for cystitis or chronic bladder outlet obstruction.  Prostate gland does appear mildly enlarged.    KUB    Nonspecific bowel gas pattern without evidence of focal obstruction.    EKG Normal sinus rhythm  Left axis deviation  Moderate voltage criteria for LVH, may be normal variant  Nonspecific ST and/or T wave abnormalities  Abnormal ECG  When compared with ECG of 04-APR-2019 07:52,  No significant change was found  Confirmed by Sacha Cooper MD ([id number]) on 4/28/2019 4:03:54 PM

## 2019-04-30 NOTE — ASSESSMENT & PLAN NOTE
IV levaquin  Urine and blood cx sent, results pending  Fever curve and WBC trending down.      4/30 yesterday spiked 102.4 temp. levaquin stopped and zosyn started. Urine culture reviewed he was not sensitive to levaquin, but is to zosyn. Blood cultures no growth to date. Check KUB again today and cxr.

## 2019-05-01 LAB
ALBUMIN SERPL BCP-MCNC: 3 G/DL (ref 3.5–5.2)
ALP SERPL-CCNC: 66 U/L (ref 55–135)
ALT SERPL W/O P-5'-P-CCNC: 40 U/L (ref 10–44)
ANION GAP SERPL CALC-SCNC: 11 MMOL/L (ref 8–16)
AST SERPL-CCNC: 40 U/L (ref 10–40)
BASOPHILS # BLD AUTO: 0.01 K/UL (ref 0–0.2)
BASOPHILS NFR BLD: 0.2 % (ref 0–1.9)
BILIRUB SERPL-MCNC: 0.7 MG/DL (ref 0.1–1)
BUN SERPL-MCNC: 8 MG/DL (ref 8–23)
CALCIUM SERPL-MCNC: 8.7 MG/DL (ref 8.7–10.5)
CHLORIDE SERPL-SCNC: 96 MMOL/L (ref 95–110)
CO2 SERPL-SCNC: 33 MMOL/L (ref 23–29)
CREAT SERPL-MCNC: 1 MG/DL (ref 0.5–1.4)
DIFFERENTIAL METHOD: ABNORMAL
EOSINOPHIL # BLD AUTO: 0.2 K/UL (ref 0–0.5)
EOSINOPHIL NFR BLD: 2.3 % (ref 0–8)
ERYTHROCYTE [DISTWIDTH] IN BLOOD BY AUTOMATED COUNT: 12.4 % (ref 11.5–14.5)
EST. GFR  (AFRICAN AMERICAN): >60 ML/MIN/1.73 M^2
EST. GFR  (NON AFRICAN AMERICAN): >60 ML/MIN/1.73 M^2
GLUCOSE SERPL-MCNC: 124 MG/DL (ref 70–110)
HCT VFR BLD AUTO: 39 % (ref 40–54)
HGB BLD-MCNC: 13.2 G/DL (ref 14–18)
LYMPHOCYTES # BLD AUTO: 1.3 K/UL (ref 1–4.8)
LYMPHOCYTES NFR BLD: 20.2 % (ref 18–48)
MCH RBC QN AUTO: 30.1 PG (ref 27–31)
MCHC RBC AUTO-ENTMCNC: 33.8 G/DL (ref 32–36)
MCV RBC AUTO: 89 FL (ref 82–98)
MONOCYTES # BLD AUTO: 1.2 K/UL (ref 0.3–1)
MONOCYTES NFR BLD: 18.6 % (ref 4–15)
NEUTROPHILS # BLD AUTO: 3.9 K/UL (ref 1.8–7.7)
NEUTROPHILS NFR BLD: 58.7 % (ref 38–73)
PLATELET # BLD AUTO: 163 K/UL (ref 150–350)
PMV BLD AUTO: 10.9 FL (ref 9.2–12.9)
POTASSIUM SERPL-SCNC: 2.8 MMOL/L (ref 3.5–5.1)
PROT SERPL-MCNC: 6.6 G/DL (ref 6–8.4)
RBC # BLD AUTO: 4.39 M/UL (ref 4.6–6.2)
SODIUM SERPL-SCNC: 140 MMOL/L (ref 136–145)
WBC # BLD AUTO: 6.65 K/UL (ref 3.9–12.7)

## 2019-05-01 PROCEDURE — 99232 PR SUBSEQUENT HOSPITAL CARE,LEVL II: ICD-10-PCS | Mod: ,,, | Performed by: FAMILY MEDICINE

## 2019-05-01 PROCEDURE — 25000003 PHARM REV CODE 250: Performed by: INTERNAL MEDICINE

## 2019-05-01 PROCEDURE — 97530 THERAPEUTIC ACTIVITIES: CPT

## 2019-05-01 PROCEDURE — 25000003 PHARM REV CODE 250: Performed by: NURSE PRACTITIONER

## 2019-05-01 PROCEDURE — A4216 STERILE WATER/SALINE, 10 ML: HCPCS | Performed by: SURGERY

## 2019-05-01 PROCEDURE — 80053 COMPREHEN METABOLIC PANEL: CPT

## 2019-05-01 PROCEDURE — 11000001 HC ACUTE MED/SURG PRIVATE ROOM

## 2019-05-01 PROCEDURE — 99232 SBSQ HOSP IP/OBS MODERATE 35: CPT | Mod: ,,, | Performed by: FAMILY MEDICINE

## 2019-05-01 PROCEDURE — 97535 SELF CARE MNGMENT TRAINING: CPT

## 2019-05-01 PROCEDURE — 94761 N-INVAS EAR/PLS OXIMETRY MLT: CPT

## 2019-05-01 PROCEDURE — 36415 COLL VENOUS BLD VENIPUNCTURE: CPT

## 2019-05-01 PROCEDURE — 85025 COMPLETE CBC W/AUTO DIFF WBC: CPT

## 2019-05-01 PROCEDURE — 63600175 PHARM REV CODE 636 W HCPCS: Performed by: INTERNAL MEDICINE

## 2019-05-01 PROCEDURE — 25000003 PHARM REV CODE 250: Performed by: SURGERY

## 2019-05-01 RX ORDER — POTASSIUM CHLORIDE 20 MEQ/1
40 TABLET, EXTENDED RELEASE ORAL ONCE
Status: COMPLETED | OUTPATIENT
Start: 2019-05-01 | End: 2019-05-01

## 2019-05-01 RX ADMIN — Medication 10 ML: at 08:05

## 2019-05-01 RX ADMIN — AMLODIPINE BESYLATE 10 MG: 10 TABLET ORAL at 10:05

## 2019-05-01 RX ADMIN — PRIMIDONE 100 MG: 50 TABLET ORAL at 10:05

## 2019-05-01 RX ADMIN — SIMVASTATIN 20 MG: 10 TABLET, FILM COATED ORAL at 08:05

## 2019-05-01 RX ADMIN — DOCUSATE SODIUM 100 MG: 100 CAPSULE, LIQUID FILLED ORAL at 08:05

## 2019-05-01 RX ADMIN — BUPROPION HYDROCHLORIDE 300 MG: 150 TABLET, EXTENDED RELEASE ORAL at 10:05

## 2019-05-01 RX ADMIN — POTASSIUM CHLORIDE 40 MEQ: 1500 TABLET, EXTENDED RELEASE ORAL at 11:05

## 2019-05-01 RX ADMIN — PIPERACILLIN AND TAZOBACTAM 4.5 G: 4; .5 INJECTION, POWDER, LYOPHILIZED, FOR SOLUTION INTRAVENOUS; PARENTERAL at 05:05

## 2019-05-01 RX ADMIN — METOPROLOL TARTRATE 100 MG: 100 TABLET ORAL at 10:05

## 2019-05-01 RX ADMIN — LISINOPRIL 20 MG: 20 TABLET ORAL at 10:05

## 2019-05-01 RX ADMIN — PANTOPRAZOLE SODIUM 40 MG: 40 TABLET, DELAYED RELEASE ORAL at 10:05

## 2019-05-01 RX ADMIN — ALPRAZOLAM 0.5 MG: 0.5 TABLET ORAL at 08:05

## 2019-05-01 RX ADMIN — MESALAMINE 800 MG: 400 CAPSULE, DELAYED RELEASE ORAL at 10:05

## 2019-05-01 RX ADMIN — DOCUSATE SODIUM 100 MG: 100 CAPSULE, LIQUID FILLED ORAL at 10:05

## 2019-05-01 RX ADMIN — PIPERACILLIN AND TAZOBACTAM 4.5 G: 4; .5 INJECTION, POWDER, LYOPHILIZED, FOR SOLUTION INTRAVENOUS; PARENTERAL at 10:05

## 2019-05-01 RX ADMIN — POTASSIUM CHLORIDE 40 MEQ: 1500 TABLET, EXTENDED RELEASE ORAL at 04:05

## 2019-05-01 RX ADMIN — METOPROLOL TARTRATE 100 MG: 100 TABLET ORAL at 08:05

## 2019-05-01 RX ADMIN — HYDROCHLOROTHIAZIDE 25 MG: 25 TABLET ORAL at 10:05

## 2019-05-01 RX ADMIN — PRIMIDONE 100 MG: 50 TABLET ORAL at 08:05

## 2019-05-01 RX ADMIN — PIPERACILLIN AND TAZOBACTAM 4.5 G: 4; .5 INJECTION, POWDER, LYOPHILIZED, FOR SOLUTION INTRAVENOUS; PARENTERAL at 01:05

## 2019-05-01 NOTE — NURSING
Bedside report receive from everardo.   Isolation maintained.  No complaints of pain or nausea.  Wife at bedside.  VS stable. Call bell in reach.  Will continue to monitor.

## 2019-05-01 NOTE — PLAN OF CARE
05/01/19 1243   Discharge Assessment   Assessment Type Discharge Planning Reassessment     Mr Dang will remain here for IV antibiotic therapy for his UTI that is resistant to PO.

## 2019-05-01 NOTE — SUBJECTIVE & OBJECTIVE
Interval History: finally afebrile and back to baseline     Review of Systems   Constitutional: Negative for activity change, fatigue, fever and unexpected weight change.   HENT: Negative for congestion, ear pain, hearing loss and rhinorrhea.    Eyes: Negative for pain, discharge, redness, itching and visual disturbance.   Respiratory: Negative for cough, shortness of breath and wheezing.    Cardiovascular: Negative for chest pain, palpitations and leg swelling.   Gastrointestinal: Positive for nausea and vomiting. Negative for abdominal pain, blood in stool and diarrhea. Constipation: no BM since admission.   Genitourinary: Negative for decreased urine volume, dysuria, frequency and urgency.   Musculoskeletal: Negative for back pain, joint swelling and neck pain.   Skin: Negative for color change, rash and wound.   Neurological: Negative for dizziness, tremors, weakness, light-headedness and headaches.     Objective:     Vital Signs (Most Recent):  Temp: 98 °F (36.7 °C) (05/01/19 0727)  Pulse: 88 (05/01/19 0800)  Resp: 18 (05/01/19 0727)  BP: (!) 151/81 (05/01/19 0727)  SpO2: 95 % (05/01/19 0730) Vital Signs (24h Range):  Temp:  [98 °F (36.7 °C)-99.2 °F (37.3 °C)] 98 °F (36.7 °C)  Pulse:  [67-88] 88  Resp:  [18-20] 18  SpO2:  [94 %-99 %] 95 %  BP: (135-167)/(68-81) 151/81     Weight: 118 kg (260 lb 2.3 oz)  Body mass index is 40.74 kg/m².    Intake/Output Summary (Last 24 hours) at 5/1/2019 1003  Last data filed at 5/1/2019 0900  Gross per 24 hour   Intake 580 ml   Output 3 ml   Net 577 ml      Physical Exam   Constitutional: He is oriented to person, place, and time. He appears well-developed and well-nourished. No distress.   HENT:   Head: Normocephalic and atraumatic.   Right Ear: Tympanic membrane and external ear normal.   Left Ear: Tympanic membrane and external ear normal.   Mouth/Throat: No oropharyngeal exudate.   Eyes: Pupils are equal, round, and reactive to light. Conjunctivae and EOM are normal. Right  eye exhibits no discharge. Left eye exhibits no discharge.   Neck: Neck supple. No tracheal deviation present.   Cardiovascular: Normal rate and regular rhythm.   No murmur heard.  Pulmonary/Chest: Effort normal and breath sounds normal. No respiratory distress. He has no wheezes. He has no rales.   Abdominal: Soft. Bowel sounds are normal. He exhibits no distension. There is no tenderness. There is no rebound and no guarding. No hernia.   Neurological: He is alert and oriented to person, place, and time. No cranial nerve deficit.   Skin: Skin is warm and dry.   Psychiatric: He has a normal mood and affect. His behavior is normal.   Nursing note and vitals reviewed.      Significant Labs:   CBC:   Recent Labs   Lab 04/29/19  1153 04/30/19  0611 05/01/19  0605   WBC 10.51 10.60 6.65   HGB 13.2* 13.0* 13.2*   HCT 38.7* 37.6* 39.0*   * 141* 163     CMP:   Recent Labs   Lab 04/29/19  1153 04/30/19  0611 04/30/19  1611 05/01/19  0605    137 140 140   K 3.0* 2.8* 3.2* 2.8*   CL 99 98 97 96   CO2 29 27 34* 33*   GLU 99 103 99 124*   BUN 7* 8 8 8   CREATININE 0.9 1.0 1.0 1.0   CALCIUM 8.9 8.7 8.8 8.7   PROT 6.6 6.6  --  6.6   ALBUMIN 3.2* 3.0*  --  3.0*   BILITOT 0.6 0.7  --  0.7   ALKPHOS 62 61  --  66   AST 16 22  --  40   ALT 20 22  --  40   ANIONGAP 10 12 9 11   EGFRNONAA >60 >60 >60 >60   mag 1.8  Pro calcitonin 0.14    Lactic acid 1.7>0.9    BNP  Recent Labs   Lab 04/27/19  1319   BNP 67     Lab Results   Component Value Date    LIPASE 21 04/27/2019     Lab Results   Component Value Date    INR 1.0 04/27/2019    INR 1.0 12/18/2014     Recent Labs   Lab 04/27/19  1319     137   CPKMB 1.8   TROPONINI <0.006   MB 1.3         TSH:   Recent Labs   Lab 02/05/19  0823   TSH 2.538       Flu negative  Blood cultures NGTD x 2    Urine Culture, Routine PRESUMPTIVE E COLI   >100,000 cfu/ml   Identification and susceptibility pending  P        Urine Culture, Routine ESCHERICHIA COLI ESBL   >100,000 cfu/ml        Resulting Agency [facility]   Susceptibility      Escherichia coli esbl     CULTURE, URINE     Amikacin <=16 mcg/mL Sensitive     Amox/K Clav'ate 16/8 mcg/mL Intermediate     Amp/Sulbactam >16/8 mcg/mL Resistant     Ampicillin >16 mcg/mL Resistant     Cefazolin >16 mcg/mL Resistant     Cefepime >16 mcg/mL Resistant     Ceftriaxone >32 mcg/mL Resistant     Ciprofloxacin >2 mcg/mL Resistant     Ertapenem <=2 mcg/mL Sensitive     Gentamicin >8 mcg/mL Resistant     Levofloxacin >4 mcg/mL Resistant     Meropenem <=4 mcg/mL Sensitive     Nitrofurantoin >64 mcg/mL Resistant     Piperacillin/Tazo <=16 mcg/mL Sensitive     Tetracycline >8 mcg/mL Resistant     Tobramycin >8 mcg/mL Resistant     Trimeth/Sulfa >2/38 mcg/mL Resistant             Significant Imaging:     CT head   1.  No CT evidence of an acute intracranial abnormality.    2.  Prior right frontotemporal craniotomy and chronic encephalomalacia overlying the left cerebral hemisphere with compensatory enlargement of the left lateral ventricular system.  Atrophy and small vessel ischemic changes of the periventricular white matter.  Findings are similar to the previous study of January 13, 2015.    CT abd and pelvis Mild diffuse bladder wall thickening suggested accentuated by the incomplete distention and recommend correlation clinically if there is clinical consideration for cystitis or chronic bladder outlet obstruction.  Prostate gland does appear mildly enlarged.    KUB    Nonspecific bowel gas pattern without evidence of focal obstruction.    EKG Normal sinus rhythm  Left axis deviation  Moderate voltage criteria for LVH, may be normal variant  Nonspecific ST and/or T wave abnormalities  Abnormal ECG  When compared with ECG of 04-APR-2019 07:52,  No significant change was found  Confirmed by Sacha Cooper MD ([id number]) on 4/28/2019 4:03:54 PM

## 2019-05-01 NOTE — PT/OT/SLP PROGRESS
"Physical Therapy Treatment    Patient Name:  Reymundo Dang Sr.   MRN:  [id number]    Recommendations:     Discharge Recommendations:  home with home health, home health PT   Discharge Equipment Recommendations: none   Barriers to discharge: Inaccessible home and Decreased caregiver support    Assessment:     Reymundo Dang Sr. is a 64 y.o. male admitted with a medical diagnosis of Sepsis due to Escherichia coli.  He presents with the following impairments/functional limitations:  weakness, impaired endurance, impaired self care skills, impaired functional mobilty, gait instability, impaired balance, impaired cardiopulmonary response to activity, decreased lower extremity function, decreased upper extremity function, edema, decreased ROM, decreased coordination, impaired fine motor. Patient seen on bed at supine and agreed with PT tx. Sat patient on side of the bed with supervision. Ambulated patient at the hallway using RW x 100 feet with Contact Guard Assistance. Walked back patient to the bathroom for toileting. Assisted patient in the bathroom for toilet activity including hygiene care with reaching items on different directions. Asked for Nursing Assistance( Alana) to complete the task including Upper Body Dressing.    Rehab Prognosis: Good; patient would benefit from acute skilled PT services to address these deficits and reach maximum level of function.    Recent Surgery: * No surgery found *      Plan:     During this hospitalization, patient to be seen 5 x/week to address the identified rehab impairments via gait training, therapeutic activities, therapeutic exercises and progress toward the following goals:    · Plan of Care Expires:  05/03/19    Subjective     Chief Complaint: No delfino complain  Patient/Family Comments/goals: "To get better and go home with family"  Pain/Comfort:  · Pain Rating 1: 0/10  · Pain Rating Post-Intervention 1: 0/10      Objective:     Communicated with patient  prior to session.  " Patient found supine with peripheral IV, telemetry upon PT entry to room.     General Precautions: Standard, fall   Orthopedic Precautions:N/A   Braces:       Functional Mobility:  · Bed Mobility:     · Rolling Left:  supervision  · Rolling Right: supervision  · Supine to Sit: supervision  · Sit to Supine: supervision  · Transfers:     · Sit to Stand:  stand by assistance with rolling walker  · Bed to Chair: stand by assistance with  rolling walker  using  Step Transfer  · Toilet Transfer: stand by assistance with  rolling walker  using  Step Transfer  · Gait: RW Ambulation x 100 feet with close SBA and cues to increase Right Foot stride, inc Right Foot/Floor  clearance and inc hollis.  · Balance: Dynamic Standing with Fair+ grade using RW      AM-PAC 6 CLICK MOBILITY  Turning over in bed (including adjusting bedclothes, sheets and blankets)?: 3  Sitting down on and standing up from a chair with arms (e.g., wheelchair, bedside commode, etc.): 3  Moving from lying on back to sitting on the side of the bed?: 3  Moving to and from a bed to a chair (including a wheelchair)?: 3  Need to walk in hospital room?: 3  Climbing 3-5 steps with a railing?: 2  Basic Mobility Total Score: 17       Therapeutic Activities and Exercises:  GAIT: Pt able to ambulate with rolling walker with Standby Assistance x 100 ft with the following gait deviation(s): decreased hollis, decreased stride length, decreased swing-to-stance ratio and decreased toe-to-floor clearance. Worked on toilet transfers, Balance trng with reaching items on different directions during hygiene care providing assistance.     Patient left supine with all lines intact, call button in reach, bed alarm on and Nurse Alana notified..    GOALS:   Multidisciplinary Problems     Physical Therapy Goals        Problem: Physical Therapy Goal    Goal Priority Disciplines Outcome Goal Variances Interventions   Physical Therapy Goal     PT, PT/OT Ongoing (interventions  implemented as appropriate)     Description:  Goals to be met by: 5/3/19    Patient will increase functional independence with mobility by performin. Supine to sit with Modified Independent  2. Sit to supine with Modified Independent  3. Bed to chair transfer with Modified Independentwith or without rolling walker using Step Transfer TECHNIQUE  4. Gait  x 150  feet with Independent with or without quad cane  5. Lower extremity exercise program x10 reps per handout, with assistance as needed                    Time Tracking:     PT Received On: 19  PT Start Time: 1240     PT Stop Time: 1315  PT Total Time (min): 35 min     Billable Minutes: Therapeutic Activity 45    Treatment Type: Treatment  PT/PTA: PT           Tray Paez, PT  2019

## 2019-05-01 NOTE — ASSESSMENT & PLAN NOTE
WBC 16K, HR >/= 90, RR > 20; tmax reported as 102F though I am not seeing that documented in the chart  Source is UTI--seen on UA and CT abd pelvis  Started on IV levaquin and IVF  Vitals are stable this AM  Taking in PO  Will await cx results and monitor over next 24 hours to ensure stability as just arrived overnight.   WBC and fever curve trending down    Still with low grade fever. Also with nausea and no BM. Had recent SBO, repeat KUB this am. Also await urine cultures and cont levaquin for now.    4/30 yesterday spiked 102.4 temp. levaquin stopped and zosyn started. Urine culture reviewed not sensitive to levaquin but is to zosyn. Blood cultures no growth to date. Check KUB again today and cxr.     5/1 cont zosyn x 5-7 days started Monday evening. Blood cultures NGTD

## 2019-05-01 NOTE — PLAN OF CARE
Problem: Adult Inpatient Plan of Care  Goal: Plan of Care Review  Outcome: Ongoing (interventions implemented as appropriate)  Pt and Wife agree with plan of care. Vs stable. No complaints of pain noted.   Call bell in reach.  IV antibiotics continued as ordered.  Isolation maintained.  Will continue to monitor.

## 2019-05-01 NOTE — ASSESSMENT & PLAN NOTE
IV levaquin  Urine and blood cx sent, results pending  Fever curve and WBC trending down.      4/30 yesterday spiked 102.4 temp. levaquin stopped and zosyn started. Urine culture reviewed he was not sensitive to levaquin, but is to zosyn. Blood cultures no growth to date. Check KUB again today and cxr.     5/1 now afebrile, cont zosyn x 5-7 days

## 2019-05-01 NOTE — PT/OT/SLP PROGRESS
Occupational Therapy   Treatment    Name: Reymundo Dang Sr.  MRN: [id number]  Admitting Diagnosis:  Sepsis due to Escherichia coli       Recommendations:     Discharge Recommendations: home with home health  Discharge Equipment Recommendations:  none  Barriers to discharge:  None    Assessment:     Reymundo Dang Sr. is a 64 y.o. male with a medical diagnosis of Sepsis due to Escherichia coli.  He presents with improving ability to eat/ drink/ move about. Performance deficits affecting function are weakness, impaired endurance, impaired self care skills, gait instability, impaired functional mobilty, impaired balance, impaired cognition, decreased upper extremity function, decreased safety awareness, abnormal tone, impaired coordination, decreased coordination, decreased lower extremity function, pain, decreased ROM, impaired fine motor, edema, impaired cardiopulmonary response to activity.     Rehab Prognosis:  Fair; patient would benefit from acute skilled OT services to address these deficits and reach maximum level of function.       Plan:     Patient to be seen 5 x/week to address the above listed problems via self-care/home management, therapeutic activities, therapeutic exercises, neuromuscular re-education  · Plan of Care Expires: 05/17/19  · Plan of Care Reviewed with: spouse, patient    Subjective     Pain/Comfort:  · Pain Rating 1: 0/10  · Pain Rating Post-Intervention 1: 0/10    Objective:     Communicated with: nursing / PT prior to session.  Patient found supine with peripheral IV, telemetry upon OT entry to room.    General Precautions: Standard, fall   Orthopedic Precautions:N/A   Braces: N/A     Occupational Performance:     Bed Mobility:    · Patient completed Rolling/Turning to Left with  modified independence  · Patient completed Rolling/Turning to Right with modified independence  · Patient completed Scooting/Bridging with modified independence  · Patient completed Supine to Sit with contact  "guard assistance  · Patient completed Sit to Supine with contact guard assistance     Functional Mobility/Transfers:  · Patient completed Sit <> Stand Transfer with contact guard assistance  with  rolling walker   ·     Activities of Daily Living:  · Feeding:  independence able to eat/ drink without hiccups or emesis today. Therapist reviewed with him what he feels he can eat given chrones dz. He says he gave away his fryer and uses an air fryer now. He confirms that milk products do not agree with him, nor soda, though he drinks a lot of soda. He confirms that donuts do not agree with him , the box of donuts in his room he declares "are not fried" because they are from [facility].   · Grooming: modified independence to wash face seated EOB  · Bathing: NT - max A per relieable source nursing  · Upper Body Dressing: minimum assistance gown, needs help with fasteners  · Lower Body Dressing: NT  · Toileting : NT - had a bowel movement earlier (after stool softener) and PT/ RN cleaned him up after that      [facility] 6 Click ADL: 20    Treatment & Education:  Self care, education on healthy habits and routines. Sat EOB and completed 20 L - UE snow angles, flexion/ OH reach, abduction and tolerated PROM to RIGHT UE to tolerance for ext rotation, supination/ pronation, elbow flex/ extend (is tight ) wrist / finger flex / extend, shoulder abduction flexion to tolerance and scapular mobilization to decrease stiffness, edema in shoulder/ hand    Patient left supine with all lines intact and call button in reachEducation:      GOALS:   Multidisciplinary Problems     Occupational Therapy Goals        Problem: Occupational Therapy Goal    Goal Priority Disciplines Outcome Interventions   Occupational Therapy Goal     OT, PT/OT     Description:  Goals to be met by: 5/6/209     Patient will increase functional independence with ADLs by performing:    Feeding with [place].- uses left hand which is now dominant, right as a helper " hand  UE Dressing with [email or web address].lina style (right UE in first)  LE Dressing with Minimal Assistance.  Grooming while seated with Modified [place].- using left hand, uses regular toothbrush.  Toileting from toilet with Minimal Assistance for hygiene and clothing management.   Upper extremity exercise program x 2 sets of 10 reps per handout, with assistance as needed for LEFT UE (bicep/ tricep) and LEFT LE, (march/ kick)  with PROM on the RIGHT UE &  AROM LE against yellow theraband resistance. (march/ kick seated eob) band left in room for wife to help him to improve strength/ activity tolerance/ appetite/ ability to pass bowels ect.   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                      Time Tracking:     OT Date of Treatment: 05/01/19  OT Start Time: 1530  OT Stop Time: 1600  OT Total Time (min): 30 min    Billable Minutes:Self Care/Home Management 15  Therapeutic Activity 15    Peace Welch OT  5/1/2019

## 2019-05-01 NOTE — PLAN OF CARE
Problem: Adult Inpatient Plan of Care  Goal: Plan of Care Review  Outcome: Ongoing (interventions implemented as appropriate)  Vitals remained stable, afebrile. No complaints of pain or nausea. Tolerating PO well today. No fevers. Voiding well. Worked with PT. IV abx given. Discussed plan of care with pt, stated understanding.

## 2019-05-01 NOTE — ASSESSMENT & PLAN NOTE
Cont home mesalamine.  CT without acute findings other than UTI  Did have another episode of vomiting overnight responded to compazine. Diarrhea sx are at baseline  No clinical evidence of repeat SBO today; tolerated PO diet this morning. If sx reoccur may repeat KUB to ensure no changes since admit CT    4/29 Today he complains again of vomiting, no BM since prior to admission. Abdomen more distended. Will check KUB    4/30 no obstruction on KUB yesterday. Continues to eat without issue then vomiting randomly. Stomach still distended but hard to tell how much because at baseline has large abd. No Bm since 4/27. Repeat KUB today    5/1 no more vomiting and abd pain

## 2019-05-01 NOTE — PROGRESS NOTES
[facility] Medicine  Progress Note    Patient Name: Reymundo Dang Sr.  MRN: [id number]  Patient Class: IP- Inpatient   Admission Date: 4/27/2019  Length of Stay: 4 days  Attending Physician: Genny Duval MD  Primary Care Provider: Rosalinda Villalba NP        Subjective:     Principal Problem:Sepsis due to Escherichia coli    HPI:  Patient presented to ER with  Nausea and vomiting. Sx started 2 days ago. He reports abdominal pain around the umbilicus. Pain described as aching sensation. Pain was constant for last 2 days. He reports fever at home to 100.6 F. Did not take anything OTC. He denies diarrhea or constipation. No blood in stool. He denies dysuria, hematuria, foul smelling urine. He reports no h/o bladder infections. No rashes, wounds. Denies chest pains, SOB, LE edema.     Hospital Course:  Ct of abd on admission consistiant with UTI. t max 101.7 this am . WBC 04384 on admission [place] 077[address] yesterday. ecoli noted on urine culture sensitivity pending.on levaquin day 3. He is still vomiting and nauseated. Given zofran. No BM since prior to admission. Also complains of sore throat. That started prior to arrival.     4/30 he is in bed this am. Seems a little delayed. He had KUB yesterday that did not show any SBO. He is still having intermittent N/V and abd pain. He did get up last night and fell. His wife at bedside reports that he seems a little confused after the fall. CT of head done does not show acute pathology.     He had last BM 4/27 prior to admission. Was able to eat biscuit this am. No Nausea.       Urine is still pending. ecoli with sensitivities pending. Was changed to zosyn yesterday after the 102 temp from levaquin after 2 days. 102.4 t max. WBC 68227>77292.     5/1 patients urine culture came back yesterday and he is sensitive to the zosyn but was resistant to the levaquin which explains why he was not getting better. He is mentally back to baseline. Low grade  fever. WBC back to normal    K+ still very low after 2 riders yesterday. Tolerating more Po today.     Interval History: finally afebrile and back to baseline     Review of Systems   Constitutional: Negative for activity change, fatigue, fever and unexpected weight change.   HENT: Negative for congestion, ear pain, hearing loss and rhinorrhea.    Eyes: Negative for pain, discharge, redness, itching and visual disturbance.   Respiratory: Negative for cough, shortness of breath and wheezing.    Cardiovascular: Negative for chest pain, palpitations and leg swelling.   Gastrointestinal: Positive for nausea and vomiting. Negative for abdominal pain, blood in stool and diarrhea. Constipation: no BM since admission.   Genitourinary: Negative for decreased urine volume, dysuria, frequency and urgency.   Musculoskeletal: Negative for back pain, joint swelling and neck pain.   Skin: Negative for color change, rash and wound.   Neurological: Negative for dizziness, tremors, weakness, light-headedness and headaches.     Objective:     Vital Signs (Most Recent):  Temp: 98 °F (36.7 °C) (05/01/19 0727)  Pulse: 88 (05/01/19 0800)  Resp: 18 (05/01/19 0727)  BP: (!) 151/81 (05/01/19 0727)  SpO2: 95 % (05/01/19 0730) Vital Signs (24h Range):  Temp:  [98 °F (36.7 °C)-99.2 °F (37.3 °C)] 98 °F (36.7 °C)  Pulse:  [67-88] 88  Resp:  [18-20] 18  SpO2:  [94 %-99 %] 95 %  BP: (135-167)/(68-81) 151/81     Weight: 118 kg (260 lb 2.3 oz)  Body mass index is 40.74 kg/m².    Intake/Output Summary (Last 24 hours) at 5/1/2019 1003  Last data filed at 5/1/2019 0900  Gross per 24 hour   Intake 580 ml   Output 3 ml   Net 577 ml      Physical Exam   Constitutional: He is oriented to person, place, and time. He appears well-developed and well-nourished. No distress.   HENT:   Head: Normocephalic and atraumatic.   Right Ear: Tympanic membrane and external ear normal.   Left Ear: Tympanic membrane and external ear normal.   Mouth/Throat: No oropharyngeal  exudate.   Eyes: Pupils are equal, round, and reactive to light. Conjunctivae and EOM are normal. Right eye exhibits no discharge. Left eye exhibits no discharge.   Neck: Neck supple. No tracheal deviation present.   Cardiovascular: Normal rate and regular rhythm.   No murmur heard.  Pulmonary/Chest: Effort normal and breath sounds normal. No respiratory distress. He has no wheezes. He has no rales.   Abdominal: Soft. Bowel sounds are normal. He exhibits no distension. There is no tenderness. There is no rebound and no guarding. No hernia.   Neurological: He is alert and oriented to person, place, and time. No cranial nerve deficit.   Skin: Skin is warm and dry.   Psychiatric: He has a normal mood and affect. His behavior is normal.   Nursing note and vitals reviewed.      Significant Labs:   CBC:   Recent Labs   Lab 04/29/19  1153 04/30/19  0611 05/01/19  0605   WBC 10.51 10.60 6.65   HGB 13.2* 13.0* 13.2*   HCT 38.7* 37.6* 39.0*   * 141* 163     CMP:   Recent Labs   Lab 04/29/19  1153 04/30/19  0611 04/30/19  1611 05/01/19  0605    137 140 140   K 3.0* 2.8* 3.2* 2.8*   CL 99 98 97 96   CO2 29 27 34* 33*   GLU 99 103 99 124*   BUN 7* 8 8 8   CREATININE 0.9 1.0 1.0 1.0   CALCIUM 8.9 8.7 8.8 8.7   PROT 6.6 6.6  --  6.6   ALBUMIN 3.2* 3.0*  --  3.0*   BILITOT 0.6 0.7  --  0.7   ALKPHOS 62 61  --  66   AST 16 22  --  40   ALT 20 22  --  40   ANIONGAP 10 12 9 11   EGFRNONAA >60 >60 >60 >60   mag 1.8  Pro calcitonin 0.14    Lactic acid 1.7>0.9    BNP  Recent Labs   Lab 04/27/19  1319   BNP 67     Lab Results   Component Value Date    LIPASE 21 04/27/2019     Lab Results   Component Value Date    INR 1.0 04/27/2019    INR 1.0 12/18/2014     Recent Labs   Lab 04/27/19  1319     137   CPKMB 1.8   TROPONINI <0.006   MB 1.3         TSH:   Recent Labs   Lab 02/05/19  0823   TSH 2.538       Flu negative  Blood cultures NGTD x 2    Urine Culture, Routine PRESUMPTIVE E COLI   >100,000 cfu/ml   Identification  and susceptibility pending  P        Urine Culture, Routine ESCHERICHIA COLI ESBL   >100,000 cfu/ml       Resulting Agency [facility]   Susceptibility      Escherichia coli esbl     CULTURE, URINE     Amikacin <=16 mcg/mL Sensitive     Amox/K Clav'ate 16/8 mcg/mL Intermediate     Amp/Sulbactam >16/8 mcg/mL Resistant     Ampicillin >16 mcg/mL Resistant     Cefazolin >16 mcg/mL Resistant     Cefepime >16 mcg/mL Resistant     Ceftriaxone >32 mcg/mL Resistant     Ciprofloxacin >2 mcg/mL Resistant     Ertapenem <=2 mcg/mL Sensitive     Gentamicin >8 mcg/mL Resistant     Levofloxacin >4 mcg/mL Resistant     Meropenem <=4 mcg/mL Sensitive     Nitrofurantoin >64 mcg/mL Resistant     Piperacillin/Tazo <=16 mcg/mL Sensitive     Tetracycline >8 mcg/mL Resistant     Tobramycin >8 mcg/mL Resistant     Trimeth/Sulfa >2/38 mcg/mL Resistant             Significant Imaging:     CT head   1.  No CT evidence of an acute intracranial abnormality.    2.  Prior right frontotemporal craniotomy and chronic encephalomalacia overlying the left cerebral hemisphere with compensatory enlargement of the left lateral ventricular system.  Atrophy and small vessel ischemic changes of the periventricular white matter.  Findings are similar to the previous study of January 13, 2015.    CT abd and pelvis Mild diffuse bladder wall thickening suggested accentuated by the incomplete distention and recommend correlation clinically if there is clinical consideration for cystitis or chronic bladder outlet obstruction.  Prostate gland does appear mildly enlarged.    KUB    Nonspecific bowel gas pattern without evidence of focal obstruction.    EKG Normal sinus rhythm  Left axis deviation  Moderate voltage criteria for LVH, may be normal variant  Nonspecific ST and/or T wave abnormalities  Abnormal ECG  When compared with ECG of 04-APR-2019 07:52,  No significant change was found  Confirmed by Sacha Cooper MD ([id number]) on 4/28/2019 4:03:54 PM    Assessment/Plan:       * Sepsis due to Escherichia coli  WBC 16K, HR >/= 90, RR > 20; tmax reported as 102F though I am not seeing that documented in the chart  Source is UTI--seen on UA and CT abd pelvis  Started on IV levaquin and IVF  Vitals are stable this AM  Taking in PO  Will await cx results and monitor over next 24 hours to ensure stability as just arrived overnight.   WBC and fever curve trending down    Still with low grade fever. Also with nausea and no BM. Had recent SBO, repeat KUB this am. Also await urine cultures and cont levaquin for now.    4/30 yesterday spiked 102.4 temp. levaquin stopped and zosyn started. Urine culture reviewed not sensitive to levaquin but is to zosyn. Blood cultures no growth to date. Check KUB again today and cxr.     5/1 cont zosyn x 5-7 days started Monday evening. Blood cultures NGTD    Fall  Did have a fall last night. Wife reports that he falls at home due to hemiparesis. He was instructed not to get up without help. He also had CT head that showed no new strokes      Acute cystitis without hematuria  IV levaquin  Urine and blood cx sent, results pending  Fever curve and WBC trending down.      4/30 yesterday spiked 102.4 temp. levaquin stopped and zosyn started. Urine culture reviewed he was not sensitive to levaquin, but is to zosyn. Blood cultures no growth to date. Check KUB again today and cxr.     5/1 now afebrile, cont zosyn x 5-7 days    Recurrent major depressive disorder, in full remission  Cont home wellbutrin and can take home trintellix .      Hypertension  Cont home amlodipine, lisinopril, HCTZ and metoprolol  Held on initial admit but sepsis sx are resolving and BP is rising so will resume home meds with hold orders for SBP < 90 in place   bp this am 126//86.    Hyperlipidemia  Cont home simvastatin.      Hemiparesis affecting dominant side as late effect of cerebrovascular accident (CVA)  PT/OT ordered  Seems to be at baseline status.      Does f/u with Dr Ramos  had aneurysm and was rec for no asa     Restless legs syndrome (RLS)  Cont home primidone.      Crohn's disease  Cont home mesalamine.  CT without acute findings other than UTI  Did have another episode of vomiting overnight responded to compazine. Diarrhea sx are at baseline  No clinical evidence of repeat SBO today; tolerated PO diet this morning. If sx reoccur may repeat KUB to ensure no changes since admit CT    4/29 Today he complains again of vomiting, no BM since prior to admission. Abdomen more distended. Will check KUB    4/30 no obstruction on KUB yesterday. Continues to eat without issue then vomiting randomly. Stomach still distended but hard to tell how much because at baseline has large abd. No Bm since 4/27. Repeat KUB today    5/1 no more vomiting and abd pain      VTE Risk Mitigation (From admission, onward)        Ordered     IP VTE HIGH RISK PATIENT  Once      04/27/19 1743     Place sequential compression device  Until discontinued      04/27/19 1743               Ki Bradley MD  Department of Hospital Medicine   [facility]

## 2019-05-02 LAB
ALBUMIN SERPL BCP-MCNC: 2.7 G/DL (ref 3.5–5.2)
ALP SERPL-CCNC: 58 U/L (ref 55–135)
ALT SERPL W/O P-5'-P-CCNC: 69 U/L (ref 10–44)
ANION GAP SERPL CALC-SCNC: 9 MMOL/L (ref 8–16)
AST SERPL-CCNC: 60 U/L (ref 10–40)
BASOPHILS # BLD AUTO: 0.03 K/UL (ref 0–0.2)
BASOPHILS NFR BLD: 0.5 % (ref 0–1.9)
BILIRUB SERPL-MCNC: 0.6 MG/DL (ref 0.1–1)
BUN SERPL-MCNC: 9 MG/DL (ref 8–23)
CALCIUM SERPL-MCNC: 8.9 MG/DL (ref 8.7–10.5)
CHLORIDE SERPL-SCNC: 102 MMOL/L (ref 95–110)
CO2 SERPL-SCNC: 32 MMOL/L (ref 23–29)
CREAT SERPL-MCNC: 1 MG/DL (ref 0.5–1.4)
DIFFERENTIAL METHOD: ABNORMAL
EOSINOPHIL # BLD AUTO: 0.3 K/UL (ref 0–0.5)
EOSINOPHIL NFR BLD: 5 % (ref 0–8)
ERYTHROCYTE [DISTWIDTH] IN BLOOD BY AUTOMATED COUNT: 12.2 % (ref 11.5–14.5)
EST. GFR  (AFRICAN AMERICAN): >60 ML/MIN/1.73 M^2
EST. GFR  (NON AFRICAN AMERICAN): >60 ML/MIN/1.73 M^2
GLUCOSE SERPL-MCNC: 85 MG/DL (ref 70–110)
HCT VFR BLD AUTO: 36.8 % (ref 40–54)
HGB BLD-MCNC: 12.6 G/DL (ref 14–18)
LYMPHOCYTES # BLD AUTO: 1.5 K/UL (ref 1–4.8)
LYMPHOCYTES NFR BLD: 25.7 % (ref 18–48)
MAGNESIUM SERPL-MCNC: 1.8 MG/DL (ref 1.6–2.6)
MCH RBC QN AUTO: 30.6 PG (ref 27–31)
MCHC RBC AUTO-ENTMCNC: 34.2 G/DL (ref 32–36)
MCV RBC AUTO: 89 FL (ref 82–98)
MONOCYTES # BLD AUTO: 1.1 K/UL (ref 0.3–1)
MONOCYTES NFR BLD: 19.8 % (ref 4–15)
NEUTROPHILS # BLD AUTO: 2.8 K/UL (ref 1.8–7.7)
NEUTROPHILS NFR BLD: 49 % (ref 38–73)
PLATELET # BLD AUTO: 163 K/UL (ref 150–350)
PMV BLD AUTO: 10.9 FL (ref 9.2–12.9)
POTASSIUM SERPL-SCNC: 2.9 MMOL/L (ref 3.5–5.1)
PROT SERPL-MCNC: 6.2 G/DL (ref 6–8.4)
RBC # BLD AUTO: 4.12 M/UL (ref 4.6–6.2)
SODIUM SERPL-SCNC: 143 MMOL/L (ref 136–145)
WBC # BLD AUTO: 5.75 K/UL (ref 3.9–12.7)

## 2019-05-02 PROCEDURE — 99232 SBSQ HOSP IP/OBS MODERATE 35: CPT | Mod: ,,, | Performed by: FAMILY MEDICINE

## 2019-05-02 PROCEDURE — 25000003 PHARM REV CODE 250: Performed by: INTERNAL MEDICINE

## 2019-05-02 PROCEDURE — 83735 ASSAY OF MAGNESIUM: CPT

## 2019-05-02 PROCEDURE — 63600175 PHARM REV CODE 636 W HCPCS: Performed by: INTERNAL MEDICINE

## 2019-05-02 PROCEDURE — 25000003 PHARM REV CODE 250: Performed by: SURGERY

## 2019-05-02 PROCEDURE — 85025 COMPLETE CBC W/AUTO DIFF WBC: CPT

## 2019-05-02 PROCEDURE — 94760 N-INVAS EAR/PLS OXIMETRY 1: CPT

## 2019-05-02 PROCEDURE — 97112 NEUROMUSCULAR REEDUCATION: CPT

## 2019-05-02 PROCEDURE — 25000003 PHARM REV CODE 250: Performed by: NURSE PRACTITIONER

## 2019-05-02 PROCEDURE — 99232 PR SUBSEQUENT HOSPITAL CARE,LEVL II: ICD-10-PCS | Mod: ,,, | Performed by: FAMILY MEDICINE

## 2019-05-02 PROCEDURE — 80053 COMPREHEN METABOLIC PANEL: CPT

## 2019-05-02 PROCEDURE — 11000001 HC ACUTE MED/SURG PRIVATE ROOM

## 2019-05-02 PROCEDURE — 36415 COLL VENOUS BLD VENIPUNCTURE: CPT

## 2019-05-02 PROCEDURE — 97530 THERAPEUTIC ACTIVITIES: CPT

## 2019-05-02 PROCEDURE — 97110 THERAPEUTIC EXERCISES: CPT

## 2019-05-02 RX ORDER — L. ACIDOPHILUS/L.BULGARICUS 100MM CELL
1 GRANULES IN PACKET (EA) ORAL 2 TIMES DAILY
Status: DISCONTINUED | OUTPATIENT
Start: 2019-05-02 | End: 2019-05-04 | Stop reason: HOSPADM

## 2019-05-02 RX ORDER — POTASSIUM CHLORIDE 20 MEQ/1
40 TABLET, EXTENDED RELEASE ORAL ONCE
Status: COMPLETED | OUTPATIENT
Start: 2019-05-02 | End: 2019-05-02

## 2019-05-02 RX ADMIN — PRIMIDONE 100 MG: 50 TABLET ORAL at 09:05

## 2019-05-02 RX ADMIN — POTASSIUM CHLORIDE 40 MEQ: 1500 TABLET, EXTENDED RELEASE ORAL at 09:05

## 2019-05-02 RX ADMIN — HYDROCHLOROTHIAZIDE 25 MG: 25 TABLET ORAL at 09:05

## 2019-05-02 RX ADMIN — PANTOPRAZOLE SODIUM 40 MG: 40 TABLET, DELAYED RELEASE ORAL at 09:05

## 2019-05-02 RX ADMIN — PIPERACILLIN AND TAZOBACTAM 4.5 G: 4; .5 INJECTION, POWDER, LYOPHILIZED, FOR SOLUTION INTRAVENOUS; PARENTERAL at 05:05

## 2019-05-02 RX ADMIN — ALPRAZOLAM 0.5 MG: 0.5 TABLET ORAL at 09:05

## 2019-05-02 RX ADMIN — LISINOPRIL 20 MG: 20 TABLET ORAL at 09:05

## 2019-05-02 RX ADMIN — MESALAMINE 800 MG: 400 CAPSULE, DELAYED RELEASE ORAL at 09:05

## 2019-05-02 RX ADMIN — DOCUSATE SODIUM 100 MG: 100 CAPSULE, LIQUID FILLED ORAL at 09:05

## 2019-05-02 RX ADMIN — LACTOBACILLUS ACIDOPHILUS / LACTOBACILLUS BULGARICUS 1 EACH: 100 MILLION CFU STRENGTH GRANULES at 09:05

## 2019-05-02 RX ADMIN — SIMVASTATIN 20 MG: 10 TABLET, FILM COATED ORAL at 09:05

## 2019-05-02 RX ADMIN — AMLODIPINE BESYLATE 10 MG: 10 TABLET ORAL at 09:05

## 2019-05-02 RX ADMIN — PIPERACILLIN AND TAZOBACTAM 4.5 G: 4; .5 INJECTION, POWDER, LYOPHILIZED, FOR SOLUTION INTRAVENOUS; PARENTERAL at 09:05

## 2019-05-02 RX ADMIN — METOPROLOL TARTRATE 100 MG: 100 TABLET ORAL at 09:05

## 2019-05-02 RX ADMIN — BUPROPION HYDROCHLORIDE 300 MG: 150 TABLET, EXTENDED RELEASE ORAL at 09:05

## 2019-05-02 RX ADMIN — PIPERACILLIN AND TAZOBACTAM 4.5 G: 4; .5 INJECTION, POWDER, LYOPHILIZED, FOR SOLUTION INTRAVENOUS; PARENTERAL at 12:05

## 2019-05-02 RX ADMIN — POTASSIUM CHLORIDE 40 MEQ: 1500 TABLET, EXTENDED RELEASE ORAL at 01:05

## 2019-05-02 NOTE — PHYSICIAN QUERY
PT Name: Reymundo Dang Sr.  MR #: [id number]     PHYSICIAN QUERY -  ELECTROLYTE CLARIFICATION      CDS/: Julieta Flanagan RN, CDS               Contact information: [email or web address]                                                                                                                        [phone]  This form is a permanent document in the medical record.     Query Date: May 2, 2019    By submitting this query, we are merely seeking further clarification of documentation to reflect the severity of illness of your patient. Please utilize your independent clinical judgment when addressing the question(s) below.    The Medical record reflects the following:     Indicators   Supporting Clinical Findings Location in Medical Record   x Lab Value(s) K: 3.1 --> 2.8 --> 3.2 --> 2.8 Labs 4/28- 5/1   x Treatment                                 Medication potassium chloride SA CR tablet 40 mEq  potassium chloride 10 mEq in 100 mL IVPB  potassium chloride SA CR tablet 40 mEq MAR 4/28  MAR 4/30 @ 1057, 1228  MAR 5/1- 5/2    Other       Provider, please specify the diagnosis or diagnoses that correspond(s) to the above indicators. Michele all that apply:    [  X ] Hypokalemia   [   ] Other electrolyte disturbance (please specify): _______   [   ] Clinically Undetermined       Please document in your progress notes daily for the duration of treatment until resolved, and include in your discharge summary.

## 2019-05-02 NOTE — ASSESSMENT & PLAN NOTE
Cont home mesalamine.  CT without acute findings other than UTI  Did have another episode of vomiting overnight responded to compazine. Diarrhea sx are at baseline  No clinical evidence of repeat SBO today; tolerated PO diet this morning. If sx reoccur may repeat KUB to ensure no changes since admit CT    4/29 Today he complains again of vomiting, no BM since prior to admission. Abdomen more distended. Will check KUB    4/30 no obstruction on KUB yesterday. Continues to eat without issue then vomiting randomly. Stomach still distended but hard to tell how much because at baseline has large abd. No Bm since 4/27. Repeat KUB today    5/1 no more vomiting and abd pain    5/2 started with diarrhea last night  lactinex started, cont mesalamine  No N/V/abd pain

## 2019-05-02 NOTE — NURSING
Bedside report received from Cindy.  Vs stable.  No complaints of pain or nausea.  Wife at bedside.  Call bell in reach.  Isolation maintained.  Will continue to monitor.

## 2019-05-02 NOTE — ASSESSMENT & PLAN NOTE
IV levaquin  Urine and blood cx sent, results pending  Fever curve and WBC trending down.      4/30 yesterday spiked 102.4 temp. levaquin stopped and zosyn started. Urine culture reviewed he was not sensitive to levaquin, but is to zosyn. Blood cultures no growth to date. Check KUB again today and cxr.     5/1 now afebrile, cont zosyn x 5-7 days    5/2 cont zosyn day 3 will need 5-7 days of this as his urine grew ESBL not sensitive to any po meds  Following blood cultures ngtd

## 2019-05-02 NOTE — PLAN OF CARE
Problem: Adult Inpatient Plan of Care  Goal: Plan of Care Review  Outcome: Ongoing (interventions implemented as appropriate)  Vitals remained stable, afebrile. No complaints of pain. Ambulating with one person assist. Tolerating PO well. 2 loose stools today. Probiotics started and stool softener held. abx continued. On day 3 on zosyn. Discussed plan of care with pt, stated understanding

## 2019-05-02 NOTE — PLAN OF CARE
05/02/19 1332   Discharge Assessment   Assessment Type Discharge Planning Reassessment     Mr Dang will remain here until Saturday for IV antibiotics with repeat U/A on that day.

## 2019-05-02 NOTE — ASSESSMENT & PLAN NOTE
WBC 16K, HR >/= 90, RR > 20; tmax reported as 102F though I am not seeing that documented in the chart  Source is UTI--seen on UA and CT abd pelvis  Started on IV levaquin and IVF  Vitals are stable this AM  Taking in PO  Will await cx results and monitor over next 24 hours to ensure stability as just arrived overnight.   WBC and fever curve trending down    Still with low grade fever. Also with nausea and no BM. Had recent SBO, repeat KUB this am. Also await urine cultures and cont levaquin for now.    4/30 yesterday spiked 102.4 temp. levaquin stopped and zosyn started. Urine culture reviewed not sensitive to levaquin but is to zosyn. Blood cultures no growth to date. Check KUB again today and cxr.     5/1 cont zosyn x 5-7 days started Monday evening. Blood cultures NGTD    5/2 cont zosyn day 3 will need 5-7 days of this as his urine grew ESBL not sensitive to any po meds  Following blood cultures ngtd

## 2019-05-02 NOTE — PROGRESS NOTES
[facility] Medicine  Progress Note    Patient Name: Reymundo Dang Sr.  MRN: [id number]  Patient Class: IP- Inpatient   Admission Date: 4/27/2019  Length of Stay: 5 days  Attending Physician: Genny Duval MD  Primary Care Provider: Rosalinda Villalba NP        Subjective:     Principal Problem:Sepsis due to Escherichia coli    HPI:  Patient presented to ER with  Nausea and vomiting. Sx started 2 days ago. He reports abdominal pain around the umbilicus. Pain described as aching sensation. Pain was constant for last 2 days. He reports fever at home to 100.6 F. Did not take anything OTC. He denies diarrhea or constipation. No blood in stool. He denies dysuria, hematuria, foul smelling urine. He reports no h/o bladder infections. No rashes, wounds. Denies chest pains, SOB, LE edema.     Hospital Course:  Ct of abd on admission consistiant with UTI. t max 101.7 this am . WBC 86658 on admission was down to 23719 yesterday. ecoli noted on urine culture sensitivity pending.on levaquin day 3. He is still vomiting and nauseated. Given zofran. No BM since prior to admission. Also complains of sore throat. That started prior to arrival.     4/30 he is in bed this am. Seems a little delayed. He had KUB yesterday that did not show any SBO. He is still having intermittent N/V and abd pain. He did get up last night and fell. His wife at bedside reports that he seems a little confused after the fall. CT of head done does not show acute pathology.     He had last BM 4/27 prior to admission. Was able to eat biscuit this am. No Nausea.       Urine is still pending. ecoli with sensitivities pending. Was changed to zosyn yesterday after the 102 temp from levaquin after 2 days. 102.4 t max. WBC 45679>81713.     5/1 patients urine culture came back yesterday and he is sensitive to the zosyn but was resistant to the levaquin which explains why he was not getting better. He is mentally back to baseline. Low grade  fever. WBC back to normal    K+ still very low after 2 riders yesterday. Tolerating more Po today.     5/2 pt is on day 3 of zosyn (started last Monday evening with 1st dose) for ESBL. He is now afebrile/normal WBC. AMS resolved. Did have 3 diarrhea stools over night. Has hx of hypokalemia and takes potassium daily. Given IV riders Tuesday due to N/v. No longer with N/V. 80meq po given yesterday K+2.8>>2.9 today.     Interval History: finally afebrile and back to baseline     Review of Systems   Constitutional: Negative for chills and fever.   HENT: Negative for congestion, ear pain, postnasal drip, rhinorrhea, sore throat and trouble swallowing.    Eyes: Negative for redness and itching.   Respiratory: Negative for cough, shortness of breath and wheezing.    Cardiovascular: Negative for chest pain and palpitations.   Gastrointestinal: Positive for diarrhea. Negative for abdominal pain, nausea and vomiting.   Genitourinary: Negative for dysuria and frequency.   Skin: Negative for rash.   Neurological: Negative for weakness and headaches.     Objective:     Vital Signs (Most Recent):  Temp: 97.8 °F (36.6 °C) (05/02/19 0759)  Pulse: 70 (05/02/19 0759)  Resp: 20 (05/02/19 0759)  BP: 126/61 (05/02/19 0759)  SpO2: 97 % (05/02/19 0759) Vital Signs (24h Range):  Temp:  [97.4 °F (36.3 °C)-98.6 °F (37 °C)] 97.8 °F (36.6 °C)  Pulse:  [58-89] 70  Resp:  [18-20] 20  SpO2:  [94 %-97 %] 97 %  BP: (126-136)/(61-74) 126/61     Weight: 118 kg (260 lb 2.3 oz)  Body mass index is 40.74 kg/m².    Intake/Output Summary (Last 24 hours) at 5/2/2019 3236  Last data filed at 5/2/2019 0045  Gross per 24 hour   Intake 940 ml   Output --   Net 940 ml      Physical Exam   Constitutional: He is oriented to person, place, and time. He appears well-developed and well-nourished. No distress.   HENT:   Head: Normocephalic and atraumatic.   Right Ear: Tympanic membrane and external ear normal.   Left Ear: Tympanic membrane and external ear normal.    Mouth/Throat: No oropharyngeal exudate.   Eyes: Pupils are equal, round, and reactive to light. Conjunctivae and EOM are normal. Right eye exhibits no discharge. Left eye exhibits no discharge.   Neck: Neck supple. No tracheal deviation present.   Cardiovascular: Normal rate and regular rhythm.   No murmur heard.  Pulmonary/Chest: Effort normal and breath sounds normal. No respiratory distress. He has no wheezes. He has no rales.   Abdominal: Soft. Bowel sounds are normal. He exhibits no distension. There is no tenderness. There is no rebound and no guarding. No hernia.   Neurological: He is alert and oriented to person, place, and time. No cranial nerve deficit.   Skin: Skin is warm and dry.   Psychiatric: He has a normal mood and affect. His behavior is normal.   Nursing note and vitals reviewed.      Significant Labs:   CBC:   Recent Labs   Lab 05/01/19  0605 05/02/19  0556   WBC 6.65 5.75   HGB 13.2* 12.6*   HCT 39.0* 36.8*    163     CMP:   Recent Labs   Lab 04/30/19  1611 05/01/19  0605 05/02/19  0556    140 143   K 3.2* 2.8* 2.9*   CL 97 96 102   CO2 34* 33* 32*   GLU 99 124* 85   BUN 8 8 9   CREATININE 1.0 1.0 1.0   CALCIUM 8.8 8.7 8.9   PROT  --  6.6 6.2   ALBUMIN  --  3.0* 2.7*   BILITOT  --  0.7 0.6   ALKPHOS  --  66 58   AST  --  40 60*   ALT  --  40 69*   ANIONGAP 9 11 9   EGFRNONAA >60 >60 >60   mag 1.8  Pro calcitonin 0.14    Lactic acid 1.7>0.9    BNP  Recent Labs   Lab 04/27/19  1319   BNP 67     Lab Results   Component Value Date    LIPASE 21 04/27/2019     Lab Results   Component Value Date    INR 1.0 04/27/2019    INR 1.0 12/18/2014     Recent Labs   Lab 04/27/19  1319     137   CPKMB 1.8   TROPONINI <0.006   MB 1.3         TSH:   Recent Labs   Lab 02/05/19  0823   TSH 2.538       Flu negative  Blood cultures NGTD x 2    Urine Culture, Routine PRESUMPTIVE E COLI   >100,000 cfu/ml   Identification and susceptibility pending  P        Urine Culture, Routine ESCHERICHIA COLI  ESBL   >100,000 cfu/ml       Resulting Agency [facility]   Susceptibility      Escherichia coli esbl     CULTURE, URINE     Amikacin <=16 mcg/mL Sensitive     Amox/K Clav'ate 16/8 mcg/mL Intermediate     Amp/Sulbactam >16/8 mcg/mL Resistant     Ampicillin >16 mcg/mL Resistant     Cefazolin >16 mcg/mL Resistant     Cefepime >16 mcg/mL Resistant     Ceftriaxone >32 mcg/mL Resistant     Ciprofloxacin >2 mcg/mL Resistant     Ertapenem <=2 mcg/mL Sensitive     Gentamicin >8 mcg/mL Resistant     Levofloxacin >4 mcg/mL Resistant     Meropenem <=4 mcg/mL Sensitive     Nitrofurantoin >64 mcg/mL Resistant     Piperacillin/Tazo <=16 mcg/mL Sensitive     Tetracycline >8 mcg/mL Resistant     Tobramycin >8 mcg/mL Resistant     Trimeth/Sulfa >2/38 mcg/mL Resistant             Significant Imagin/30 CT head   1.  No CT evidence of an acute intracranial abnormality.    2.  Prior right frontotemporal craniotomy and chronic encephalomalacia overlying the left cerebral hemisphere with compensatory enlargement of the left lateral ventricular system.  Atrophy and small vessel ischemic changes of the periventricular white matter.  Findings are similar to the previous study of 2015.     CT abd and pelvis Mild diffuse bladder wall thickening suggested accentuated by the incomplete distention and recommend correlation clinically if there is clinical consideration for cystitis or chronic bladder outlet obstruction.  Prostate gland does appear mildly enlarged.    KUB    Nonspecific bowel gas pattern without evidence of focal obstruction.     CXR The cardiac silhouette is within normal limits.  Atherosclerotic changes of the aorta.    Mild chronic appearing lung changes.  No focal infiltrate or effusion.    Old healed rib fractures.  Degenerative changes of the spine.     KUB Nonspecific bowel gas pattern without evidence of focal obstruction.  Contrast progressing to the rectum as noted above.    EKG Normal sinus  rhythm  Left axis deviation  Moderate voltage criteria for LVH, may be normal variant  Nonspecific ST and/or T wave abnormalities  Abnormal ECG  When compared with ECG of 04-APR-2019 07:52,  No significant change was found  Confirmed by Sacha Cooper MD ([id number]) on 4/28/2019 4:03:54 PM    Assessment/Plan:      * Sepsis due to Escherichia coli  WBC 16K, HR >/= 90, RR > 20; tmax reported as 102F though I am not seeing that documented in the chart  Source is UTI--seen on UA and CT abd pelvis  Started on IV levaquin and IVF  Vitals are stable this AM  Taking in PO  Will await cx results and monitor over next 24 hours to ensure stability as just arrived overnight.   WBC and fever curve trending down    Still with low grade fever. Also with nausea and no BM. Had recent SBO, repeat KUB this am. Also await urine cultures and cont levaquin for now.    4/30 yesterday spiked 102.4 temp. levaquin stopped and zosyn started. Urine culture reviewed not sensitive to levaquin but is to zosyn. Blood cultures no growth to date. Check KUB again today and cxr.     5/1 cont zosyn x 5-7 days started Monday evening. Blood cultures NGTD    5/2 cont zosyn day 3 will need 5-7 days of this as his urine grew ESBL not sensitive to any po meds  Following blood cultures ngtd    Fall  Did have a fall last night. Wife reports that he falls at home due to hemiparesis. He was instructed not to get up without help. He also had CT head that showed no new strokes    Cont PT/OT due to hx CVA with left sided weakness at baseline and hx of falls at home. Doing well, at his baseline    Acute cystitis without hematuria  IV levaquin  Urine and blood cx sent, results pending  Fever curve and WBC trending down.      4/30 yesterday spiked 102.4 temp. levaquin stopped and zosyn started. Urine culture reviewed he was not sensitive to levaquin, but is to zosyn. Blood cultures no growth to date. Check KUB again today and cxr.     5/1 now afebrile, cont zosyn x 5-7  days    5/2 cont zosyn day 3 will need 5-7 days of this as his urine grew ESBL not sensitive to any po meds  Following blood cultures ngtd    Recurrent major depressive disorder, in full remission  Cont home wellbutrin and can take home trintellix .      Hypokalemia  Has h/o this, but will check mag and cont to replace.      Hypertension  Cont home amlodipine, lisinopril, HCTZ and metoprolol  Held on initial admit but sepsis sx are resolving and BP is rising so will resume home meds with hold orders for SBP < 90 in place   bp this am 126//74    Hyperlipidemia  Cont home simvastatin.      Hemiparesis affecting dominant side as late effect of cerebrovascular accident (CVA)  PT/OT ordered  Seems to be at baseline status.      Does f/u with Dr Ramos had aneurysm and was rec for no asa     Restless legs syndrome (RLS)  Cont home primidone.      Crohn's disease  Cont home mesalamine.  CT without acute findings other than UTI  Did have another episode of vomiting overnight responded to compazine. Diarrhea sx are at baseline  No clinical evidence of repeat SBO today; tolerated PO diet this morning. If sx reoccur may repeat KUB to ensure no changes since admit CT    4/29 Today he complains again of vomiting, no BM since prior to admission. Abdomen more distended. Will check KUB    4/30 no obstruction on KUB yesterday. Continues to eat without issue then vomiting randomly. Stomach still distended but hard to tell how much because at baseline has large abd. No Bm since 4/27. Repeat KUB today    5/1 no more vomiting and abd pain    5/2 started with diarrhea last night  lactinex started, cont mesalamine  No N/V/abd pain      VTE Risk Mitigation (From admission, onward)        Ordered     IP VTE HIGH RISK PATIENT  Once      04/27/19 1743     Place sequential compression device  Until discontinued      04/27/19 1743              Lori Perez MD  Department of Hospital Medicine   [facility]

## 2019-05-02 NOTE — PLAN OF CARE
Problem: Adult Inpatient Plan of Care  Goal: Plan of Care Review  Outcome: Ongoing (interventions implemented as appropriate)  Pt and wife agree with plan of care.  VS stable. No complaints of nausea or pain.  IV antibiotic continued as ordered.  Isolation maintained.  Call bell in reach.  Will continue to monitor.

## 2019-05-02 NOTE — SUBJECTIVE & OBJECTIVE
Interval History: finally afebrile and back to baseline     Review of Systems   Constitutional: Negative for chills and fever.   HENT: Negative for congestion, ear pain, postnasal drip, rhinorrhea, sore throat and trouble swallowing.    Eyes: Negative for redness and itching.   Respiratory: Negative for cough, shortness of breath and wheezing.    Cardiovascular: Negative for chest pain and palpitations.   Gastrointestinal: Positive for diarrhea. Negative for abdominal pain, nausea and vomiting.   Genitourinary: Negative for dysuria and frequency.   Skin: Negative for rash.   Neurological: Negative for weakness and headaches.     Objective:     Vital Signs (Most Recent):  Temp: 97.8 °F (36.6 °C) (05/02/19 0759)  Pulse: 70 (05/02/19 0759)  Resp: 20 (05/02/19 0759)  BP: 126/61 (05/02/19 0759)  SpO2: 97 % (05/02/19 0759) Vital Signs (24h Range):  Temp:  [97.4 °F (36.3 °C)-98.6 °F (37 °C)] 97.8 °F (36.6 °C)  Pulse:  [58-89] 70  Resp:  [18-20] 20  SpO2:  [94 %-97 %] 97 %  BP: (126-136)/(61-74) 126/61     Weight: 118 kg (260 lb 2.3 oz)  Body mass index is 40.74 kg/m².    Intake/Output Summary (Last 24 hours) at 5/2/2019 0827  Last data filed at 5/2/2019 0045  Gross per 24 hour   Intake 940 ml   Output --   Net 940 ml      Physical Exam   Constitutional: He is oriented to person, place, and time. He appears well-developed and well-nourished. No distress.   HENT:   Head: Normocephalic and atraumatic.   Right Ear: Tympanic membrane and external ear normal.   Left Ear: Tympanic membrane and external ear normal.   Mouth/Throat: No oropharyngeal exudate.   Eyes: Pupils are equal, round, and reactive to light. Conjunctivae and EOM are normal. Right eye exhibits no discharge. Left eye exhibits no discharge.   Neck: Neck supple. No tracheal deviation present.   Cardiovascular: Normal rate and regular rhythm.   No murmur heard.  Pulmonary/Chest: Effort normal and breath sounds normal. No respiratory distress. He has no wheezes. He  has no rales.   Abdominal: Soft. Bowel sounds are normal. He exhibits no distension. There is no tenderness. There is no rebound and no guarding. No hernia.   Neurological: He is alert and oriented to person, place, and time. No cranial nerve deficit.   Skin: Skin is warm and dry.   Psychiatric: He has a normal mood and affect. His behavior is normal.   Nursing note and vitals reviewed.      Significant Labs:   CBC:   Recent Labs   Lab 05/01/19  0605 05/02/19  0556   WBC 6.65 5.75   HGB 13.2* 12.6*   HCT 39.0* 36.8*    163     CMP:   Recent Labs   Lab 04/30/19  1611 05/01/19  0605 05/02/19  0556    140 143   K 3.2* 2.8* 2.9*   CL 97 96 102   CO2 34* 33* 32*   GLU 99 124* 85   BUN 8 8 9   CREATININE 1.0 1.0 1.0   CALCIUM 8.8 8.7 8.9   PROT  --  6.6 6.2   ALBUMIN  --  3.0* 2.7*   BILITOT  --  0.7 0.6   ALKPHOS  --  66 58   AST  --  40 60*   ALT  --  40 69*   ANIONGAP 9 11 9   EGFRNONAA >60 >60 >60   mag 1.8  Pro calcitonin 0.14    Lactic acid 1.7>0.9    BNP  Recent Labs   Lab 04/27/19  1319   BNP 67     Lab Results   Component Value Date    LIPASE 21 04/27/2019     Lab Results   Component Value Date    INR 1.0 04/27/2019    INR 1.0 12/18/2014     Recent Labs   Lab 04/27/19  1319     137   CPKMB 1.8   TROPONINI <0.006   MB 1.3         TSH:   Recent Labs   Lab 02/05/19  0823   TSH 2.538       Flu negative  Blood cultures NGTD x 2    Urine Culture, Routine PRESUMPTIVE E COLI   >100,000 cfu/ml   Identification and susceptibility pending  P        Urine Culture, Routine ESCHERICHIA COLI ESBL   >100,000 cfu/ml       Resulting Agency [facility]   Susceptibility      Escherichia coli esbl     CULTURE, URINE     Amikacin <=16 mcg/mL Sensitive     Amox/K Clav'ate 16/8 mcg/mL Intermediate     Amp/Sulbactam >16/8 mcg/mL Resistant     Ampicillin >16 mcg/mL Resistant     Cefazolin >16 mcg/mL Resistant     Cefepime >16 mcg/mL Resistant     Ceftriaxone >32 mcg/mL Resistant     Ciprofloxacin >2 mcg/mL Resistant      Ertapenem <=2 mcg/mL Sensitive     Gentamicin >8 mcg/mL Resistant     Levofloxacin >4 mcg/mL Resistant     Meropenem <=4 mcg/mL Sensitive     Nitrofurantoin >64 mcg/mL Resistant     Piperacillin/Tazo <=16 mcg/mL Sensitive     Tetracycline >8 mcg/mL Resistant     Tobramycin >8 mcg/mL Resistant     Trimeth/Sulfa >2/38 mcg/mL Resistant             Significant Imagin/30 CT head   1.  No CT evidence of an acute intracranial abnormality.    2.  Prior right frontotemporal craniotomy and chronic encephalomalacia overlying the left cerebral hemisphere with compensatory enlargement of the left lateral ventricular system.  Atrophy and small vessel ischemic changes of the periventricular white matter.  Findings are similar to the previous study of 2015.     CT abd and pelvis Mild diffuse bladder wall thickening suggested accentuated by the incomplete distention and recommend correlation clinically if there is clinical consideration for cystitis or chronic bladder outlet obstruction.  Prostate gland does appear mildly enlarged.    KUB    Nonspecific bowel gas pattern without evidence of focal obstruction.     CXR The cardiac silhouette is within normal limits.  Atherosclerotic changes of the aorta.    Mild chronic appearing lung changes.  No focal infiltrate or effusion.    Old healed rib fractures.  Degenerative changes of the spine.     KUB Nonspecific bowel gas pattern without evidence of focal obstruction.  Contrast progressing to the rectum as noted above.    EKG Normal sinus rhythm  Left axis deviation  Moderate voltage criteria for LVH, may be normal variant  Nonspecific ST and/or T wave abnormalities  Abnormal ECG  When compared with ECG of 2019 07:52,  No significant change was found  Confirmed by Sacha Cooper MD ([id number]) on 2019 4:03:54 PM

## 2019-05-02 NOTE — PHYSICIAN QUERY
PT Name: Reymundo Dang Sr.  MR #: [id number]     Physician Query Form - Documentation Clarification      CDS/: Julieta Flanagan RN, CDS               Contact information: [email or web address]                                                                                                                         [phone]    This form is a permanent document in the medical record.     Query Date: May 2, 2019    By submitting this query, we are merely seeking further clarification of documentation. Please utilize your independent clinical judgment when addressing the question(s) below.    The Medical record reflects the following:    Supporting Clinical Findings Location in Medical Record   Hemiparesis affecting dominant side as late effect of cerebrovascular accident (CVA)    PN 5/2   old CVA with right sided weakness   PT/OT Eval 4/30                                                                            Doctor, Please specify the laterality of hemiparesis     Provider Use Only    [ X  ] Right sided hemiparesis   [   ] Other, please specify ____________________                                                                                                           [  ] Clinically Undetermined

## 2019-05-02 NOTE — ASSESSMENT & PLAN NOTE
Cont home amlodipine, lisinopril, HCTZ and metoprolol  Held on initial admit but sepsis sx are resolving and BP is rising so will resume home meds with hold orders for SBP < 90 in place   bp this am 126//74

## 2019-05-02 NOTE — PT/OT/SLP PROGRESS
Occupational Therapy   Treatment    Name: Reymundo Dang Sr.  MRN: [id number]  Admitting Diagnosis:  Sepsis due to Escherichia coli       Recommendations:     Discharge Recommendations: home with home health  Discharge Equipment Recommendations:  none  Barriers to discharge:  None    Assessment:     Reymundo Dang Sr. is a 64 y.o. male with a medical diagnosis of Sepsis due to Escherichia coli.  He presents with less edema in RIGHT UE with the result of better range of motion. PROM > AROM indicating can be improved upon with strengthening.   Shoulder external rotation: AROM = 0-5  PROM = 0-30  Shoulder flexion: AROM = 0-30 PROM = 0-80  Shoulder Abduction: AROM = 0-30 PROM = 0-60   Forearm Supination: AROM = none PROM = 0-20- unable to turn palm up  Forearm Pronation: AROM = full PROM = full able to turn palm down  Wrist Flexion: AROM = 0-30 PROM = 0-40  Wrist Extension: AROM = 0-5 PROM =0-20  Finger flexion / extension: full AROM able to extend fingers and make a fist. Performance deficits affecting function are weakness, impaired endurance, impaired self care skills, gait instability, impaired functional mobilty, impaired balance, impaired cognition, decreased upper extremity function, decreased safety awareness, abnormal tone, impaired coordination, decreased coordination, decreased lower extremity function, pain, decreased ROM, impaired fine motor, edema, impaired cardiopulmonary response to activity.     Rehab Prognosis:  Fair; patient would benefit from acute skilled OT services to address these deficits and reach maximum level of function.       Plan:     Patient to be seen 5 x/week to address the above listed problems via self-care/home management, therapeutic activities, therapeutic exercises, neuromuscular re-education  · Plan of Care Expires: 05/17/19  · Plan of Care Reviewed with: spouse, patient    Subjective     Pain/Comfort:  · Pain Rating 1: 0/10  · Pain Rating Post-Intervention 1: 0/10    Objective:      Communicated with: nursing, wife (in room() prior to session.  Patient found supine with telemetry, peripheral IV upon OT entry to room.    General Precautions: Standard, fall   Orthopedic Precautions:N/A   Braces: N/A     Occupational Performance:     Bed Mobility:    · Patient completed Rolling/Turning to Left with  modified independence  · Patient completed Rolling/Turning to Right with modified independence  · Patient completed Scooting/Bridging with supervision  · Patient completed Supine to Sit with supervision  · Patient completed Sit to Supine with supervision     Functional Mobility/Transfers:  · Patient completed Sit <> Stand Transfer with contact guard assistance  with  rolling walker   ·     Activities of Daily Living:  · Feeding:  independence using left hand for skill and no hiccups/ nausea today  · Grooming: independence using left hand to wash face/ teeth  · Bathing: NT  · Upper Body Dressing: NT  · Lower Body Dressing: NT  · Toileting: NT - does not need to use toilet at this time- is using urinal      [facility] 6 Click ADL: 20    Treatment & Education:  Sat edge of bed to complete 2 sets of 10 reps LEFT UE - shoulder flexion, abduction, biceps / triceps and LEFT LE marches / kicks with cues to properly isolate muscles, then same with RIGHT LE.  Therapist assisted with AAROM to RIGHT UE in external rotation, shoulder flexion/ abduction, scapular mobilization, forearm supination/ pronation, wrist extension/ flexion x 10 reps all. Followed by PNF diagonal patterns Better ROM today with less edema.    Patient left seated EOB waiting for PT with all lines intact and call button in reachEducation:      GOALS:   Multidisciplinary Problems     Occupational Therapy Goals        Problem: Occupational Therapy Goal    Goal Priority Disciplines Outcome Interventions   Occupational Therapy Goal     OT, PT/OT     Description:  Goals to be met by: 5/6/209     Patient will increase functional independence with ADLs  by performing:    Feeding with Given.- uses left hand which is now dominant, right as a helper hand  UE Dressing with Given.lina style (right UE in first)  LE Dressing with Minimal Assistance.  Grooming while seated with Modified Given.- using left hand, uses regular toothbrush.  Toileting from toilet with Minimal Assistance for hygiene and clothing management.   Upper extremity exercise program x 2 sets of 10 reps per handout, with assistance as needed for LEFT UE (bicep/ tricep) and LEFT LE, (march/ kick)  with PROM on the RIGHT UE &  AROM LE against yellow theraband resistance. (march/ kick seated eob) band left in room for wife to help him to improve strength/ activity tolerance/ appetite/ ability to pass bowels ect.   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                      Time Tracking:     OT Date of Treatment: 05/02/19  OT Start Time: 1230  OT Stop Time: 1255  OT Total Time (min): 25 min    Billable Minutes:Therapeutic Exercise 15  Neuromuscular Re-education 15    Peace Welch, FAUSTINO  5/2/2019

## 2019-05-02 NOTE — ASSESSMENT & PLAN NOTE
Did have a fall last night. Wife reports that he falls at home due to hemiparesis. He was instructed not to get up without help. He also had CT head that showed no new strokes    Cont PT/OT due to hx CVA with left sided weakness at baseline and hx of falls at home. Doing well, at his baseline

## 2019-05-02 NOTE — PT/OT/SLP PROGRESS
"Physical Therapy Treatment    Patient Name:  Reymundo Dang Sr.   MRN:  [id number]    Recommendations:     Discharge Recommendations:  home health PT, home with home health   Discharge Equipment Recommendations: none   Barriers to discharge: Inaccessible home and Decreased caregiver support    Assessment:     Reymundo Dang Sr. is a 64 y.o. male admitted with a medical diagnosis of Sepsis due to Escherichia coli.  He presents with the following impairments/functional limitations:  weakness . Patient seen sitting up at edge of the bed with wife at bedside. Patient reports feeling good and ready to walk outside the hallway and eventually wants to go home soon.    Rehab Prognosis: Good; patient would benefit from acute skilled PT services to address these deficits and reach maximum level of function.    Recent Surgery: * No surgery found *      Plan:     During this hospitalization, patient to be seen 6 x/week to address the identified rehab impairments via gait training, therapeutic activities, therapeutic exercises and progress toward the following goals:    · Plan of Care Expires:  05/03/19    Subjective     Chief Complaint: Patient stated, " I feel good and I want to go home and sleep on my own bed".  Patient/Family Comments/goals: " To walk better and not using a walker or anything to walk  and be back to myself"  Pain/Comfort:  · Pain Rating 1: 0/10  · Pain Rating Post-Intervention 1: 0/10      Objective:     Communicated with patient and wife prior to session.  Patient found Sitting at edge of the bed with peripheral IV, telemetry upon PT entry to room.     General Precautions: Standard, fall   Orthopedic Precautions:N/A   Braces:       Functional Mobility:  · Bed Mobility:     · Rolling Left:  modified independence  · Rolling Right: modified independence  · Supine to Sit: modified independence  · Sit to Supine: modified independence  · Transfers:     · Sit to Stand:  supervision with no AD  · Bed to Chair: " supervision with  no AD  using  Step Transfer  · Toilet Transfer: supervision with  no AD  using  Step Transfer  · Gait: Ambulated using RW x 100 feet and no assistive device x 150 feet with Supervision. Exhibits reciprocal gait with 25 % increased Right Foot/Floor clearance and weight shifting leading to greater stability  · Balance: Dynamic Standing balance with Good- grade.       AM-PAC 6 CLICK MOBILITY  Turning over in bed (including adjusting bedclothes, sheets and blankets)?: 4  Sitting down on and standing up from a chair with arms (e.g., wheelchair, bedside commode, etc.): 3  Moving from lying on back to sitting on the side of the bed?: 4  Moving to and from a bed to a chair (including a wheelchair)?: 3  Need to walk in hospital room?: 3  Climbing 3-5 steps with a railing?: 3  Basic Mobility Total Score: 20       Therapeutic Activities and Exercises:   Implemented gait trng using RW x 100 feet and progressed to No Assistive Device x 150 feet with Supervision. Noted greater stability and has increased self confidence and requesting to walk with no assistive device. Provided patient with bilat LE strengthening ex x 10 reps on each such as Ankle Pumps, LAQ, Marching at Sitting and standing and  Tip Toe at standing using Assistive device infront for support. No sign of respiratory distress and no sign of fatigue. Jess[place] has gained good self confidence.    Patient left Siiting up at the toilet Commode with wife present and notified Nurse Cindy to provide supervision assistance with toilet activity for inc safety. with all lines intact and call button in reach..    GOALS:   Multidisciplinary Problems     Physical Therapy Goals        Problem: Physical Therapy Goal    Goal Priority Disciplines Outcome Goal Variances Interventions   Physical Therapy Goal     PT, PT/OT Ongoing (interventions implemented as appropriate)     Description:  Goals to be met by: 5/3/19    Patient will increase functional independence  with mobility by performin. Supine to sit with Modified Independent - met 19  2. Sit to supine with Modified Independent - met 19  3. Bed to chair transfer with Modified Independentwith or without rolling walker using Step Transfer TECHNIQUE  4. Gait  x 150  feet with Independent with or without quad cane  5. Lower extremity exercise program x10 reps per handout, with assistance as needed                     Time Tracking:     PT Received On: 19  PT Start Time: 1257     PT Stop Time: 1335  PT Total Time (min): 38 min     Billable Minutes: Therapeutic Activity 30 mins    Treatment Type: Treatment  PT/PTA: PT           Tray Paez, PT  2019

## 2019-05-02 NOTE — PROGRESS NOTES
Report was received. Pt is resting in bed with wife at bedside. No complaints or needs at this time. Call bell within reach. Vitals WNL

## 2019-05-03 LAB
ALBUMIN SERPL BCP-MCNC: 2.7 G/DL (ref 3.5–5.2)
ALP SERPL-CCNC: 59 U/L (ref 55–135)
ALT SERPL W/O P-5'-P-CCNC: 73 U/L (ref 10–44)
ANION GAP SERPL CALC-SCNC: 8 MMOL/L (ref 8–16)
AST SERPL-CCNC: 47 U/L (ref 10–40)
BACTERIA BLD CULT: NORMAL
BACTERIA BLD CULT: NORMAL
BASOPHILS # BLD AUTO: 0.02 K/UL (ref 0–0.2)
BASOPHILS NFR BLD: 0.2 % (ref 0–1.9)
BILIRUB SERPL-MCNC: 0.5 MG/DL (ref 0.1–1)
BUN SERPL-MCNC: 9 MG/DL (ref 8–23)
CALCIUM SERPL-MCNC: 8.6 MG/DL (ref 8.7–10.5)
CHLORIDE SERPL-SCNC: 103 MMOL/L (ref 95–110)
CO2 SERPL-SCNC: 30 MMOL/L (ref 23–29)
CREAT SERPL-MCNC: 0.9 MG/DL (ref 0.5–1.4)
DIFFERENTIAL METHOD: ABNORMAL
EOSINOPHIL # BLD AUTO: 0.1 K/UL (ref 0–0.5)
EOSINOPHIL NFR BLD: 1.7 % (ref 0–8)
ERYTHROCYTE [DISTWIDTH] IN BLOOD BY AUTOMATED COUNT: 12.3 % (ref 11.5–14.5)
EST. GFR  (AFRICAN AMERICAN): >60 ML/MIN/1.73 M^2
EST. GFR  (NON AFRICAN AMERICAN): >60 ML/MIN/1.73 M^2
GLUCOSE SERPL-MCNC: 97 MG/DL (ref 70–110)
HCT VFR BLD AUTO: 36 % (ref 40–54)
HGB BLD-MCNC: 12.2 G/DL (ref 14–18)
LYMPHOCYTES # BLD AUTO: 1.7 K/UL (ref 1–4.8)
LYMPHOCYTES NFR BLD: 20.6 % (ref 18–48)
MCH RBC QN AUTO: 30.2 PG (ref 27–31)
MCHC RBC AUTO-ENTMCNC: 33.9 G/DL (ref 32–36)
MCV RBC AUTO: 89 FL (ref 82–98)
MONOCYTES # BLD AUTO: 1.3 K/UL (ref 0.3–1)
MONOCYTES NFR BLD: 16 % (ref 4–15)
NEUTROPHILS # BLD AUTO: 4.9 K/UL (ref 1.8–7.7)
NEUTROPHILS NFR BLD: 61.5 % (ref 38–73)
PLATELET # BLD AUTO: 180 K/UL (ref 150–350)
PMV BLD AUTO: 10.6 FL (ref 9.2–12.9)
POTASSIUM SERPL-SCNC: 3 MMOL/L (ref 3.5–5.1)
PROT SERPL-MCNC: 6.1 G/DL (ref 6–8.4)
RBC # BLD AUTO: 4.04 M/UL (ref 4.6–6.2)
SODIUM SERPL-SCNC: 141 MMOL/L (ref 136–145)
WBC # BLD AUTO: 8.04 K/UL (ref 3.9–12.7)

## 2019-05-03 PROCEDURE — 36415 COLL VENOUS BLD VENIPUNCTURE: CPT

## 2019-05-03 PROCEDURE — 25000003 PHARM REV CODE 250: Performed by: NURSE PRACTITIONER

## 2019-05-03 PROCEDURE — 25000003 PHARM REV CODE 250: Performed by: INTERNAL MEDICINE

## 2019-05-03 PROCEDURE — 99233 SBSQ HOSP IP/OBS HIGH 50: CPT | Mod: ,,, | Performed by: INTERNAL MEDICINE

## 2019-05-03 PROCEDURE — 11000001 HC ACUTE MED/SURG PRIVATE ROOM

## 2019-05-03 PROCEDURE — 85025 COMPLETE CBC W/AUTO DIFF WBC: CPT

## 2019-05-03 PROCEDURE — 94761 N-INVAS EAR/PLS OXIMETRY MLT: CPT

## 2019-05-03 PROCEDURE — 97110 THERAPEUTIC EXERCISES: CPT

## 2019-05-03 PROCEDURE — 99233 PR SUBSEQUENT HOSPITAL CARE,LEVL III: ICD-10-PCS | Mod: ,,, | Performed by: INTERNAL MEDICINE

## 2019-05-03 PROCEDURE — 63600175 PHARM REV CODE 636 W HCPCS: Performed by: INTERNAL MEDICINE

## 2019-05-03 PROCEDURE — 97530 THERAPEUTIC ACTIVITIES: CPT

## 2019-05-03 PROCEDURE — 80053 COMPREHEN METABOLIC PANEL: CPT

## 2019-05-03 PROCEDURE — 25000003 PHARM REV CODE 250: Performed by: SURGERY

## 2019-05-03 PROCEDURE — 97535 SELF CARE MNGMENT TRAINING: CPT

## 2019-05-03 RX ORDER — POTASSIUM CHLORIDE 20 MEQ/1
40 TABLET, EXTENDED RELEASE ORAL ONCE
Status: COMPLETED | OUTPATIENT
Start: 2019-05-03 | End: 2019-05-03

## 2019-05-03 RX ADMIN — POTASSIUM CHLORIDE 40 MEQ: 1500 TABLET, EXTENDED RELEASE ORAL at 02:05

## 2019-05-03 RX ADMIN — PRIMIDONE 100 MG: 50 TABLET ORAL at 09:05

## 2019-05-03 RX ADMIN — AMLODIPINE BESYLATE 10 MG: 10 TABLET ORAL at 08:05

## 2019-05-03 RX ADMIN — LISINOPRIL 20 MG: 20 TABLET ORAL at 08:05

## 2019-05-03 RX ADMIN — PIPERACILLIN AND TAZOBACTAM 4.5 G: 4; .5 INJECTION, POWDER, LYOPHILIZED, FOR SOLUTION INTRAVENOUS; PARENTERAL at 09:05

## 2019-05-03 RX ADMIN — SIMVASTATIN 20 MG: 10 TABLET, FILM COATED ORAL at 09:05

## 2019-05-03 RX ADMIN — HYDROCODONE BITARTRATE AND ACETAMINOPHEN 1 TABLET: 5; 325 TABLET ORAL at 02:05

## 2019-05-03 RX ADMIN — PANTOPRAZOLE SODIUM 40 MG: 40 TABLET, DELAYED RELEASE ORAL at 08:05

## 2019-05-03 RX ADMIN — ALPRAZOLAM 0.5 MG: 0.5 TABLET ORAL at 09:05

## 2019-05-03 RX ADMIN — BUPROPION HYDROCHLORIDE 300 MG: 150 TABLET, EXTENDED RELEASE ORAL at 08:05

## 2019-05-03 RX ADMIN — PIPERACILLIN AND TAZOBACTAM 4.5 G: 4; .5 INJECTION, POWDER, LYOPHILIZED, FOR SOLUTION INTRAVENOUS; PARENTERAL at 02:05

## 2019-05-03 RX ADMIN — METOPROLOL TARTRATE 100 MG: 100 TABLET ORAL at 08:05

## 2019-05-03 RX ADMIN — METOPROLOL TARTRATE 100 MG: 100 TABLET ORAL at 09:05

## 2019-05-03 RX ADMIN — MESALAMINE 800 MG: 400 CAPSULE, DELAYED RELEASE ORAL at 08:05

## 2019-05-03 RX ADMIN — LACTOBACILLUS ACIDOPHILUS / LACTOBACILLUS BULGARICUS 1 EACH: 100 MILLION CFU STRENGTH GRANULES at 08:05

## 2019-05-03 RX ADMIN — PIPERACILLIN AND TAZOBACTAM 4.5 G: 4; .5 INJECTION, POWDER, LYOPHILIZED, FOR SOLUTION INTRAVENOUS; PARENTERAL at 05:05

## 2019-05-03 RX ADMIN — PRIMIDONE 100 MG: 50 TABLET ORAL at 08:05

## 2019-05-03 RX ADMIN — LACTOBACILLUS ACIDOPHILUS / LACTOBACILLUS BULGARICUS 1 EACH: 100 MILLION CFU STRENGTH GRANULES at 09:05

## 2019-05-03 RX ADMIN — POTASSIUM CHLORIDE 40 MEQ: 1500 TABLET, EXTENDED RELEASE ORAL at 09:05

## 2019-05-03 NOTE — PLAN OF CARE
05/03/19 1001   Discharge Assessment   Assessment Type Discharge Planning Reassessment      Discussed discharge plan with Mr Dang and his wife, Yareli. If his urine tomorrow is clean then he will discharge tomorrow following his afternoon dose of zosyn. If it is not clean then he will complete seven days IV antibiotics and discharge home Monday. Neither of them voice any post acute care needs. CM agrees at this time.

## 2019-05-03 NOTE — PLAN OF CARE
Problem: Adjustment to Illness (Sepsis/Septic Shock)  Goal: Optimal Coping  Outcome: Ongoing (interventions implemented as appropriate)  Intervention: Optimize Psychosocial Adjustment to Illness     05/03/19 0544   Promote Anxiety Reduction   Supportive Measures active listening utilized;decision-making supported;goal setting facilitated;positive reinforcement provided;problem solving facilitated;relaxation techniques promoted;verbalization of feelings encouraged   Family/Support System Care caregiver stress acknowledged         Problem: Infection (Sepsis/Septic Shock)  Goal: Absence of Infection Signs/Symptoms  Outcome: Ongoing (interventions implemented as appropriate)  Patient received antibiotics as ordered and temperature within normal limits throughout the shift.

## 2019-05-03 NOTE — ASSESSMENT & PLAN NOTE
WBC 16K, HR >/= 90, RR > 20; tmax reported as 102F though I am not seeing that documented in the chart  Source is UTI--seen on UA and CT abd pelvis  Started on IV levaquin and IVF  Vitals are stable this AM  Taking in PO  Will await cx results and monitor over next 24 hours to ensure stability as just arrived overnight.   WBC and fever curve trending down    Still with low grade fever. Also with nausea and no BM. Had recent SBO, repeat KUB this am. Also await urine cultures and cont levaquin for now.    4/30 yesterday spiked 102.4 temp. levaquin stopped and zosyn started. Urine culture reviewed not sensitive to levaquin but is to zosyn. Blood cultures no growth to date. Check KUB again today and cxr.     5/1 cont zosyn x 5-7 days started Monday evening. Blood cultures NGTD    5/2 cont zosyn day 3 will need 5-7 days of this as his urine grew ESBL not sensitive to any po meds  Following blood cultures ngtd    5/3 cont zosyn 5-7 days. Repeat U/a ordered for tomorrow if cleared infection can consider d/c over weekend but if still has leuko cont till Monday. Blood cultures NGTD

## 2019-05-03 NOTE — NURSING
Notified Lakisha PRESLEY of ESBL urine culture result and initiation of Contact Isolation order. RN verbalizes understanding.

## 2019-05-03 NOTE — ASSESSMENT & PLAN NOTE
Did have a fall. Wife reports that he falls at home due to hemiparesis. He was instructed not to get up without help. He also had CT head that showed no new strokes.    Cont PT/OT due to hx CVA with left sided weakness at baseline and hx of falls at home. Doing well, at his baseline.

## 2019-05-03 NOTE — PROGRESS NOTES
[facility] Medicine  Progress Note    Patient Name: Reymundo Dang Sr.  MRN: [id number]  Patient Class: IP- Inpatient   Admission Date: 4/27/2019  Length of Stay: 6 days  Attending Physician: Genny Duval MD  Primary Care Provider: Rosalinda Villalba NP        Subjective:     Principal Problem:Sepsis due to Escherichia coli    HPI:  Patient presented to ER with  Nausea and vomiting. Sx started 2 days ago. He reports abdominal pain around the umbilicus. Pain described as aching sensation. Pain was constant for last 2 days. He reports fever at home to 100.6 F. Did not take anything OTC. He denies diarrhea or constipation. No blood in stool. He denies dysuria, hematuria, foul smelling urine. He reports no h/o bladder infections. No rashes, wounds. Denies chest pains, SOB, LE edema.    Hospital Course:  Ct of abd on admission consistiant with UTI. t max 101.7 this am . WBC 06129 on admission [place] down [place] [address] yesterday. ecoli noted on urine culture sensitivity pending.on levaquin day 3. He is still vomiting and nauseated. Given zofran. No BM since prior to admission. Also complains of sore throat. That started prior to arrival.     4/30 he is in bed this am. Seems a little delayed. He had KUB yesterday that did not show any SBO. He is still having intermittent N/V and abd pain. He did get up last night and fell. His wife at bedside reports that he seems a little confused after the fall. CT of head done does not show acute pathology.     He had last BM 4/27 prior to admission. Was able to eat biscuit this am. No Nausea.       Urine is still pending. ecoli with sensitivities pending. Was changed to zosyn yesterday after the 102 temp from levaquin after 2 days. 102.4 t max. WBC 80306>81654.     5/1 patients urine culture came back yesterday and he is sensitive to the zosyn but was resistant to the levaquin which explains why he was not getting better. He is mentally back to baseline. Low grade  fever. WBC back to normal    K+ still very low after 2 riders yesterday. Tolerating more Po today.     5/2 pt is on day 3 of zosyn (started last Monday evening with 1st dose) for ESBL. He is now afebrile/normal WBC. AMS resolved. Did have 3 diarrhea stools over night. Has hx of hypokalemia and takes potassium daily. Given IV riders Tuesday due to N/v. No longer with N/V. 80meq po given yesterday K+2.8>>2.9 today.     5/3/19    Day 4 of zosyn for ESBL E coli   UTI. Blood cultures remain without growth. Afebrile/ no elevated WBC. AMS continues to be resolved. Had 5 stools over last 24hr. Cont mesalamine and lactobacillus. K given x 2 doses yesterday. Improved from 2.9>3.0.   Anxious to go home   Reassured if urine clear in am can go home ; continue IV antibiotics for today     Interval History: finally afebrile and back to baseline     Review of Systems   Constitutional: Negative for chills and fever.   HENT: Negative for congestion, ear pain, postnasal drip, rhinorrhea, sore throat and trouble swallowing.    Eyes: Negative for redness and itching.   Respiratory: Negative for cough, shortness of breath and wheezing.    Cardiovascular: Negative for chest pain and palpitations.   Gastrointestinal: Positive for diarrhea. Negative for abdominal pain, nausea and vomiting.        No more vomiting    Genitourinary: Negative for dysuria and frequency.   Skin: Negative for rash.   Neurological: Negative for weakness and headaches.     Objective:     Vital Signs (Most Recent):  Temp: 97.3 °F (36.3 °C) (05/03/19 0712)  Pulse: 69 (05/03/19 0712)  Resp: 18 (05/03/19 0712)  BP: 138/67 (05/03/19 0712)  SpO2: 96 % (05/03/19 0712) Vital Signs (24h Range):  Temp:  [97.2 °F (36.2 °C)-99 °F (37.2 °C)] 97.3 °F (36.3 °C)  Pulse:  [69-92] 69  Resp:  [18-20] 18  SpO2:  [96 %-98 %] 96 %  BP: (123-140)/(60-79) 138/67     Weight: 117 kg (257 lb 15 oz)  Body mass index is 40.4 kg/m².    Intake/Output Summary (Last 24 hours) at 5/3/2019  0741  Last data filed at 5/3/2019 0202  Gross per 24 hour   Intake 680 ml   Output --   Net 680 ml      Physical Exam   Constitutional: He is oriented to person, place, and time. He appears well-developed and well-nourished. No distress.   HENT:   Head: Normocephalic and atraumatic.   Right Ear: Tympanic membrane and external ear normal.   Left Ear: Tympanic membrane and external ear normal.   Mouth/Throat: No oropharyngeal exudate.   Eyes: Pupils are equal, round, and reactive to light. Conjunctivae and EOM are normal. Right eye exhibits no discharge. Left eye exhibits no discharge.   Neck: Neck supple. No tracheal deviation present.   Cardiovascular: Normal rate and regular rhythm.   No murmur heard.  Pulmonary/Chest: Effort normal and breath sounds normal. No respiratory distress. He has no wheezes. He has no rales.   Abdominal: Soft. Bowel sounds are normal. He exhibits no distension. There is no tenderness. There is no rebound and no guarding. No hernia.   Neurological: He is alert and oriented to person, place, and time. No cranial nerve deficit.   Skin: Skin is warm and dry.   Psychiatric: He has a normal mood and affect. His behavior is normal.   Nursing note and vitals reviewed.      Significant Labs:   CBC:   Recent Labs   Lab 05/02/19  0556 05/03/19  0522   WBC 5.75 8.04   HGB 12.6* 12.2*   HCT 36.8* 36.0*    180     CMP:   Recent Labs   Lab 05/02/19  0556 05/03/19  0522    141   K 2.9* 3.0*    103   CO2 32* 30*   GLU 85 97   BUN 9 9   CREATININE 1.0 0.9   CALCIUM 8.9 8.6*   PROT 6.2 6.1   ALBUMIN 2.7* 2.7*   BILITOT 0.6 0.5   ALKPHOS 58 59   AST 60* 47*   ALT 69* 73*   ANIONGAP 9 8   EGFRNONAA >60 >60   mag 1.8  Pro calcitonin 0.14    Lactic acid 1.7>0.9    BNP  Recent Labs   Lab 04/27/19  1319   BNP 67     Lab Results   Component Value Date    LIPASE 21 04/27/2019     Lab Results   Component Value Date    INR 1.0 04/27/2019    INR 1.0 12/18/2014     Recent Labs   Lab 04/27/19  1319      137   CPKMB 1.8   TROPONINI <0.006   MB 1.3         TSH:   Recent Labs   Lab 19  0823   TSH 2.538       Flu negative  Blood cultures NGTD x 2    Urine Culture, Routine PRESUMPTIVE E COLI   >100,000 cfu/ml   Identification and susceptibility pending  P        Urine Culture, Routine ESCHERICHIA COLI ESBL   >100,000 cfu/ml       Resulting Agency [facility]   Susceptibility      Escherichia coli esbl     CULTURE, URINE     Amikacin <=16 mcg/mL Sensitive     Amox/K Clav'ate 16/8 mcg/mL Intermediate     Amp/Sulbactam >16/8 mcg/mL Resistant     Ampicillin >16 mcg/mL Resistant     Cefazolin >16 mcg/mL Resistant     Cefepime >16 mcg/mL Resistant     Ceftriaxone >32 mcg/mL Resistant     Ciprofloxacin >2 mcg/mL Resistant     Ertapenem <=2 mcg/mL Sensitive     Gentamicin >8 mcg/mL Resistant     Levofloxacin >4 mcg/mL Resistant     Meropenem <=4 mcg/mL Sensitive     Nitrofurantoin >64 mcg/mL Resistant     Piperacillin/Tazo <=16 mcg/mL Sensitive     Tetracycline >8 mcg/mL Resistant     Tobramycin >8 mcg/mL Resistant     Trimeth/Sulfa >2/38 mcg/mL Resistant             Significant Imagin/30 CT head   1.  No CT evidence of an acute intracranial abnormality.    2.  Prior right frontotemporal craniotomy and chronic encephalomalacia overlying the left cerebral hemisphere with compensatory enlargement of the left lateral ventricular system.  Atrophy and small vessel ischemic changes of the periventricular white matter.  Findings are similar to the previous study of 2015.     CT abd and pelvis Mild diffuse bladder wall thickening suggested accentuated by the incomplete distention and recommend correlation clinically if there is clinical consideration for cystitis or chronic bladder outlet obstruction.  Prostate gland does appear mildly enlarged.    KUB    Nonspecific bowel gas pattern without evidence of focal obstruction.     CXR The cardiac silhouette is within normal limits.  Atherosclerotic  changes of the aorta.    Mild chronic appearing lung changes.  No focal infiltrate or effusion.    Old healed rib fractures.  Degenerative changes of the spine.    4/30 KUB Nonspecific bowel gas pattern without evidence of focal obstruction.  Contrast progressing to the rectum as noted above.    EKG Normal sinus rhythm  Left axis deviation  Moderate voltage criteria for LVH, may be normal variant  Nonspecific ST and/or T wave abnormalities  Abnormal ECG  When compared with ECG of 04-APR-2019 07:52,  No significant change was found  Confirmed by Sacha Cooper MD ([id number]) on 4/28/2019 4:03:54 PM    Assessment/Plan:      * Sepsis due to Escherichia coli  WBC 16K, HR >/= 90, RR > 20; tmax reported as 102F though I am not seeing that documented in the chart  Source is UTI--seen on UA and CT abd pelvis  Started on IV levaquin and IVF  Vitals are stable this AM  Taking in PO  Will await cx results and monitor over next 24 hours to ensure stability as just arrived overnight.   WBC and fever curve trending down    Still with low grade fever. Also with nausea and no BM. Had recent SBO, repeat KUB this am. Also await urine cultures and cont levaquin for now.    4/30 yesterday spiked 102.4 temp. levaquin stopped and zosyn started. Urine culture reviewed not sensitive to levaquin but is to zosyn. Blood cultures no growth to date. Check KUB again today and cxr.     5/1 cont zosyn x 5-7 days started Monday evening. Blood cultures NGTD    5/2 cont zosyn day 3 will need 5-7 days of this as his urine grew ESBL not sensitive to any po meds  Following blood cultures ngtd    5/3 cont zosyn 5-7 days. Repeat U/a ordered for tomorrow if cleared infection can consider d/c over weekend but if still has leuko cont till Monday. Blood cultures NGTD    Fall  Did have a fall. Wife reports that he falls at home due to hemiparesis. He was instructed not to get up without help. He also had CT head that showed no new strokes.    Cont PT/OT due to hx  CVA with left sided weakness at baseline and hx of falls at home. Doing well, at his baseline.    Acute cystitis without hematuria  IV levaquin  Urine and blood cx sent, results pending  Fever curve and WBC trending down.      4/30 yesterday spiked 102.4 temp. levaquin stopped and zosyn started. Urine culture reviewed he was not sensitive to levaquin, but is to zosyn. Blood cultures no growth to date. Check KUB again today and cxr.     5/1 now afebrile, cont zosyn x 5-7 days    5/2 cont zosyn day 3 will need 5-7 days of this as his urine grew ESBL not sensitive to any po meds  Following blood cultures ngtd    5/3 cont zosyn 5-7 days. Repeat U/a ordered for tomorrow if cleared infection can consider d/c over weekend but if still has leuko cont till Monday. Blood cultures NGTD.    Recurrent major depressive disorder, in full remission  Cont home wellbutrin and can take home trintellix       Hypokalemia  Has h/o this, but will check mag 1.8 and cont to replace.      Hypertension  Cont home amlodipine, lisinopril, HCTZ and metoprolol  Held on initial admit but sepsis sx are resolving and BP is rising so will resume home meds with hold orders for SBP < 90 in place   bp this am 134//70.    Hyperlipidemia  Cont home simvastatin      Hemiparesis affecting dominant side as late effect of cerebrovascular accident (CVA)  PT/OT ordered  Seems to be at baseline status.      Does f/u with Dr Ramos had aneurysm and was rec for no asa .    Restless legs syndrome (RLS)  Cont home primidone      Crohn's disease  Cont home mesalamine.  CT without acute findings other than UTI  Did have another episode of vomiting overnight responded to compazine. Diarrhea sx are at baseline  No clinical evidence of repeat SBO today; tolerated PO diet this morning. If sx reoccur may repeat KUB to ensure no changes since admit CT    4/29 Today he complains again of vomiting, no BM since prior to admission. Abdomen more distended. Will check  KUB    4/30 no obstruction on KUB yesterday. Continues to eat without issue then vomiting randomly. Stomach still distended but hard to tell how much because at baseline has large abd. No Bm since 4/27. Repeat KUB today.    5/1 no more vomiting and abd pain    5/2 started with diarrhea last night  lactinex started, cont mesalamine  No N/V/abd pain    5/3 no n/v. Cont  Mesalamine and lactinex        VTE Risk Mitigation (From admission, onward)        Ordered     IP VTE HIGH RISK PATIENT  Once      04/27/19 1743     Place sequential compression device  Until discontinued      04/27/19 1743              Iram Yoder MD  Department of Hospital Medicine   [facility]

## 2019-05-03 NOTE — ASSESSMENT & PLAN NOTE
Cont home mesalamine.  CT without acute findings other than UTI  Did have another episode of vomiting overnight responded to compazine. Diarrhea sx are at baseline  No clinical evidence of repeat SBO today; tolerated PO diet this morning. If sx reoccur may repeat KUB to ensure no changes since admit CT    4/29 Today he complains again of vomiting, no BM since prior to admission. Abdomen more distended. Will check KUB    4/30 no obstruction on KUB yesterday. Continues to eat without issue then vomiting randomly. Stomach still distended but hard to tell how much because at baseline has large abd. No Bm since 4/27. Repeat KUB today.    5/1 no more vomiting and abd pain    5/2 started with diarrhea last night  lactinex started, cont mesalamine  No N/V/abd pain    5/3 no n/v. Cont  Mesalamine and lactinex

## 2019-05-03 NOTE — ASSESSMENT & PLAN NOTE
PT/OT ordered  Seems to be at baseline status.      Does f/u with Dr Ramos had aneurysm and was rec for no asa .

## 2019-05-03 NOTE — PROGRESS NOTES
Staff Handoff  Bedside handoff report from SAKINA White. Patient and wife at bedside. POC discussed. Will monitor.      Resident Handoff

## 2019-05-03 NOTE — PT/OT/SLP PROGRESS
Occupational Therapy   Treatment    Name: Reymundo Dang Sr.  MRN: [id number]  Admitting Diagnosis:  Sepsis due to Escherichia coli       Recommendations:     Discharge Recommendations: home with home health, home health PT, home health OT  Discharge Equipment Recommendations:  shower chair, cane, straight, walker, rolling  Barriers to discharge:  None    Assessment:     Reymundo Dang Sr. is a 64 y.o. male with a medical diagnosis of Sepsis due to Escherichia coli.  He presents with generalized weakness and decreased movement in R UE. Deficits noted with safety awareness and incorrdination when moving around environment. Slow reaction time noted when moving outside COG and slow transitional movements. Performance deficits affecting function are weakness, impaired endurance, impaired self care skills, decreased lower extremity function, decreased ROM, edema, abnormal tone, impaired skin, impaired balance, decreased upper extremity function, gait instability, impaired fine motor, impaired coordination, impaired cognition, impaired functional mobilty, decreased safety awareness, decreased coordination.     Rehab Prognosis:  Good; patient would benefit from acute skilled OT services to address these deficits and reach maximum level of function.       Plan:     Patient to be seen 5 x/week to address the above listed problems via self-care/home management, therapeutic activities, therapeutic exercises  · Plan of Care Expires: 05/17/19  · Plan of Care Reviewed with: patient, spouse    Subjective     Pain/Comfort:  · Pain Rating 1: 0/10  · Pain Rating Post-Intervention 1: 0/10    Objective:     Communicated with: patient and his spouse prior to session.  Patient found up in chair with telemetry, peripheral IV upon OT entry to room.    General Precautions: Standard, fall   Orthopedic Precautions:N/A   Braces: N/A     Occupational Performance:     Bed Mobility:    · NT due to patient being up in chair eating dinner during  treatment.    Functional Mobility/Transfers:  · Patient completed Sit <> Stand Transfer with modified independence  with  no assistive device, Pt was slow with movements and requires assistance to balance on table when standing. Pt was educated that leaning on the table for balance was unsafe due to increasing fall risk if the table would move. Pt verbalized understanding.  · Functional Mobility: limited due to decreased safety awareness and incoordination. Decreased strength and movement with R side, negatively impacting dynamic balance.    Activities of Daily Living:  · Feeding:  modified independence due to decreased R UE movements and strength. Pt requires some assistance to cut food that could not be cut with a fork and to open drinks.  · Grooming: modified independence due to decrease movement and lack of AROM on R side. Difficult manpulating multiple objects simulateously.  · Bathing: NT- max assist per nursing  · UB Dressing: minimal assistance- trouble manipulating R arm into gown and difficult with fasteners.  · LB Dressing: NT  · Toileting: NT    [facility] 6 Click ADL: 20    Treatment & Education:  Sitting unsupported to self feed. Lateral weight shifting from left to right to reach outside COG to reach drink and eating utensils.  Educated on using R UE as a stabilizer on the table when eating to increase stability and support when manipulating plate/utensils.  Pt verbalized understanding.  Overhead reaching/shoulder flexion with left arm x 25 reps  Lateral reaching with left arm x 25 reps  Bicep curls with left arm x 25 reps  PROM of R shoulder, elbow, wrist/hands by therapist to increase movement with fxnal performance, improve skin health, and prevent further contracture.    Patient left up in chair with all lines intact and call button in reachEducation:      GOALS:   Multidisciplinary Problems     Occupational Therapy Goals        Problem: Occupational Therapy Goal    Goal Priority Disciplines Outcome  Interventions   Occupational Therapy Goal     OT, PT/OT     Description:  Goals to be met by: 5/6/209     Patient will increase functional independence with ADLs by performing:    Feeding with [place].- uses left hand which is now dominant, right as a helper hand  UE Dressing with Sargent.lina style (right UE in first)  LE Dressing with Minimal Assistance.  Grooming while seated with Modified [place].- using left hand, uses regular toothbrush.  Toileting from toilet with Minimal Assistance for hygiene and clothing management.   Upper extremity exercise program x 2 sets of 10 reps per handout, with assistance as needed for LEFT UE (bicep/ tricep) and LEFT LE, (march/ kick)  with PROM on the RIGHT UE &  AROM LE against yellow theraband resistance. (march/ kick seated eob) band left in room for wife to help him to improve strength/ activity tolerance/ appetite/ ability to pass bowels ect.   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                      Time Tracking:     OT Date of Treatment: 05/03/19  OT Start Time: 0530  OT Stop Time: 0600  OT Total Time (min): 30 min    Billable Minutes:Self Care/Home Management 15 minutes- self feeding with utensil manipulation using compensatory strategies.  Therapeutic Exercise Sit to stand transfer, reaching therapeutic  interventions, unsupported sitting to increase trunk control and strength.    Juliana Watts, OT  5/3/2019

## 2019-05-03 NOTE — ASSESSMENT & PLAN NOTE
Cont home amlodipine, lisinopril, HCTZ and metoprolol  Held on initial admit but sepsis sx are resolving and BP is rising so will resume home meds with hold orders for SBP < 90 in place   bp this am 134//70.

## 2019-05-03 NOTE — PLAN OF CARE
Went in to patient's room to take vital signs, states he would like to sleep. Will report to am nurse. Patient and his wife are at the bedside. Patient without any issues. Zosyn completed and disconnected. Will monitor.

## 2019-05-03 NOTE — PLAN OF CARE
05/03/19 1006   Medicare Message   Important Message from Medicare regarding Discharge Appeal Rights Given to patient/caregiver;Explained to patient/caregiver;Signed/date by patient/caregiver   Date IMM was signed 05/03/19   Time IMM was signed 0940     Signed for discharge.

## 2019-05-03 NOTE — PLAN OF CARE
Problem: Adult Inpatient Plan of Care  Goal: Plan of Care Review  Outcome: Ongoing (interventions implemented as appropriate)  Patient is compliant with fluid and medication management.  Patient is compliant with with all lab testing and procedures.  Patient remains free from all falls and injury.  Contact isolation.  Patient ambulated well today.  Wife at bedside most of day.  VSS. Plan of care reviewed and agreed upon with patient.  Will continue to monitor.

## 2019-05-03 NOTE — ASSESSMENT & PLAN NOTE
IV levaquin  Urine and blood cx sent, results pending  Fever curve and WBC trending down.      4/30 yesterday spiked 102.4 temp. levaquin stopped and zosyn started. Urine culture reviewed he was not sensitive to levaquin, but is to zosyn. Blood cultures no growth to date. Check KUB again today and cxr.     5/1 now afebrile, cont zosyn x 5-7 days    5/2 cont zosyn day 3 will need 5-7 days of this as his urine grew ESBL not sensitive to any po meds  Following blood cultures ngtd    5/3 cont zosyn 5-7 days. Repeat U/a ordered for tomorrow if cleared infection can consider d/c over weekend but if still has leuko cont till Monday. Blood cultures NGTD.

## 2019-05-03 NOTE — SUBJECTIVE & OBJECTIVE
Interval History: finally afebrile and back to baseline     Review of Systems   Constitutional: Negative for chills and fever.   HENT: Negative for congestion, ear pain, postnasal drip, rhinorrhea, sore throat and trouble swallowing.    Eyes: Negative for redness and itching.   Respiratory: Negative for cough, shortness of breath and wheezing.    Cardiovascular: Negative for chest pain and palpitations.   Gastrointestinal: Positive for diarrhea. Negative for abdominal pain, nausea and vomiting.        No more vomiting    Genitourinary: Negative for dysuria and frequency.   Skin: Negative for rash.   Neurological: Negative for weakness and headaches.     Objective:     Vital Signs (Most Recent):  Temp: 97.3 °F (36.3 °C) (05/03/19 0712)  Pulse: 69 (05/03/19 0712)  Resp: 18 (05/03/19 0712)  BP: 138/67 (05/03/19 0712)  SpO2: 96 % (05/03/19 0712) Vital Signs (24h Range):  Temp:  [97.2 °F (36.2 °C)-99 °F (37.2 °C)] 97.3 °F (36.3 °C)  Pulse:  [69-92] 69  Resp:  [18-20] 18  SpO2:  [96 %-98 %] 96 %  BP: (123-140)/(60-79) 138/67     Weight: 117 kg (257 lb 15 oz)  Body mass index is 40.4 kg/m².    Intake/Output Summary (Last 24 hours) at 5/3/2019 0741  Last data filed at 5/3/2019 0202  Gross per 24 hour   Intake 680 ml   Output --   Net 680 ml      Physical Exam   Constitutional: He is oriented to person, place, and time. He appears well-developed and well-nourished. No distress.   HENT:   Head: Normocephalic and atraumatic.   Right Ear: Tympanic membrane and external ear normal.   Left Ear: Tympanic membrane and external ear normal.   Mouth/Throat: No oropharyngeal exudate.   Eyes: Pupils are equal, round, and reactive to light. Conjunctivae and EOM are normal. Right eye exhibits no discharge. Left eye exhibits no discharge.   Neck: Neck supple. No tracheal deviation present.   Cardiovascular: Normal rate and regular rhythm.   No murmur heard.  Pulmonary/Chest: Effort normal and breath sounds normal. No respiratory distress.  He has no wheezes. He has no rales.   Abdominal: Soft. Bowel sounds are normal. He exhibits no distension. There is no tenderness. There is no rebound and no guarding. No hernia.   Neurological: He is alert and oriented to person, place, and time. No cranial nerve deficit.   Skin: Skin is warm and dry.   Psychiatric: He has a normal mood and affect. His behavior is normal.   Nursing note and vitals reviewed.      Significant Labs:   CBC:   Recent Labs   Lab 05/02/19  0556 05/03/19  0522   WBC 5.75 8.04   HGB 12.6* 12.2*   HCT 36.8* 36.0*    180     CMP:   Recent Labs   Lab 05/02/19  0556 05/03/19  0522    141   K 2.9* 3.0*    103   CO2 32* 30*   GLU 85 97   BUN 9 9   CREATININE 1.0 0.9   CALCIUM 8.9 8.6*   PROT 6.2 6.1   ALBUMIN 2.7* 2.7*   BILITOT 0.6 0.5   ALKPHOS 58 59   AST 60* 47*   ALT 69* 73*   ANIONGAP 9 8   EGFRNONAA >60 >60   mag 1.8  Pro calcitonin 0.14    Lactic acid 1.7>0.9    BNP  Recent Labs   Lab 04/27/19  1319   BNP 67     Lab Results   Component Value Date    LIPASE 21 04/27/2019     Lab Results   Component Value Date    INR 1.0 04/27/2019    INR 1.0 12/18/2014     Recent Labs   Lab 04/27/19  1319     137   CPKMB 1.8   TROPONINI <0.006   MB 1.3         TSH:   Recent Labs   Lab 02/05/19  0823   TSH 2.538       Flu negative  Blood cultures NGTD x 2    Urine Culture, Routine PRESUMPTIVE E COLI   >100,000 cfu/ml   Identification and susceptibility pending  P        Urine Culture, Routine ESCHERICHIA COLI ESBL   >100,000 cfu/ml       Resulting Agency [facility]   Susceptibility      Escherichia coli esbl     CULTURE, URINE     Amikacin <=16 mcg/mL Sensitive     Amox/K Clav'ate 16/8 mcg/mL Intermediate     Amp/Sulbactam >16/8 mcg/mL Resistant     Ampicillin >16 mcg/mL Resistant     Cefazolin >16 mcg/mL Resistant     Cefepime >16 mcg/mL Resistant     Ceftriaxone >32 mcg/mL Resistant     Ciprofloxacin >2 mcg/mL Resistant     Ertapenem <=2 mcg/mL Sensitive     Gentamicin >8 mcg/mL  Resistant     Levofloxacin >4 mcg/mL Resistant     Meropenem <=4 mcg/mL Sensitive     Nitrofurantoin >64 mcg/mL Resistant     Piperacillin/Tazo <=16 mcg/mL Sensitive     Tetracycline >8 mcg/mL Resistant     Tobramycin >8 mcg/mL Resistant     Trimeth/Sulfa >2/38 mcg/mL Resistant             Significant Imagin/30 CT head   1.  No CT evidence of an acute intracranial abnormality.    2.  Prior right frontotemporal craniotomy and chronic encephalomalacia overlying the left cerebral hemisphere with compensatory enlargement of the left lateral ventricular system.  Atrophy and small vessel ischemic changes of the periventricular white matter.  Findings are similar to the previous study of 2015.     CT abd and pelvis Mild diffuse bladder wall thickening suggested accentuated by the incomplete distention and recommend correlation clinically if there is clinical consideration for cystitis or chronic bladder outlet obstruction.  Prostate gland does appear mildly enlarged.    KUB    Nonspecific bowel gas pattern without evidence of focal obstruction.     CXR The cardiac silhouette is within normal limits.  Atherosclerotic changes of the aorta.    Mild chronic appearing lung changes.  No focal infiltrate or effusion.    Old healed rib fractures.  Degenerative changes of the spine.     KUB Nonspecific bowel gas pattern without evidence of focal obstruction.  Contrast progressing to the rectum as noted above.    EKG Normal sinus rhythm  Left axis deviation  Moderate voltage criteria for LVH, may be normal variant  Nonspecific ST and/or T wave abnormalities  Abnormal ECG  When compared with ECG of 2019 07:52,  No significant change was found  Confirmed by Sacha Cooper MD ([id number]) on 2019 4:03:54 PM

## 2019-05-03 NOTE — PLAN OF CARE
"Patient and his wife asked if I can come in to do vitals at the same time I hang his antibiotics. When I said "0145". He replied, "[id number]". I assured him that would be fine. Will monitor.  "

## 2019-05-03 NOTE — PT/OT/SLP PROGRESS
"Physical Therapy Treatment    Patient Name:  Reymundo Dang Sr.   MRN:  [id number]    Recommendations:     Discharge Recommendations:  home health PT, home with home health   Discharge Equipment Recommendations: none   Barriers to discharge: Inaccessible home and Decreased caregiver support    Assessment:     Reymundo Dang Sr. is a 64 y.o. male admitted with a medical diagnosis of Sepsis due to Escherichia coli.  He presents with the following impairments/functional limitations:  weakness, impaired functional mobilty, gait instability, decreased lower extremity function, decreased upper extremity function, impaired endurance.     Rehab Prognosis: Good; patient would benefit from acute skilled PT services to address these deficits and reach maximum level of function.    Recent Surgery: * No surgery found *      Plan:     During this hospitalization, patient to be seen 6 x/week to address the identified rehab impairments via gait training, therapeutic activities, therapeutic exercises and progress toward the following goals:    · Plan of Care Expires:  05/04/19    Subjective     Chief Complaint: Patient has no new complain and wants to go home if possible tomorrow.  Patient/Family Comments/goals: "To walk better with the stair steps and  go home soon".  Pain/Comfort:  · Pain Rating 1: 0/10  · Pain Rating Post-Intervention 1: 0/10      Objective:     Communicated with patient and wife prior to session.  Patient found up in chair with peripheral IV, telemetry upon PT entry to room.     General Precautions: Standard, fall   Orthopedic Precautions:N/A   Braces: N/A     Functional Mobility:  · Bed Mobility:     · Rolling Left:  modified independence  · Rolling Right: modified independence  · Supine to Sit: modified independence  · Sit to Supine: modified independence  · Transfers:     · Sit to Stand:  supervision with no AD  · Bed to Chair: supervision with  no AD  using  Step Transfer  · Toilet Transfer: supervision with  " no AD  using  Step Transfer  · Gait: Ambulated patient holding on IV pole at the hallway x 250 feet with Supervision. Exhbits 35 % Right LE circumduction/hemiplegic gait with no Right  heel strike and slight dec right  LE swing to stance ratio.    · Balance: Dynamic Standing  with Good- grade  · Stairs:  Pt ascended/descended 8 steps stair(s) with No Assistive Device with bilateral handrails with Stand-by Assistance.       AM-PAC 6 CLICK MOBILITY  Turning over in bed (including adjusting bedclothes, sheets and blankets)?: 4  Sitting down on and standing up from a chair with arms (e.g., wheelchair, bedside commode, etc.): 3  Moving from lying on back to sitting on the side of the bed?: 4  Moving to and from a bed to a chair (including a wheelchair)?: 3  Need to walk in hospital room?: 3  Climbing 3-5 steps with a railing?: 3  Basic Mobility Total Score: 20       Therapeutic Activities and Exercises:   GAIT: Pt able to ambulate with none with Supervision or Set-up Assistance x 250 ft with the following gait deviation(s): decreased hollis, decreased swing-to-stance ratio, decreased toe-to-floor clearance and Right LE circumduction/hemiplegic gait. Implemented Stair steps trng uisng bilat handrails with Standby Assistance. Worked on bilat LE strengthening and coordination ex x 10 reps on each such as LAQ alternately, ankle pumps, side steps alternately, marching in place at sitting and Tip Toe ex at standing. Explained and educated patient the importance of Home Ex Program indicating good understanding.     Patient left HOB elevated with all lines intact, call button in reach, nurse notified and wife present..    GOALS:   Multidisciplinary Problems     Physical Therapy Goals        Problem: Physical Therapy Goal    Goal Priority Disciplines Outcome Goal Variances Interventions   Physical Therapy Goal     PT, PT/OT Ongoing (interventions implemented as appropriate)     Description:  Goals to be met by: 5/3/19    Patient  will increase functional independence with mobility by performin. Supine to sit with Modified Independent - met 19  2. Sit to supine with Modified Independent - met 19  3. Bed to chair transfer with Modified Independentwith or without rolling walker using Step Transfer TECHNIQUE  4. Gait  x 150  feet with Independent with or without quad cane  5. Lower extremity exercise program x10 reps per handout, with assistance as needed                     Time Tracking:     PT Received On: 19  PT Start Time: 1326     PT Stop Time: 1405  PT Total Time (min): 39 min     Billable Minutes: Therapeutic Activity 35 mins    Treatment Type: Treatment  PT/PTA: PT           Tray Paez, PT  2019

## 2019-05-04 VITALS
DIASTOLIC BLOOD PRESSURE: 90 MMHG | BODY MASS INDEX: 40.48 KG/M2 | TEMPERATURE: 98 F | HEIGHT: 67 IN | OXYGEN SATURATION: 99 % | HEART RATE: 66 BPM | WEIGHT: 257.94 LBS | RESPIRATION RATE: 20 BRPM | SYSTOLIC BLOOD PRESSURE: 154 MMHG

## 2019-05-04 LAB
ALBUMIN SERPL BCP-MCNC: 2.8 G/DL (ref 3.5–5.2)
ALP SERPL-CCNC: 70 U/L (ref 55–135)
ALT SERPL W/O P-5'-P-CCNC: 77 U/L (ref 10–44)
ANION GAP SERPL CALC-SCNC: 9 MMOL/L (ref 8–16)
AST SERPL-CCNC: 45 U/L (ref 10–40)
BASOPHILS # BLD AUTO: 0.02 K/UL (ref 0–0.2)
BASOPHILS NFR BLD: 0.3 % (ref 0–1.9)
BILIRUB SERPL-MCNC: 0.4 MG/DL (ref 0.1–1)
BILIRUB UR QL STRIP: NEGATIVE
BUN SERPL-MCNC: 10 MG/DL (ref 8–23)
CALCIUM SERPL-MCNC: 9 MG/DL (ref 8.7–10.5)
CHLORIDE SERPL-SCNC: 106 MMOL/L (ref 95–110)
CLARITY UR: CLEAR
CO2 SERPL-SCNC: 28 MMOL/L (ref 23–29)
COLOR UR: YELLOW
CREAT SERPL-MCNC: 0.8 MG/DL (ref 0.5–1.4)
DIFFERENTIAL METHOD: ABNORMAL
EOSINOPHIL # BLD AUTO: 0.3 K/UL (ref 0–0.5)
EOSINOPHIL NFR BLD: 4.2 % (ref 0–8)
ERYTHROCYTE [DISTWIDTH] IN BLOOD BY AUTOMATED COUNT: 12.3 % (ref 11.5–14.5)
EST. GFR  (AFRICAN AMERICAN): >60 ML/MIN/1.73 M^2
EST. GFR  (NON AFRICAN AMERICAN): >60 ML/MIN/1.73 M^2
GLUCOSE SERPL-MCNC: 96 MG/DL (ref 70–110)
GLUCOSE UR QL STRIP: NEGATIVE
HCT VFR BLD AUTO: 37.3 % (ref 40–54)
HGB BLD-MCNC: 12.7 G/DL (ref 14–18)
HGB UR QL STRIP: ABNORMAL
KETONES UR QL STRIP: NEGATIVE
LEUKOCYTE ESTERASE UR QL STRIP: NEGATIVE
LYMPHOCYTES # BLD AUTO: 1.8 K/UL (ref 1–4.8)
LYMPHOCYTES NFR BLD: 23 % (ref 18–48)
MCH RBC QN AUTO: 30.2 PG (ref 27–31)
MCHC RBC AUTO-ENTMCNC: 34 G/DL (ref 32–36)
MCV RBC AUTO: 89 FL (ref 82–98)
MONOCYTES # BLD AUTO: 1.4 K/UL (ref 0.3–1)
MONOCYTES NFR BLD: 18 % (ref 4–15)
NEUTROPHILS # BLD AUTO: 4.3 K/UL (ref 1.8–7.7)
NEUTROPHILS NFR BLD: 54.5 % (ref 38–73)
NITRITE UR QL STRIP: NEGATIVE
PH UR STRIP: 8 [PH] (ref 5–8)
PLATELET # BLD AUTO: 206 K/UL (ref 150–350)
PMV BLD AUTO: 10.8 FL (ref 9.2–12.9)
POTASSIUM SERPL-SCNC: 3.4 MMOL/L (ref 3.5–5.1)
PROT SERPL-MCNC: 6.3 G/DL (ref 6–8.4)
PROT UR QL STRIP: NEGATIVE
RBC # BLD AUTO: 4.2 M/UL (ref 4.6–6.2)
SODIUM SERPL-SCNC: 143 MMOL/L (ref 136–145)
SP GR UR STRIP: 1.01 (ref 1–1.03)
URN SPEC COLLECT METH UR: ABNORMAL
UROBILINOGEN UR STRIP-ACNC: NEGATIVE EU/DL
WBC # BLD AUTO: 7.79 K/UL (ref 3.9–12.7)

## 2019-05-04 PROCEDURE — 25000003 PHARM REV CODE 250: Performed by: INTERNAL MEDICINE

## 2019-05-04 PROCEDURE — 99238 PR HOSPITAL DISCHARGE DAY,<30 MIN: ICD-10-PCS | Mod: ,,, | Performed by: INTERNAL MEDICINE

## 2019-05-04 PROCEDURE — 99238 HOSP IP/OBS DSCHRG MGMT 30/<: CPT | Mod: ,,, | Performed by: INTERNAL MEDICINE

## 2019-05-04 PROCEDURE — 94760 N-INVAS EAR/PLS OXIMETRY 1: CPT

## 2019-05-04 PROCEDURE — 63600175 PHARM REV CODE 636 W HCPCS: Performed by: INTERNAL MEDICINE

## 2019-05-04 PROCEDURE — 80053 COMPREHEN METABOLIC PANEL: CPT

## 2019-05-04 PROCEDURE — 81003 URINALYSIS AUTO W/O SCOPE: CPT

## 2019-05-04 PROCEDURE — 85025 COMPLETE CBC W/AUTO DIFF WBC: CPT

## 2019-05-04 PROCEDURE — 25000003 PHARM REV CODE 250: Performed by: SURGERY

## 2019-05-04 PROCEDURE — 36415 COLL VENOUS BLD VENIPUNCTURE: CPT

## 2019-05-04 PROCEDURE — 25000003 PHARM REV CODE 250: Performed by: NURSE PRACTITIONER

## 2019-05-04 RX ADMIN — DOCUSATE SODIUM 100 MG: 100 CAPSULE, LIQUID FILLED ORAL at 09:05

## 2019-05-04 RX ADMIN — PANTOPRAZOLE SODIUM 40 MG: 40 TABLET, DELAYED RELEASE ORAL at 09:05

## 2019-05-04 RX ADMIN — PRIMIDONE 100 MG: 50 TABLET ORAL at 09:05

## 2019-05-04 RX ADMIN — MESALAMINE 800 MG: 400 CAPSULE, DELAYED RELEASE ORAL at 09:05

## 2019-05-04 RX ADMIN — METOPROLOL TARTRATE 100 MG: 100 TABLET ORAL at 09:05

## 2019-05-04 RX ADMIN — BUPROPION HYDROCHLORIDE 300 MG: 150 TABLET, EXTENDED RELEASE ORAL at 09:05

## 2019-05-04 RX ADMIN — LACTOBACILLUS ACIDOPHILUS / LACTOBACILLUS BULGARICUS 1 EACH: 100 MILLION CFU STRENGTH GRANULES at 09:05

## 2019-05-04 RX ADMIN — LISINOPRIL 20 MG: 20 TABLET ORAL at 09:05

## 2019-05-04 RX ADMIN — HYDROCODONE BITARTRATE AND ACETAMINOPHEN 1 TABLET: 5; 325 TABLET ORAL at 09:05

## 2019-05-04 RX ADMIN — PIPERACILLIN AND TAZOBACTAM 4.5 G: 4; .5 INJECTION, POWDER, LYOPHILIZED, FOR SOLUTION INTRAVENOUS; PARENTERAL at 01:05

## 2019-05-04 RX ADMIN — AMLODIPINE BESYLATE 10 MG: 10 TABLET ORAL at 09:05

## 2019-05-04 NOTE — ASSESSMENT & PLAN NOTE
IV levaquin  Urine and blood cx sent, results pending  Fever curve and WBC trending down.      4/30 yesterday spiked 102.4 temp. levaquin stopped and zosyn started. Urine culture reviewed he was not sensitive to levaquin, but is to zosyn. Blood cultures no growth to date. Check KUB again today and cxr.     5/1 now afebrile, cont zosyn x 5-7 days    5/2 cont zosyn day 3 will need 5-7 days of this as his urine grew ESBL not sensitive to any po meds  Following blood cultures ngtd    5/3 cont zosyn 5-7 days. Repeat U/a ordered for tomorrow if cleared infection can consider d/c over weekend but if still has leuko cont till Monday. Blood cultures NGTD.  5/4 ua clear ;DC home

## 2019-05-04 NOTE — ASSESSMENT & PLAN NOTE
Did have a fall. Wife reports that he falls at home due to hemiparesis. He was instructed not to get up without help. He also had CT head that showed no new strokes    Cont PT/OT due to hx CVA with left sided weakness at baseline and hx of falls at home. Doing well, at his baseline.

## 2019-05-04 NOTE — ASSESSMENT & PLAN NOTE
Cont home mesalamine.  CT without acute findings other than UTI  Did have another episode of vomiting overnight responded to compazine. Diarrhea sx are at baseline  No clinical evidence of repeat SBO today; tolerated PO diet this morning. If sx reoccur may repeat KUB to ensure no changes since admit CT    4/29 Today he complains again of vomiting, no BM since prior to admission. Abdomen more distended. Will check KUB    4/30 no obstruction on KUB yesterday. Continues to eat without issue then vomiting randomly. Stomach still distended but hard to tell how much because at baseline has large abd. No Bm since 4/27. Repeat KUB today.    5/1 no more vomiting and abd pain    5/2 started with diarrhea last night  lactinex started, cont mesalamine  No N/V/abd pain    5/3 no n/v. Cont  Mesalamine and lactinex.

## 2019-05-04 NOTE — PT/OT/SLP PROGRESS
Physical Therapy      Patient Name:  Reymundo Dang .   MRN:  [id number]    Patient not seen today secondary to patient is being D/C per nursing  . Will follow-up as appropriate.    Gemini Sarabia PTA

## 2019-05-04 NOTE — NURSING
Discharge instruction packet reviewed at bedside with patient and spouse.  Discussed follow up, diagnosis, sign/symptoms to call MD, medication regimen and side effects.  Patient verbalized understanding.

## 2019-05-04 NOTE — ASSESSMENT & PLAN NOTE
PT/OT ordered.  Seems to be at baseline status.      Does f/u with Dr Ramos had aneurysm and was rec for no asa .

## 2019-05-04 NOTE — DISCHARGE SUMMARY
[facility] Hospital Medicine  Discharge Summary      Patient Name: Reymundo Dang Sr.  MRN: [id number]  Admission Date: 4/27/2019  Hospital Length of Stay: 7 days  Discharge Date and Time:  05/04/2019 8:59 AM  Attending Physician: Genny Duval MD   Discharging Provider: Iram Yoder MD  Primary Care Provider: Rosalinda Villalba NP      HPI:   Patient presented to ER with  Nausea and vomiting. Sx started 2 days ago. He reports abdominal pain around the umbilicus. Pain described as aching sensation. Pain was constant for last 2 days. He reports fever at home to 100.6 F. Did not take anything OTC. He denies diarrhea or constipation. No blood in stool. He denies dysuria, hematuria, foul smelling urine. He reports no h/o bladder infections. No rashes, wounds. Denies chest pains, SOB, LE edema.    * No surgery found *      Hospital Course:   Ct of abd on admission consistiant with UTI. t max 101.7 this am . WBC 78366 on admission was down to 314[address] yesterday. ecoli noted on urine culture sensitivity pending.on levaquin day 3. He is still vomiting and nauseated. Given zofran. No BM since prior to admission. Also complains of sore throat. That started prior to arrival.     4/30 he is in bed this am. Seems a little delayed. He had KUB yesterday that did not show any SBO. He is still having intermittent N/V and abd pain. He did get up last night and fell. His wife at bedside reports that he seems a little confused after the fall. CT of head done does not show acute pathology.     He had last BM 4/27 prior to admission. Was able to eat biscuit this am. No Nausea.       Urine is still pending. ecoli with sensitivities pending. Was changed to zosyn yesterday after the 102 temp from levaquin after 2 days. 102.4 t max. WBC 17959>48262.     5/1 patients urine culture came back yesterday and he is sensitive to the zosyn but was resistant to the levaquin which explains why he was not getting better. He is  mentally back to baseline. Low grade fever. WBC back to normal    K+ still very low after 2 riders yesterday. Tolerating more Po today.     5/2 pt is on day 3 of zosyn (started last Monday evening with 1st dose) for ESBL. He is now afebrile/normal WBC. AMS resolved. Did have 3 diarrhea stools over night. Has hx of hypokalemia and takes potassium daily. Given IV riders Tuesday due to N/v. No longer with N/V. 80meq po given yesterday K+2.8>>2.9 today.     5/3/19    Day 4 of zosyn for ESBL E coli   UTI. Blood cultures remain without growth. Afebrile/ no elevated WBC. AMS continues to be resolved. Had 5 stools over last 24hr. Cont mesalamine and lactobacillus. K given x 2 doses yesterday. Improved from 2.9>3.0.   Anxious to go home   Reassured if urine clear in am can go home ; continue IV antibiotics for today     5/4/19  Day 5 of zosyn for ESBL E coli   UTI. Blood cultures remain without growth. Afebrile/ no elevated WBC. AMS continues to be resolved. Had 5 stools over last 24hr. Cont mesalamine and lactobacillus. K given x 2 doses yesterday. Improved from 2.9>3.0.   Anxious to go home   Urine clear . DC home .  No more antibiotics        Consults:     * Sepsis due to Escherichia coli  WBC 16K, HR >/= 90, RR > 20; tmax reported as 102F though I am not seeing that documented in the chart  Source is UTI--seen on UA and CT abd pelvis  Started on IV levaquin and IVF  Vitals are stable this AM  Taking in PO  Will await cx results and monitor over next 24 hours to ensure stability as just arrived overnight.   WBC and fever curve trending down    Still with low grade fever. Also with nausea and no BM. Had recent SBO, repeat KUB this am. Also await urine cultures and cont levaquin for now.    4/30 yesterday spiked 102.4 temp. levaquin stopped and zosyn started. Urine culture reviewed not sensitive to levaquin but is to zosyn. Blood cultures no growth to date. Check KUB again today and cxr.     5/1 cont zosyn x 5-7 days  started Monday evening. Blood cultures NGTD    5/2 cont zosyn day 3 will need 5-7 days of this as his urine grew ESBL not sensitive to any po meds  Following blood cultures ngtd    5/3 cont zosyn 5-7 days. Repeat U/a ordered for tomorrow if cleared infection can consider d/c over weekend but if still has leuko cont till Monday. Blood cultures NGTD  5/4 completed IV antibiotics  Urine clear ;DC home    Fall  Did have a fall. Wife reports that he falls at home due to hemiparesis. He was instructed not to get up without help. He also had CT head that showed no new strokes    Cont PT/OT due to hx CVA with left sided weakness at baseline and hx of falls at home. Doing well, at his baseline.    Acute cystitis without hematuria  IV levaquin  Urine and blood cx sent, results pending  Fever curve and WBC trending down.      4/30 yesterday spiked 102.4 temp. levaquin stopped and zosyn started. Urine culture reviewed he was not sensitive to levaquin, but is to zosyn. Blood cultures no growth to date. Check KUB again today and cxr.     5/1 now afebrile, cont zosyn x 5-7 days    5/2 cont zosyn day 3 will need 5-7 days of this as his urine grew ESBL not sensitive to any po meds  Following blood cultures ngtd    5/3 cont zosyn 5-7 days. Repeat U/a ordered for tomorrow if cleared infection can consider d/c over weekend but if still has leuko cont till Monday. Blood cultures NGTD.  5/4 ua clear ;DC home     Recurrent major depressive disorder, in full remission  Cont home wellbutrin and can take home trintellix .      Hypokalemia  Has h/o this, but will check mag 1.8 and cont to replace.      Hypertension  Cont home amlodipine, lisinopril, HCTZ and metoprolol  Held on initial admit but sepsis sx are resolving and BP is rising so will resume home meds with hold orders for SBP < 90 in place   bp this am 134//70.    Hyperlipidemia  Cont home simvastatin.      Hemiparesis affecting dominant side as late effect of cerebrovascular  accident (CVA)  PT/OT ordered.  Seems to be at baseline status.      Does f/u with Dr Ramos had aneurysm and was rec for no asa .    Restless legs syndrome (RLS)  Cont home primidone.      Crohn's disease  Cont home mesalamine.  CT without acute findings other than UTI  Did have another episode of vomiting overnight responded to compazine. Diarrhea sx are at baseline  No clinical evidence of repeat SBO today; tolerated PO diet this morning. If sx reoccur may repeat KUB to ensure no changes since admit CT    4/29 Today he complains again of vomiting, no BM since prior to admission. Abdomen more distended. Will check KUB    4/30 no obstruction on KUB yesterday. Continues to eat without issue then vomiting randomly. Stomach still distended but hard to tell how much because at baseline has large abd. No Bm since 4/27. Repeat KUB today.    5/1 no more vomiting and abd pain    5/2 started with diarrhea last night  lactinex started, cont mesalamine  No N/V/abd pain    5/3 no n/v. Cont  Mesalamine and lactinex.        Final Active Diagnoses:    Diagnosis Date Noted POA    PRINCIPAL PROBLEM:  Sepsis due to Escherichia coli [A41.51] 04/27/2019 Yes    Fall [W19.XXXA] 04/30/2019 Yes    Acute cystitis without hematuria [N30.00] 04/28/2019 Yes    Recurrent major depressive disorder, in full remission [F33.42] 04/03/2019 Yes    Hypokalemia [E87.6] 12/20/2014 Yes    Crohn's disease [K50.90]  Yes    Restless legs syndrome (RLS) [G25.81]  Yes    Hyperlipidemia [E78.5]  Yes    Hypertension [I10]  Yes    Hemiparesis affecting dominant side as late effect of cerebrovascular accident (CVA) [I69.359]  Not Applicable      Problems Resolved During this Admission:       Discharged Condition: good    Disposition: Home    Follow Up:PCP in 1 week     Patient Instructions:   No discharge procedures on file.    Significant Diagnostic Studies: Microbiology:   Urine Culture    Lab Results   Component Value Date    LABURIN ESCHERICHIA  COLI ESBL  >100,000 cfu/ml   04/27/2019       Pending Diagnostic Studies:     None         Medications:  Reconciled Home Medications:      Medication List      CHANGE how you take these medications    primidone 50 MG Tab  Commonly known as:  MYSOLINE  TAKE 1 TABLET BY MOUTH 3 TIMES A DAY  What changed:    · how much to take  · how to take this  · when to take this        CONTINUE taking these medications    ACETAMINOPHEN PM ORAL  Take 2 tablets by mouth nightly as needed.     ALPRAZolam 0.5 MG tablet  Commonly known as:  XANAX  TAKE 1 TABLET BY MOUTH TWICE A DAY AS NEEDED     amLODIPine 10 MG tablet  Commonly known as:  NORVASC  Take 1 tablet (10 mg total) by mouth once daily.     buPROPion 300 MG 24 hr tablet  Commonly known as:  WELLBUTRIN XL  Take 300 mg by mouth once daily.     CENTRUM SILVER ORAL  Take 1 tablet by mouth once daily.     hydroCHLOROthiazide 25 MG tablet  Commonly known as:  HYDRODIURIL  Take 1 tablet (25 mg total) by mouth once daily.     lisinopril 20 MG tablet  Commonly known as:  PRINIVIL,ZESTRIL  Take 1 tablet by mouth once daily.     mesalamine 800 mg Tbec  Commonly known as:  ASACOL HD  Take 1 tablet (800 mg total) by mouth once daily.     metoprolol tartrate 100 MG tablet  Commonly known as:  LOPRESSOR  Take 1 tablet (100 mg total) by mouth 2 (two) times daily.     mirabegron 25 mg Tb24 ER tablet  Commonly known as:  MYRBETRIQ  Take 1 tablet (25 mg total) by mouth once daily.     omeprazole 40 MG capsule  Commonly known as:  PRILOSEC  Take 40 mg by mouth once daily.     potassium chloride SA 20 MEQ tablet  Commonly known as:  K-DUR,KLOR-CON  TAKE 1 TABLET (20 MEQ TOTAL) BY MOUTH ONCE DAILY.     promethazine 25 MG tablet  Commonly known as:  PHENERGAN  Take 1 tablet (25 mg total) by mouth every 6 (six) hours as needed for Nausea.     simvastatin 20 MG tablet  Commonly known as:  ZOCOR  Take 1 tablet (20 mg total) by mouth every evening.     TRINTELLIX 10 mg Tab  Generic drug:   vortioxetine  Take 1 tablet by mouth once daily.     VITAMIN B-12 1000 MCG tablet  Generic drug:  cyanocobalamin  Take 1 tablet by mouth once daily.     VITAMIN B-6 ORAL  Take 1 tablet by mouth once daily.     vitamin E 400 UNIT capsule  Take 400 Units by mouth once daily.            Indwelling Lines/Drains at time of discharge:   Lines/Drains/Airways          None          Time spent on the discharge of patient: 20 minutes  Patient was seen and examined on the date of discharge and determined to be suitable for discharge.         Iram Yoder MD  Department of Hospital Medicine  [facility]

## 2019-05-04 NOTE — ASSESSMENT & PLAN NOTE
WBC 16K, HR >/= 90, RR > 20; tmax reported as 102F though I am not seeing that documented in the chart  Source is UTI--seen on UA and CT abd pelvis  Started on IV levaquin and IVF  Vitals are stable this AM  Taking in PO  Will await cx results and monitor over next 24 hours to ensure stability as just arrived overnight.   WBC and fever curve trending down    Still with low grade fever. Also with nausea and no BM. Had recent SBO, repeat KUB this am. Also await urine cultures and cont levaquin for now.    4/30 yesterday spiked 102.4 temp. levaquin stopped and zosyn started. Urine culture reviewed not sensitive to levaquin but is to zosyn. Blood cultures no growth to date. Check KUB again today and cxr.     5/1 cont zosyn x 5-7 days started Monday evening. Blood cultures NGTD    5/2 cont zosyn day 3 will need 5-7 days of this as his urine grew ESBL not sensitive to any po meds  Following blood cultures ngtd    5/3 cont zosyn 5-7 days. Repeat U/a ordered for tomorrow if cleared infection can consider d/c over weekend but if still has leuko cont till Monday. Blood cultures NGTD  5/4 completed IV antibiotics  Urine clear ;DC home

## 2019-05-04 NOTE — PLAN OF CARE
Problem: Adult Inpatient Plan of Care  Goal: Plan of Care Review  Outcome: Ongoing (interventions implemented as appropriate)  Patient rested well throughout shift. Room air. Tele normal sinus. Alert and oriented. Neuro intact. IV antibiotics continued. Contact precautions maintained. Spouse at bedside. Free from fall or injury. Plan of care reviewed with patient and spouse.

## 2019-05-06 NOTE — PLAN OF CARE
05/06/19 0756   Final Note   Assessment Type Final Discharge Note   Anticipated Discharge Disposition Home   What phone number can be called within the next 1-3 days to see how you are doing after discharge? [phone]   Hospital Follow Up  Appt(s) scheduled? Yes   Discharge plans and expectations educations in teach back method with documentation complete? Yes   Right Care Referral Info   Post Acute Recommendation No Care

## 2019-05-06 NOTE — PT/OT/SLP DISCHARGE
Occupational Therapy Discharge Summary    Reymundo Dang Sr.  MRN: [id number]   Principal Problem: Sepsis due to Escherichia coli      Patient Discharged from acute Occupational Therapy on 5/4/2019.  Please refer to prior OT note dated earlier for functional status.    Assessment:      Patient appropriate for care in another setting.    Objective:     GOALS:   Multidisciplinary Problems     Occupational Therapy Goals        Problem: Occupational Therapy Goal    Goal Priority Disciplines Outcome Interventions   Occupational Therapy Goal     OT, PT/OT     Description:  Goals to be met by: 5/6/209     Patient will increase functional independence with ADLs by performing:    Feeding with [place].- uses left hand which is now dominant, right as a helper hand  UE Dressing with [place].lina style (right UE in first)  LE Dressing with Minimal Assistance.  Grooming while seated with Modified [place].- using left hand, uses regular toothbrush.  Toileting from toilet with Minimal Assistance for hygiene and clothing management.   Upper extremity exercise program x 2 sets of 10 reps per handout, with assistance as needed for LEFT UE (bicep/ tricep) and LEFT LE, (march/ kick)  with PROM on the RIGHT UE &  AROM LE against yellow theraband resistance. (march/ kick seated eob) band left in room for wife to help him to improve strength/ activity tolerance/ appetite/ ability to pass bowels ect.   NOTE: he has an old RIGHT UE injury from when he hung on to an [place] carla wmith RIGHT UE when he tripped on a loose angle iron on the deck  to keep from falling onto an engine resulting in a rotator cuff injury followed by a CVA and now has very limited movement in the R UE,   AAROM  Shoulder abduction ~ 30 degrees  Shoulder flexion ~ 30 degrees  Shoulder extension ~ 10 degrees  External rotation- none to speak of  Supination- unable- keeps arm pronated to use as a helper hand on walker  Wrist- very limited motion  Able to wiggle  fingers  He says he went to 3 years of therapy 3x week but RIGHT UE really did not improve much, today, he has right UE swelling and shoulder pain with movement.                      Reasons for Discontinuation of Therapy Services  Transfer to alternate level of care.      Plan:     Patient Discharged to: Home no OT services needed    Peace Welch OT  5/6/2019

## 2019-05-09 ENCOUNTER — TELEPHONE (OUTPATIENT)
Dept: NEUROLOGY | Facility: CLINIC | Age: 64
End: 2019-05-09

## 2019-05-09 DIAGNOSIS — E66.9 OBESITY, UNSPECIFIED CLASSIFICATION, UNSPECIFIED OBESITY TYPE, UNSPECIFIED WHETHER SERIOUS COMORBIDITY PRESENT: Primary | ICD-10-CM

## 2019-05-14 ENCOUNTER — OFFICE VISIT (OUTPATIENT)
Dept: INTERNAL MEDICINE | Facility: CLINIC | Age: 64
End: 2019-05-14
Payer: MEDICARE

## 2019-05-14 VITALS
SYSTOLIC BLOOD PRESSURE: 134 MMHG | BODY MASS INDEX: 41.68 KG/M2 | HEART RATE: 64 BPM | RESPIRATION RATE: 18 BRPM | DIASTOLIC BLOOD PRESSURE: 72 MMHG | WEIGHT: 266.13 LBS

## 2019-05-14 DIAGNOSIS — A41.51 SEPSIS DUE TO ESCHERICHIA COLI: ICD-10-CM

## 2019-05-14 DIAGNOSIS — B96.29 UTI DUE TO EXTENDED-SPECTRUM BETA LACTAMASE (ESBL) PRODUCING ESCHERICHIA COLI: Primary | ICD-10-CM

## 2019-05-14 DIAGNOSIS — N39.0 UTI DUE TO EXTENDED-SPECTRUM BETA LACTAMASE (ESBL) PRODUCING ESCHERICHIA COLI: Primary | ICD-10-CM

## 2019-05-14 DIAGNOSIS — Z16.12 UTI DUE TO EXTENDED-SPECTRUM BETA LACTAMASE (ESBL) PRODUCING ESCHERICHIA COLI: Primary | ICD-10-CM

## 2019-05-14 LAB
BILIRUB UR QL STRIP: NEGATIVE
CLARITY UR: CLEAR
COLOR UR: YELLOW
GLUCOSE UR QL STRIP: NEGATIVE
HGB UR QL STRIP: NEGATIVE
KETONES UR QL STRIP: ABNORMAL
LEUKOCYTE ESTERASE UR QL STRIP: NEGATIVE
NITRITE UR QL STRIP: NEGATIVE
PH UR STRIP: 6 [PH] (ref 5–8)
PROT UR QL STRIP: NEGATIVE
SP GR UR STRIP: >=1.03 (ref 1–1.03)
URN SPEC COLLECT METH UR: ABNORMAL
UROBILINOGEN UR STRIP-ACNC: NEGATIVE EU/DL

## 2019-05-14 PROCEDURE — 3008F BODY MASS INDEX DOCD: CPT | Mod: CPTII,S$GLB,, | Performed by: NURSE PRACTITIONER

## 2019-05-14 PROCEDURE — 3075F SYST BP GE 130 - 139MM HG: CPT | Mod: CPTII,S$GLB,, | Performed by: NURSE PRACTITIONER

## 2019-05-14 PROCEDURE — 99214 OFFICE O/P EST MOD 30 MIN: CPT | Mod: S$GLB,,, | Performed by: NURSE PRACTITIONER

## 2019-05-14 PROCEDURE — 3075F PR MOST RECENT SYSTOLIC BLOOD PRESS GE 130-139MM HG: ICD-10-PCS | Mod: CPTII,S$GLB,, | Performed by: NURSE PRACTITIONER

## 2019-05-14 PROCEDURE — 3078F PR MOST RECENT DIASTOLIC BLOOD PRESSURE < 80 MM HG: ICD-10-PCS | Mod: CPTII,S$GLB,, | Performed by: NURSE PRACTITIONER

## 2019-05-14 PROCEDURE — 81003 URINALYSIS AUTO W/O SCOPE: CPT

## 2019-05-14 PROCEDURE — 99214 PR OFFICE/OUTPT VISIT, EST, LEVL IV, 30-39 MIN: ICD-10-PCS | Mod: S$GLB,,, | Performed by: NURSE PRACTITIONER

## 2019-05-14 PROCEDURE — 3008F PR BODY MASS INDEX (BMI) DOCUMENTED: ICD-10-PCS | Mod: CPTII,S$GLB,, | Performed by: NURSE PRACTITIONER

## 2019-05-14 PROCEDURE — 99999 PR PBB SHADOW E&M-EST. PATIENT-LVL III: CPT | Mod: PBBFAC,,, | Performed by: NURSE PRACTITIONER

## 2019-05-14 PROCEDURE — 3078F DIAST BP <80 MM HG: CPT | Mod: CPTII,S$GLB,, | Performed by: NURSE PRACTITIONER

## 2019-05-14 PROCEDURE — 87086 URINE CULTURE/COLONY COUNT: CPT

## 2019-05-14 PROCEDURE — 99999 PR PBB SHADOW E&M-EST. PATIENT-LVL III: ICD-10-PCS | Mod: PBBFAC,,, | Performed by: NURSE PRACTITIONER

## 2019-05-14 NOTE — PROGRESS NOTES
Subjective:       Patient ID: Reymundo Dang Sr. is a 64 y.o. male.    Chief Complaint: Follow-up (hospital )    HPI: Pt presents to clinic today known to mw with c/o needing f.u from hospital. He has not followed up wit Dr Morrison because he has been admitted to hospital every time he had roxanne ppt. He was admitted for SBO then a week later for drug resistant UTI. He was treated for 7 days with zosyn after starting on levaquin that was drug resistant. He has not had any issues with diarrhea since d/c. No N/V/D. Soft stools. Denies black or bloody stools. He reports that he has had no fever. He does still have slight burning when he urinates. He denies difficulty to urinate and has urgency. NO back pain. He had zosyn 5 days for his UTI. He was sent home on Sat after had Ua that was clean.   Review of Systems   Constitutional: Negative for chills and fever.   HENT: Negative for congestion, postnasal drip and sore throat.    Eyes: Negative for photophobia.   Respiratory: Negative for chest tightness and shortness of breath.    Cardiovascular: Negative for chest pain.   Gastrointestinal: Negative for abdominal distention, abdominal pain, blood in stool and vomiting.   Genitourinary: Positive for dysuria and urgency. Negative for flank pain and hematuria.   Musculoskeletal: Negative for back pain.   Skin: Negative for pallor.   Neurological: Negative for dizziness, seizures, facial asymmetry, speech difficulty and numbness.   Hematological: Does not bruise/bleed easily.   Psychiatric/Behavioral: Negative for agitation and suicidal ideas. The patient is not nervous/anxious.        Objective:      Physical Exam   Constitutional: He is oriented to person, place, and time. He appears well-developed and well-nourished.   HENT:   Head: Normocephalic and atraumatic.   Eyes: Pupils are equal, round, and reactive to light. Conjunctivae and EOM are normal.   Neck: Normal range of motion. Neck supple. No JVD present. No thyromegaly  present.   Cardiovascular: Normal rate, regular rhythm, normal heart sounds and intact distal pulses.   No murmur heard.  Pulmonary/Chest: Effort normal and breath sounds normal. No stridor. No respiratory distress. He has no wheezes. He has no rales.   Abdominal: Soft. Bowel sounds are normal. He exhibits no distension and no mass. There is tenderness. There is no guarding.   Mid abd soreness, no palp abnormality   Musculoskeletal: Normal range of motion. He exhibits no edema.   Lymphadenopathy:     He has no cervical adenopathy.   Neurological: He is alert and oriented to person, place, and time. He has normal reflexes. No cranial nerve deficit.   Skin: Skin is warm and dry. Capillary refill takes less than 2 seconds. No rash noted. No pallor.   Psychiatric: He has a normal mood and affect.   Nursing note and vitals reviewed.      Assessment:       1. UTI due to extended-spectrum beta lactamase (ESBL) producing Escherichia coli    2. Sepsis due to Escherichia coli        Plan:     Problem List Items Addressed This Visit     Sepsis due to Escherichia coli      Other Visit Diagnoses     UTI due to extended-spectrum beta lactamase (ESBL) producing Escherichia coli    -  Primary    Relevant Orders    Urinalysis    Urine culture          Repeat U/a and culture as he is still having urgency and dysuria.     Will remake his GI appt

## 2019-05-16 LAB — BACTERIA UR CULT: NORMAL

## 2019-05-17 DIAGNOSIS — F41.9 ANXIETY AND DEPRESSION: ICD-10-CM

## 2019-05-17 DIAGNOSIS — F32.A ANXIETY AND DEPRESSION: ICD-10-CM

## 2019-05-21 ENCOUNTER — TELEPHONE (OUTPATIENT)
Dept: INTERNAL MEDICINE | Facility: CLINIC | Age: 64
End: 2019-05-21

## 2019-05-21 NOTE — TELEPHONE ENCOUNTER
----- Message from Bria Knox sent at 2019  2:09 PM CDT -----  Contact: Yareli/Wife  Reymundo Dang Sr.  MRN: [id number]  : 1955  PCP: Rosalinda Villalba  Home Phone      [phone]  Work Phone      Not on file.  Mobile          [phone]      MESSAGE:   Needs RX  ALPRAZolam (XANAX) 0.5 MG tablet    Pharmacy: [facility] in [place]    Phone: [phone]

## 2019-05-22 RX ORDER — ALPRAZOLAM 0.5 MG/1
TABLET ORAL
Qty: 60 TABLET | Refills: 0 | Status: SHIPPED | OUTPATIENT
Start: 2019-05-22 | End: 2019-06-25 | Stop reason: SDUPTHER

## 2019-05-23 DIAGNOSIS — F32.A ANXIETY AND DEPRESSION: ICD-10-CM

## 2019-05-23 DIAGNOSIS — R30.0 DYSURIA: Primary | ICD-10-CM

## 2019-05-23 DIAGNOSIS — F41.9 ANXIETY AND DEPRESSION: ICD-10-CM

## 2019-05-23 RX ORDER — ALPRAZOLAM 0.5 MG/1
TABLET ORAL
Qty: 60 TABLET | Refills: 0 | OUTPATIENT
Start: 2019-05-23

## 2019-05-23 RX ORDER — NITROFURANTOIN 25; 75 MG/1; MG/1
100 CAPSULE ORAL 2 TIMES DAILY
Qty: 14 CAPSULE | Refills: 0 | Status: ON HOLD | OUTPATIENT
Start: 2019-05-23 | End: 2019-05-31 | Stop reason: HOSPADM

## 2019-05-23 NOTE — TELEPHONE ENCOUNTER
He was actually resistant to everything PO last visit I will send macrobid so he can start something but if this is trhe same bug he will need readmission. Follow up urine results in am please

## 2019-05-23 NOTE — TELEPHONE ENCOUNTER
Patients wife Yareli and Reymundo notified urinalysis and urine culture order faxed to [facility] for collection today. Informed Rosalinda Villalba, NP will call in an antibiotic but do not start antibiotic until result are reviewed. Patient and wife verbalized undertanidng with no questions or concerns. Fax confirmation received.

## 2019-05-23 NOTE — TELEPHONE ENCOUNTER
Mr. Dwyer states he is experiencing difficulty urinating, burning upon urination, urinary urgency, frequency, low grade temperature 99.0-100.0, flank pain x2 days. Denies nausea, vomiting, or any other symptoms. Unable to come in for an appointment this afternoon. Please advise.

## 2019-05-23 NOTE — TELEPHONE ENCOUNTER
Patients wife Yareli states patient has been experiencing a temp of 99.0-100.0 and complaining of difficulty urinating x2 days. Yareli states she is on her way home and will call back when she is with Mr. Dwyer so I can obtain more information. Awaiting a call back.

## 2019-05-24 ENCOUNTER — TELEPHONE (OUTPATIENT)
Dept: INTERNAL MEDICINE | Facility: CLINIC | Age: 64
End: 2019-05-24

## 2019-05-24 ENCOUNTER — HOSPITAL ENCOUNTER (OUTPATIENT)
Facility: HOSPITAL | Age: 64
Discharge: HOME | DRG: 690 | End: 2019-05-31
Attending: SURGERY | Admitting: FAMILY MEDICINE
Payer: MEDICARE

## 2019-05-24 DIAGNOSIS — R53.83 FATIGUE: ICD-10-CM

## 2019-05-24 DIAGNOSIS — Z16.12 ESBL (EXTENDED SPECTRUM BETA-LACTAMASE) PRODUCING BACTERIA INFECTION: ICD-10-CM

## 2019-05-24 DIAGNOSIS — A49.9 ESBL (EXTENDED SPECTRUM BETA-LACTAMASE) PRODUCING BACTERIA INFECTION: ICD-10-CM

## 2019-05-24 DIAGNOSIS — N30.00 ACUTE CYSTITIS WITHOUT HEMATURIA: Primary | ICD-10-CM

## 2019-05-24 PROBLEM — A41.51 SEPSIS DUE TO ESCHERICHIA COLI: Status: RESOLVED | Noted: 2019-04-27 | Resolved: 2019-05-24

## 2019-05-24 PROBLEM — K56.609 SBO (SMALL BOWEL OBSTRUCTION): Status: RESOLVED | Noted: 2019-04-03 | Resolved: 2019-05-24

## 2019-05-24 LAB
ALBUMIN SERPL BCP-MCNC: 3.5 G/DL (ref 3.5–5.2)
ALP SERPL-CCNC: 77 U/L (ref 55–135)
ALT SERPL W/O P-5'-P-CCNC: 23 U/L (ref 10–44)
ANION GAP SERPL CALC-SCNC: 10 MMOL/L (ref 8–16)
APTT BLDCRRT: 27.6 SEC (ref 21–32)
AST SERPL-CCNC: 19 U/L (ref 10–40)
BACTERIA #/AREA URNS HPF: ABNORMAL /HPF
BASOPHILS # BLD AUTO: 0.02 K/UL (ref 0–0.2)
BASOPHILS NFR BLD: 0.2 % (ref 0–1.9)
BILIRUB SERPL-MCNC: 0.4 MG/DL (ref 0.1–1)
BILIRUB UR QL STRIP: ABNORMAL
BNP SERPL-MCNC: 65 PG/ML (ref 0–99)
BUN SERPL-MCNC: 12 MG/DL (ref 8–23)
CALCIUM SERPL-MCNC: 9.5 MG/DL (ref 8.7–10.5)
CHLORIDE SERPL-SCNC: 104 MMOL/L (ref 95–110)
CK MB SERPL-MCNC: 1.6 NG/ML (ref 0.1–6.5)
CK MB SERPL-RTO: 1.3 % (ref 0–5)
CK SERPL-CCNC: 121 U/L (ref 20–200)
CK SERPL-CCNC: 121 U/L (ref 20–200)
CLARITY UR: ABNORMAL
CO2 SERPL-SCNC: 28 MMOL/L (ref 23–29)
COLOR UR: YELLOW
COMPLEXED PSA SERPL-MCNC: 1.2 NG/ML (ref 0–4)
CREAT SERPL-MCNC: 1.1 MG/DL (ref 0.5–1.4)
DIFFERENTIAL METHOD: ABNORMAL
EOSINOPHIL # BLD AUTO: 0.1 K/UL (ref 0–0.5)
EOSINOPHIL NFR BLD: 1.1 % (ref 0–8)
ERYTHROCYTE [DISTWIDTH] IN BLOOD BY AUTOMATED COUNT: 13.1 % (ref 11.5–14.5)
EST. GFR  (AFRICAN AMERICAN): >60 ML/MIN/1.73 M^2
EST. GFR  (NON AFRICAN AMERICAN): >60 ML/MIN/1.73 M^2
GLUCOSE SERPL-MCNC: 110 MG/DL (ref 70–110)
GLUCOSE UR QL STRIP: NEGATIVE
HCT VFR BLD AUTO: 41.5 % (ref 40–54)
HGB BLD-MCNC: 14.1 G/DL (ref 14–18)
HGB UR QL STRIP: ABNORMAL
HYALINE CASTS #/AREA URNS LPF: 0 /LPF
INR PPP: 1 (ref 0.8–1.2)
KETONES UR QL STRIP: ABNORMAL
LACTATE SERPL-SCNC: 1 MMOL/L (ref 0.5–2.2)
LACTATE SERPL-SCNC: 1.3 MMOL/L (ref 0.5–2.2)
LACTATE SERPL-SCNC: 1.5 MMOL/L (ref 0.5–2.2)
LEUKOCYTE ESTERASE UR QL STRIP: ABNORMAL
LYMPHOCYTES # BLD AUTO: 1.6 K/UL (ref 1–4.8)
LYMPHOCYTES NFR BLD: 16 % (ref 18–48)
MCH RBC QN AUTO: 30.4 PG (ref 27–31)
MCHC RBC AUTO-ENTMCNC: 34 G/DL (ref 32–36)
MCV RBC AUTO: 89 FL (ref 82–98)
MICROSCOPIC COMMENT: ABNORMAL
MONOCYTES # BLD AUTO: 1.5 K/UL (ref 0.3–1)
MONOCYTES NFR BLD: 14.6 % (ref 4–15)
NEUTROPHILS # BLD AUTO: 7 K/UL (ref 1.8–7.7)
NEUTROPHILS NFR BLD: 68.1 % (ref 38–73)
NITRITE UR QL STRIP: POSITIVE
PH UR STRIP: 7 [PH] (ref 5–8)
PHOSPHATE SERPL-MCNC: 2.3 MG/DL (ref 2.7–4.5)
PLATELET # BLD AUTO: 179 K/UL (ref 150–350)
PMV BLD AUTO: 10.8 FL (ref 9.2–12.9)
POTASSIUM SERPL-SCNC: 3.6 MMOL/L (ref 3.5–5.1)
PROT SERPL-MCNC: 7.2 G/DL (ref 6–8.4)
PROT UR QL STRIP: ABNORMAL
PROTHROMBIN TIME: 10.3 SEC (ref 9–12.5)
RBC # BLD AUTO: 4.64 M/UL (ref 4.6–6.2)
RBC #/AREA URNS HPF: 30 /HPF (ref 0–4)
SODIUM SERPL-SCNC: 142 MMOL/L (ref 136–145)
SP GR UR STRIP: 1.02 (ref 1–1.03)
TROPONIN I SERPL DL<=0.01 NG/ML-MCNC: <0.006 NG/ML (ref 0–0.03)
URN SPEC COLLECT METH UR: ABNORMAL
UROBILINOGEN UR STRIP-ACNC: 1 EU/DL
WBC # BLD AUTO: 10.27 K/UL (ref 3.9–12.7)
WBC #/AREA URNS HPF: 100 /HPF (ref 0–5)

## 2019-05-24 PROCEDURE — 96374 THER/PROPH/DIAG INJ IV PUSH: CPT

## 2019-05-24 PROCEDURE — 85610 PROTHROMBIN TIME: CPT

## 2019-05-24 PROCEDURE — 81000 URINALYSIS NONAUTO W/SCOPE: CPT

## 2019-05-24 PROCEDURE — 93005 ELECTROCARDIOGRAM TRACING: CPT

## 2019-05-24 PROCEDURE — 85025 COMPLETE CBC W/AUTO DIFF WBC: CPT

## 2019-05-24 PROCEDURE — 11000001 HC ACUTE MED/SURG PRIVATE ROOM

## 2019-05-24 PROCEDURE — G0378 HOSPITAL OBSERVATION PER HR: HCPCS

## 2019-05-24 PROCEDURE — 25000003 PHARM REV CODE 250: Performed by: FAMILY MEDICINE

## 2019-05-24 PROCEDURE — 87040 BLOOD CULTURE FOR BACTERIA: CPT

## 2019-05-24 PROCEDURE — 93010 ELECTROCARDIOGRAM REPORT: CPT | Mod: ,,, | Performed by: INTERNAL MEDICINE

## 2019-05-24 PROCEDURE — 63600175 PHARM REV CODE 636 W HCPCS: Performed by: SURGERY

## 2019-05-24 PROCEDURE — 93010 EKG 12-LEAD: ICD-10-PCS | Mod: ,,, | Performed by: INTERNAL MEDICINE

## 2019-05-24 PROCEDURE — 87088 URINE BACTERIA CULTURE: CPT

## 2019-05-24 PROCEDURE — 87186 SC STD MICRODIL/AGAR DIL: CPT

## 2019-05-24 PROCEDURE — 63600175 PHARM REV CODE 636 W HCPCS: Performed by: FAMILY MEDICINE

## 2019-05-24 PROCEDURE — 82553 CREATINE MB FRACTION: CPT

## 2019-05-24 PROCEDURE — 83880 ASSAY OF NATRIURETIC PEPTIDE: CPT

## 2019-05-24 PROCEDURE — 99285 EMERGENCY DEPT VISIT HI MDM: CPT | Mod: 25

## 2019-05-24 PROCEDURE — 25000003 PHARM REV CODE 250: Performed by: SURGERY

## 2019-05-24 PROCEDURE — 84153 ASSAY OF PSA TOTAL: CPT

## 2019-05-24 PROCEDURE — 99222 1ST HOSP IP/OBS MODERATE 55: CPT | Mod: AI,,, | Performed by: FAMILY MEDICINE

## 2019-05-24 PROCEDURE — 84484 ASSAY OF TROPONIN QUANT: CPT

## 2019-05-24 PROCEDURE — 82550 ASSAY OF CK (CPK): CPT

## 2019-05-24 PROCEDURE — 99222 PR INITIAL HOSPITAL CARE,LEVL II: ICD-10-PCS | Mod: AI,,, | Performed by: FAMILY MEDICINE

## 2019-05-24 PROCEDURE — 84100 ASSAY OF PHOSPHORUS: CPT

## 2019-05-24 PROCEDURE — 36415 COLL VENOUS BLD VENIPUNCTURE: CPT

## 2019-05-24 PROCEDURE — 87086 URINE CULTURE/COLONY COUNT: CPT

## 2019-05-24 PROCEDURE — 87077 CULTURE AEROBIC IDENTIFY: CPT

## 2019-05-24 PROCEDURE — 80053 COMPREHEN METABOLIC PANEL: CPT

## 2019-05-24 PROCEDURE — 83605 ASSAY OF LACTIC ACID: CPT

## 2019-05-24 PROCEDURE — 51702 INSERT TEMP BLADDER CATH: CPT

## 2019-05-24 PROCEDURE — 85730 THROMBOPLASTIN TIME PARTIAL: CPT

## 2019-05-24 RX ORDER — PRIMIDONE 50 MG/1
100 TABLET ORAL 2 TIMES DAILY
Status: DISCONTINUED | OUTPATIENT
Start: 2019-05-24 | End: 2019-05-28

## 2019-05-24 RX ORDER — ACETAMINOPHEN 325 MG/1
650 TABLET ORAL EVERY 8 HOURS PRN
Status: DISCONTINUED | OUTPATIENT
Start: 2019-05-24 | End: 2019-05-31 | Stop reason: HOSPADM

## 2019-05-24 RX ORDER — ONDANSETRON 2 MG/ML
4 INJECTION INTRAMUSCULAR; INTRAVENOUS EVERY 8 HOURS PRN
Status: DISCONTINUED | OUTPATIENT
Start: 2019-05-24 | End: 2019-05-31 | Stop reason: HOSPADM

## 2019-05-24 RX ORDER — SODIUM CHLORIDE 0.9 % (FLUSH) 0.9 %
10 SYRINGE (ML) INJECTION
Status: DISCONTINUED | OUTPATIENT
Start: 2019-05-24 | End: 2019-05-31 | Stop reason: HOSPADM

## 2019-05-24 RX ORDER — ALPRAZOLAM 0.5 MG/1
0.5 TABLET ORAL 2 TIMES DAILY PRN
Status: DISCONTINUED | OUTPATIENT
Start: 2019-05-24 | End: 2019-05-31 | Stop reason: HOSPADM

## 2019-05-24 RX ORDER — PANTOPRAZOLE SODIUM 40 MG/1
40 TABLET, DELAYED RELEASE ORAL DAILY
Status: DISCONTINUED | OUTPATIENT
Start: 2019-05-24 | End: 2019-05-31 | Stop reason: HOSPADM

## 2019-05-24 RX ORDER — SIMVASTATIN 10 MG/1
20 TABLET, FILM COATED ORAL NIGHTLY
Status: DISCONTINUED | OUTPATIENT
Start: 2019-05-24 | End: 2019-05-31 | Stop reason: HOSPADM

## 2019-05-24 RX ORDER — METOPROLOL TARTRATE 100 MG/1
100 TABLET ORAL 2 TIMES DAILY
Status: DISCONTINUED | OUTPATIENT
Start: 2019-05-24 | End: 2019-05-31 | Stop reason: HOSPADM

## 2019-05-24 RX ORDER — SODIUM CHLORIDE 9 MG/ML
INJECTION, SOLUTION INTRAVENOUS CONTINUOUS
Status: DISCONTINUED | OUTPATIENT
Start: 2019-05-24 | End: 2019-05-26

## 2019-05-24 RX ORDER — LISINOPRIL 20 MG/1
20 TABLET ORAL DAILY
Status: DISCONTINUED | OUTPATIENT
Start: 2019-05-25 | End: 2019-05-25

## 2019-05-24 RX ORDER — AMLODIPINE BESYLATE 10 MG/1
10 TABLET ORAL DAILY
Status: DISCONTINUED | OUTPATIENT
Start: 2019-05-25 | End: 2019-05-31 | Stop reason: HOSPADM

## 2019-05-24 RX ORDER — BUPROPION HYDROCHLORIDE 150 MG/1
300 TABLET ORAL DAILY
Status: DISCONTINUED | OUTPATIENT
Start: 2019-05-25 | End: 2019-05-31 | Stop reason: HOSPADM

## 2019-05-24 RX ORDER — ONDANSETRON 2 MG/ML
4 INJECTION INTRAMUSCULAR; INTRAVENOUS
Status: COMPLETED | OUTPATIENT
Start: 2019-05-24 | End: 2019-05-24

## 2019-05-24 RX ADMIN — ONDANSETRON 4 MG: 2 INJECTION INTRAMUSCULAR; INTRAVENOUS at 07:05

## 2019-05-24 RX ADMIN — PIPERACILLIN AND TAZOBACTAM 4.5 G: 4; .5 INJECTION, POWDER, LYOPHILIZED, FOR SOLUTION INTRAVENOUS; PARENTERAL at 12:05

## 2019-05-24 RX ADMIN — SIMVASTATIN 20 MG: 10 TABLET, FILM COATED ORAL at 09:05

## 2019-05-24 RX ADMIN — ONDANSETRON 4 MG: 2 INJECTION INTRAMUSCULAR; INTRAVENOUS at 12:05

## 2019-05-24 RX ADMIN — METOPROLOL TARTRATE 100 MG: 100 TABLET ORAL at 09:05

## 2019-05-24 RX ADMIN — PRIMIDONE 100 MG: 50 TABLET ORAL at 09:05

## 2019-05-24 RX ADMIN — SODIUM CHLORIDE: 0.9 INJECTION, SOLUTION INTRAVENOUS at 12:05

## 2019-05-24 RX ADMIN — PIPERACILLIN AND TAZOBACTAM 4.5 G: 4; .5 INJECTION, POWDER, LYOPHILIZED, FOR SOLUTION INTRAVENOUS; PARENTERAL at 09:05

## 2019-05-24 NOTE — PLAN OF CARE
Problem: Adult Inpatient Plan of Care  Goal: Plan of Care Review  Outcome: Ongoing (interventions implemented as appropriate)  vitals remained stable, afebrile. No complaints of pain. Complained of discomfort from catheter insertion. Urine is cloudy with some sediment .complained of nausea and vomiting. One episode of emesis this afternoon about 100 mL. IV fluids started and abx given. Discussed plan of care with pt,stated understanding

## 2019-05-24 NOTE — PLAN OF CARE
"   05/24/19 1351   Readmission Questionnaire   At the time of your discharge, did someone talk to you about what your health problems were? Yes   At the time of discharge, did someone talk to you about what to watch out for regarding worsening of your health problem? Yes   At the time of discharge, did someone talk to you about what to do if you experienced worsening of your health problem? Yes   At the time of discharge, did someone talk to you about which medication to take when you left the hospital and which ones to stop taking? Yes   At the time of discharge, did someone talk to you about when and where to follow up with a doctor after you left the hospital? Yes   What do you believe caused you to be sick enough to be re-admitted? "UTI"   How often do you need to have someone help you when you read instructions, pamphlets, or other written material from your doctor or pharmacy? Sometimes   Do you have problems taking your medications as prescribed? No   Do you have any problems affording any of  your prescribed medications? No   Do you have problems obtaining/receiving your medications? No   Does the patient have transportation to healthcare appointments? No   Lives With spouse   Living Arrangements house   Does the patient have family/friends to help with healtcare needs after discharge? yes   Who are your caregiver(s) and their phone number(s)? Spouse [phone]   Does your caregiver provide all the help you need? Yes   Are you currently feeling confused? No   Are you currently having problems thinking? No   Are you currently having memory problems? No   Have you felt down, depressed, or hopeless? 0   Have you felt little interest or pleasure in doing things? 0   In the last 7 days, my sleep quality was: fair     "

## 2019-05-24 NOTE — TELEPHONE ENCOUNTER
Patients wife Yareli states Mr. Dwyer is experiencing temperature 100.0 and difficulty urinating. Yareli instructed to take patient to ER per notation on urinalysis from Rosalinda Villalba NP. Yareli verbalized understanding with no questions or concerns. States she will bring Mr. Dwyer to the ER. Urinalysis result from LOS scanned to chart.

## 2019-05-24 NOTE — HPI
64 year old male comes in after fever started 3 days ago. The next day he says he started with burning with urinating. By yesterday, he says he couldn't get urine out at all. He was recently hospitalized for a UTI and was dc'd without abx but felt back to normal. His only other complaint was constipation today after diarrhea yesterday.

## 2019-05-24 NOTE — PLAN OF CARE
05/24/19 1355   Medicare Message   Important Message from Medicare regarding Discharge Appeal Rights Explained to patient/caregiver;Given to patient/caregiver;Signed/date by patient/caregiver   Date IMM was signed 05/24/19   Time IMM was signed [id number]

## 2019-05-24 NOTE — PLAN OF CARE
05/24/19 1357   Medicare Message   Important Message from Medicare regarding Discharge Appeal Rights Given to patient/caregiver;Explained to patient/caregiver;Signed/date by patient/caregiver   Date IMM was signed 05/24/19   Time IMM was signed [id number]

## 2019-05-24 NOTE — ED PROVIDER NOTES
Encounter Date: 5/24/2019       History     Chief Complaint   Patient presents with    Urinary Retention     vomiting, diarrhea, back and abominal pain     Reymundo Dang Sr. is a 64 y.o. Male with PMH of Crohn's disease, CVA with right sided hemiparesis, Hyperlipidemia, HTN, RLS, and organic brain disorder who presents to the ED with reports of 2 day h/o urinary retention, low grade fever, and abdominal pain. Tmax of 100.6F at home for the past 2 days with associated chills and body aches.   Reports burning with every urination; urinary frequency (keeping him up at night) with oliguria. Denies foul odor. Denies testicular pain or swelling. Denies penile discharge. Admit on 04/27; + uti/urine culture + ecoli ESBL; f/u on 05/14 with PCP and repeat u/a completed with negative results.   Subjective low grade fever per wife reports; afebrile here today.  Generalized abdominal pain that started yesterday with associated nausea, vomiting and diarrhea. Intermittent nausea with vomiting episodes X 2 since yesterday; denies hematemesis; reports yellow-tinged emesis. Decreased appetite, but tolerating po liquid. Loose, watery stool that started yesterday. Reports 4-5 day hx of constipation with no BM; then started with diarrhea yesterday. Denies blood or mucus in stool.     The history is provided by the patient.     Review of patient's allergies indicates:   Allergen Reactions    Aspirin      Other reaction(s): Hx of Aneurysm    Sulfa (sulfonamide antibiotics) Other (See Comments)     Other reaction(s): dizzy     Past Medical History:   Diagnosis Date    Aneurysm     s/p craniotomy prior to rupture 1990    Crohn's disease     CVA (cerebral vascular accident)     unknown date, left sided with right sided hemiparesis    Edema     History of organic brain syndrome     Hyperlipidemia     Hypertension     Restless legs syndrome (RLS)      Past Surgical History:   Procedure Laterality Date    BRAIN SURGERY      Stent  Aneurysm    CERVICAL SPINE SURGERY      CHOLECYSTECTOMY      HERNIA REPAIR      SHOULDER SURGERY      Right     Family History   Problem Relation Age of Onset    Glaucoma Mother     Clotting disorder Mother     Aneurysm Father     Lung cancer Brother      Social History     Tobacco Use    Smoking status: Never Smoker    Smokeless tobacco: Never Used   Substance Use Topics    Alcohol use: No    Drug use: No     Review of Systems   Constitutional: Positive for chills and fever. Negative for appetite change.   HENT: Negative.  Negative for congestion, ear discharge, ear pain, postnasal drip, rhinorrhea and sore throat.    Eyes: Negative.    Respiratory: Negative.  Negative for cough, chest tightness and shortness of breath.    Cardiovascular: Negative.  Negative for chest pain.   Gastrointestinal: Positive for abdominal distention, abdominal pain, diarrhea, nausea and vomiting.   Endocrine: Negative.    Genitourinary: Positive for difficulty urinating and dysuria. Negative for decreased urine volume, discharge, flank pain, hematuria, penile pain, penile swelling, testicular pain and urgency.   Musculoskeletal: Negative.  Negative for arthralgias and back pain.   Skin: Negative.  Negative for rash.   Allergic/Immunologic: Negative.    Neurological: Negative.  Negative for dizziness, weakness, numbness and headaches.   Hematological: Negative.  Does not bruise/bleed easily.   Psychiatric/Behavioral: Negative.        Physical Exam     Initial Vitals [05/24/19 1051]   BP Pulse Resp Temp SpO2   (!) 145/76 71 20 97.3 °F (36.3 °C) 97 %      MAP       --         Physical Exam    Nursing note and vitals reviewed.  Constitutional: He appears well-developed and well-nourished.   HENT:   Head: Normocephalic and atraumatic.   Eyes: Conjunctivae and EOM are normal. Pupils are equal, round, and reactive to light.   Neck: Neck supple.   Cardiovascular: Normal rate, regular rhythm, normal heart sounds and intact distal  pulses.   Pulmonary/Chest: Breath sounds normal.   Abdominal: Soft. Bowel sounds are normal. He exhibits distension. There is generalized tenderness. There is no rigidity, no rebound, no guarding, no CVA tenderness, no tenderness at McBurney's point and negative Gonsales's sign. No hernia.   Musculoskeletal: Normal range of motion.   Neurological: He is alert and oriented to person, place, and time. He has normal strength.   Skin: Skin is warm and dry.   Psychiatric: He has a normal mood and affect. His behavior is normal. Judgment and thought content normal.         ED Course   Procedures  Labs Reviewed   CBC W/ AUTO DIFFERENTIAL - Abnormal; Notable for the following components:       Result Value    Mono # 1.5 (*)     Lymph% 16.0 (*)     All other components within normal limits   URINALYSIS, REFLEX TO URINE CULTURE - Abnormal; Notable for the following components:    Appearance, UA Cloudy (*)     Protein, UA 3+ (*)     Ketones, UA 1+ (*)     Bilirubin (UA) 1+ (*)     Occult Blood UA 3+ (*)     Nitrite, UA Positive (*)     Leukocytes, UA 1+ (*)     All other components within normal limits    Narrative:     Preferred Collection Type->Urine, Clean Catch   PHOSPHORUS - Abnormal; Notable for the following components:    Phosphorus 2.3 (*)     All other components within normal limits   URINALYSIS MICROSCOPIC - Abnormal; Notable for the following components:    RBC, UA 30 (*)     WBC,  (*)     Bacteria Few (*)     All other components within normal limits    Narrative:     Preferred Collection Type->Urine, Clean Catch   CULTURE, BLOOD   CULTURE, BLOOD   CULTURE, URINE   COMPREHENSIVE METABOLIC PANEL   LACTIC ACID, PLASMA   TROPONIN I   CK   CK-MB   B-TYPE NATRIURETIC PEPTIDE   PROTIME-INR   APTT   LACTIC ACID, PLASMA   PSA, SCREENING          Imaging Results          X-Ray Abdomen AP 1 View (In process)    Procedure changed from X-Ray Abdomen Flat And Erect                                       Clinical  Impression:       ICD-10-CM ICD-9-CM   1. Acute cystitis without hematuria N30.00 595.0   2. Fatigue R53.83 780.79         Disposition:   Disposition: Admitted  Condition: Stable       This patient was recently admitted for E coli related urinary tract infection earlier this month  Patient was having subjective fevers at home per wife at bedside, concerned about recurrent UTI  Patient brought in here today with a normal white count no fever, alert and appropriate today  Patient has urinary tract infection on catheterized urine, culture sent with blood cultures as well  Discussed with primary care physician Chris, there is concerned about possible prostate infection  PSA sent, culture sent, IV Zosyn started, will admit the patient to the hospital for further evaluation                 Praveen High MD  05/24/19 4771

## 2019-05-24 NOTE — PLAN OF CARE
05/24/19 1357   Discharge Assessment   Assessment Type Discharge Planning Assessment   Confirmed/corrected address and phone number on facesheet? Yes   Assessment information obtained from? Patient;Medical Record   Expected Length of Stay (days) 4   Communicated expected length of stay with patient/caregiver yes   Prior to hospitilization cognitive status: Alert/Oriented   Prior to hospitalization functional status: Assistive Equipment   Current cognitive status: Alert/Oriented   Current Functional Status: Assistive Equipment   Facility Arrived From: home   Lives With spouse   Able to Return to Prior Arrangements yes   Is patient able to care for self after discharge? Yes   Who are your caregiver(s) and their phone number(s)? spouse [phone]   Patient's perception of discharge disposition home or selfcare   Readmission Within the Last 30 Days previous discharge plan unsuccessful   If yes, most recent facility name: [facility]   Patient currently being followed by outpatient case management? No   Patient currently receives any other outside agency services? No   Equipment Currently Used at Home cane, straight;walker, rolling;shower chair;bedside commode;rollator   Do you have any problems affording any of your prescribed medications? No   Is the patient taking medications as prescribed? yes   Does the patient have transportation home? Yes   Transportation Anticipated family or friend will provide   Dialysis Name and Scheduled days n/a   Does the patient receive services at the [facility]madin Clinic? No   Discharge Plan A Home   Discharge Plan B Home Health   DME Needed Upon Discharge  none   Patient/Family in Agreement with Plan yes         Patient admitted for UTI. Known for resistance to a number of antibiotics. He lives at home with spouse. He does have all equipment needed at this time. Spouse will provide transportation home once medically cleared for discharge. Patient denies any needs or concerns at  this time for discharge. Patient educated on diagnosis for this admission, and patient verbalizes understanding. Discharge planning brochure and educational handout of what signs and symptoms to look for after discharge, and how to manage health at home, given to patient and family. Patient and family are encouraged to call with any questions or help the would need. Contact information given. CM will continue to follow patient throughout the transitions of care, and assist with any discharge needs.

## 2019-05-24 NOTE — ASSESSMENT & PLAN NOTE
Treat with zosyn for now and follow sensitivies. Recent MDRUTI.  Concern for possible prostatic involvement

## 2019-05-24 NOTE — H&P
[facility] Medicine  History & Physical    Patient Name: Reymundo Dang Sr.  MRN: [id number]  Admission Date: 5/24/2019  Attending Physician: Lori Perez MD   Primary Care Provider: Rosalinda Villalba NP         Patient information was obtained from patient and ER records.     Subjective:     Principal Problem:Acute cystitis without hematuria    Chief Complaint:   Chief Complaint   Patient presents with    Urinary Retention     vomiting, diarrhea, back and abominal pain        HPI: 64 year old male comes in after fever started 3 days ago. The next day he says he started with burning with urinating. By yesterday, he says he couldn't get urine out at all. He was recently hospitalized for a UTI and was dc'd without abx but felt back to normal. His only other complaint was constipation today after diarrhea yesterday.    Past Medical History:   Diagnosis Date    Aneurysm     s/p craniotomy prior to rupture 1990    Crohn's disease     CVA (cerebral vascular accident)     unknown date, left sided with right sided hemiparesis    Edema     History of organic brain syndrome     Hyperlipidemia     Hypertension     Restless legs syndrome (RLS)        Past Surgical History:   Procedure Laterality Date    BRAIN SURGERY      Stent Aneurysm    CERVICAL SPINE SURGERY      CHOLECYSTECTOMY      HERNIA REPAIR      SHOULDER SURGERY      Right       Review of patient's allergies indicates:   Allergen Reactions    Aspirin      Other reaction(s): Hx of Aneurysm    Sulfa (sulfonamide antibiotics) Other (See Comments)     Other reaction(s): dizzy       No current facility-administered medications on file prior to encounter.      Current Outpatient Medications on File Prior to Encounter   Medication Sig    ALPRAZolam (XANAX) 0.5 MG tablet TAKE 1 TABLET BY MOUTH TWICE A DAY AS NEEDED    amlodipine (NORVASC) 10 MG tablet Take 1 tablet (10 mg total) by mouth once daily.    buPROPion (WELLBUTRIN  XL) 300 MG 24 hr tablet Take 300 mg by mouth once daily.    cyanocobalamin (VITAMIN B-12) 1000 MCG tablet Take 1 tablet by mouth once daily.    lisinopril (PRINIVIL,ZESTRIL) 20 MG tablet Take 1 tablet by mouth once daily.    mesalamine (ASACOL HD) 800 mg TbEC Take 1 tablet (800 mg total) by mouth once daily.    metoprolol tartrate (LOPRESSOR) 100 MG tablet Take 1 tablet (100 mg total) by mouth 2 (two) times daily.    MULTIVITAMIN W-MINERALS/LUTEIN (CENTRUM SILVER ORAL) Take 1 tablet by mouth once daily.      omeprazole (PRILOSEC) 40 MG capsule Take 40 mg by mouth once daily.    potassium chloride SA (K-DUR,KLOR-CON) 20 MEQ tablet TAKE 1 TABLET (20 MEQ TOTAL) BY MOUTH ONCE DAILY.    primidone (MYSOLINE) 50 MG Tab TAKE 1 TABLET BY MOUTH 3 TIMES A DAY (Patient taking differently: TAKE 2 TABLET BY MOUTH BID)    pyridoxine HCl, vitamin B6, (VITAMIN B-6 ORAL) Take 1 tablet by mouth once daily.    simvastatin (ZOCOR) 20 MG tablet Take 1 tablet (20 mg total) by mouth every evening.    vitamin E 400 UNIT capsule Take 400 Units by mouth once daily.    vortioxetine (TRINTELLIX) 10 mg Tab Take 1 tablet by mouth once daily.    acetaminophen/diphenhydramine (ACETAMINOPHEN PM ORAL) Take 2 tablets by mouth nightly as needed.    hydrochlorothiazide (HYDRODIURIL) 25 MG tablet Take 1 tablet (25 mg total) by mouth once daily.    mirabegron (MYRBETRIQ) 25 mg Tb24 ER tablet Take 1 tablet (25 mg total) by mouth once daily.    nitrofurantoin, macrocrystal-monohydrate, (MACROBID) 100 MG capsule Take 1 capsule (100 mg total) by mouth 2 (two) times daily.    promethazine (PHENERGAN) 25 MG tablet Take 1 tablet (25 mg total) by mouth every 6 (six) hours as needed for Nausea.     Family History     Problem Relation (Age of Onset)    Aneurysm Father    Clotting disorder Mother    Glaucoma Mother    Lung cancer Brother        Tobacco Use    Smoking status: Never Smoker    Smokeless tobacco: Never Used   Substance and Sexual  Activity    Alcohol use: No    Drug use: No    Sexual activity: Yes     Partners: Female     Review of Systems   Constitutional: Positive for fever. Negative for chills.   HENT: Negative for congestion, ear pain, postnasal drip, rhinorrhea, sore throat and trouble swallowing.    Eyes: Negative for redness and itching.   Respiratory: Negative for cough, shortness of breath and wheezing.    Cardiovascular: Negative for chest pain and palpitations.   Gastrointestinal: Positive for diarrhea and vomiting. Negative for abdominal pain and nausea.   Genitourinary: Positive for difficulty urinating and dysuria. Negative for frequency.   Skin: Negative for rash.   Neurological: Negative for weakness and headaches.     Objective:     Vital Signs (Most Recent):  Temp: 98.9 °F (37.2 °C) (05/24/19 1638)  Pulse: 90 (05/24/19 1638)  Resp: 18 (05/24/19 1638)  BP: (!) 169/79 (05/24/19 1638)  SpO2: 97 % (05/24/19 1638) Vital Signs (24h Range):  Temp:  [97.2 °F (36.2 °C)-98.9 °F (37.2 °C)] 98.9 °F (37.2 °C)  Pulse:  [71-90] 90  Resp:  [18-20] 18  SpO2:  [95 %-97 %] 97 %  BP: (139-169)/(76-89) 169/79     Weight: 121.1 kg (267 lb)  Body mass index is 43.09 kg/m².    Physical Exam   Constitutional: He is oriented to person, place, and time. He appears well-developed. No distress.   Hiccups during conversation.    obese   HENT:   Head: Normocephalic and atraumatic.   Eyes: Pupils are equal, round, and reactive to light. Conjunctivae are normal.   Neck: Normal range of motion. Neck supple. No thyromegaly present.   Cardiovascular: Normal rate, regular rhythm, normal heart sounds and intact distal pulses.   Pulmonary/Chest: Effort normal and breath sounds normal. No respiratory distress. He has no wheezes.   Abdominal: Soft. Bowel sounds are normal. There is no tenderness.   Genitourinary:   Genitourinary Comments: Pace in place   Musculoskeletal: Normal range of motion. He exhibits no edema.   Lymphadenopathy:     He has no cervical  adenopathy.   Neurological: He is alert and oriented to person, place, and time.   Skin: Skin is warm and dry. No rash noted.   Psychiatric: He has a normal mood and affect. His behavior is normal.   Nursing note and vitals reviewed.        CRANIAL NERVES     CN III, IV, VI   Pupils are equal, round, and reactive to light.       Significant Labs:   Blood Culture: No results for input(s): LABBLOO in the last 48 hours.  CBC:   Recent Labs   Lab 05/24/19  1120   WBC 10.27   HGB 14.1   HCT 41.5        CMP:   Recent Labs   Lab 05/24/19  1120      K 3.6      CO2 28      BUN 12   CREATININE 1.1   CALCIUM 9.5   PROT 7.2   ALBUMIN 3.5   BILITOT 0.4   ALKPHOS 77   AST 19   ALT 23   ANIONGAP 10   EGFRNONAA >60     Lactic Acid:   Recent Labs   Lab 05/24/19  1120 05/24/19  1536   LACTATE 1.0 1.5     Urine Culture: No results for input(s): LABURIN in the last 48 hours.  Urine Studies:   Recent Labs   Lab 05/24/19  1055   COLORU Yellow   APPEARANCEUA Cloudy*   PHUR 7.0   SPECGRAV 1.020   PROTEINUA 3+*   GLUCUA Negative   KETONESU 1+*   BILIRUBINUA 1+*   OCCULTUA 3+*   NITRITE Positive*   UROBILINOGEN 1.0   LEUKOCYTESUR 1+*   RBCUA 30*   WBCUA 100*   BACTERIA Few*   HYALINECASTS 0       Significant Imaging: I have reviewed all pertinent imaging results/findings within the past 24 hours.     KUB:  Prior hernia repair.  Vascular calcifications overlying the pelvis.    No evidence of focal bowel obstruction.  No evidence of pathologic calcifications or soft tissue masses.    Assessment/Plan:     * Acute cystitis without hematuria  Treat with zosyn for now and follow sensitivies. Recent MDRUTI.  Concern for possible prostatic involvement      Recurrent major depressive disorder, in full remission  Resume home meds      Hypertension  Resume home meds      Restless legs syndrome (RLS)  Resume home meds      Crohn's disease  Hold mesalamine        VTE Risk Mitigation (From admission, onward)        Ordered     IP  VTE HIGH RISK PATIENT  Once      05/24/19 1236     Place sequential compression device  Until discontinued      05/24/19 1236             Lori Perez MD  Department of Hospital Medicine   [facility]

## 2019-05-24 NOTE — SUBJECTIVE & OBJECTIVE
Past Medical History:   Diagnosis Date    Aneurysm     s/p craniotomy prior to rupture 1990    Crohn's disease     CVA (cerebral vascular accident)     unknown date, left sided with right sided hemiparesis    Edema     History of organic brain syndrome     Hyperlipidemia     Hypertension     Restless legs syndrome (RLS)        Past Surgical History:   Procedure Laterality Date    BRAIN SURGERY      Stent Aneurysm    CERVICAL SPINE SURGERY      CHOLECYSTECTOMY      HERNIA REPAIR      SHOULDER SURGERY      Right       Review of patient's allergies indicates:   Allergen Reactions    Aspirin      Other reaction(s): Hx of Aneurysm    Sulfa (sulfonamide antibiotics) Other (See Comments)     Other reaction(s): dizzy       No current facility-administered medications on file prior to encounter.      Current Outpatient Medications on File Prior to Encounter   Medication Sig    ALPRAZolam (XANAX) 0.5 MG tablet TAKE 1 TABLET BY MOUTH TWICE A DAY AS NEEDED    amlodipine (NORVASC) 10 MG tablet Take 1 tablet (10 mg total) by mouth once daily.    buPROPion (WELLBUTRIN XL) 300 MG 24 hr tablet Take 300 mg by mouth once daily.    cyanocobalamin (VITAMIN B-12) 1000 MCG tablet Take 1 tablet by mouth once daily.    lisinopril (PRINIVIL,ZESTRIL) 20 MG tablet Take 1 tablet by mouth once daily.    mesalamine (ASACOL HD) 800 mg TbEC Take 1 tablet (800 mg total) by mouth once daily.    metoprolol tartrate (LOPRESSOR) 100 MG tablet Take 1 tablet (100 mg total) by mouth 2 (two) times daily.    MULTIVITAMIN W-MINERALS/LUTEIN (CENTRUM SILVER ORAL) Take 1 tablet by mouth once daily.      omeprazole (PRILOSEC) 40 MG capsule Take 40 mg by mouth once daily.    potassium chloride SA (K-DUR,KLOR-CON) 20 MEQ tablet TAKE 1 TABLET (20 MEQ TOTAL) BY MOUTH ONCE DAILY.    primidone (MYSOLINE) 50 MG Tab TAKE 1 TABLET BY MOUTH 3 TIMES A DAY (Patient taking differently: TAKE 2 TABLET BY MOUTH BID)    pyridoxine HCl, vitamin B6,  (VITAMIN B-6 ORAL) Take 1 tablet by mouth once daily.    simvastatin (ZOCOR) 20 MG tablet Take 1 tablet (20 mg total) by mouth every evening.    vitamin E 400 UNIT capsule Take 400 Units by mouth once daily.    vortioxetine (TRINTELLIX) 10 mg Tab Take 1 tablet by mouth once daily.    acetaminophen/diphenhydramine (ACETAMINOPHEN PM ORAL) Take 2 tablets by mouth nightly as needed.    hydrochlorothiazide (HYDRODIURIL) 25 MG tablet Take 1 tablet (25 mg total) by mouth once daily.    mirabegron (MYRBETRIQ) 25 mg Tb24 ER tablet Take 1 tablet (25 mg total) by mouth once daily.    nitrofurantoin, macrocrystal-monohydrate, (MACROBID) 100 MG capsule Take 1 capsule (100 mg total) by mouth 2 (two) times daily.    promethazine (PHENERGAN) 25 MG tablet Take 1 tablet (25 mg total) by mouth every 6 (six) hours as needed for Nausea.     Family History     Problem Relation (Age of Onset)    Aneurysm Father    Clotting disorder Mother    Glaucoma Mother    Lung cancer Brother        Tobacco Use    Smoking status: Never Smoker    Smokeless tobacco: Never Used   Substance and Sexual Activity    Alcohol use: No    Drug use: No    Sexual activity: Yes     Partners: Female     Review of Systems   Constitutional: Positive for fever. Negative for chills.   HENT: Negative for congestion, ear pain, postnasal drip, rhinorrhea, sore throat and trouble swallowing.    Eyes: Negative for redness and itching.   Respiratory: Negative for cough, shortness of breath and wheezing.    Cardiovascular: Negative for chest pain and palpitations.   Gastrointestinal: Positive for diarrhea and vomiting. Negative for abdominal pain and nausea.   Genitourinary: Positive for difficulty urinating and dysuria. Negative for frequency.   Skin: Negative for rash.   Neurological: Negative for weakness and headaches.     Objective:     Vital Signs (Most Recent):  Temp: 98.9 °F (37.2 °C) (05/24/19 1638)  Pulse: 90 (05/24/19 1638)  Resp: 18 (05/24/19  1638)  BP: (!) 169/79 (05/24/19 1638)  SpO2: 97 % (05/24/19 1638) Vital Signs (24h Range):  Temp:  [97.2 °F (36.2 °C)-98.9 °F (37.2 °C)] 98.9 °F (37.2 °C)  Pulse:  [71-90] 90  Resp:  [18-20] 18  SpO2:  [95 %-97 %] 97 %  BP: (139-169)/(76-89) 169/79     Weight: 121.1 kg (267 lb)  Body mass index is 43.09 kg/m².    Physical Exam   Constitutional: He is oriented to person, place, and time. He appears well-developed. No distress.   Hiccups during conversation.    obese   HENT:   Head: Normocephalic and atraumatic.   Eyes: Pupils are equal, round, and reactive to light. Conjunctivae are normal.   Neck: Normal range of motion. Neck supple. No thyromegaly present.   Cardiovascular: Normal rate, regular rhythm, normal heart sounds and intact distal pulses.   Pulmonary/Chest: Effort normal and breath sounds normal. No respiratory distress. He has no wheezes.   Abdominal: Soft. Bowel sounds are normal. There is no tenderness.   Genitourinary:   Genitourinary Comments: Pace in place   Musculoskeletal: Normal range of motion. He exhibits no edema.   Lymphadenopathy:     He has no cervical adenopathy.   Neurological: He is alert and oriented to person, place, and time.   Skin: Skin is warm and dry. No rash noted.   Psychiatric: He has a normal mood and affect. His behavior is normal.   Nursing note and vitals reviewed.        CRANIAL NERVES     CN III, IV, VI   Pupils are equal, round, and reactive to light.       Significant Labs:   Blood Culture: No results for input(s): LABBLOO in the last 48 hours.  CBC:   Recent Labs   Lab 05/24/19  1120   WBC 10.27   HGB 14.1   HCT 41.5        CMP:   Recent Labs   Lab 05/24/19  1120      K 3.6      CO2 28      BUN 12   CREATININE 1.1   CALCIUM 9.5   PROT 7.2   ALBUMIN 3.5   BILITOT 0.4   ALKPHOS 77   AST 19   ALT 23   ANIONGAP 10   EGFRNONAA >60     Lactic Acid:   Recent Labs   Lab 05/24/19  1120 05/24/19  1536   LACTATE 1.0 1.5     Urine Culture: No results for  input(s): LABURIN in the last 48 hours.  Urine Studies:   Recent Labs   Lab 05/24/19  1055   COLORU Yellow   APPEARANCEUA Cloudy*   PHUR 7.0   SPECGRAV 1.020   PROTEINUA 3+*   GLUCUA Negative   KETONESU 1+*   BILIRUBINUA 1+*   OCCULTUA 3+*   NITRITE Positive*   UROBILINOGEN 1.0   LEUKOCYTESUR 1+*   RBCUA 30*   WBCUA 100*   BACTERIA Few*   HYALINECASTS 0       Significant Imaging: I have reviewed all pertinent imaging results/findings within the past 24 hours.     KUB:  Prior hernia repair.  Vascular calcifications overlying the pelvis.    No evidence of focal bowel obstruction.  No evidence of pathologic calcifications or soft tissue masses.

## 2019-05-24 NOTE — PLAN OF CARE
05/24/19 1355   Advance Directives (For Healthcare)   Advance Directive  (If Adv Dir status is received, view document under Code in header or Chart Review Media tab) Patient does not have Advance Directive, declines information.

## 2019-05-24 NOTE — PLAN OF CARE
05/24/19 1357   Advance Directives (For Healthcare)   Advance Directive  (If Adv Dir status is received, view document under Code in header or Chart Review Media tab) Patient does not have Advance Directive, declines information.

## 2019-05-25 LAB
ALBUMIN SERPL BCP-MCNC: 2.9 G/DL (ref 3.5–5.2)
ALP SERPL-CCNC: 61 U/L (ref 55–135)
ALT SERPL W/O P-5'-P-CCNC: 19 U/L (ref 10–44)
ANION GAP SERPL CALC-SCNC: 8 MMOL/L (ref 8–16)
AST SERPL-CCNC: 13 U/L (ref 10–40)
BASOPHILS # BLD AUTO: 0.01 K/UL (ref 0–0.2)
BASOPHILS NFR BLD: 0.1 % (ref 0–1.9)
BILIRUB SERPL-MCNC: 0.3 MG/DL (ref 0.1–1)
BUN SERPL-MCNC: 10 MG/DL (ref 8–23)
CALCIUM SERPL-MCNC: 8.7 MG/DL (ref 8.7–10.5)
CHLORIDE SERPL-SCNC: 107 MMOL/L (ref 95–110)
CO2 SERPL-SCNC: 29 MMOL/L (ref 23–29)
CREAT SERPL-MCNC: 0.9 MG/DL (ref 0.5–1.4)
DIFFERENTIAL METHOD: ABNORMAL
EOSINOPHIL # BLD AUTO: 0.3 K/UL (ref 0–0.5)
EOSINOPHIL NFR BLD: 4.3 % (ref 0–8)
ERYTHROCYTE [DISTWIDTH] IN BLOOD BY AUTOMATED COUNT: 13 % (ref 11.5–14.5)
EST. GFR  (AFRICAN AMERICAN): >60 ML/MIN/1.73 M^2
EST. GFR  (NON AFRICAN AMERICAN): >60 ML/MIN/1.73 M^2
GLUCOSE SERPL-MCNC: 99 MG/DL (ref 70–110)
HCT VFR BLD AUTO: 39.3 % (ref 40–54)
HGB BLD-MCNC: 13.1 G/DL (ref 14–18)
LYMPHOCYTES # BLD AUTO: 1.7 K/UL (ref 1–4.8)
LYMPHOCYTES NFR BLD: 24.8 % (ref 18–48)
MCH RBC QN AUTO: 30 PG (ref 27–31)
MCHC RBC AUTO-ENTMCNC: 33.3 G/DL (ref 32–36)
MCV RBC AUTO: 90 FL (ref 82–98)
MONOCYTES # BLD AUTO: 1.2 K/UL (ref 0.3–1)
MONOCYTES NFR BLD: 16.5 % (ref 4–15)
NEUTROPHILS # BLD AUTO: 3.8 K/UL (ref 1.8–7.7)
NEUTROPHILS NFR BLD: 54.3 % (ref 38–73)
PLATELET # BLD AUTO: 158 K/UL (ref 150–350)
PMV BLD AUTO: 11.1 FL (ref 9.2–12.9)
POTASSIUM SERPL-SCNC: 3.3 MMOL/L (ref 3.5–5.1)
PROT SERPL-MCNC: 6.1 G/DL (ref 6–8.4)
RBC # BLD AUTO: 4.37 M/UL (ref 4.6–6.2)
SODIUM SERPL-SCNC: 144 MMOL/L (ref 136–145)
WBC # BLD AUTO: 6.97 K/UL (ref 3.9–12.7)

## 2019-05-25 PROCEDURE — 97530 THERAPEUTIC ACTIVITIES: CPT

## 2019-05-25 PROCEDURE — 25000003 PHARM REV CODE 250: Performed by: FAMILY MEDICINE

## 2019-05-25 PROCEDURE — 80053 COMPREHEN METABOLIC PANEL: CPT

## 2019-05-25 PROCEDURE — 85025 COMPLETE CBC W/AUTO DIFF WBC: CPT

## 2019-05-25 PROCEDURE — 63600175 PHARM REV CODE 636 W HCPCS: Performed by: FAMILY MEDICINE

## 2019-05-25 PROCEDURE — 97162 PT EVAL MOD COMPLEX 30 MIN: CPT

## 2019-05-25 PROCEDURE — 99232 SBSQ HOSP IP/OBS MODERATE 35: CPT | Mod: ,,, | Performed by: FAMILY MEDICINE

## 2019-05-25 PROCEDURE — G0378 HOSPITAL OBSERVATION PER HR: HCPCS

## 2019-05-25 PROCEDURE — 99232 PR SUBSEQUENT HOSPITAL CARE,LEVL II: ICD-10-PCS | Mod: ,,, | Performed by: FAMILY MEDICINE

## 2019-05-25 PROCEDURE — 36415 COLL VENOUS BLD VENIPUNCTURE: CPT

## 2019-05-25 PROCEDURE — 11000001 HC ACUTE MED/SURG PRIVATE ROOM

## 2019-05-25 RX ORDER — BISACODYL 5 MG
10 TABLET, DELAYED RELEASE (ENTERIC COATED) ORAL DAILY PRN
Status: DISCONTINUED | OUTPATIENT
Start: 2019-05-25 | End: 2019-05-31 | Stop reason: HOSPADM

## 2019-05-25 RX ORDER — LISINOPRIL 20 MG/1
20 TABLET ORAL ONCE
Status: COMPLETED | OUTPATIENT
Start: 2019-05-25 | End: 2019-05-25

## 2019-05-25 RX ORDER — POTASSIUM CHLORIDE 20 MEQ/1
40 TABLET, EXTENDED RELEASE ORAL DAILY
Status: DISCONTINUED | OUTPATIENT
Start: 2019-05-25 | End: 2019-05-31 | Stop reason: HOSPADM

## 2019-05-25 RX ORDER — PROCHLORPERAZINE EDISYLATE 5 MG/ML
5 INJECTION INTRAMUSCULAR; INTRAVENOUS EVERY 6 HOURS PRN
Status: DISCONTINUED | OUTPATIENT
Start: 2019-05-25 | End: 2019-05-31 | Stop reason: HOSPADM

## 2019-05-25 RX ORDER — LISINOPRIL 40 MG/1
40 TABLET ORAL DAILY
Status: DISCONTINUED | OUTPATIENT
Start: 2019-05-26 | End: 2019-05-31 | Stop reason: HOSPADM

## 2019-05-25 RX ADMIN — SIMVASTATIN 20 MG: 10 TABLET, FILM COATED ORAL at 09:05

## 2019-05-25 RX ADMIN — BISACODYL 10 MG: 5 TABLET, COATED ORAL at 08:05

## 2019-05-25 RX ADMIN — LISINOPRIL 20 MG: 20 TABLET ORAL at 01:05

## 2019-05-25 RX ADMIN — PROCHLORPERAZINE EDISYLATE 5 MG: 5 INJECTION INTRAMUSCULAR; INTRAVENOUS at 09:05

## 2019-05-25 RX ADMIN — PIPERACILLIN AND TAZOBACTAM 4.5 G: 4; .5 INJECTION, POWDER, LYOPHILIZED, FOR SOLUTION INTRAVENOUS; PARENTERAL at 11:05

## 2019-05-25 RX ADMIN — METOPROLOL TARTRATE 100 MG: 100 TABLET ORAL at 09:05

## 2019-05-25 RX ADMIN — PIPERACILLIN AND TAZOBACTAM 4.5 G: 4; .5 INJECTION, POWDER, LYOPHILIZED, FOR SOLUTION INTRAVENOUS; PARENTERAL at 03:05

## 2019-05-25 RX ADMIN — LISINOPRIL 20 MG: 20 TABLET ORAL at 08:05

## 2019-05-25 RX ADMIN — PIPERACILLIN AND TAZOBACTAM 4.5 G: 4; .5 INJECTION, POWDER, LYOPHILIZED, FOR SOLUTION INTRAVENOUS; PARENTERAL at 07:05

## 2019-05-25 RX ADMIN — AMLODIPINE BESYLATE 10 MG: 10 TABLET ORAL at 08:05

## 2019-05-25 RX ADMIN — PRIMIDONE 100 MG: 50 TABLET ORAL at 08:05

## 2019-05-25 RX ADMIN — POTASSIUM CHLORIDE 40 MEQ: 1500 TABLET, EXTENDED RELEASE ORAL at 08:05

## 2019-05-25 RX ADMIN — ALPRAZOLAM 0.5 MG: 0.5 TABLET ORAL at 09:05

## 2019-05-25 RX ADMIN — METOPROLOL TARTRATE 100 MG: 100 TABLET ORAL at 08:05

## 2019-05-25 RX ADMIN — SODIUM CHLORIDE: 0.9 INJECTION, SOLUTION INTRAVENOUS at 01:05

## 2019-05-25 RX ADMIN — PROCHLORPERAZINE EDISYLATE 5 MG: 5 INJECTION INTRAMUSCULAR; INTRAVENOUS at 01:05

## 2019-05-25 RX ADMIN — PRIMIDONE 100 MG: 50 TABLET ORAL at 09:05

## 2019-05-25 RX ADMIN — BUPROPION HYDROCHLORIDE 300 MG: 150 TABLET, EXTENDED RELEASE ORAL at 08:05

## 2019-05-25 RX ADMIN — PANTOPRAZOLE SODIUM 40 MG: 40 TABLET, DELAYED RELEASE ORAL at 08:05

## 2019-05-25 NOTE — SUBJECTIVE & OBJECTIVE
Review of Systems   Constitutional: Positive for fever. Negative for chills.   HENT: Negative for congestion, ear pain, postnasal drip, rhinorrhea, sore throat and trouble swallowing.    Eyes: Negative for redness and itching.   Respiratory: Negative for cough, shortness of breath and wheezing.    Cardiovascular: Negative for chest pain and palpitations.   Gastrointestinal: Positive for abdominal pain, constipation and vomiting. Negative for diarrhea and nausea.   Genitourinary: Positive for difficulty urinating and dysuria. Negative for frequency.   Skin: Negative for rash.   Neurological: Negative for weakness and headaches.     Objective:     Vital Signs (Most Recent):  Temp: 97.2 °F (36.2 °C) (05/25/19 1119)  Pulse: 69 (05/25/19 1119)  Resp: 18 (05/25/19 1119)  BP: (!) 169/88 (05/25/19 1119)  SpO2: 97 % (05/25/19 1119) Vital Signs (24h Range):  Temp:  [96.5 °F (35.8 °C)-98.9 °F (37.2 °C)] 97.2 °F (36.2 °C)  Pulse:  [56-90] 69  Resp:  [16-20] 18  SpO2:  [95 %-100 %] 97 %  BP: (139-170)/(76-89) 169/88     Weight: 121.1 kg (267 lb)  Body mass index is 43.09 kg/m².    Physical Exam   Constitutional: He is oriented to person, place, and time. He appears well-developed. No distress.   No more belching    obese   HENT:   Head: Normocephalic and atraumatic.   Eyes: Pupils are equal, round, and reactive to light. Conjunctivae are normal.   Neck: Normal range of motion. Neck supple. No thyromegaly present.   Cardiovascular: Normal rate, regular rhythm, normal heart sounds and intact distal pulses.   Pulmonary/Chest: Effort normal and breath sounds normal. No respiratory distress. He has no wheezes.   Abdominal: Soft. Bowel sounds are normal. There is no tenderness.   Obtuse abdomen    Genitourinary:   Genitourinary Comments: Pace in place   Musculoskeletal: Normal range of motion. He exhibits no edema.   Lymphadenopathy:     He has no cervical adenopathy.   Neurological: He is alert and oriented to person, place, and  time.   Skin: Skin is warm and dry. No rash noted.   Psychiatric: He has a normal mood and affect. His behavior is normal.   Nursing note and vitals reviewed.        CRANIAL NERVES     CN III, IV, VI   Pupils are equal, round, and reactive to light.       Significant Labs:   Blood Culture:   Recent Labs   Lab 05/24/19  1120   LABBLOO No Growth to date  No Growth to date     CBC:   Recent Labs   Lab 05/24/19  1120 05/25/19  0634   WBC 10.27 6.97   HGB 14.1 13.1*   HCT 41.5 39.3*    158     CMP:   Recent Labs   Lab 05/24/19  1120 05/25/19  0634    144   K 3.6 3.3*    107   CO2 28 29    99   BUN 12 10   CREATININE 1.1 0.9   CALCIUM 9.5 8.7   PROT 7.2 6.1   ALBUMIN 3.5 2.9*   BILITOT 0.4 0.3   ALKPHOS 77 61   AST 19 13   ALT 23 19   ANIONGAP 10 8   EGFRNONAA >60 >60     Lactic Acid:   Recent Labs   Lab 05/24/19  1120 05/24/19  1536 05/24/19  1858   LACTATE 1.0 1.5 1.3     Urine Culture: No results for input(s): LABURIN in the last 48 hours.  Urine Studies:   Recent Labs   Lab 05/24/19  1055   COLORU Yellow   APPEARANCEUA Cloudy*   PHUR 7.0   SPECGRAV 1.020   PROTEINUA 3+*   GLUCUA Negative   KETONESU 1+*   BILIRUBINUA 1+*   OCCULTUA 3+*   NITRITE Positive*   UROBILINOGEN 1.0   LEUKOCYTESUR 1+*   RBCUA 30*   WBCUA 100*   BACTERIA Few*   HYALINECASTS 0       Significant Imaging: I have reviewed all pertinent imaging results/findings within the past 24 hours.     KUB:  Prior hernia repair.  Vascular calcifications overlying the pelvis.    No evidence of focal bowel obstruction.  No evidence of pathologic calcifications or soft tissue masses.

## 2019-05-25 NOTE — ASSESSMENT & PLAN NOTE
Treat with zosyn for now and follow sensitivies. Recent MDRUTI.  Concern for possible prostatic involvement  PSA is okay though.  Try to remove thornton today and follow cultures.

## 2019-05-25 NOTE — PLAN OF CARE
Problem: Adult Inpatient Plan of Care  Goal: Plan of Care Review  Outcome: Ongoing (interventions implemented as appropriate)  Patient admitted with acute cystitis.  Contact isolation for patient room in place related to history of ESBL in urine.  Thornton catheter discontinued as order of MD this morning.  Urine very cloudy with sediment and patient only voiding small amounts.  Awaiting time frame before scanning bladder to determine if thornton catheter still indicated a a need.  Continuous fluids running as ordered.  Patient has constant hiccuping; no nausea or vomiting today. BM this morning, very hard to pass; dulcolax given.  Zosyn ordered every 8 hours.  Potassium low at 3.3--40 mEq oral potassium given.  Physical Therapy evaluated patient and ambulated him.  Patient impulsive with ambulation.  Bed alarm refused.  Instructed patient and wife on importance of calling for nursing staff for assistance any time patient wishes to get up.  Heart monitor in place, SR on monitor.  Wife remains at bedside; very attentive to patient's needs.  SCD's ordered and refused; patient educated on importance of VTE prevention when in hospital.  Compazine given to help with hiccupping and indigestion; patient denies improvement but hiccupping is less frequent.  Lisinopril added to medication regimen as BP was slightly elevated.  VSS, afebrile, room air.  Plan of care discussed thoroughly with patient and wife; both verbalize understanding.

## 2019-05-25 NOTE — NURSING
Patient voided per urinal and urine is only slightly cloudy.  Milky tent to urine on first post-thornton void has resolved.

## 2019-05-25 NOTE — PROGRESS NOTES
[facility] Hospital Medicine  Progress Note    Patient Name: Reymundo Dang Sr.  MRN: [id number]  Patient Class: IP- Inpatient   Admission Date: 5/24/2019  Length of Stay: 1 days  Attending Physician: Lori Perez MD  Primary Care Provider: Rosalinda Villalba NP        Subjective:     Principal Problem:Acute cystitis without hematuria    HPI:  64 year old male comes in after fever started 3 days ago. The next day he says he started with burning with urinating. By yesterday, he says he couldn't get urine out at all. He was recently hospitalized for a UTI and was dc'd without abx but felt back to normal. His only other complaint was constipation today after diarrhea yesterday.    Hospital Course:  Patient with c/o vomiting yesterday and difficulty passing stool today. No fevers since admit.      Review of Systems   Constitutional: Positive for fever. Negative for chills.   HENT: Negative for congestion, ear pain, postnasal drip, rhinorrhea, sore throat and trouble swallowing.    Eyes: Negative for redness and itching.   Respiratory: Negative for cough, shortness of breath and wheezing.    Cardiovascular: Negative for chest pain and palpitations.   Gastrointestinal: Positive for abdominal pain, constipation and vomiting. Negative for diarrhea and nausea.   Genitourinary: Positive for difficulty urinating and dysuria. Negative for frequency.   Skin: Negative for rash.   Neurological: Negative for weakness and headaches.     Objective:     Vital Signs (Most Recent):  Temp: 97.2 °F (36.2 °C) (05/25/19 1119)  Pulse: 69 (05/25/19 1119)  Resp: 18 (05/25/19 1119)  BP: (!) 169/88 (05/25/19 1119)  SpO2: 97 % (05/25/19 1119) Vital Signs (24h Range):  Temp:  [96.5 °F (35.8 °C)-98.9 °F (37.2 °C)] 97.2 °F (36.2 °C)  Pulse:  [56-90] 69  Resp:  [16-20] 18  SpO2:  [95 %-100 %] 97 %  BP: (139-170)/(76-89) 169/88     Weight: 121.1 kg (267 lb)  Body mass index is 43.09 kg/m².    Physical Exam   Constitutional: He is  oriented to person, place, and time. He appears well-developed. No distress.   No more belching    obese   HENT:   Head: Normocephalic and atraumatic.   Eyes: Pupils are equal, round, and reactive to light. Conjunctivae are normal.   Neck: Normal range of motion. Neck supple. No thyromegaly present.   Cardiovascular: Normal rate, regular rhythm, normal heart sounds and intact distal pulses.   Pulmonary/Chest: Effort normal and breath sounds normal. No respiratory distress. He has no wheezes.   Abdominal: Soft. Bowel sounds are normal. There is no tenderness.   Obtuse abdomen    Genitourinary:   Genitourinary Comments: Pace in place   Musculoskeletal: Normal range of motion. He exhibits no edema.   Lymphadenopathy:     He has no cervical adenopathy.   Neurological: He is alert and oriented to person, place, and time.   Skin: Skin is warm and dry. No rash noted.   Psychiatric: He has a normal mood and affect. His behavior is normal.   Nursing note and vitals reviewed.        CRANIAL NERVES     CN III, IV, VI   Pupils are equal, round, and reactive to light.       Significant Labs:   Blood Culture:   Recent Labs   Lab 05/24/19  1120   LABBLOO No Growth to date  No Growth to date     CBC:   Recent Labs   Lab 05/24/19  1120 05/25/19  0634   WBC 10.27 6.97   HGB 14.1 13.1*   HCT 41.5 39.3*    158     CMP:   Recent Labs   Lab 05/24/19  1120 05/25/19  0634    144   K 3.6 3.3*    107   CO2 28 29    99   BUN 12 10   CREATININE 1.1 0.9   CALCIUM 9.5 8.7   PROT 7.2 6.1   ALBUMIN 3.5 2.9*   BILITOT 0.4 0.3   ALKPHOS 77 61   AST 19 13   ALT 23 19   ANIONGAP 10 8   EGFRNONAA >60 >60     Lactic Acid:   Recent Labs   Lab 05/24/19  1120 05/24/19  1536 05/24/19  1858   LACTATE 1.0 1.5 1.3     Urine Culture: No results for input(s): LABURIN in the last 48 hours.  Urine Studies:   Recent Labs   Lab 05/24/19  1055   COLORU Yellow   APPEARANCEUA Cloudy*   PHUR 7.0   SPECGRAV 1.020   PROTEINUA 3+*   GLUCUA  Negative   KETONESU 1+*   BILIRUBINUA 1+*   OCCULTUA 3+*   NITRITE Positive*   UROBILINOGEN 1.0   LEUKOCYTESUR 1+*   RBCUA 30*   WBCUA 100*   BACTERIA Few*   HYALINECASTS 0       Significant Imaging: I have reviewed all pertinent imaging results/findings within the past 24 hours.     KUB:  Prior hernia repair.  Vascular calcifications overlying the pelvis.    No evidence of focal bowel obstruction.  No evidence of pathologic calcifications or soft tissue masses.    Assessment/Plan:      * Acute cystitis without hematuria  Treat with zosyn for now and follow sensitivies. Recent MDRUTI.  Concern for possible prostatic involvement  PSA is okay though.  Try to remove thornton today and follow cultures.    Recurrent major depressive disorder, in full remission  Resume home meds      Hypertension  Resume home meds  Double lisinopril today.    Restless legs syndrome (RLS)  Resume home meds      Crohn's disease  Hold mesalamine, consider kub if no BMs        VTE Risk Mitigation (From admission, onward)        Ordered     IP VTE HIGH RISK PATIENT  Once      05/24/19 1236     Place sequential compression device  Until discontinued      05/24/19 1236              Lori Perez MD  Department of Hospital Medicine   [facility]

## 2019-05-25 NOTE — HOSPITAL COURSE
Patient with c/o vomiting yesterday and difficulty passing stool today. No fevers since admit.    5/26  Urine culture +. Awaiting sensitivies.  No more fevers. Belching/hiccups continue. Bowels moving.    5/27 urine culture with ecoli. Sensitive to the zosyn day 4. He completed 5 days of zosyn last visit for resistant UTI. He again has resistant UTI. Started on zosyn which it is sensitive to. He will need to cont 7 days of this prior to d/c. PSA was 1.2    5/28 ;  Urine culture ESBL only sensitive to iv abx. Started on Zosyn since admission. Will need to cont x 7 days. Last dose due Friday morning. CT abd shows mildly enlarged prostate, PSA normal. Started on flomax because he report that his has trouble getting the urine out. No fever. No elevated WBC.     5/29 No changes./Day 5/7 of zosyn. Last dose due Friday morning. Cont flomax for BPH. No fever. No elevated WBC.     5/30 No changes, day 6/7 on zosyn. Will complete full 7 day course Friday am. Urinating better with flomax. No fever. Blood cultures remain no growth    5/31/19 No changes, day 7/7 on zosyn.  Urinating better with flomax. No fever. Blood cultures remain no growth  Creat stable, K+ 3.3; replace   d/c on Augmentin for atleast another week or until urology f/u. Questioning prostatitis. Also will need probiotic due to crohns   Needs close f/u with urology .

## 2019-05-25 NOTE — NURSING
Patient called to say he voided and had BM. Upon entry to room, jug found empty. Patient voided into toilet.  Instructed to void into job next void and call when occurs so that urinary output can be assessed.

## 2019-05-25 NOTE — NURSING
Pace catheter discontinued; patient provided with urinal and instructed to call when able to void.

## 2019-05-26 LAB
ALBUMIN SERPL BCP-MCNC: 3.3 G/DL (ref 3.5–5.2)
ALP SERPL-CCNC: 68 U/L (ref 55–135)
ALT SERPL W/O P-5'-P-CCNC: 28 U/L (ref 10–44)
ANION GAP SERPL CALC-SCNC: 10 MMOL/L (ref 8–16)
AST SERPL-CCNC: 21 U/L (ref 10–40)
BACTERIA UR CULT: NORMAL
BASOPHILS # BLD AUTO: 0.04 K/UL (ref 0–0.2)
BASOPHILS NFR BLD: 0.6 % (ref 0–1.9)
BILIRUB SERPL-MCNC: 0.3 MG/DL (ref 0.1–1)
BUN SERPL-MCNC: 6 MG/DL (ref 8–23)
CALCIUM SERPL-MCNC: 9 MG/DL (ref 8.7–10.5)
CHLORIDE SERPL-SCNC: 107 MMOL/L (ref 95–110)
CO2 SERPL-SCNC: 29 MMOL/L (ref 23–29)
CREAT SERPL-MCNC: 0.8 MG/DL (ref 0.5–1.4)
DIFFERENTIAL METHOD: NORMAL
EOSINOPHIL # BLD AUTO: 0.4 K/UL (ref 0–0.5)
EOSINOPHIL NFR BLD: 5.2 % (ref 0–8)
ERYTHROCYTE [DISTWIDTH] IN BLOOD BY AUTOMATED COUNT: 13 % (ref 11.5–14.5)
EST. GFR  (AFRICAN AMERICAN): >60 ML/MIN/1.73 M^2
EST. GFR  (NON AFRICAN AMERICAN): >60 ML/MIN/1.73 M^2
GLUCOSE SERPL-MCNC: 91 MG/DL (ref 70–110)
HCT VFR BLD AUTO: 42.9 % (ref 40–54)
HGB BLD-MCNC: 14.5 G/DL (ref 14–18)
LYMPHOCYTES # BLD AUTO: 1.9 K/UL (ref 1–4.8)
LYMPHOCYTES NFR BLD: 27.7 % (ref 18–48)
MCH RBC QN AUTO: 30.3 PG (ref 27–31)
MCHC RBC AUTO-ENTMCNC: 33.8 G/DL (ref 32–36)
MCV RBC AUTO: 90 FL (ref 82–98)
MONOCYTES # BLD AUTO: 0.9 K/UL (ref 0.3–1)
MONOCYTES NFR BLD: 12.7 % (ref 4–15)
NEUTROPHILS # BLD AUTO: 3.6 K/UL (ref 1.8–7.7)
NEUTROPHILS NFR BLD: 53.8 % (ref 38–73)
PLATELET # BLD AUTO: 189 K/UL (ref 150–350)
PMV BLD AUTO: 10.7 FL (ref 9.2–12.9)
POTASSIUM SERPL-SCNC: 3.2 MMOL/L (ref 3.5–5.1)
PROT SERPL-MCNC: 7.1 G/DL (ref 6–8.4)
RBC # BLD AUTO: 4.78 M/UL (ref 4.6–6.2)
SODIUM SERPL-SCNC: 146 MMOL/L (ref 136–145)
WBC # BLD AUTO: 6.67 K/UL (ref 3.9–12.7)

## 2019-05-26 PROCEDURE — 63600175 PHARM REV CODE 636 W HCPCS: Performed by: FAMILY MEDICINE

## 2019-05-26 PROCEDURE — 25000003 PHARM REV CODE 250: Performed by: FAMILY MEDICINE

## 2019-05-26 PROCEDURE — 97530 THERAPEUTIC ACTIVITIES: CPT

## 2019-05-26 PROCEDURE — 85025 COMPLETE CBC W/AUTO DIFF WBC: CPT

## 2019-05-26 PROCEDURE — 97116 GAIT TRAINING THERAPY: CPT

## 2019-05-26 PROCEDURE — 25500020 PHARM REV CODE 255: Performed by: FAMILY MEDICINE

## 2019-05-26 PROCEDURE — 99232 SBSQ HOSP IP/OBS MODERATE 35: CPT | Mod: ,,, | Performed by: FAMILY MEDICINE

## 2019-05-26 PROCEDURE — 36415 COLL VENOUS BLD VENIPUNCTURE: CPT

## 2019-05-26 PROCEDURE — 99232 PR SUBSEQUENT HOSPITAL CARE,LEVL II: ICD-10-PCS | Mod: ,,, | Performed by: FAMILY MEDICINE

## 2019-05-26 PROCEDURE — G0378 HOSPITAL OBSERVATION PER HR: HCPCS

## 2019-05-26 PROCEDURE — 80053 COMPREHEN METABOLIC PANEL: CPT

## 2019-05-26 PROCEDURE — 11000001 HC ACUTE MED/SURG PRIVATE ROOM

## 2019-05-26 RX ORDER — CLONIDINE HYDROCHLORIDE 0.1 MG/1
0.1 TABLET ORAL EVERY 6 HOURS PRN
Status: DISCONTINUED | OUTPATIENT
Start: 2019-05-26 | End: 2019-05-31 | Stop reason: HOSPADM

## 2019-05-26 RX ORDER — HYDROCHLOROTHIAZIDE 25 MG/1
25 TABLET ORAL DAILY
Status: DISCONTINUED | OUTPATIENT
Start: 2019-05-27 | End: 2019-05-31 | Stop reason: HOSPADM

## 2019-05-26 RX ORDER — TAMSULOSIN HYDROCHLORIDE 0.4 MG/1
0.4 CAPSULE ORAL DAILY
Status: DISCONTINUED | OUTPATIENT
Start: 2019-05-26 | End: 2019-05-31 | Stop reason: HOSPADM

## 2019-05-26 RX ORDER — CHLORPROMAZINE HYDROCHLORIDE 25 MG/1
25 TABLET, FILM COATED ORAL ONCE
Status: COMPLETED | OUTPATIENT
Start: 2019-05-26 | End: 2019-05-26

## 2019-05-26 RX ADMIN — METOPROLOL TARTRATE 100 MG: 100 TABLET ORAL at 09:05

## 2019-05-26 RX ADMIN — CHLORPROMAZINE HYDROCHLORIDE 25 MG: 25 TABLET, SUGAR COATED ORAL at 12:05

## 2019-05-26 RX ADMIN — BUPROPION HYDROCHLORIDE 300 MG: 150 TABLET, EXTENDED RELEASE ORAL at 08:05

## 2019-05-26 RX ADMIN — IOHEXOL 100 ML: 350 INJECTION, SOLUTION INTRAVENOUS at 02:05

## 2019-05-26 RX ADMIN — PRIMIDONE 100 MG: 50 TABLET ORAL at 09:05

## 2019-05-26 RX ADMIN — PANTOPRAZOLE SODIUM 40 MG: 40 TABLET, DELAYED RELEASE ORAL at 08:05

## 2019-05-26 RX ADMIN — PRIMIDONE 100 MG: 50 TABLET ORAL at 08:05

## 2019-05-26 RX ADMIN — POTASSIUM CHLORIDE 40 MEQ: 1500 TABLET, EXTENDED RELEASE ORAL at 08:05

## 2019-05-26 RX ADMIN — PIPERACILLIN AND TAZOBACTAM 4.5 G: 4; .5 INJECTION, POWDER, LYOPHILIZED, FOR SOLUTION INTRAVENOUS; PARENTERAL at 08:05

## 2019-05-26 RX ADMIN — LISINOPRIL 40 MG: 40 TABLET ORAL at 08:05

## 2019-05-26 RX ADMIN — AMLODIPINE BESYLATE 10 MG: 10 TABLET ORAL at 08:05

## 2019-05-26 RX ADMIN — TAMSULOSIN HYDROCHLORIDE 0.4 MG: 0.4 CAPSULE ORAL at 04:05

## 2019-05-26 RX ADMIN — METOPROLOL TARTRATE 100 MG: 100 TABLET ORAL at 08:05

## 2019-05-26 RX ADMIN — PIPERACILLIN AND TAZOBACTAM 4.5 G: 4; .5 INJECTION, POWDER, LYOPHILIZED, FOR SOLUTION INTRAVENOUS; PARENTERAL at 03:05

## 2019-05-26 RX ADMIN — CLONIDINE HYDROCHLORIDE 0.1 MG: 0.1 TABLET ORAL at 09:05

## 2019-05-26 RX ADMIN — ALPRAZOLAM 0.5 MG: 0.5 TABLET ORAL at 09:05

## 2019-05-26 RX ADMIN — SIMVASTATIN 20 MG: 10 TABLET, FILM COATED ORAL at 09:05

## 2019-05-26 RX ADMIN — IOHEXOL 30 ML: 350 INJECTION, SOLUTION INTRAVENOUS at 12:05

## 2019-05-26 NOTE — NURSING
Med pass pt vomited x 2 , 350ml food/fluid , compazine 5mg given sivp as per md order for nauseam, pt denies pain at this time, abdomen round / obese nontender bs positive bm x 3 today per pt up in chair ra denies sob resp e/u, ambulates w/ standby assist right side weakness noted to both upper/lower ext ivf infusing well to right hand, c/o agitation appears to be irritable ativan given as per md order wife at bedside,  Pt meds reviewed per his request enc. To [id number] when in bed

## 2019-05-26 NOTE — ASSESSMENT & PLAN NOTE
Treat with zosyn for now and follow sensitivies. Recent MDRUTI.  Concern for possible prostatic involvement  PSA is okay though.   follow cultures.

## 2019-05-26 NOTE — PT/OT/SLP PROGRESS
"Physical Therapy Treatment    Patient Name:  Reymundo Dang Sr.   MRN:  [id number]    Recommendations:     Discharge Recommendations:  home health PT   Discharge Equipment Recommendations: none   Barriers to discharge: Inaccessible home    Assessment:     Reymundo Dang Sr. is a 64 y.o. male admitted with a medical diagnosis of Acute cystitis without hematuria.  He presents with the following impairments/functional limitations:  weakness, impaired self care skills, impaired functional mobilty, gait instability, impaired balance, decreased lower extremity function, decreased coordination, decreased safety awareness. Patient relax and no sign of impulsivity. Ambulated patient to Therapy room with SBA no assistive device x 150 feet. Trained and performed ascending/descending 20 stair steps using bilat handrails with SBA. No sign of distress and fatigue.    Rehab Prognosis: Good; patient would benefit from acute skilled PT services to address these deficits and reach maximum level of function.    Recent Surgery: * No surgery found *      Plan:     During this hospitalization, patient to be seen 5 x/week to address the identified rehab impairments via gait training, therapeutic activities, therapeutic exercises and progress toward the following goals:    · Plan of Care Expires:  05/31/19    Subjective     Chief Complaint: Patient has no new complain.  Patient/Family Comments/goals: " To get better and go home hopefully tomorrow".  Pain/Comfort:  · Pain Rating 1: 0/10  · Pain Rating Post-Intervention 1: 0/10      Objective:     Communicated with patient and wife  prior to session.  Patient found up in chair with telemetry, peripheral IV upon PT entry to room.     General Precautions: Standard, fall, other (see comments)(Impulsive)   Orthopedic Precautions:N/A   Braces: N/A     Functional Mobility:  · Bed Mobility:     · Rolling Left:  modified independence  · Rolling Right: modified independence  · Scooting: modified " independence  · Supine to Sit: modified independence  · Sit to Supine: modified independence  · Transfers:     · Sit to Stand:  supervision with no AD  · Bed to Chair: supervision with  no AD  using  Step Transfer  · Gait: Ambulated with no Assistive device SBA  x 150 feet. Reciprocal gait, dec right LE stride and decreased right Foot/Floor clearance  · Balance: Stand Dynamic with Good- grade  · Stairs:  Pt ascended/descended 20 steps stair(s) with No Assistive Device with bilateral handrails with Stand-by Assistance.       AM-PAC 6 CLICK MOBILITY  Turning over in bed (including adjusting bedclothes, sheets and blankets)?: 4  Sitting down on and standing up from a chair with arms (e.g., wheelchair, bedside commode, etc.): 3  Moving from lying on back to sitting on the side of the bed?: 4  Moving to and from a bed to a chair (including a wheelchair)?: 3  Need to walk in hospital room?: 3  Climbing 3-5 steps with a railing?: 3  Basic Mobility Total Score: 20       Therapeutic Activities and Exercises:   GAIT: Pt able to ambulate with none with Standby Assistance x 150 ft with the following gait deviation(s): Reciprocal gait with dec right LE stride and dec right Foot/Floor clearance. Gait trng on 20 stair steps ascending/descending using bilat handrails with SBA. Pt performed bilateral LE exercises consisting of Active range of motion exercises for strengthening and coordination ex  x 10 reps consisting of Ankle DF, Ankle PF, Quad Sets, Heel slides, ABD/ADD, LAQ with pt able to tolerate activities well and no sign of fatigue.   PT discussed importance of patient's performance of Home Exercise Program (HEP) with pt verbalizing understanding.     Patient left up in chair with all lines intact, call button in reach, Nurse notified and wife present..    GOALS:   Multidisciplinary Problems     Physical Therapy Goals        Problem: Physical Therapy Goal    Goal Priority Disciplines Outcome Goal Variances Interventions    Physical Therapy Goal     PT, PT/OT Ongoing (interventions implemented as appropriate)     Description:  Goals to be met by: 7     Patient will increase functional independence with mobility by performin. Supine to sit with Independent - met 19  2. Sit to supine with Independent - met 19  3. Bed to chair transfer with Modified Independentwith or without none using Step Transfer TECHNIQUE  4. Gait  x 200  feet with Modified Independent with or without none  5. Lower extremity exercise program x10 reps per handout, with assistance as needed                     Time Tracking:     PT Received On: 19  PT Start Time: 1246     PT Stop Time: 1312  PT Total Time (min): 26 min     Billable Minutes: Gait Training 15 and Therapeutic Activity 12    Treatment Type: Treatment  PT/PTA: PT           Tray Paez, PT  2019

## 2019-05-26 NOTE — PROGRESS NOTES
[facility] Medicine  Progress Note    Patient Name: Reymundo Dang Sr.  MRN: [id number]  Patient Class: IP- Inpatient   Admission Date: 5/24/2019  Length of Stay: 2 days  Attending Physician: Lori Perez MD  Primary Care Provider: Rosalinda Villalba NP        Subjective:     Principal Problem:Acute cystitis without hematuria    HPI:  64 year old male comes in after fever started 3 days ago. The next day he says he started with burning with urinating. By yesterday, he says he couldn't get urine out at all. He was recently hospitalized for a UTI and was dc'd without abx but felt back to normal. His only other complaint was constipation today after diarrhea yesterday.    Hospital Course:  Patient with c/o vomiting yesterday and difficulty passing stool today. No fevers since admit.    5/26  Urine culture +. Awaiting sensitivies.  No more fevers. Belching/hiccups continue. Bowels moving.      Review of Systems   Constitutional: Negative for chills and fever.   HENT: Negative for congestion, ear pain, postnasal drip, rhinorrhea, sore throat and trouble swallowing.    Eyes: Negative for redness and itching.   Respiratory: Negative for cough, shortness of breath and wheezing.    Cardiovascular: Negative for chest pain and palpitations.   Gastrointestinal: Positive for abdominal pain and nausea. Negative for constipation, diarrhea and vomiting.   Genitourinary: Negative for difficulty urinating, dysuria and frequency.   Skin: Negative for rash.   Neurological: Negative for weakness and headaches.     Objective:     Vital Signs (Most Recent):  Temp: 98.2 °F (36.8 °C) (05/26/19 0719)  Pulse: 69 (05/26/19 0719)  Resp: 18 (05/26/19 0431)  BP: (!) 166/86 (05/26/19 0719)  SpO2: 97 % (05/26/19 0719) Vital Signs (24h Range):  Temp:  [96.6 °F (35.9 °C)-98.2 °F (36.8 °C)] 98.2 °F (36.8 °C)  Pulse:  [63-77] 69  Resp:  [18-20] 18  SpO2:  [93 %-100 %] 97 %  BP: (139-172)/(78-88) 166/86     Weight: 121.1  kg (267 lb)  Body mass index is 43.09 kg/m².    Physical Exam   Constitutional: He is oriented to person, place, and time. He appears well-developed. No distress.   No more belching    obese   HENT:   Head: Normocephalic and atraumatic.   Eyes: Pupils are equal, round, and reactive to light. Conjunctivae are normal.   Neck: Normal range of motion. Neck supple. No thyromegaly present.   Cardiovascular: Normal rate, regular rhythm, normal heart sounds and intact distal pulses.   Pulmonary/Chest: Effort normal and breath sounds normal. No respiratory distress. He has no wheezes.   Abdominal: Soft. Bowel sounds are normal. There is no tenderness.   Obtuse abdomen    Musculoskeletal: Normal range of motion. He exhibits no edema.   Lymphadenopathy:     He has no cervical adenopathy.   Neurological: He is alert and oriented to person, place, and time.   Skin: Skin is warm and dry. No rash noted.   Psychiatric: He has a normal mood and affect. His behavior is normal.   Nursing note and vitals reviewed.        CRANIAL NERVES     CN III, IV, VI   Pupils are equal, round, and reactive to light.       Significant Labs:   Blood Culture:   Recent Labs   Lab 05/24/19  1120   LABBLOO No Growth to date  No Growth to date  No Growth to date  No Growth to date     CBC:   Recent Labs   Lab 05/24/19  1120 05/25/19  0634 05/26/19  0751   WBC 10.27 6.97 6.67   HGB 14.1 13.1* 14.5   HCT 41.5 39.3* 42.9    158 189     CMP:   Recent Labs   Lab 05/24/19  1120 05/25/19  0634    144   K 3.6 3.3*    107   CO2 28 29    99   BUN 12 10   CREATININE 1.1 0.9   CALCIUM 9.5 8.7   PROT 7.2 6.1   ALBUMIN 3.5 2.9*   BILITOT 0.4 0.3   ALKPHOS 77 61   AST 19 13   ALT 23 19   ANIONGAP 10 8   EGFRNONAA >60 >60     Lactic Acid:   Recent Labs   Lab 05/24/19  1120 05/24/19  1536 05/24/19  1858   LACTATE 1.0 1.5 1.3     Urine Culture:   Recent Labs   Lab 05/24/19  1055   LABURIN PRESUMPTIVE E COLI  >100,000 cfu/ml  Identification and  susceptibility pending       Urine Studies:   Recent Labs   Lab 05/24/19  1055   COLORU Yellow   APPEARANCEUA Cloudy*   PHUR 7.0   SPECGRAV 1.020   PROTEINUA 3+*   GLUCUA Negative   KETONESU 1+*   BILIRUBINUA 1+*   OCCULTUA 3+*   NITRITE Positive*   UROBILINOGEN 1.0   LEUKOCYTESUR 1+*   RBCUA 30*   WBCUA 100*   BACTERIA Few*   HYALINECASTS 0     CX:  PRESUMPTIVE E COLI   >100,000 cfu/ml   Identification and susceptibility pending     Significant Imaging: I have reviewed all pertinent imaging results/findings within the past 24 hours.     KUB:  Prior hernia repair.  Vascular calcifications overlying the pelvis.    No evidence of focal bowel obstruction.  No evidence of pathologic calcifications or soft tissue masses.    Assessment/Plan:      * Acute cystitis without hematuria  Treat with zosyn for now and follow sensitivies. Recent MDRUTI.  Concern for possible prostatic involvement  PSA is okay though.   follow cultures.    Recurrent major depressive disorder, in full remission  Resume home meds      Hypertension  Resume home meds  Double lisinopril today.    Restless legs syndrome (RLS)  Resume home meds      Crohn's disease  Hold mesalamine, consider kub if no BMs  Check CT today with continued hiccups and vomiting. Need to look at diaphragm. Thorazine x 1.        VTE Risk Mitigation (From admission, onward)        Ordered     IP VTE HIGH RISK PATIENT  Once      05/24/19 1236     Place sequential compression device  Until discontinued      05/24/19 1236              Lori Perez MD  Department of Hospital Medicine   [facility]

## 2019-05-26 NOTE — NURSING
Upon making rounds wife noted to be assisting resident to bathroom w/ equipment alone reeducated on calling for staff assistance secondary to frequent falls wife verbalizes understanding also spoke of patient safety  Will cont to monitor

## 2019-05-26 NOTE — SUBJECTIVE & OBJECTIVE
Review of Systems   Constitutional: Negative for chills and fever.   HENT: Negative for congestion, ear pain, postnasal drip, rhinorrhea, sore throat and trouble swallowing.    Eyes: Negative for redness and itching.   Respiratory: Negative for cough, shortness of breath and wheezing.    Cardiovascular: Negative for chest pain and palpitations.   Gastrointestinal: Positive for abdominal pain and nausea. Negative for constipation, diarrhea and vomiting.   Genitourinary: Negative for difficulty urinating, dysuria and frequency.   Skin: Negative for rash.   Neurological: Negative for weakness and headaches.     Objective:     Vital Signs (Most Recent):  Temp: 98.2 °F (36.8 °C) (05/26/19 0719)  Pulse: 69 (05/26/19 0719)  Resp: 18 (05/26/19 0431)  BP: (!) 166/86 (05/26/19 0719)  SpO2: 97 % (05/26/19 0719) Vital Signs (24h Range):  Temp:  [96.6 °F (35.9 °C)-98.2 °F (36.8 °C)] 98.2 °F (36.8 °C)  Pulse:  [63-77] 69  Resp:  [18-20] 18  SpO2:  [93 %-100 %] 97 %  BP: (139-172)/(78-88) 166/86     Weight: 121.1 kg (267 lb)  Body mass index is 43.09 kg/m².    Physical Exam   Constitutional: He is oriented to person, place, and time. He appears well-developed. No distress.   No more belching    obese   HENT:   Head: Normocephalic and atraumatic.   Eyes: Pupils are equal, round, and reactive to light. Conjunctivae are normal.   Neck: Normal range of motion. Neck supple. No thyromegaly present.   Cardiovascular: Normal rate, regular rhythm, normal heart sounds and intact distal pulses.   Pulmonary/Chest: Effort normal and breath sounds normal. No respiratory distress. He has no wheezes.   Abdominal: Soft. Bowel sounds are normal. There is no tenderness.   Obtuse abdomen    Musculoskeletal: Normal range of motion. He exhibits no edema.   Lymphadenopathy:     He has no cervical adenopathy.   Neurological: He is alert and oriented to person, place, and time.   Skin: Skin is warm and dry. No rash noted.   Psychiatric: He has a normal  mood and affect. His behavior is normal.   Nursing note and vitals reviewed.        CRANIAL NERVES     CN III, IV, VI   Pupils are equal, round, and reactive to light.       Significant Labs:   Blood Culture:   Recent Labs   Lab 05/24/19  1120   LABBLOO No Growth to date  No Growth to date  No Growth to date  No Growth to date     CBC:   Recent Labs   Lab 05/24/19  1120 05/25/19  0634 05/26/19  0751   WBC 10.27 6.97 6.67   HGB 14.1 13.1* 14.5   HCT 41.5 39.3* 42.9    158 189     CMP:   Recent Labs   Lab 05/24/19  1120 05/25/19  0634    144   K 3.6 3.3*    107   CO2 28 29    99   BUN 12 10   CREATININE 1.1 0.9   CALCIUM 9.5 8.7   PROT 7.2 6.1   ALBUMIN 3.5 2.9*   BILITOT 0.4 0.3   ALKPHOS 77 61   AST 19 13   ALT 23 19   ANIONGAP 10 8   EGFRNONAA >60 >60     Lactic Acid:   Recent Labs   Lab 05/24/19  1120 05/24/19  1536 05/24/19  1858   LACTATE 1.0 1.5 1.3     Urine Culture:   Recent Labs   Lab 05/24/19  1055   LABURIN PRESUMPTIVE E COLI  >100,000 cfu/ml  Identification and susceptibility pending       Urine Studies:   Recent Labs   Lab 05/24/19  1055   COLORU Yellow   APPEARANCEUA Cloudy*   PHUR 7.0   SPECGRAV 1.020   PROTEINUA 3+*   GLUCUA Negative   KETONESU 1+*   BILIRUBINUA 1+*   OCCULTUA 3+*   NITRITE Positive*   UROBILINOGEN 1.0   LEUKOCYTESUR 1+*   RBCUA 30*   WBCUA 100*   BACTERIA Few*   HYALINECASTS 0     CX:  PRESUMPTIVE E COLI   >100,000 cfu/ml   Identification and susceptibility pending     Significant Imaging: I have reviewed all pertinent imaging results/findings within the past 24 hours.     KUB:  Prior hernia repair.  Vascular calcifications overlying the pelvis.    No evidence of focal bowel obstruction.  No evidence of pathologic calcifications or soft tissue masses.

## 2019-05-26 NOTE — PLAN OF CARE
Problem: Adult Inpatient Plan of Care  Goal: Plan of Care Review  Outcome: Ongoing (interventions implemented as appropriate)  Vitals stable, afebrile. No complaints of pain. Ambulating, eating, and voiding well. abx given. Sensitivity came back, sensitive to zosyn and meropenem. flomax started for frequency. CT of abdomen done, waiting for results.  Potassium low, PO potassium given. IV fluids stopped, sodium started to climb. Pt does vomits 100 mL each meal. Discussed plan of care, stated understanding

## 2019-05-27 LAB
ALBUMIN SERPL BCP-MCNC: 3 G/DL (ref 3.5–5.2)
ALP SERPL-CCNC: 57 U/L (ref 55–135)
ALT SERPL W/O P-5'-P-CCNC: 30 U/L (ref 10–44)
ANION GAP SERPL CALC-SCNC: 9 MMOL/L (ref 8–16)
AST SERPL-CCNC: 20 U/L (ref 10–40)
BASOPHILS # BLD AUTO: 0.04 K/UL (ref 0–0.2)
BASOPHILS NFR BLD: 0.7 % (ref 0–1.9)
BILIRUB SERPL-MCNC: 0.4 MG/DL (ref 0.1–1)
BUN SERPL-MCNC: 5 MG/DL (ref 8–23)
CALCIUM SERPL-MCNC: 8.9 MG/DL (ref 8.7–10.5)
CHLORIDE SERPL-SCNC: 106 MMOL/L (ref 95–110)
CO2 SERPL-SCNC: 29 MMOL/L (ref 23–29)
CREAT SERPL-MCNC: 0.8 MG/DL (ref 0.5–1.4)
DIFFERENTIAL METHOD: ABNORMAL
EOSINOPHIL # BLD AUTO: 0.3 K/UL (ref 0–0.5)
EOSINOPHIL NFR BLD: 4.8 % (ref 0–8)
ERYTHROCYTE [DISTWIDTH] IN BLOOD BY AUTOMATED COUNT: 12.7 % (ref 11.5–14.5)
EST. GFR  (AFRICAN AMERICAN): >60 ML/MIN/1.73 M^2
EST. GFR  (NON AFRICAN AMERICAN): >60 ML/MIN/1.73 M^2
GLUCOSE SERPL-MCNC: 103 MG/DL (ref 70–110)
HCT VFR BLD AUTO: 38.6 % (ref 40–54)
HGB BLD-MCNC: 13.2 G/DL (ref 14–18)
LYMPHOCYTES # BLD AUTO: 1.6 K/UL (ref 1–4.8)
LYMPHOCYTES NFR BLD: 28.5 % (ref 18–48)
MAGNESIUM SERPL-MCNC: 1.7 MG/DL (ref 1.6–2.6)
MCH RBC QN AUTO: 30.1 PG (ref 27–31)
MCHC RBC AUTO-ENTMCNC: 34.2 G/DL (ref 32–36)
MCV RBC AUTO: 88 FL (ref 82–98)
MONOCYTES # BLD AUTO: 0.8 K/UL (ref 0.3–1)
MONOCYTES NFR BLD: 14.5 % (ref 4–15)
NEUTROPHILS # BLD AUTO: 2.9 K/UL (ref 1.8–7.7)
NEUTROPHILS NFR BLD: 51.5 % (ref 38–73)
PLATELET # BLD AUTO: 171 K/UL (ref 150–350)
PMV BLD AUTO: 11 FL (ref 9.2–12.9)
POTASSIUM SERPL-SCNC: 2.9 MMOL/L (ref 3.5–5.1)
PROT SERPL-MCNC: 6.2 G/DL (ref 6–8.4)
RBC # BLD AUTO: 4.39 M/UL (ref 4.6–6.2)
SODIUM SERPL-SCNC: 144 MMOL/L (ref 136–145)
WBC # BLD AUTO: 5.58 K/UL (ref 3.9–12.7)

## 2019-05-27 PROCEDURE — 25000003 PHARM REV CODE 250: Performed by: FAMILY MEDICINE

## 2019-05-27 PROCEDURE — 63600175 PHARM REV CODE 636 W HCPCS: Performed by: FAMILY MEDICINE

## 2019-05-27 PROCEDURE — 11000001 HC ACUTE MED/SURG PRIVATE ROOM

## 2019-05-27 PROCEDURE — 83735 ASSAY OF MAGNESIUM: CPT

## 2019-05-27 PROCEDURE — 99232 SBSQ HOSP IP/OBS MODERATE 35: CPT | Mod: ,,, | Performed by: INTERNAL MEDICINE

## 2019-05-27 PROCEDURE — 36415 COLL VENOUS BLD VENIPUNCTURE: CPT

## 2019-05-27 PROCEDURE — 97530 THERAPEUTIC ACTIVITIES: CPT

## 2019-05-27 PROCEDURE — 25000003 PHARM REV CODE 250: Performed by: NURSE PRACTITIONER

## 2019-05-27 PROCEDURE — 80053 COMPREHEN METABOLIC PANEL: CPT

## 2019-05-27 PROCEDURE — 85025 COMPLETE CBC W/AUTO DIFF WBC: CPT

## 2019-05-27 PROCEDURE — 99232 PR SUBSEQUENT HOSPITAL CARE,LEVL II: ICD-10-PCS | Mod: ,,, | Performed by: INTERNAL MEDICINE

## 2019-05-27 PROCEDURE — G0378 HOSPITAL OBSERVATION PER HR: HCPCS

## 2019-05-27 PROCEDURE — 97116 GAIT TRAINING THERAPY: CPT

## 2019-05-27 RX ORDER — POTASSIUM CHLORIDE 20 MEQ/1
40 TABLET, EXTENDED RELEASE ORAL ONCE
Status: COMPLETED | OUTPATIENT
Start: 2019-05-27 | End: 2019-05-27

## 2019-05-27 RX ADMIN — BUPROPION HYDROCHLORIDE 300 MG: 150 TABLET, EXTENDED RELEASE ORAL at 09:05

## 2019-05-27 RX ADMIN — PRIMIDONE 100 MG: 50 TABLET ORAL at 08:05

## 2019-05-27 RX ADMIN — PIPERACILLIN AND TAZOBACTAM 4.5 G: 4; .5 INJECTION, POWDER, LYOPHILIZED, FOR SOLUTION INTRAVENOUS; PARENTERAL at 07:05

## 2019-05-27 RX ADMIN — PIPERACILLIN AND TAZOBACTAM 4.5 G: 4; .5 INJECTION, POWDER, LYOPHILIZED, FOR SOLUTION INTRAVENOUS; PARENTERAL at 12:05

## 2019-05-27 RX ADMIN — POTASSIUM CHLORIDE 40 MEQ: 1500 TABLET, EXTENDED RELEASE ORAL at 09:05

## 2019-05-27 RX ADMIN — PANTOPRAZOLE SODIUM 40 MG: 40 TABLET, DELAYED RELEASE ORAL at 09:05

## 2019-05-27 RX ADMIN — CLONIDINE HYDROCHLORIDE 0.1 MG: 0.1 TABLET ORAL at 08:05

## 2019-05-27 RX ADMIN — ALPRAZOLAM 0.5 MG: 0.5 TABLET ORAL at 08:05

## 2019-05-27 RX ADMIN — SIMVASTATIN 20 MG: 10 TABLET, FILM COATED ORAL at 08:05

## 2019-05-27 RX ADMIN — METOPROLOL TARTRATE 100 MG: 100 TABLET ORAL at 08:05

## 2019-05-27 RX ADMIN — AMLODIPINE BESYLATE 10 MG: 10 TABLET ORAL at 09:05

## 2019-05-27 RX ADMIN — PIPERACILLIN AND TAZOBACTAM 4.5 G: 4; .5 INJECTION, POWDER, LYOPHILIZED, FOR SOLUTION INTRAVENOUS; PARENTERAL at 04:05

## 2019-05-27 RX ADMIN — HYDROCHLOROTHIAZIDE 25 MG: 25 TABLET ORAL at 09:05

## 2019-05-27 RX ADMIN — LISINOPRIL 40 MG: 40 TABLET ORAL at 09:05

## 2019-05-27 RX ADMIN — METOPROLOL TARTRATE 100 MG: 100 TABLET ORAL at 09:05

## 2019-05-27 RX ADMIN — PRIMIDONE 100 MG: 50 TABLET ORAL at 09:05

## 2019-05-27 RX ADMIN — TAMSULOSIN HYDROCHLORIDE 0.4 MG: 0.4 CAPSULE ORAL at 09:05

## 2019-05-27 RX ADMIN — POTASSIUM CHLORIDE 40 MEQ: 1500 TABLET, EXTENDED RELEASE ORAL at 12:05

## 2019-05-27 NOTE — PLAN OF CARE
Problem: Adult Inpatient Plan of Care  Goal: Plan of Care Review  Outcome: Ongoing (interventions implemented as appropriate)  Patient had a good day; no nausea/vomiting; no uncontrolled hiccups. Patient having bowel movements which are not difficult to pass.  Up to restroom with assistance.  Wife at bedside.  Physical therapy worked with patient today; tolerated well.  Potassium low at 2.9--80meq oral potassium given.  IV zosyn given every 8 hours; plan is to continue until no earlier than Friday, 6/1/19.  Heart monitor in place; NSR.  Vitals stable; afebrile, room air.  Up in chair for meals.  Discussed plan of care and patient/spouse verbalize understanding.

## 2019-05-27 NOTE — ASSESSMENT & PLAN NOTE
Treat with zosyn for now and follow sensitivies. Recent MDRUTI.  Concern for possible prostatic involvement  PSA is okay though.   follow cultures cont zosyn x 7 days

## 2019-05-27 NOTE — PLAN OF CARE
Patient requires 7 days IV antibiotics for UTI not sensitive to any oral antibiotic. Discussed skilled level of care with patient and his wife. Dr Duval is in agreement with skilled level of care.Explained allotted 100 days of skilled care per year to patient and his wife.. Explained 100% covered for days 1-20 and 20% copay for days . They are in agreement with skilled for completion of IV antibiotic course. Attempted to obtain auth from [facility] but they are closed for Memorial Day. Will call tomorrow.

## 2019-05-27 NOTE — PT/OT/SLP PROGRESS
"Physical Therapy Treatment    Patient Name:  Reymundo Dang Sr.   MRN:  [id number]    Recommendations:     Discharge Recommendations:  home health PT   Discharge Equipment Recommendations: none   Barriers to discharge: Inaccessible home    Assessment:     Reymundo Dang Sr. is a 64 y.o. male admitted with a medical diagnosis of Acute cystitis without hematuria.  He presents with the following impairments/functional limitations:  weakness, impaired self care skills, impaired functional mobilty, gait instability, impaired balance, decreased lower extremity function, decreased coordination, decreased safety awareness. Patient seen up in the chair with wife present. Patient has no new complaint and agreed with PT tx. Ambulated patient to Therapy room with no Assistive device x 150 feet with SBA. Ascended/descended 20 stair steps using Right Handrail with SBA. No sign of respiratory distress and participated well ended up tx with bilat LE Therapeutic ex. For strengthening and coordination ex.    Rehab Prognosis: Good; patient would benefit from acute skilled PT services to address these deficits and reach maximum level of function.    Recent Surgery: * No surgery found *      Plan:     During this hospitalization, patient to be seen 5 x/week to address the identified rehab impairments via gait training, therapeutic activities, therapeutic exercises and progress toward the following goals:    · Plan of Care Expires:  05/31/19    Subjective     Chief Complaint: No pain complain  Patient/Family Comments/goals: "To get better, get all my medicine together, go home and do my own exercise".  Pain/Comfort:  Pain Rating 1: 0/10  Pain Rating Post-Intervention 1: 0/10      Objective:     Communicated with patient and wife prior to session.  Patient found up in chair with telemetry, peripheral IV upon PT entry to room.     General Precautions: Standard, fall, contact   Orthopedic Precautions:N/A   Braces: N/A     Functional " Mobility:  · Bed Mobility:     · Rolling Left:  modified independence  · Rolling Right: modified independence  · Scooting: modified independence  · Supine to Sit: modified independence  · Sit to Supine: modified independence  · Transfers:     · Sit to Stand:  stand by assistance with rolling walker  · Bed to Chair: stand by assistance with  rolling walker  using  Step Transfer  · Gait: RW Ambulation x 150 feet with SBA. Exhbits right LE slight circumduction gait and dec right heel strike with limited Right Dorsiflexion at Mid Swing due to residual hemiparesis.   · Balance: Stand dynamic with Fair+ grade  · Stairs:  Pt ascended/descended 20 steps stair(s) with No Assistive Device with right handrail with Stand-by Assistance.       AM-PAC 6 CLICK MOBILITY  Turning over in bed (including adjusting bedclothes, sheets and blankets)?: 4  Sitting down on and standing up from a chair with arms (e.g., wheelchair, bedside commode, etc.): 3  Moving from lying on back to sitting on the side of the bed?: 4  Moving to and from a bed to a chair (including a wheelchair)?: 3  Need to walk in hospital room?: 3  Climbing 3-5 steps with a railing?: 3  Basic Mobility Total Score: 20       Therapeutic Activities and Exercises:   GAIT: Pt able to ambulate with none with Standby Assistance x 150 ft with the following gait deviation(s): Exhibits Right  LE slight Circumduction gait pattern, no  right heel strike, dec right Ankle Dorsiflexion at Mid Swing and dec right LE swing phase due to residual hemiparesis.  Pt performed Bilateral  LE exercises consisting of Active range of motion exercises for Strengthening and Coordination ex  x 10 reps consisting of Ankle DF, Ankle PF, Quad Sets, Heel slides, ABD/ADD Alternately, Marching at Sitting, and  LAQ Alternately with pt able to tolerate activities well.   PT discussed importance of patient's performance of Home Exercise Program (HEP) with pt verbalizing understanding.     Patient left up in  chair with all lines intact, call button in reach, Nurse notified and wife present..    GOALS:   Multidisciplinary Problems     Physical Therapy Goals        Problem: Physical Therapy Goal    Goal Priority Disciplines Outcome Goal Variances Interventions   Physical Therapy Goal     PT, PT/OT Ongoing (interventions implemented as appropriate)     Description:  Goals to be met by: 7     Patient will increase functional independence with mobility by performin. Supine to sit with Independent - met 19  2. Sit to supine with Independent - met 19  3. Bed to chair transfer with Modified Independentwith or without none using Step Transfer TECHNIQUE  4. Gait  x 200  feet with Modified Independent with or without none  5. Lower extremity exercise program x10 reps per handout, with assistance as needed - met 19  6.  Lower extremity exercise program x20 reps per handout, with assistance as needed  7. Ascend/Descend 30 stair steps using handrail/s with Modified Independent.                    Time Tracking:     PT Received On: 19  PT Start Time: 1445     PT Stop Time: 1510  PT Total Time (min): 25 min     Billable Minutes: Gait Training 15 and Therapeutic Activity 15    Treatment Type: Treatment  PT/PTA: PT           Tray Paez, PT  2019

## 2019-05-27 NOTE — PROGRESS NOTES
[facility] Hospital Medicine  Progress Note    Patient Name: Reymundo Dang Sr.  MRN: [id number]  Patient Class: IP- Inpatient   Admission Date: 5/24/2019  Length of Stay: 3 days  Attending Physician: Lori Perez MD  Primary Care Provider: Rosalinda Villalba NP        Subjective:     Principal Problem:Acute cystitis without hematuria    HPI:  64 year old male comes in after fever started 3 days ago. The next day he says he started with burning with urinating. By yesterday, he says he couldn't get urine out at all. He was recently hospitalized for a UTI and was dc'd without abx but felt back to normal. His only other complaint was constipation today after diarrhea yesterday.    Hospital Course:  Patient with c/o vomiting yesterday and difficulty passing stool today. No fevers since admit.    5/26  Urine culture +. Awaiting sensitivies.  No more fevers. Belching/hiccups continue. Bowels moving.    5/27 urine culture with ecoli. Sensitive to the zosyn day 4. He completed 5 days of zosyn last visit for resistant UTI. He again has resistant UTI. Started on zosyn which it is sensitive to. He will need to cont 7 days of this prior to d/c. PSA was 1.2    Review of Systems   Constitutional: Negative for chills and fever.   HENT: Negative for congestion, ear pain, postnasal drip, rhinorrhea and sore throat.    Eyes: Negative for pain and redness.   Respiratory: Negative for cough, shortness of breath and wheezing.    Cardiovascular: Negative for chest pain and palpitations.   Gastrointestinal: Positive for abdominal pain (chronic) and nausea (chronic). Negative for constipation, diarrhea and vomiting.   Genitourinary: Negative for difficulty urinating, dysuria and frequency.   Skin: Negative for rash.   Neurological: Negative for weakness and headaches.     Objective:     Vital Signs (Most Recent):  Temp: 97.5 °F (36.4 °C) (05/27/19 0720)  Pulse: 65 (05/27/19 1020)  Resp: 20 (05/27/19 0720)  BP: (!)  167/80 (05/27/19 0720)  SpO2: 99 % (05/27/19 0720) Vital Signs (24h Range):  Temp:  [96.8 °F (36 °C)-97.9 °F (36.6 °C)] 97.5 °F (36.4 °C)  Pulse:  [60-83] 65  Resp:  [15-20] 20  SpO2:  [98 %-99 %] 99 %  BP: (160-187)/(80-98) 167/80     Weight: 121.1 kg (267 lb)  Body mass index is 43.09 kg/m².    Physical Exam   Constitutional: He is oriented to person, place, and time. He appears well-developed. No distress.       obese   HENT:   Head: Normocephalic and atraumatic.   Right Ear: External ear normal.   Left Ear: External ear normal.   Eyes: Pupils are equal, round, and reactive to light. Conjunctivae and EOM are normal.   Neck: Normal range of motion. Neck supple. No tracheal deviation present.   Cardiovascular: Normal rate, regular rhythm and normal heart sounds.   Pulmonary/Chest: Effort normal and breath sounds normal. No respiratory distress. He has no wheezes.   Abdominal: Soft. Bowel sounds are normal. There is no tenderness.   Musculoskeletal: Normal range of motion. He exhibits no edema.   Neurological: He is alert and oriented to person, place, and time.   Skin: Skin is warm and dry. No rash noted.   Psychiatric: He has a normal mood and affect. His behavior is normal.   Nursing note and vitals reviewed.        CRANIAL NERVES     CN III, IV, VI   Pupils are equal, round, and reactive to light.  Extraocular motions are normal.        Significant Labs:   Blood Culture: NGTD   CBC:   Recent Labs   Lab 05/26/19  0751 05/27/19 0541   WBC 6.67 5.58   HGB 14.5 13.2*   HCT 42.9 38.6*    171     CMP:   Recent Labs   Lab 05/26/19  0751 05/27/19  0541   * 144   K 3.2* 2.9*    106   CO2 29 29   GLU 91 103   BUN 6* 5*   CREATININE 0.8 0.8   CALCIUM 9.0 8.9   PROT 7.1 6.2   ALBUMIN 3.3* 3.0*   BILITOT 0.3 0.4   ALKPHOS 68 57   AST 21 20   ALT 28 30   ANIONGAP 10 9   EGFRNONAA >60 >60       CX:  Escherichia coli esbl       CULTURE, URINE     Amikacin 32 mcg/mL Intermediate     Amox/K Clav'ate 16/8  mcg/mL Intermediate     Amp/Sulbactam 16/8 mcg/mL Intermediate     Ampicillin >16 mcg/mL Resistant     Cefazolin >16 mcg/mL Resistant     Cefepime >16 mcg/mL Resistant     Ceftriaxone >32 mcg/mL Resistant     Ciprofloxacin >2 mcg/mL Resistant     Ertapenem <=2 mcg/mL Sensitive     Gentamicin >8 mcg/mL Resistant     Levofloxacin >4 mcg/mL Resistant     Meropenem <=4 mcg/mL Sensitive     Nitrofurantoin >64 mcg/mL Resistant     Piperacillin/Tazo <=16 mcg/mL Sensitive     Tetracycline >8 mcg/mL Resistant     Tobramycin >8 mcg/mL Resistant     Trimeth/Sulfa >2/38 mcg/mL Resistant      5/24 PSA 1.2  LActic acid 1.3  Lab Results   Component Value Date    INR 1.0 05/24/2019    INR 1.0 04/27/2019    INR 1.0 12/18/2014     BNP  Recent Labs   Lab 05/24/19  1120   BNP 65         Significant Imaging:     KUB:  Prior hernia repair.  Vascular calcifications overlying the pelvis.    No evidence of focal bowel obstruction.  No evidence of pathologic calcifications or soft tissue masses.    CTA abd No acute abdominopelvic process.    Right spigelian hernia containing small bowel without complication.    Diverticulosis.    Assessment/Plan:      * Acute cystitis without hematuria  Treat with zosyn for now and follow sensitivies. Recent MDRUTI.  Concern for possible prostatic involvement  PSA is okay though.   follow cultures cont zosyn x 7 days    Recurrent major depressive disorder, in full remission  Resume home meds      Hypertension  Resume home meds  Double lisinopril today. Kidneys stable, bp 137/80    Restless legs syndrome (RLS)  Resume home meds      Crohn's disease  Hold mesalamine- resume, consider kub negative      VTE Risk Mitigation (From admission, onward)        Ordered     IP VTE HIGH RISK PATIENT  Once      05/24/19 1236     Place sequential compression device  Until discontinued      05/24/19 1236              Genny Duval MD  Department of Hospital Medicine   [facility]

## 2019-05-27 NOTE — PLAN OF CARE
Problem: Adult Inpatient Plan of Care  Goal: Plan of Care Review  Outcome: Ongoing (interventions implemented as appropriate)  Patient sitting up in chair before bed with no complaints. B/P 187/94, PRN clonidine given with expected results and otherwise VS stable. IV antibiotics administered as ordered. A&O. Patient free from falls/injury. Wife at bedside attending to patient needs. No acute changes noted. Plan of care reviewed and agreed upon with patient and wife.

## 2019-05-27 NOTE — SUBJECTIVE & OBJECTIVE
Review of Systems   Constitutional: Negative for chills and fever.   HENT: Negative for congestion, ear pain, postnasal drip, rhinorrhea and sore throat.    Eyes: Negative for pain and redness.   Respiratory: Negative for cough, shortness of breath and wheezing.    Cardiovascular: Negative for chest pain and palpitations.   Gastrointestinal: Positive for abdominal pain (chronic) and nausea (chronic). Negative for constipation, diarrhea and vomiting.   Genitourinary: Negative for difficulty urinating, dysuria and frequency.   Skin: Negative for rash.   Neurological: Negative for weakness and headaches.     Objective:     Vital Signs (Most Recent):  Temp: 97.5 °F (36.4 °C) (05/27/19 0720)  Pulse: 65 (05/27/19 1020)  Resp: 20 (05/27/19 0720)  BP: (!) 167/80 (05/27/19 0720)  SpO2: 99 % (05/27/19 0720) Vital Signs (24h Range):  Temp:  [96.8 °F (36 °C)-97.9 °F (36.6 °C)] 97.5 °F (36.4 °C)  Pulse:  [60-83] 65  Resp:  [15-20] 20  SpO2:  [98 %-99 %] 99 %  BP: (160-187)/(80-98) 167/80     Weight: 121.1 kg (267 lb)  Body mass index is 43.09 kg/m².    Physical Exam   Constitutional: He is oriented to person, place, and time. He appears well-developed. No distress.       obese   HENT:   Head: Normocephalic and atraumatic.   Right Ear: External ear normal.   Left Ear: External ear normal.   Eyes: Pupils are equal, round, and reactive to light. Conjunctivae and EOM are normal.   Neck: Normal range of motion. Neck supple. No tracheal deviation present.   Cardiovascular: Normal rate, regular rhythm and normal heart sounds.   Pulmonary/Chest: Effort normal and breath sounds normal. No respiratory distress. He has no wheezes.   Abdominal: Soft. Bowel sounds are normal. There is no tenderness.   Musculoskeletal: Normal range of motion. He exhibits no edema.   Neurological: He is alert and oriented to person, place, and time.   Skin: Skin is warm and dry. No rash noted.   Psychiatric: He has a normal mood and affect. His behavior is  normal.   Nursing note and vitals reviewed.        CRANIAL NERVES     CN III, IV, VI   Pupils are equal, round, and reactive to light.  Extraocular motions are normal.        Significant Labs:   Blood Culture: NGTD   CBC:   Recent Labs   Lab 05/26/19  0751 05/27/19  0541   WBC 6.67 5.58   HGB 14.5 13.2*   HCT 42.9 38.6*    171     CMP:   Recent Labs   Lab 05/26/19  0751 05/27/19  0541   * 144   K 3.2* 2.9*    106   CO2 29 29   GLU 91 103   BUN 6* 5*   CREATININE 0.8 0.8   CALCIUM 9.0 8.9   PROT 7.1 6.2   ALBUMIN 3.3* 3.0*   BILITOT 0.3 0.4   ALKPHOS 68 57   AST 21 20   ALT 28 30   ANIONGAP 10 9   EGFRNONAA >60 >60       CX:  Escherichia coli esbl       CULTURE, URINE     Amikacin 32 mcg/mL Intermediate     Amox/K Clav'ate 16/8 mcg/mL Intermediate     Amp/Sulbactam 16/8 mcg/mL Intermediate     Ampicillin >16 mcg/mL Resistant     Cefazolin >16 mcg/mL Resistant     Cefepime >16 mcg/mL Resistant     Ceftriaxone >32 mcg/mL Resistant     Ciprofloxacin >2 mcg/mL Resistant     Ertapenem <=2 mcg/mL Sensitive     Gentamicin >8 mcg/mL Resistant     Levofloxacin >4 mcg/mL Resistant     Meropenem <=4 mcg/mL Sensitive     Nitrofurantoin >64 mcg/mL Resistant     Piperacillin/Tazo <=16 mcg/mL Sensitive     Tetracycline >8 mcg/mL Resistant     Tobramycin >8 mcg/mL Resistant     Trimeth/Sulfa >2/38 mcg/mL Resistant      5/24 PSA 1.2  LActic acid 1.3  Lab Results   Component Value Date    INR 1.0 05/24/2019    INR 1.0 04/27/2019    INR 1.0 12/18/2014     BNP  Recent Labs   Lab 05/24/19  1120   BNP 65         Significant Imaging:     KUB:  Prior hernia repair.  Vascular calcifications overlying the pelvis.    No evidence of focal bowel obstruction.  No evidence of pathologic calcifications or soft tissue masses.    CTA abd No acute abdominopelvic process.    Right spigelian hernia containing small bowel without complication.    Diverticulosis.

## 2019-05-28 PROBLEM — E66.01 MORBID OBESITY: Status: ACTIVE | Noted: 2019-05-28

## 2019-05-28 PROBLEM — A49.9 ESBL (EXTENDED SPECTRUM BETA-LACTAMASE) PRODUCING BACTERIA INFECTION: Status: ACTIVE | Noted: 2019-05-28

## 2019-05-28 PROBLEM — Z16.12 ESBL (EXTENDED SPECTRUM BETA-LACTAMASE) PRODUCING BACTERIA INFECTION: Status: ACTIVE | Noted: 2019-05-28

## 2019-05-28 LAB
ALBUMIN SERPL BCP-MCNC: 3.2 G/DL (ref 3.5–5.2)
ALP SERPL-CCNC: 58 U/L (ref 55–135)
ALT SERPL W/O P-5'-P-CCNC: 55 U/L (ref 10–44)
ANION GAP SERPL CALC-SCNC: 9 MMOL/L (ref 8–16)
AST SERPL-CCNC: 36 U/L (ref 10–40)
BASOPHILS # BLD AUTO: 0.03 K/UL (ref 0–0.2)
BASOPHILS NFR BLD: 0.6 % (ref 0–1.9)
BILIRUB SERPL-MCNC: 0.4 MG/DL (ref 0.1–1)
BUN SERPL-MCNC: 5 MG/DL (ref 8–23)
CALCIUM SERPL-MCNC: 9.2 MG/DL (ref 8.7–10.5)
CHLORIDE SERPL-SCNC: 106 MMOL/L (ref 95–110)
CO2 SERPL-SCNC: 29 MMOL/L (ref 23–29)
CREAT SERPL-MCNC: 0.8 MG/DL (ref 0.5–1.4)
DIFFERENTIAL METHOD: ABNORMAL
EOSINOPHIL # BLD AUTO: 0.3 K/UL (ref 0–0.5)
EOSINOPHIL NFR BLD: 6.1 % (ref 0–8)
ERYTHROCYTE [DISTWIDTH] IN BLOOD BY AUTOMATED COUNT: 12.8 % (ref 11.5–14.5)
EST. GFR  (AFRICAN AMERICAN): >60 ML/MIN/1.73 M^2
EST. GFR  (NON AFRICAN AMERICAN): >60 ML/MIN/1.73 M^2
GLUCOSE SERPL-MCNC: 93 MG/DL (ref 70–110)
HCT VFR BLD AUTO: 39.5 % (ref 40–54)
HGB BLD-MCNC: 13.5 G/DL (ref 14–18)
LYMPHOCYTES # BLD AUTO: 1.9 K/UL (ref 1–4.8)
LYMPHOCYTES NFR BLD: 38.4 % (ref 18–48)
MAGNESIUM SERPL-MCNC: 1.8 MG/DL (ref 1.6–2.6)
MCH RBC QN AUTO: 30.2 PG (ref 27–31)
MCHC RBC AUTO-ENTMCNC: 34.2 G/DL (ref 32–36)
MCV RBC AUTO: 88 FL (ref 82–98)
MONOCYTES # BLD AUTO: 0.7 K/UL (ref 0.3–1)
MONOCYTES NFR BLD: 14.4 % (ref 4–15)
NEUTROPHILS # BLD AUTO: 2 K/UL (ref 1.8–7.7)
NEUTROPHILS NFR BLD: 40.5 % (ref 38–73)
PLATELET # BLD AUTO: 180 K/UL (ref 150–350)
PMV BLD AUTO: 11 FL (ref 9.2–12.9)
POTASSIUM SERPL-SCNC: 3.1 MMOL/L (ref 3.5–5.1)
PROT SERPL-MCNC: 6.5 G/DL (ref 6–8.4)
RBC # BLD AUTO: 4.47 M/UL (ref 4.6–6.2)
SODIUM SERPL-SCNC: 144 MMOL/L (ref 136–145)
WBC # BLD AUTO: 4.92 K/UL (ref 3.9–12.7)

## 2019-05-28 PROCEDURE — 97116 GAIT TRAINING THERAPY: CPT

## 2019-05-28 PROCEDURE — G0378 HOSPITAL OBSERVATION PER HR: HCPCS

## 2019-05-28 PROCEDURE — 25000003 PHARM REV CODE 250: Performed by: NURSE PRACTITIONER

## 2019-05-28 PROCEDURE — 83735 ASSAY OF MAGNESIUM: CPT

## 2019-05-28 PROCEDURE — 25000003 PHARM REV CODE 250: Performed by: FAMILY MEDICINE

## 2019-05-28 PROCEDURE — 97110 THERAPEUTIC EXERCISES: CPT

## 2019-05-28 PROCEDURE — 99233 PR SUBSEQUENT HOSPITAL CARE,LEVL III: ICD-10-PCS | Mod: ,,, | Performed by: INTERNAL MEDICINE

## 2019-05-28 PROCEDURE — 11000001 HC ACUTE MED/SURG PRIVATE ROOM

## 2019-05-28 PROCEDURE — 63600175 PHARM REV CODE 636 W HCPCS: Performed by: FAMILY MEDICINE

## 2019-05-28 PROCEDURE — 85025 COMPLETE CBC W/AUTO DIFF WBC: CPT

## 2019-05-28 PROCEDURE — 80053 COMPREHEN METABOLIC PANEL: CPT

## 2019-05-28 PROCEDURE — 97530 THERAPEUTIC ACTIVITIES: CPT

## 2019-05-28 PROCEDURE — 36415 COLL VENOUS BLD VENIPUNCTURE: CPT

## 2019-05-28 PROCEDURE — 99233 SBSQ HOSP IP/OBS HIGH 50: CPT | Mod: ,,, | Performed by: INTERNAL MEDICINE

## 2019-05-28 RX ORDER — PRIMIDONE 50 MG/1
TABLET ORAL
Qty: 90 TABLET | Refills: 11 | Status: SHIPPED | OUTPATIENT
Start: 2019-05-28 | End: 2019-05-31 | Stop reason: HOSPADM

## 2019-05-28 RX ORDER — POTASSIUM CHLORIDE 20 MEQ/1
40 TABLET, EXTENDED RELEASE ORAL ONCE
Status: COMPLETED | OUTPATIENT
Start: 2019-05-28 | End: 2019-05-28

## 2019-05-28 RX ORDER — PRIMIDONE 50 MG/1
50 TABLET ORAL 3 TIMES DAILY
Status: DISCONTINUED | OUTPATIENT
Start: 2019-05-28 | End: 2019-05-28

## 2019-05-28 RX ORDER — PRIMIDONE 50 MG/1
100 TABLET ORAL 2 TIMES DAILY
Status: DISCONTINUED | OUTPATIENT
Start: 2019-05-28 | End: 2019-05-31 | Stop reason: HOSPADM

## 2019-05-28 RX ADMIN — BUPROPION HYDROCHLORIDE 300 MG: 150 TABLET, EXTENDED RELEASE ORAL at 08:05

## 2019-05-28 RX ADMIN — AMLODIPINE BESYLATE 10 MG: 10 TABLET ORAL at 08:05

## 2019-05-28 RX ADMIN — SIMVASTATIN 20 MG: 10 TABLET, FILM COATED ORAL at 09:05

## 2019-05-28 RX ADMIN — PIPERACILLIN AND TAZOBACTAM 4.5 G: 4; .5 INJECTION, POWDER, LYOPHILIZED, FOR SOLUTION INTRAVENOUS; PARENTERAL at 07:05

## 2019-05-28 RX ADMIN — POTASSIUM CHLORIDE 40 MEQ: 1500 TABLET, EXTENDED RELEASE ORAL at 12:05

## 2019-05-28 RX ADMIN — POTASSIUM CHLORIDE 40 MEQ: 1500 TABLET, EXTENDED RELEASE ORAL at 08:05

## 2019-05-28 RX ADMIN — TAMSULOSIN HYDROCHLORIDE 0.4 MG: 0.4 CAPSULE ORAL at 08:05

## 2019-05-28 RX ADMIN — PANTOPRAZOLE SODIUM 40 MG: 40 TABLET, DELAYED RELEASE ORAL at 08:05

## 2019-05-28 RX ADMIN — LISINOPRIL 40 MG: 40 TABLET ORAL at 08:05

## 2019-05-28 RX ADMIN — PIPERACILLIN AND TAZOBACTAM 4.5 G: 4; .5 INJECTION, POWDER, LYOPHILIZED, FOR SOLUTION INTRAVENOUS; PARENTERAL at 04:05

## 2019-05-28 RX ADMIN — METOPROLOL TARTRATE 100 MG: 100 TABLET ORAL at 08:05

## 2019-05-28 RX ADMIN — PRIMIDONE 100 MG: 50 TABLET ORAL at 09:05

## 2019-05-28 RX ADMIN — PIPERACILLIN AND TAZOBACTAM 4.5 G: 4; .5 INJECTION, POWDER, LYOPHILIZED, FOR SOLUTION INTRAVENOUS; PARENTERAL at 12:05

## 2019-05-28 RX ADMIN — PRIMIDONE 100 MG: 50 TABLET ORAL at 08:05

## 2019-05-28 RX ADMIN — ALPRAZOLAM 0.5 MG: 0.5 TABLET ORAL at 09:05

## 2019-05-28 RX ADMIN — METOPROLOL TARTRATE 100 MG: 100 TABLET ORAL at 09:05

## 2019-05-28 RX ADMIN — HYDROCHLOROTHIAZIDE 25 MG: 25 TABLET ORAL at 08:05

## 2019-05-28 NOTE — PLAN OF CARE
Spoke with Samanta JIN nurse for [employer]. No authorization as of yet for skilled placement. Most likely not until tomorrow.

## 2019-05-28 NOTE — PT/OT/SLP PROGRESS
"Physical Therapy Treatment    Patient Name:  Reymundo Dang Sr.   MRN:  [id number]    Recommendations:     Discharge Recommendations:  home health PT   Discharge Equipment Recommendations: none   Barriers to discharge: Inaccessible home    Assessment:     Reymundo Dang Sr. is a 64 y.o. male admitted with a medical diagnosis of ESBL (extended spectrum beta-lactamase) producing bacteria infection.  He presents with the following impairments/functional limitations:  weakness, decreased lower extremity function, decreased upper extremity function, impaired functional mobilty, impaired self care skills, gait instability, decreased coordination, decreased safety awareness.    Rehab Prognosis: Good; patient would benefit from acute skilled PT services to address these deficits and reach maximum level of function.    Recent Surgery: * No surgery found *      Plan:     During this hospitalization, patient to be seen 5 x/week to address the identified rehab impairments via gait training, therapeutic activities, therapeutic exercises and progress toward the following goals:    · Plan of Care Expires:  05/31/19    Subjective     Chief Complaint: Patient reported has E Coli infection  from the Lab result.  Patient/Family Comments/goals: "To get rid the infections and go home".  Pain/Comfort:  · Pain Rating 1: 0/10  · Pain Rating Post-Intervention 1: 0/10      Objective:     Communicated with patient and wife prior to session.  Patient found up in chair with telemetry, peripheral IV upon PT entry to room.     General Precautions: Standard, fall, contact   Orthopedic Precautions:N/A   Braces: N/A     Functional Mobility:  · Transfers:     · Sit to Stand:  supervision with no AD  · Bed to Chair: supervision with  no AD  using  Step Transfer  · Gait: Ambulated with no assistive device x 175 feet with SBA. Reciprocal gait with slight Right LE circumduction gait with no Right heel strike , slight dec Right Foot/Floor clearance and " slight dec right LE swing phase.   · Balance: Stand dynamic with Good- grade  · Stairs:  Pt ascended/descended 28 steps stair(s) with No Assistive Device with left handrail with Stand-by Assistance.       AM-PAC 6 CLICK MOBILITY  Turning over in bed (including adjusting bedclothes, sheets and blankets)?: 4  Sitting down on and standing up from a chair with arms (e.g., wheelchair, bedside commode, etc.): 3  Moving from lying on back to sitting on the side of the bed?: 4  Moving to and from a bed to a chair (including a wheelchair)?: 3  Need to walk in hospital room?: 3  Climbing 3-5 steps with a railing?: 3  Basic Mobility Total Score: 20       Therapeutic Activities and Exercises:   GAIT: Pt able to ambulate with none with Standby Assistance x 175 ft with the following gait deviation(s): Reciprocal gait with light dec Right LE Circumduction gait, no right heel strike, slight dec right Foot/floor clearacne and slight dec right LE swing phase. Worked on dynamic standing balance with weight shifting and reaching items at different directions .Pt performed bilat LE exercises consisting of Active range of motion exercises for strengthening and coordination ex x 15 reps consisting of Ankle DF, Ankle PF, Side Steps Alternately, LAQ Alternately, Marching at Sitting with pt able to tolerate activities well.   PT discussed importance of patient's performance of Home Exercise Program (HEP) with pt verbalizing understanding.     Patient left up in chair with all lines intact, call button in reach, Nurse notified and wife present..    GOALS:   Multidisciplinary Problems     Physical Therapy Goals        Problem: Physical Therapy Goal    Goal Priority Disciplines Outcome Goal Variances Interventions   Physical Therapy Goal     PT, PT/OT Ongoing (interventions implemented as appropriate)     Description:  Goals to be met by: 7     Patient will increase functional independence with mobility by performin. Supine to sit with  Independent - met 5/26/19  2. Sit to supine with Independent - met 5/26/19  3. Bed to chair transfer with Modified Independentwith or without none using Step Transfer TECHNIQUE  4. Gait  x 200  feet with Modified Independent with or without none  5. Lower extremity exercise program x10 reps per handout, with assistance as needed - met 5/27/19  6.  Lower extremity exercise program x20 reps per handout, with assistance as needed  7. Ascend/Descend 30 stair steps using handrail/s with Modified Independent.                    Time Tracking:     PT Received On: 05/28/19  PT Start Time: 1230     PT Stop Time: 1315  PT Total Time (min): 45 min     Billable Minutes: Gait Training 15, Therapeutic Activity 15 and Therapeutic Exercise 15    Treatment Type: Treatment  PT/PTA: PT           Tray Paez, PT  05/28/2019

## 2019-05-28 NOTE — ASSESSMENT & PLAN NOTE
# The patient is asked to make an attempt to improve diet and exercise patterns to aid in medical management of this problem.     # Eat  5 small meals a day.     # Cut out high carbohydrate  foods : bread, rice, pasta, potatoes.     # Exercise/walk 5x/week for at least 30-45  minutes.

## 2019-05-28 NOTE — PLAN OF CARE
Problem: Adult Inpatient Plan of Care  Goal: Plan of Care Review  Outcome: Ongoing (interventions implemented as appropriate)  Patient admitted with acute cystitis; here until Friday for zosyn administration.  Awake and oriented; VSS, afebrile on room air.  Up with physical therapy; did well.  Heart monitor in place; NSR.  SCD's at bedside for VTE prevention; patient refuses them.  Potassium 3.1--80meq given of potassium to supplement.  Wife at bedside; up with assistance.  Verbalizes understanding of plan of care.

## 2019-05-28 NOTE — PLAN OF CARE
05/28/19 1114   Post-Acute Status   Post-Acute Authorization HME   HME Status Authorization Obtained         Spoke with Laura at [facility] who gave permission to pull heavy duty BSC from [employer]. BSC delivered to patient. All proper paperwork signed and brought back to [employer]. Education paper on BSC along with [facility] contact information given to patient.       Estelita Paul RN, BSN  [facility]   Case Management/Utilization Review  [phone] (Phone)  [phone] (Fax)

## 2019-05-28 NOTE — PLAN OF CARE
05/28/19 1028   Discharge Reassessment   Assessment Type Discharge Planning Reassessment       Spoke with [employer] and authorization is pending clinical review. Pending auth #[id number].

## 2019-05-28 NOTE — ASSESSMENT & PLAN NOTE
Treat with zosyn for now and follow sensitivies. Recent MDRUTI.  Concern for possible prostatic involvement add flomax  PSA is okay though.   follow cultures cont zosyn x 7 days.

## 2019-05-28 NOTE — SUBJECTIVE & OBJECTIVE
Review of Systems   Constitutional: Negative for chills and fever.   HENT: Negative for congestion, ear pain, postnasal drip, rhinorrhea and sore throat.    Eyes: Negative for pain and redness.   Respiratory: Negative for cough, shortness of breath and wheezing.    Cardiovascular: Negative for chest pain and palpitations.   Gastrointestinal: Negative for constipation, diarrhea and vomiting. Abdominal pain: chronic. Nausea: chronic.   Genitourinary: Positive for difficulty urinating, dysuria and urgency. Negative for frequency.   Skin: Negative for rash.   Neurological: Negative for weakness and headaches.     Objective:     Vital Signs (Most Recent):  Temp: 97 °F (36.1 °C) (05/28/19 0715)  Pulse: 69 (05/28/19 0800)  Resp: 18 (05/28/19 0715)  BP: (!) 178/85 (05/28/19 0715)  SpO2: 97 % (05/28/19 0715) Vital Signs (24h Range):  Temp:  [96.3 °F (35.7 °C)-98 °F (36.7 °C)] 97 °F (36.1 °C)  Pulse:  [53-82] 69  Resp:  [18-20] 18  SpO2:  [95 %-99 %] 97 %  BP: (131-181)/(73-85) 178/85     Weight: 121.1 kg (267 lb)  Body mass index is 43.09 kg/m².    Physical Exam   Constitutional: He is oriented to person, place, and time. He appears well-developed. No distress.       obese   HENT:   Head: Normocephalic and atraumatic.   Right Ear: External ear normal.   Left Ear: External ear normal.   Eyes: Pupils are equal, round, and reactive to light. Conjunctivae and EOM are normal.   Neck: Normal range of motion. Neck supple. No tracheal deviation present.   Cardiovascular: Normal rate, regular rhythm and normal heart sounds.   Pulmonary/Chest: Effort normal and breath sounds normal. No respiratory distress. He has no wheezes.   Abdominal: Soft. Bowel sounds are normal. There is no tenderness.   Musculoskeletal: Normal range of motion. He exhibits no edema.   Neurological: He is alert and oriented to person, place, and time.   Skin: Skin is warm and dry. No rash noted. No erythema. No pallor.   Psychiatric: He has a normal mood and  affect. His behavior is normal.   Nursing note and vitals reviewed.        CRANIAL NERVES     CN III, IV, VI   Pupils are equal, round, and reactive to light.  Extraocular motions are normal.        Significant Labs:   Blood Culture: NGTD   CBC:   Recent Labs   Lab 05/27/19  0541 05/28/19  0635   WBC 5.58 4.92   HGB 13.2* 13.5*   HCT 38.6* 39.5*    180     CMP:   Recent Labs   Lab 05/27/19  0541 05/28/19  0635    144   K 2.9* 3.1*    106   CO2 29 29    93   BUN 5* 5*   CREATININE 0.8 0.8   CALCIUM 8.9 9.2   PROT 6.2 6.5   ALBUMIN 3.0* 3.2*   BILITOT 0.4 0.4   ALKPHOS 57 58   AST 20 36   ALT 30 55*   ANIONGAP 9 9   EGFRNONAA >60 >60       CX:  Escherichia coli esbl       CULTURE, URINE     Amikacin 32 mcg/mL Intermediate     Amox/K Clav'ate 16/8 mcg/mL Intermediate     Amp/Sulbactam 16/8 mcg/mL Intermediate     Ampicillin >16 mcg/mL Resistant     Cefazolin >16 mcg/mL Resistant     Cefepime >16 mcg/mL Resistant     Ceftriaxone >32 mcg/mL Resistant     Ciprofloxacin >2 mcg/mL Resistant     Ertapenem <=2 mcg/mL Sensitive     Gentamicin >8 mcg/mL Resistant     Levofloxacin >4 mcg/mL Resistant     Meropenem <=4 mcg/mL Sensitive     Nitrofurantoin >64 mcg/mL Resistant     Piperacillin/Tazo <=16 mcg/mL Sensitive     Tetracycline >8 mcg/mL Resistant     Tobramycin >8 mcg/mL Resistant     Trimeth/Sulfa >2/38 mcg/mL Resistant      5/24 PSA 1.2  LActic acid 1.3  Lab Results   Component Value Date    INR 1.0 05/24/2019    INR 1.0 04/27/2019    INR 1.0 12/18/2014     BNP  Recent Labs   Lab 05/24/19  1120   BNP 65         Significant Imaging:     KUB:  Prior hernia repair.  Vascular calcifications overlying the pelvis.    No evidence of focal bowel obstruction.  No evidence of pathologic calcifications or soft tissue masses.    CTA abd No acute abdominopelvic process.    Right spigelian hernia containing small bowel without complication.    Diverticulosis.

## 2019-05-28 NOTE — PROGRESS NOTES
[facility] Hospital Medicine  Progress Note    Patient Name: Reymundo Dang Sr.  MRN: [id number]  Patient Class: IP- Inpatient   Admission Date: 5/24/2019  Length of Stay: 4 days  Attending Physician: Lori Perez MD  Primary Care Provider: Rosalinda Villalba NP        Subjective:     Principal Problem:Acute cystitis without hematuria    HPI:  64 year old male comes in after fever started 3 days ago. The next day he says he started with burning with urinating. By yesterday, he says he couldn't get urine out at all. He was recently hospitalized for a UTI and was dc'd without abx but felt back to normal. His only other complaint was constipation today after diarrhea yesterday.    Hospital Course:  Patient with c/o vomiting yesterday and difficulty passing stool today. No fevers since admit.    5/26  Urine culture +. Awaiting sensitivies.  No more fevers. Belching/hiccups continue. Bowels moving.    5/27 urine culture with ecoli. Sensitive to the zosyn day 4. He completed 5 days of zosyn last visit for resistant UTI. He again has resistant UTI. Started on zosyn which it is sensitive to. He will need to cont 7 days of this prior to d/c. PSA was 1.2    5/28 ;  Urine culture ESBL only sensitive to iv abx. Started on Zosyn since admission. Will need to cont x 7 days. Last dose due Friday morning. CT abd shows mildly enlarged prostate, PSA normal. Started on flomax because he report that his has trouble getting the urine out. No fever. No elevated WBC.     Review of Systems   Constitutional: Negative for chills and fever.   HENT: Negative for congestion, ear pain, postnasal drip, rhinorrhea and sore throat.    Eyes: Negative for pain and redness.   Respiratory: Negative for cough, shortness of breath and wheezing.    Cardiovascular: Negative for chest pain and palpitations.   Gastrointestinal: Negative for constipation, diarrhea and vomiting. Abdominal pain: chronic. Nausea: chronic.   Genitourinary:  Positive for difficulty urinating, dysuria and urgency. Negative for frequency.   Skin: Negative for rash.   Neurological: Negative for weakness and headaches.     Objective:     Vital Signs (Most Recent):  Temp: 97 °F (36.1 °C) (05/28/19 0715)  Pulse: 69 (05/28/19 0800)  Resp: 18 (05/28/19 0715)  BP: (!) 178/85 (05/28/19 0715)  SpO2: 97 % (05/28/19 0715) Vital Signs (24h Range):  Temp:  [96.3 °F (35.7 °C)-98 °F (36.7 °C)] 97 °F (36.1 °C)  Pulse:  [53-82] 69  Resp:  [18-20] 18  SpO2:  [95 %-99 %] 97 %  BP: (131-181)/(73-85) 178/85     Weight: 121.1 kg (267 lb)  Body mass index is 43.09 kg/m².    Physical Exam   Constitutional: He is oriented to person, place, and time. He appears well-developed. No distress.       obese   HENT:   Head: Normocephalic and atraumatic.   Right Ear: External ear normal.   Left Ear: External ear normal.   Eyes: Pupils are equal, round, and reactive to light. Conjunctivae and EOM are normal.   Neck: Normal range of motion. Neck supple. No tracheal deviation present.   Cardiovascular: Normal rate, regular rhythm and normal heart sounds.   Pulmonary/Chest: Effort normal and breath sounds normal. No respiratory distress. He has no wheezes.   Abdominal: Soft. Bowel sounds are normal. There is no tenderness.   Musculoskeletal: Normal range of motion. He exhibits no edema.   Neurological: He is alert and oriented to person, place, and time.   Skin: Skin is warm and dry. No rash noted. No erythema. No pallor.   Psychiatric: He has a normal mood and affect. His behavior is normal.   Nursing note and vitals reviewed.        CRANIAL NERVES     CN III, IV, VI   Pupils are equal, round, and reactive to light.  Extraocular motions are normal.        Significant Labs:   Blood Culture: NGTD   CBC:   Recent Labs   Lab 05/27/19  0541 05/28/19  0635   WBC 5.58 4.92   HGB 13.2* 13.5*   HCT 38.6* 39.5*    180     CMP:   Recent Labs   Lab 05/27/19  0541 05/28/19  0635    144   K 2.9* 3.1*     106   CO2 29 29    93   BUN 5* 5*   CREATININE 0.8 0.8   CALCIUM 8.9 9.2   PROT 6.2 6.5   ALBUMIN 3.0* 3.2*   BILITOT 0.4 0.4   ALKPHOS 57 58   AST 20 36   ALT 30 55*   ANIONGAP 9 9   EGFRNONAA >60 >60       CX:  Escherichia coli esbl       CULTURE, URINE     Amikacin 32 mcg/mL Intermediate     Amox/K Clav'ate 16/8 mcg/mL Intermediate     Amp/Sulbactam 16/8 mcg/mL Intermediate     Ampicillin >16 mcg/mL Resistant     Cefazolin >16 mcg/mL Resistant     Cefepime >16 mcg/mL Resistant     Ceftriaxone >32 mcg/mL Resistant     Ciprofloxacin >2 mcg/mL Resistant     Ertapenem <=2 mcg/mL Sensitive     Gentamicin >8 mcg/mL Resistant     Levofloxacin >4 mcg/mL Resistant     Meropenem <=4 mcg/mL Sensitive     Nitrofurantoin >64 mcg/mL Resistant     Piperacillin/Tazo <=16 mcg/mL Sensitive     Tetracycline >8 mcg/mL Resistant     Tobramycin >8 mcg/mL Resistant     Trimeth/Sulfa >2/38 mcg/mL Resistant      5/24 PSA 1.2  LActic acid 1.3  Lab Results   Component Value Date    INR 1.0 05/24/2019    INR 1.0 04/27/2019    INR 1.0 12/18/2014     BNP  Recent Labs   Lab 05/24/19  1120   BNP 65         Significant Imaging:     KUB:  Prior hernia repair.  Vascular calcifications overlying the pelvis.    No evidence of focal bowel obstruction.  No evidence of pathologic calcifications or soft tissue masses.    CTA abd No acute abdominopelvic process.    Right spigelian hernia containing small bowel without complication.    Diverticulosis.    Assessment/Plan:      * Acute cystitis without hematuria  Treat with zosyn for now and follow sensitivies. Recent MDRUTI.  Concern for possible prostatic involvement add flomax  PSA is okay though.   follow cultures cont zosyn x 7 days.    Morbid obesity    # The patient is asked to make an attempt to improve diet and exercise patterns to aid in medical management of this problem.     # Eat  5 small meals a day.     # Cut out high carbohydrate  foods : bread, rice, pasta, potatoes.     #  Exercise/walk 5x/week for at least 30-45  minutes.            Recurrent major depressive disorder, in full remission  Resume home meds.      Hypokalemia  Repeat replacement today  Mag 1.8.      Hypertension  Resume home meds.  Double lisinopril today. Kidneys stable, bp 131//85    Restless legs syndrome (RLS)  Resume home meds.      Crohn's disease  stable      VTE Risk Mitigation (From admission, onward)        Ordered     IP VTE HIGH RISK PATIENT  Once      05/24/19 1236     Place sequential compression device  Until discontinued      05/24/19 1236              Iram Yoder MD  Department of Hospital Medicine   [facility]

## 2019-05-29 LAB
ANION GAP SERPL CALC-SCNC: 9 MMOL/L (ref 8–16)
BACTERIA BLD CULT: NORMAL
BACTERIA BLD CULT: NORMAL
BUN SERPL-MCNC: 7 MG/DL (ref 8–23)
CALCIUM SERPL-MCNC: 9.1 MG/DL (ref 8.7–10.5)
CHLORIDE SERPL-SCNC: 105 MMOL/L (ref 95–110)
CO2 SERPL-SCNC: 29 MMOL/L (ref 23–29)
CREAT SERPL-MCNC: 0.9 MG/DL (ref 0.5–1.4)
EST. GFR  (AFRICAN AMERICAN): >60 ML/MIN/1.73 M^2
EST. GFR  (NON AFRICAN AMERICAN): >60 ML/MIN/1.73 M^2
GLUCOSE SERPL-MCNC: 89 MG/DL (ref 70–110)
MAGNESIUM SERPL-MCNC: 1.8 MG/DL (ref 1.6–2.6)
POTASSIUM SERPL-SCNC: 3.2 MMOL/L (ref 3.5–5.1)
SODIUM SERPL-SCNC: 143 MMOL/L (ref 136–145)

## 2019-05-29 PROCEDURE — 63600175 PHARM REV CODE 636 W HCPCS: Performed by: FAMILY MEDICINE

## 2019-05-29 PROCEDURE — 11000001 HC ACUTE MED/SURG PRIVATE ROOM

## 2019-05-29 PROCEDURE — 97530 THERAPEUTIC ACTIVITIES: CPT

## 2019-05-29 PROCEDURE — 25000003 PHARM REV CODE 250: Performed by: FAMILY MEDICINE

## 2019-05-29 PROCEDURE — 99232 SBSQ HOSP IP/OBS MODERATE 35: CPT | Mod: ,,, | Performed by: FAMILY MEDICINE

## 2019-05-29 PROCEDURE — 83735 ASSAY OF MAGNESIUM: CPT

## 2019-05-29 PROCEDURE — G0378 HOSPITAL OBSERVATION PER HR: HCPCS

## 2019-05-29 PROCEDURE — 25000003 PHARM REV CODE 250: Performed by: NURSE PRACTITIONER

## 2019-05-29 PROCEDURE — 36415 COLL VENOUS BLD VENIPUNCTURE: CPT

## 2019-05-29 PROCEDURE — 80048 BASIC METABOLIC PNL TOTAL CA: CPT

## 2019-05-29 PROCEDURE — 97116 GAIT TRAINING THERAPY: CPT

## 2019-05-29 PROCEDURE — 99232 PR SUBSEQUENT HOSPITAL CARE,LEVL II: ICD-10-PCS | Mod: ,,, | Performed by: FAMILY MEDICINE

## 2019-05-29 RX ORDER — POTASSIUM CHLORIDE 20 MEQ/1
40 TABLET, EXTENDED RELEASE ORAL ONCE
Status: COMPLETED | OUTPATIENT
Start: 2019-05-29 | End: 2019-05-29

## 2019-05-29 RX ADMIN — PIPERACILLIN AND TAZOBACTAM 4.5 G: 4; .5 INJECTION, POWDER, LYOPHILIZED, FOR SOLUTION INTRAVENOUS; PARENTERAL at 11:05

## 2019-05-29 RX ADMIN — TAMSULOSIN HYDROCHLORIDE 0.4 MG: 0.4 CAPSULE ORAL at 10:05

## 2019-05-29 RX ADMIN — AMLODIPINE BESYLATE 10 MG: 10 TABLET ORAL at 10:05

## 2019-05-29 RX ADMIN — PRIMIDONE 100 MG: 50 TABLET ORAL at 10:05

## 2019-05-29 RX ADMIN — POTASSIUM CHLORIDE 40 MEQ: 1500 TABLET, EXTENDED RELEASE ORAL at 12:05

## 2019-05-29 RX ADMIN — SIMVASTATIN 20 MG: 10 TABLET, FILM COATED ORAL at 08:05

## 2019-05-29 RX ADMIN — LISINOPRIL 40 MG: 40 TABLET ORAL at 10:05

## 2019-05-29 RX ADMIN — ACETAMINOPHEN 650 MG: 325 TABLET ORAL at 08:05

## 2019-05-29 RX ADMIN — PIPERACILLIN AND TAZOBACTAM 4.5 G: 4; .5 INJECTION, POWDER, LYOPHILIZED, FOR SOLUTION INTRAVENOUS; PARENTERAL at 04:05

## 2019-05-29 RX ADMIN — BUPROPION HYDROCHLORIDE 300 MG: 150 TABLET, EXTENDED RELEASE ORAL at 10:05

## 2019-05-29 RX ADMIN — PIPERACILLIN AND TAZOBACTAM 4.5 G: 4; .5 INJECTION, POWDER, LYOPHILIZED, FOR SOLUTION INTRAVENOUS; PARENTERAL at 07:05

## 2019-05-29 RX ADMIN — METOPROLOL TARTRATE 100 MG: 100 TABLET ORAL at 08:05

## 2019-05-29 RX ADMIN — PIPERACILLIN AND TAZOBACTAM 4.5 G: 4; .5 INJECTION, POWDER, LYOPHILIZED, FOR SOLUTION INTRAVENOUS; PARENTERAL at 12:05

## 2019-05-29 RX ADMIN — POTASSIUM CHLORIDE 40 MEQ: 1500 TABLET, EXTENDED RELEASE ORAL at 10:05

## 2019-05-29 RX ADMIN — METOPROLOL TARTRATE 100 MG: 100 TABLET ORAL at 10:05

## 2019-05-29 RX ADMIN — PANTOPRAZOLE SODIUM 40 MG: 40 TABLET, DELAYED RELEASE ORAL at 10:05

## 2019-05-29 RX ADMIN — PRIMIDONE 100 MG: 50 TABLET ORAL at 08:05

## 2019-05-29 RX ADMIN — HYDROCHLOROTHIAZIDE 25 MG: 25 TABLET ORAL at 10:05

## 2019-05-29 RX ADMIN — ALPRAZOLAM 0.5 MG: 0.5 TABLET ORAL at 08:05

## 2019-05-29 NOTE — PLAN OF CARE
Problem: Adult Inpatient Plan of Care  Goal: Plan of Care Review  Outcome: Ongoing (interventions implemented as appropriate)  Patient with no complaints at this time. Antibiotics administered as ordered. VS stable. A&O. Patient up to restroom with wife by side. Free from falls/injury. No acute changes noted. NS on tele. Plan of care reviewed and agreed upon with wife and patient.

## 2019-05-29 NOTE — PLAN OF CARE
[facility] has still not authorized skilled level of care for this patient. I have had two conversations today with Samanta Herzog who is our nurse reviewer with [facility]. She states she is still working on it. Mr Dang is due to discharge on Friday. Original authorization was submitted on Monday.

## 2019-05-29 NOTE — PLAN OF CARE
Pt currently receiving Zosyn IV therapy. To be discharged Friday. AAOx4. VS stable, Afebrile. Patient walked with physical therapy today. Patient is on Telemetry with NSR. Patient refused SCDs, but ambulates in room. Wife at bedside. No signs of distress noted. Will continue to monitor. Plan of care reviewed with Patient and spouse. Patient and spouse verbalized understanding.

## 2019-05-29 NOTE — PLAN OF CARE
05/29/19 1110   Discharge Assessment   Assessment Type Discharge Planning Reassessment     Still [facility] word [facility]. Left voicemail for Samanta Herzog.

## 2019-05-29 NOTE — PROGRESS NOTES
Staff Handoff  Bedside report received from SAKINA Borrego. VS stable, Call light in reach. Wife in room. No signs of distress noted. Will continue to monitor.      Resident Handoff

## 2019-05-29 NOTE — SUBJECTIVE & OBJECTIVE
Review of Systems   Constitutional: Negative for chills and fever.   HENT: Negative for congestion, ear pain, postnasal drip, rhinorrhea and sore throat.    Eyes: Negative for pain and redness.   Respiratory: Negative for cough, shortness of breath and wheezing.    Cardiovascular: Negative for chest pain and palpitations.   Gastrointestinal: Negative for constipation, diarrhea and vomiting. Abdominal pain: chronic. Nausea: chronic.   Genitourinary: Positive for difficulty urinating, dysuria and urgency. Negative for frequency.   Skin: Negative for rash.   Neurological: Negative for weakness and headaches.     Objective:     Vital Signs (Most Recent):  Temp: 96.2 °F (35.7 °C) (05/29/19 0734)  Pulse: 67 (05/29/19 0800)  Resp: 18 (05/29/19 0334)  BP: (!) 164/83 (05/29/19 0734)  SpO2: 98 % (05/29/19 0734) Vital Signs (24h Range):  Temp:  [96.2 °F (35.7 °C)-98.2 °F (36.8 °C)] 96.2 °F (35.7 °C)  Pulse:  [55-76] 67  Resp:  [16-20] 18  SpO2:  [97 %-98 %] 98 %  BP: (133-181)/(72-83) 164/83     Weight: 121.1 kg (267 lb)  Body mass index is 43.09 kg/m².    Physical Exam   Constitutional: He is oriented to person, place, and time. He appears well-developed. No distress.       obese   HENT:   Head: Normocephalic and atraumatic.   Right Ear: External ear normal.   Left Ear: External ear normal.   Eyes: Pupils are equal, round, and reactive to light. Conjunctivae and EOM are normal.   Neck: Normal range of motion. Neck supple. No tracheal deviation present.   Cardiovascular: Normal rate, regular rhythm and normal heart sounds.   Pulmonary/Chest: Effort normal and breath sounds normal. No respiratory distress. He has no wheezes.   Abdominal: Soft. Bowel sounds are normal. There is no tenderness.   Musculoskeletal: Normal range of motion. He exhibits no edema.   Neurological: He is alert and oriented to person, place, and time.   Skin: Skin is warm and dry. No rash noted. No erythema. No pallor.   Psychiatric: He has a normal mood  and affect. His behavior is normal.   Nursing note and vitals reviewed.        CRANIAL NERVES     CN III, IV, VI   Pupils are equal, round, and reactive to light.  Extraocular motions are normal.        Significant Labs:   Blood Culture: NGTD   CBC:   Recent Labs   Lab 05/28/19  0635   WBC 4.92   HGB 13.5*   HCT 39.5*        CMP:   Recent Labs   Lab 05/28/19  0635 05/29/19  0540    143   K 3.1* 3.2*    105   CO2 29 29   GLU 93 89   BUN 5* 7*   CREATININE 0.8 0.9   CALCIUM 9.2 9.1   PROT 6.5  --    ALBUMIN 3.2*  --    BILITOT 0.4  --    ALKPHOS 58  --    AST 36  --    ALT 55*  --    ANIONGAP 9 9   EGFRNONAA >60 >60       CX:  Escherichia coli esbl       CULTURE, URINE     Amikacin 32 mcg/mL Intermediate     Amox/K Clav'ate 16/8 mcg/mL Intermediate     Amp/Sulbactam 16/8 mcg/mL Intermediate     Ampicillin >16 mcg/mL Resistant     Cefazolin >16 mcg/mL Resistant     Cefepime >16 mcg/mL Resistant     Ceftriaxone >32 mcg/mL Resistant     Ciprofloxacin >2 mcg/mL Resistant     Ertapenem <=2 mcg/mL Sensitive     Gentamicin >8 mcg/mL Resistant     Levofloxacin >4 mcg/mL Resistant     Meropenem <=4 mcg/mL Sensitive     Nitrofurantoin >64 mcg/mL Resistant     Piperacillin/Tazo <=16 mcg/mL Sensitive     Tetracycline >8 mcg/mL Resistant     Tobramycin >8 mcg/mL Resistant     Trimeth/Sulfa >2/38 mcg/mL Resistant      5/24 PSA 1.2  LActic acid 1.3  Lab Results   Component Value Date    INR 1.0 05/24/2019    INR 1.0 04/27/2019    INR 1.0 12/18/2014     BNP  Recent Labs   Lab 05/24/19  1120   BNP 65         Significant Imaging:     KUB:  Prior hernia repair.  Vascular calcifications overlying the pelvis.    No evidence of focal bowel obstruction.  No evidence of pathologic calcifications or soft tissue masses.    CT abd No acute abdominopelvic process.    Right spigelian hernia containing small bowel without complication.    Diverticulosis.

## 2019-05-29 NOTE — PT/OT/SLP PROGRESS
"Physical Therapy Treatment    Patient Name:  Reymundo Dang Sr.   MRN:  [id number]    Recommendations:     Discharge Recommendations:  home   Discharge Equipment Recommendations: none   Barriers to discharge: Inaccessible home    Assessment:     Reymundo Dang Sr. is a 64 y.o. male admitted with a medical diagnosis of ESBL (extended spectrum beta-lactamase) producing bacteria infection.  He presents with the following impairments/functional limitations:  weakness, decreased lower extremity function, decreased upper extremity function, gait instability, decreased coordination, impaired self care skills . Patient is showing good progress towards PT goals. Increased independence with out of bed activity and transfer tasks to impact mobility and self care tasks.    Rehab Prognosis: Good; patient would benefit from acute skilled PT services to address these deficits and reach maximum level of function.    Recent Surgery: * No surgery found *      Plan:     During this hospitalization, patient to be seen 5 x/week to address the identified rehab impairments via gait training, therapeutic activities, therapeutic exercises and progress toward the following goals:    · Plan of Care Expires:  05/31/19    Subjective     Chief Complaint: Patient reported rested well last night.  Patient/Family Comments/goals: "To go home and do my own exercise".  Pain/Comfort:  · Pain Rating 1: 0/10  · Pain Rating Post-Intervention 1: 0/10      Objective:     Communicated with patient and wife prior to session.  Patient found Sitting at egde of the bed. with telemetry, peripheral IV upon PT entry to room.     General Precautions: Standard, fall, contact   Orthopedic Precautions:N/A   Braces: N/A     Functional Mobility:  · Bed Mobility:     · Rolling Left:  independence  · Rolling Right: independence  · Scooting: independence  · Supine to Sit: independence  · Sit to Supine: independence  · Transfers:     · Sit to Stand:  supervision with no " AD  · Bed to Chair: supervision with  no AD  using  Step Transfer  · Gait: Ambulated with no Assistive Device x 300 feet with Supervision.  Exhibits slight Right LE circumduction gait, no right heel stri, slight dec Right Foot/Floor clearance and slight dec right LE swing to stance phase.  · Balance: Stand Dynamic with Good- grade  · Stairs:  Pt ascended/descended 32 steps stair(s) with No Assistive Device with left handrail with Supervision or Set-up Assistance.       AM-PAC 6 CLICK MOBILITY  Turning over in bed (including adjusting bedclothes, sheets and blankets)?: 4  Sitting down on and standing up from a chair with arms (e.g., wheelchair, bedside commode, etc.): 4  Moving from lying on back to sitting on the side of the bed?: 4  Moving to and from a bed to a chair (including a wheelchair)?: 4  Need to walk in hospital room?: 3  Climbing 3-5 steps with a railing?: 3  Basic Mobility Total Score: 22       Therapeutic Activities and Exercises:   Worked on Dynamic Standing balance with reaching items over midline and different directions. GAIT: Pt able to ambulate with none with Supervision or Set-up Assistance x 300 ft with the following gait deviation(s): Slight Right LE circumduction gait pattern, no Right heel strike, slight dec right Foot/Floor clearance and slight dec Right LE swing to stance phase. Pt performed bilat LE exercises consisting of Active range of motion exercises for strengthening and coordination ex  x 20 reps consisting of Ankle DF, Ankle PF, LAQ Alternately, Marching at Sitting, Side Steps Alternately and Sit to Stand ex with pt able to tolerate activities well.   PT discussed importance of patient's performance of Home Exercise Program (HEP) with pt verbalizing understanding.      Patient left up in chair with all lines intact, call button in reach, Nurse Asuncion notified and wife present..    GOALS:   Multidisciplinary Problems     Physical Therapy Goals        Problem: Physical Therapy Goal     Goal Priority Disciplines Outcome Goal Variances Interventions   Physical Therapy Goal     PT, PT/OT Ongoing (interventions implemented as appropriate)     Description:  Goals to be met by: 7     Patient will increase functional independence with mobility by performin. Supine to sit with Independent - met 19  2. Sit to supine with Independent - met 19  3. Bed to chair transfer with Modified Independentwith or without none using Step Transfer TECHNIQUE  4. Gait  x 200  feet with Modified Independent with or without none  5. Lower extremity exercise program x10 reps per handout, with assistance as needed - met 19  6.  Lower extremity exercise program x20 reps per handout, with assistance as needed  7. Ascend/Descend 30 stair steps using handrail/s with Modified Independent.                    Time Tracking:     PT Received On: 19  PT Start Time: 1225     PT Stop Time: 1300  PT Total Time (min): 35 min     Billable Minutes: Gait Training 15 and Therapeutic Activity 15    Treatment Type: Treatment  PT/PTA: PT           Tray Paez, PT  2019

## 2019-05-29 NOTE — PROGRESS NOTES
[facility] Hospital Medicine  Progress Note    Patient Name: Reymundo Dang Sr.  MRN: [id number]  Patient Class: IP- Inpatient   Admission Date: 5/24/2019  Length of Stay: 5 days  Attending Physician: Lori Perez MD  Primary Care Provider: Rosalinda Villalba NP        Subjective:     Principal Problem:ESBL (extended spectrum beta-lactamase) producing bacteria infection    HPI:  64 year old male comes in after fever started 3 days ago. The next day he says he started with burning with urinating. By yesterday, he says he couldn't get urine out at all. He was recently hospitalized for a UTI and was dc'd without abx but felt back to normal. His only other complaint was constipation today after diarrhea yesterday.    Hospital Course:  Patient with c/o vomiting yesterday and difficulty passing stool today. No fevers since admit.    5/26  Urine culture +. Awaiting sensitivies.  No more fevers. Belching/hiccups continue. Bowels moving.    5/27 urine culture with ecoli. Sensitive to the zosyn day 4. He completed 5 days of zosyn last visit for resistant UTI. He again has resistant UTI. Started on zosyn which it is sensitive to. He will need to cont 7 days of this prior to d/c. PSA was 1.2    5/28 ;  Urine culture ESBL only sensitive to iv abx. Started on Zosyn since admission. Will need to cont x 7 days. Last dose due Friday morning. CT abd shows mildly enlarged prostate, PSA normal. Started on flomax because he report that his has trouble getting the urine out. No fever. No elevated WBC.     5/29 No changes./Day 5/7 of zosyn. Last dose due Friday morning. Cont flomax for BPH. No fever. No elevated WBC.     Review of Systems   Constitutional: Negative for chills and fever.   HENT: Negative for congestion, ear pain, postnasal drip, rhinorrhea and sore throat.    Eyes: Negative for pain and redness.   Respiratory: Negative for cough, shortness of breath and wheezing.    Cardiovascular: Negative for chest  pain and palpitations.   Gastrointestinal: Negative for constipation, diarrhea and vomiting. Abdominal pain: chronic. Nausea: chronic.   Genitourinary: Positive for difficulty urinating, dysuria and urgency. Negative for frequency.   Skin: Negative for rash.   Neurological: Negative for weakness and headaches.     Objective:     Vital Signs (Most Recent):  Temp: 96.2 °F (35.7 °C) (05/29/19 0734)  Pulse: 67 (05/29/19 0800)  Resp: 18 (05/29/19 0334)  BP: (!) 164/83 (05/29/19 0734)  SpO2: 98 % (05/29/19 0734) Vital Signs (24h Range):  Temp:  [96.2 °F (35.7 °C)-98.2 °F (36.8 °C)] 96.2 °F (35.7 °C)  Pulse:  [55-76] 67  Resp:  [16-20] 18  SpO2:  [97 %-98 %] 98 %  BP: (133-181)/(72-83) 164/83     Weight: 121.1 kg (267 lb)  Body mass index is 43.09 kg/m².    Physical Exam   Constitutional: He is oriented to person, place, and time. He appears well-developed. No distress.       obese   HENT:   Head: Normocephalic and atraumatic.   Right Ear: External ear normal.   Left Ear: External ear normal.   Eyes: Pupils are equal, round, and reactive to light. Conjunctivae and EOM are normal.   Neck: Normal range of motion. Neck supple. No tracheal deviation present.   Cardiovascular: Normal rate, regular rhythm and normal heart sounds.   Pulmonary/Chest: Effort normal and breath sounds normal. No respiratory distress. He has no wheezes.   Abdominal: Soft. Bowel sounds are normal. There is no tenderness.   Musculoskeletal: Normal range of motion. He exhibits no edema.   Neurological: He is alert and oriented to person, place, and time.   Skin: Skin is warm and dry. No rash noted. No erythema. No pallor.   Psychiatric: He has a normal mood and affect. His behavior is normal.   Nursing note and vitals reviewed.        CRANIAL NERVES     CN III, IV, VI   Pupils are equal, round, and reactive to light.  Extraocular motions are normal.        Significant Labs:   Blood Culture: NGTD   CBC:   Recent Labs   Lab 05/28/19  0635   WBC 4.92   HGB  13.5*   HCT 39.5*        CMP:   Recent Labs   Lab 05/28/19  0635 05/29/19  0540    143   K 3.1* 3.2*    105   CO2 29 29   GLU 93 89   BUN 5* 7*   CREATININE 0.8 0.9   CALCIUM 9.2 9.1   PROT 6.5  --    ALBUMIN 3.2*  --    BILITOT 0.4  --    ALKPHOS 58  --    AST 36  --    ALT 55*  --    ANIONGAP 9 9   EGFRNONAA >60 >60       CX:  Escherichia coli esbl       CULTURE, URINE     Amikacin 32 mcg/mL Intermediate     Amox/K Clav'ate 16/8 mcg/mL Intermediate     Amp/Sulbactam 16/8 mcg/mL Intermediate     Ampicillin >16 mcg/mL Resistant     Cefazolin >16 mcg/mL Resistant     Cefepime >16 mcg/mL Resistant     Ceftriaxone >32 mcg/mL Resistant     Ciprofloxacin >2 mcg/mL Resistant     Ertapenem <=2 mcg/mL Sensitive     Gentamicin >8 mcg/mL Resistant     Levofloxacin >4 mcg/mL Resistant     Meropenem <=4 mcg/mL Sensitive     Nitrofurantoin >64 mcg/mL Resistant     Piperacillin/Tazo <=16 mcg/mL Sensitive     Tetracycline >8 mcg/mL Resistant     Tobramycin >8 mcg/mL Resistant     Trimeth/Sulfa >2/38 mcg/mL Resistant      5/24 PSA 1.2  LActic acid 1.3  Lab Results   Component Value Date    INR 1.0 05/24/2019    INR 1.0 04/27/2019    INR 1.0 12/18/2014     BNP  Recent Labs   Lab 05/24/19  1120   BNP 65         Significant Imaging:     KUB:  Prior hernia repair.  Vascular calcifications overlying the pelvis.    No evidence of focal bowel obstruction.  No evidence of pathologic calcifications or soft tissue masses.    CT abd No acute abdominopelvic process.    Right spigelian hernia containing small bowel without complication.    Diverticulosis.    Assessment/Plan:      * ESBL (extended spectrum beta-lactamase) producing bacteria infection  Cont zosyn till Friday am    Morbid obesity    # The patient is asked to make an attempt to improve diet and exercise patterns to aid in medical management of this problem.     # Eat  5 small meals a day.     # Cut out high carbohydrate  foods : bread, rice, pasta, potatoes.      # Exercise/walk 5x/week for at least 30-45  minutes.            Acute cystitis without hematuria  Treat with zosyn for now and follow sensitivies. Recent MDRUTI.  Concern for possible prostatic involvement add flomax  PSA is okay though.   follow cultures cont zosyn x 7 days.    Recurrent major depressive disorder, in full remission  Resume home meds.      Hypokalemia  Repeat replacement today  Mag 1.8.      Hypertension  Resume home meds.  Double lisinopril today. Kidneys stable, bp 131//83    Restless legs syndrome (RLS)  Resume home meds.      Crohn's disease    Resume asacol stable      VTE Risk Mitigation (From admission, onward)        Ordered     IP VTE HIGH RISK PATIENT  Once      05/24/19 1236     Place sequential compression device  Until discontinued      05/24/19 1236              Ki Bradley MD  Department of Hospital Medicine   [facility]

## 2019-05-30 LAB
ANION GAP SERPL CALC-SCNC: 10 MMOL/L (ref 8–16)
BUN SERPL-MCNC: 8 MG/DL (ref 8–23)
CALCIUM SERPL-MCNC: 9.1 MG/DL (ref 8.7–10.5)
CHLORIDE SERPL-SCNC: 107 MMOL/L (ref 95–110)
CO2 SERPL-SCNC: 27 MMOL/L (ref 23–29)
CREAT SERPL-MCNC: 1 MG/DL (ref 0.5–1.4)
EST. GFR  (AFRICAN AMERICAN): >60 ML/MIN/1.73 M^2
EST. GFR  (NON AFRICAN AMERICAN): >60 ML/MIN/1.73 M^2
GLUCOSE SERPL-MCNC: 96 MG/DL (ref 70–110)
MAGNESIUM SERPL-MCNC: 1.8 MG/DL (ref 1.6–2.6)
POTASSIUM SERPL-SCNC: 3.6 MMOL/L (ref 3.5–5.1)
SODIUM SERPL-SCNC: 144 MMOL/L (ref 136–145)

## 2019-05-30 PROCEDURE — 63600175 PHARM REV CODE 636 W HCPCS: Performed by: FAMILY MEDICINE

## 2019-05-30 PROCEDURE — 99231 SBSQ HOSP IP/OBS SF/LOW 25: CPT | Mod: ,,, | Performed by: FAMILY MEDICINE

## 2019-05-30 PROCEDURE — 83735 ASSAY OF MAGNESIUM: CPT

## 2019-05-30 PROCEDURE — 97530 THERAPEUTIC ACTIVITIES: CPT

## 2019-05-30 PROCEDURE — 36415 COLL VENOUS BLD VENIPUNCTURE: CPT

## 2019-05-30 PROCEDURE — 97116 GAIT TRAINING THERAPY: CPT

## 2019-05-30 PROCEDURE — 25000003 PHARM REV CODE 250: Performed by: FAMILY MEDICINE

## 2019-05-30 PROCEDURE — G0378 HOSPITAL OBSERVATION PER HR: HCPCS

## 2019-05-30 PROCEDURE — 11000001 HC ACUTE MED/SURG PRIVATE ROOM

## 2019-05-30 PROCEDURE — 25000003 PHARM REV CODE 250: Performed by: NURSE PRACTITIONER

## 2019-05-30 PROCEDURE — 99231 PR SUBSEQUENT HOSPITAL CARE,LEVL I: ICD-10-PCS | Mod: ,,, | Performed by: FAMILY MEDICINE

## 2019-05-30 PROCEDURE — 80048 BASIC METABOLIC PNL TOTAL CA: CPT

## 2019-05-30 RX ORDER — POTASSIUM CHLORIDE 20 MEQ/1
40 TABLET, EXTENDED RELEASE ORAL ONCE
Status: COMPLETED | OUTPATIENT
Start: 2019-05-30 | End: 2019-05-30

## 2019-05-30 RX ORDER — MESALAMINE 400 MG/1
800 CAPSULE, DELAYED RELEASE ORAL DAILY
Status: DISCONTINUED | OUTPATIENT
Start: 2019-05-30 | End: 2019-05-31 | Stop reason: HOSPADM

## 2019-05-30 RX ADMIN — PIPERACILLIN AND TAZOBACTAM 4.5 G: 4; .5 INJECTION, POWDER, LYOPHILIZED, FOR SOLUTION INTRAVENOUS; PARENTERAL at 09:05

## 2019-05-30 RX ADMIN — SIMVASTATIN 20 MG: 10 TABLET, FILM COATED ORAL at 08:05

## 2019-05-30 RX ADMIN — AMLODIPINE BESYLATE 10 MG: 10 TABLET ORAL at 09:05

## 2019-05-30 RX ADMIN — METOPROLOL TARTRATE 100 MG: 100 TABLET ORAL at 08:05

## 2019-05-30 RX ADMIN — PRIMIDONE 100 MG: 50 TABLET ORAL at 08:05

## 2019-05-30 RX ADMIN — TAMSULOSIN HYDROCHLORIDE 0.4 MG: 0.4 CAPSULE ORAL at 09:05

## 2019-05-30 RX ADMIN — PIPERACILLIN AND TAZOBACTAM 4.5 G: 4; .5 INJECTION, POWDER, LYOPHILIZED, FOR SOLUTION INTRAVENOUS; PARENTERAL at 02:05

## 2019-05-30 RX ADMIN — ACETAMINOPHEN 650 MG: 325 TABLET ORAL at 09:05

## 2019-05-30 RX ADMIN — PANTOPRAZOLE SODIUM 40 MG: 40 TABLET, DELAYED RELEASE ORAL at 09:05

## 2019-05-30 RX ADMIN — HYDROCHLOROTHIAZIDE 25 MG: 25 TABLET ORAL at 09:05

## 2019-05-30 RX ADMIN — METOPROLOL TARTRATE 100 MG: 100 TABLET ORAL at 09:05

## 2019-05-30 RX ADMIN — POTASSIUM CHLORIDE 40 MEQ: 1500 TABLET, EXTENDED RELEASE ORAL at 01:05

## 2019-05-30 RX ADMIN — ALPRAZOLAM 0.5 MG: 0.5 TABLET ORAL at 08:05

## 2019-05-30 RX ADMIN — PRIMIDONE 100 MG: 50 TABLET ORAL at 09:05

## 2019-05-30 RX ADMIN — BUPROPION HYDROCHLORIDE 300 MG: 150 TABLET, EXTENDED RELEASE ORAL at 09:05

## 2019-05-30 RX ADMIN — POTASSIUM CHLORIDE 40 MEQ: 1500 TABLET, EXTENDED RELEASE ORAL at 09:05

## 2019-05-30 RX ADMIN — MESALAMINE 800 MG: 400 CAPSULE, DELAYED RELEASE ORAL at 09:05

## 2019-05-30 RX ADMIN — LISINOPRIL 40 MG: 40 TABLET ORAL at 09:05

## 2019-05-30 NOTE — PROGRESS NOTES
[facility] Medicine  Progress Note    Patient Name: Reymundo Dang Sr.  MRN: [id number]  Patient Class: IP- Inpatient   Admission Date: 5/24/2019  Length of Stay: 6 days  Attending Physician: Lori Perez MD  Primary Care Provider: Rosalinda Villalba NP        Subjective:     Principal Problem:ESBL (extended spectrum beta-lactamase) producing bacteria infection    HPI:  64 year old male comes in after fever started 3 days ago. The next day he says he started with burning with urinating. By yesterday, he says he couldn't get urine out at all. He was recently hospitalized for a UTI and was dc'd without abx but felt back to normal. His only other complaint was constipation today after diarrhea yesterday.    Hospital Course:  Patient with c/o vomiting yesterday and difficulty passing stool today. No fevers since admit.    5/26  Urine culture +. Awaiting sensitivies.  No more fevers. Belching/hiccups continue. Bowels moving.    5/27 urine culture with ecoli. Sensitive to the zosyn day 4. He completed 5 days of zosyn last visit for resistant UTI. He again has resistant UTI. Started on zosyn which it is sensitive to. He will need to cont 7 days of this prior to d/c. PSA was 1.2    5/28 ;  Urine culture ESBL only sensitive to iv abx. Started on Zosyn since admission. Will need to cont x 7 days. Last dose due Friday morning. CT abd shows mildly enlarged prostate, PSA normal. Started on flomax because he report that his has trouble getting the urine out. No fever. No elevated WBC.     5/29 No changes./Day 5/7 of zosyn. Last dose due Friday morning. Cont flomax for BPH. No fever. No elevated WBC.     5/30 No changes, day 6/7 on zosyn. Will complete full 7 day course Friday am. Urinating better with flomax. No fever. Blood cultures remain no growth    Review of Systems   Constitutional: Negative for chills and fever.   HENT: Negative for congestion, ear pain, postnasal drip, rhinorrhea and sore  throat.    Eyes: Negative for pain and redness.   Respiratory: Negative for cough, shortness of breath and wheezing.    Cardiovascular: Negative for chest pain and palpitations.   Gastrointestinal: Negative for constipation, diarrhea and vomiting. Abdominal pain: chronic. Nausea: chronic.   Genitourinary: Negative for difficulty urinating, dysuria, frequency and urgency.   Skin: Negative for rash.   Neurological: Negative for weakness and headaches.     Objective:     Vital Signs (Most Recent):  Temp: 97.1 °F (36.2 °C) (05/30/19 0747)  Pulse: 63 (05/30/19 0747)  Resp: 18 (05/30/19 0747)  BP: (!) 147/85 (05/30/19 0747)  SpO2: 100 % (05/30/19 0747) Vital Signs (24h Range):  Temp:  [96.1 °F (35.6 °C)-98.1 °F (36.7 °C)] 97.1 °F (36.2 °C)  Pulse:  [58-74] 63  Resp:  [18-20] 18  SpO2:  [97 %-100 %] 100 %  BP: (138-157)/(70-87) 147/85     Weight: 121.1 kg (267 lb)  Body mass index is 43.09 kg/m².    Physical Exam   Constitutional: He is oriented to person, place, and time. He appears well-developed. No distress.   Sitting up in chair   HENT:   Head: Normocephalic and atraumatic.   Right Ear: External ear normal.   Left Ear: External ear normal.   Eyes: Pupils are equal, round, and reactive to light. Conjunctivae and EOM are normal.   Neck: Normal range of motion. Neck supple. No tracheal deviation present.   Cardiovascular: Normal rate, regular rhythm and normal heart sounds.   Pulmonary/Chest: Effort normal and breath sounds normal. No respiratory distress. He has no wheezes.   Abdominal: Soft. Bowel sounds are normal. There is no tenderness.   Musculoskeletal: Normal range of motion. He exhibits no edema.   Neurological: He is alert and oriented to person, place, and time.   Skin: Skin is warm and dry. No rash noted. No erythema. No pallor.   Psychiatric: He has a normal mood and affect. His behavior is normal.   Nursing note and vitals reviewed.        CRANIAL NERVES     CN III, IV, VI   Pupils are equal, round, and  reactive to light.  Extraocular motions are normal.        Significant Labs:   Blood Culture: NGTD x 5 days  Lab Results   Component Value Date    WBC 4.92 05/28/2019    HGB 13.5 (L) 05/28/2019    HCT 39.5 (L) 05/28/2019    MCV 88 05/28/2019     05/28/2019     CMP:   Recent Labs   Lab 05/29/19  0540 05/30/19  0552    144   K 3.2* 3.6    107   CO2 29 27   GLU 89 96   BUN 7* 8   CREATININE 0.9 1.0   CALCIUM 9.1 9.1   ANIONGAP 9 10   EGFRNONAA >60 >60   Mag 1.8    CX:  Escherichia coli esbl       CULTURE, URINE     Amikacin 32 mcg/mL Intermediate     Amox/K Clav'ate 16/8 mcg/mL Intermediate     Amp/Sulbactam 16/8 mcg/mL Intermediate     Ampicillin >16 mcg/mL Resistant     Cefazolin >16 mcg/mL Resistant     Cefepime >16 mcg/mL Resistant     Ceftriaxone >32 mcg/mL Resistant     Ciprofloxacin >2 mcg/mL Resistant     Ertapenem <=2 mcg/mL Sensitive     Gentamicin >8 mcg/mL Resistant     Levofloxacin >4 mcg/mL Resistant     Meropenem <=4 mcg/mL Sensitive     Nitrofurantoin >64 mcg/mL Resistant     Piperacillin/Tazo <=16 mcg/mL Sensitive     Tetracycline >8 mcg/mL Resistant     Tobramycin >8 mcg/mL Resistant     Trimeth/Sulfa >2/38 mcg/mL Resistant      5/24 PSA 1.2  LActic acid 1.3  Lab Results   Component Value Date    INR 1.0 05/24/2019    INR 1.0 04/27/2019    INR 1.0 12/18/2014     BNP  Recent Labs   Lab 05/24/19  1120   BNP 65         Significant Imaging:     KUB:  Prior hernia repair.  Vascular calcifications overlying the pelvis.    No evidence of focal bowel obstruction.  No evidence of pathologic calcifications or soft tissue masses.    CT abd No acute abdominopelvic process.    Right spigelian hernia containing small bowel without complication.    Diverticulosis.    EKG Normal sinus rhythm  Nonspecific ST and T wave abnormality  Abnormal ECG  When compared with ECG of 27-APR-2019 13:29,  No significant change was found  Confirmed by Saeed Diehl MD [id number] on 5/24/2019 1:42:50  PM    Assessment/Plan:      * ESBL (extended spectrum beta-lactamase) producing bacteria infection  Cont zosyn till Friday am  ESCHERICHIA COLI ESBL   >100,000 cfu/ml     Resulting Agency [facility]   Susceptibility      Escherichia coli esbl     CULTURE, URINE     Amikacin 32 mcg/mL Intermediate     Amox/K Clav'ate 16/8 mcg/mL Intermediate     Amp/Sulbactam 16/8 mcg/mL Intermediate     Ampicillin >16 mcg/mL Resistant     Cefazolin >16 mcg/mL Resistant     Cefepime >16 mcg/mL Resistant     Ceftriaxone >32 mcg/mL Resistant     Ciprofloxacin >2 mcg/mL Resistant     Ertapenem <=2 mcg/mL Sensitive     Gentamicin >8 mcg/mL Resistant     Levofloxacin >4 mcg/mL Resistant     Meropenem <=4 mcg/mL Sensitive     Nitrofurantoin >64 mcg/mL Resistant     Piperacillin/Tazo <=16 mcg/mL Sensitive     Tetracycline >8 mcg/mL Resistant     Tobramycin >8 mcg/mL Resistant     Trimeth/Sulfa >2/38 mcg/mL Resistant             Morbid obesity    # The patient is asked to make an attempt to improve diet and exercise patterns to aid in medical management of this problem.     # Eat  5 small meals a day.     # Cut out high carbohydrate  foods : bread, rice, pasta, potatoes.     # Exercise/walk 5x/week for at least 30-45  minutes.            Acute cystitis without hematuria  Treat with zosyn for now and follow sensitivies. Recent MDRUTI.  Concern for possible prostatic involvement add flomax and urinating better  PSA is okay though.  cont zosyn x 7 days ecoli sensitive UTI  Will d/c on Augmentin for atleast another week or until urology f/u. Questioning prostatitis. Also will need probiotic due to crohns   Needs close f/u with urology     Recurrent major depressive disorder, in full remission  Resume home meds.      Hypokalemia  Repeat replacement today better 3.8 today but will cont replacement  Mag 1.8.      Hypertension  Resume home meds.  Double lisinopril today. Kidneys stable, bp 138//87    Restless legs syndrome (RLS)  Resume home  meds.      Crohn's disease    Resume asacol stable      VTE Risk Mitigation (From admission, onward)        Ordered     IP VTE HIGH RISK PATIENT  Once      05/24/19 1236     Place sequential compression device  Until discontinued      05/24/19 1236              Lori Perez MD  Department of Hospital Medicine   [facility]

## 2019-05-30 NOTE — SUBJECTIVE & OBJECTIVE
Review of Systems   Constitutional: Negative for chills and fever.   HENT: Negative for congestion, ear pain, postnasal drip, rhinorrhea and sore throat.    Eyes: Negative for pain and redness.   Respiratory: Negative for cough, shortness of breath and wheezing.    Cardiovascular: Negative for chest pain and palpitations.   Gastrointestinal: Negative for constipation, diarrhea and vomiting. Abdominal pain: chronic. Nausea: chronic.   Genitourinary: Negative for difficulty urinating, dysuria, frequency and urgency.   Skin: Negative for rash.   Neurological: Negative for weakness and headaches.     Objective:     Vital Signs (Most Recent):  Temp: 97.1 °F (36.2 °C) (05/30/19 0747)  Pulse: 63 (05/30/19 0747)  Resp: 18 (05/30/19 0747)  BP: (!) 147/85 (05/30/19 0747)  SpO2: 100 % (05/30/19 0747) Vital Signs (24h Range):  Temp:  [96.1 °F (35.6 °C)-98.1 °F (36.7 °C)] 97.1 °F (36.2 °C)  Pulse:  [58-74] 63  Resp:  [18-20] 18  SpO2:  [97 %-100 %] 100 %  BP: (138-157)/(70-87) 147/85     Weight: 121.1 kg (267 lb)  Body mass index is 43.09 kg/m².    Physical Exam   Constitutional: He is oriented to person, place, and time. He appears well-developed. No distress.   Sitting up in chair   HENT:   Head: Normocephalic and atraumatic.   Right Ear: External ear normal.   Left Ear: External ear normal.   Eyes: Pupils are equal, round, and reactive to light. Conjunctivae and EOM are normal.   Neck: Normal range of motion. Neck supple. No tracheal deviation present.   Cardiovascular: Normal rate, regular rhythm and normal heart sounds.   Pulmonary/Chest: Effort normal and breath sounds normal. No respiratory distress. He has no wheezes.   Abdominal: Soft. Bowel sounds are normal. There is no tenderness.   Musculoskeletal: Normal range of motion. He exhibits no edema.   Neurological: He is alert and oriented to person, place, and time.   Skin: Skin is warm and dry. No rash noted. No erythema. No pallor.   Psychiatric: He has a normal mood  and affect. His behavior is normal.   Nursing note and vitals reviewed.        CRANIAL NERVES     CN III, IV, VI   Pupils are equal, round, and reactive to light.  Extraocular motions are normal.        Significant Labs:   Blood Culture: NGTD x 5 days  Lab Results   Component Value Date    WBC 4.92 05/28/2019    HGB 13.5 (L) 05/28/2019    HCT 39.5 (L) 05/28/2019    MCV 88 05/28/2019     05/28/2019     CMP:   Recent Labs   Lab 05/29/19  0540 05/30/19  0552    144   K 3.2* 3.6    107   CO2 29 27   GLU 89 96   BUN 7* 8   CREATININE 0.9 1.0   CALCIUM 9.1 9.1   ANIONGAP 9 10   EGFRNONAA >60 >60   Mag 1.8    CX:  Escherichia coli esbl       CULTURE, URINE     Amikacin 32 mcg/mL Intermediate     Amox/K Clav'ate 16/8 mcg/mL Intermediate     Amp/Sulbactam 16/8 mcg/mL Intermediate     Ampicillin >16 mcg/mL Resistant     Cefazolin >16 mcg/mL Resistant     Cefepime >16 mcg/mL Resistant     Ceftriaxone >32 mcg/mL Resistant     Ciprofloxacin >2 mcg/mL Resistant     Ertapenem <=2 mcg/mL Sensitive     Gentamicin >8 mcg/mL Resistant     Levofloxacin >4 mcg/mL Resistant     Meropenem <=4 mcg/mL Sensitive     Nitrofurantoin >64 mcg/mL Resistant     Piperacillin/Tazo <=16 mcg/mL Sensitive     Tetracycline >8 mcg/mL Resistant     Tobramycin >8 mcg/mL Resistant     Trimeth/Sulfa >2/38 mcg/mL Resistant      5/24 PSA 1.2  LActic acid 1.3  Lab Results   Component Value Date    INR 1.0 05/24/2019    INR 1.0 04/27/2019    INR 1.0 12/18/2014     BNP  Recent Labs   Lab 05/24/19  1120   BNP 65         Significant Imaging:     KUB:  Prior hernia repair.  Vascular calcifications overlying the pelvis.    No evidence of focal bowel obstruction.  No evidence of pathologic calcifications or soft tissue masses.    CT abd No acute abdominopelvic process.    Right spigelian hernia containing small bowel without complication.    Diverticulosis.    EKG Normal sinus rhythm  Nonspecific ST and T wave abnormality  Abnormal ECG  When  compared with ECG of 27-APR-2019 13:29,  No significant change was found  Confirmed by Saeed Diehl MD ([id number]) on 5/24/2019 1:42:50 PM

## 2019-05-30 NOTE — PROGRESS NOTES
Staff Handoff  Bedside report per MIKEY Aquino. No distress noted. Room air. Tele normal sinus. Contact precautions maintained. Spouse at bedside. Urinal in reach. Awake, alert, oriented. Call bell in reach. Encouraged to call for assistance.       Resident Handoff

## 2019-05-30 NOTE — ASSESSMENT & PLAN NOTE
Cont zosyn till Friday am  ESCHERICHIA COLI ESBL   >100,000 cfu/ml     Resulting Agency [facility]   Susceptibility      Escherichia coli esbl     CULTURE, URINE     Amikacin 32 mcg/mL Intermediate     Amox/K Clav'ate 16/8 mcg/mL Intermediate     Amp/Sulbactam 16/8 mcg/mL Intermediate     Ampicillin >16 mcg/mL Resistant     Cefazolin >16 mcg/mL Resistant     Cefepime >16 mcg/mL Resistant     Ceftriaxone >32 mcg/mL Resistant     Ciprofloxacin >2 mcg/mL Resistant     Ertapenem <=2 mcg/mL Sensitive     Gentamicin >8 mcg/mL Resistant     Levofloxacin >4 mcg/mL Resistant     Meropenem <=4 mcg/mL Sensitive     Nitrofurantoin >64 mcg/mL Resistant     Piperacillin/Tazo <=16 mcg/mL Sensitive     Tetracycline >8 mcg/mL Resistant     Tobramycin >8 mcg/mL Resistant     Trimeth/Sulfa >2/38 mcg/mL Resistant

## 2019-05-30 NOTE — ASSESSMENT & PLAN NOTE
Treat with zosyn for now and follow sensitivies. Recent MDRUTI.  Concern for possible prostatic involvement add flomax and urinating better  PSA is okay though.  cont zosyn x 7 days ecoli sensitive UTI  Will d/c on Augmentin for atleast another week or until urology f/u. Questioning prostatitis. Also will need probiotic due to crohns   Needs close f/u with urology

## 2019-05-30 NOTE — PLAN OF CARE
05/30/19 1023   Discharge Reassessment   Assessment Type Discharge Planning Reassessment         Patient will remain for continued treatment today. Patient will need 1 more day of IV antibiotics, plan discharge tomorrow. Skilled authorization still pending, but with planned discharge tomorrow, patient will just remain acute. No needs or concerns voiced at this time. CM will continue to follow and assist as needed.

## 2019-05-30 NOTE — PLAN OF CARE
Problem: Adult Inpatient Plan of Care  Goal: Plan of Care Review  Outcome: Ongoing (interventions implemented as appropriate)  Patient rested well throughout shift. Room air. Tele normal sinus. Alert and oriented. Spouse at bedside. Urinal in reach. IV antibiotics continued. Contact precautions maintained. Free from fall or injury. Plan of care reviewed with patient and spouse.

## 2019-05-30 NOTE — PT/OT/SLP PROGRESS
"Physical Therapy Treatment    Patient Name:  Reymundo Dang Sr.   MRN:  [id number]    Recommendations:     Discharge Recommendations:  home   Discharge Equipment Recommendations: none   Barriers to discharge: None    Assessment:     Reymundo Dang Sr. is a 64 y.o. male admitted with a medical diagnosis of ESBL (extended spectrum beta-lactamase) producing bacteria infection.  He presents with the following impairments/functional limitations:  weakness, decreased lower extremity function, decreased upper extremity function, gait instability, impaired self care skills, impaired cognition.    Rehab Prognosis: Good; patient would benefit from acute skilled PT services to address these deficits and reach maximum level of function.    Recent Surgery: * No surgery found *      Plan:     During this hospitalization, patient to be seen 5 x/week to address the identified rehab impairments via therapeutic activities, therapeutic exercises, gait training and progress toward the following goals:    · Plan of Care Expires:  05/31/19    Subjective     Chief Complaint: Patient has nothing to complain.  Patient/Family Comments/goals: "To go home, spend time with my family and do my own exercise".  Pain/Comfort:  · Pain Rating 1: 0/10  · Pain Rating Post-Intervention 1: 0/10      Objective:     Communicated with patient prior to session.  Patient found up in chair with peripheral IV, telemetry upon PT entry to room.     General Precautions: Standard, contact, fall   Orthopedic Precautions:N/A   Braces: N/A     Functional Mobility:  · Bed Mobility:     · Rolling Left:  independence  · Rolling Right: independence  · Scooting: independence  · Supine to Sit: independence  · Sit to Supine: independence  · Transfers:     · Sit to Stand:  independence with no AD  · Bed to Chair: independence with  no AD  using  Step Transfer  · Toilet Transfer: independence with  no AD  using  Step Transfer  · Gait: Ambulated with no assistiv device x 300 " feet with Supervision.  Exhibits slight Right LE circumduction gait,  no right  heel strike, slight dec right foot/floor clearance and slight dec right LE swing to stance ratio.  · Balance: Stand Dynamic with Good grade  · Stairs:  Pt ascended/descended 32 steps stair(s) with No Assistive Device with left handrail with Supervision or Set-up Assistance.       AM-PAC 6 CLICK MOBILITY  Turning over in bed (including adjusting bedclothes, sheets and blankets)?: 4  Sitting down on and standing up from a chair with arms (e.g., wheelchair, bedside commode, etc.): 4  Moving from lying on back to sitting on the side of the bed?: 4  Moving to and from a bed to a chair (including a wheelchair)?: 4  Need to walk in hospital room?: 4  Climbing 3-5 steps with a railing?: 4  Basic Mobility Total Score: 24       Therapeutic Activities and Exercises:   Continued Plan of Care such as GAIT: Pt able to ambulate with none with Supervision or Set-up Assistance x 300 ft with the following gait deviation(s): Right LE slight circumduction gait pattern, no Right Heel strike, slight dec right Foot/Floor clearance and slight dec right LE swing to stance ratio. Pt performed Bilateral LE exercises consisting of Active range of motion exercises for strengthening and coordination ex  x 20 reps consisting of Ankle DF, Ankle PF, Marching at Sitting,ABD/ADD Alternately,  LAQ Alternately with pt able to tolerate activities well.   PT discussed importance of patient's performance of Home Exercise Program (HEP) with pt verbalizing understanding.     Patient left up in chair with all lines intact, call button in reach and Nurse Jania notified..    GOALS:   Multidisciplinary Problems     Physical Therapy Goals        Problem: Physical Therapy Goal    Goal Priority Disciplines Outcome Goal Variances Interventions   Physical Therapy Goal     PT, PT/OT Ongoing (interventions implemented as appropriate)     Description:  Goals to be met by: 7     Patient  will increase functional independence with mobility by performin. Supine to sit with Independent - met 19  2. Sit to supine with Independent - met 19  3. Bed to chair transfer with Modified Independentwith or without none using Step Transfer TECHNIQUE  4. Gait  x 200  feet with Modified Independent with or without none  5. Lower extremity exercise program x10 reps per handout, with assistance as needed - met 19  6.  Lower extremity exercise program x20 reps per handout, with assistance as needed - met 19  7. Ascend/Descend 30 stair steps using handrail/s with Modified Independent.                     Time Tracking:     PT Received On: 19  PT Start Time: 1230     PT Stop Time: 1310  PT Total Time (min): 40 min     Billable Minutes: Gait Training 15 and Therapeutic Activity 15    Treatment Type: Treatment  PT/PTA: PT           Tray Paez, PT  2019

## 2019-05-31 VITALS
DIASTOLIC BLOOD PRESSURE: 80 MMHG | BODY MASS INDEX: 42.91 KG/M2 | TEMPERATURE: 97 F | OXYGEN SATURATION: 99 % | SYSTOLIC BLOOD PRESSURE: 141 MMHG | HEIGHT: 66 IN | HEART RATE: 60 BPM | WEIGHT: 267 LBS | RESPIRATION RATE: 20 BRPM

## 2019-05-31 LAB
ANION GAP SERPL CALC-SCNC: 8 MMOL/L (ref 8–16)
BUN SERPL-MCNC: 11 MG/DL (ref 8–23)
CALCIUM SERPL-MCNC: 8.8 MG/DL (ref 8.7–10.5)
CHLORIDE SERPL-SCNC: 107 MMOL/L (ref 95–110)
CO2 SERPL-SCNC: 28 MMOL/L (ref 23–29)
CREAT SERPL-MCNC: 1 MG/DL (ref 0.5–1.4)
EST. GFR  (AFRICAN AMERICAN): >60 ML/MIN/1.73 M^2
EST. GFR  (NON AFRICAN AMERICAN): >60 ML/MIN/1.73 M^2
GLUCOSE SERPL-MCNC: 102 MG/DL (ref 70–110)
MAGNESIUM SERPL-MCNC: 1.6 MG/DL (ref 1.6–2.6)
POTASSIUM SERPL-SCNC: 3.3 MMOL/L (ref 3.5–5.1)
SODIUM SERPL-SCNC: 143 MMOL/L (ref 136–145)

## 2019-05-31 PROCEDURE — 99239 HOSP IP/OBS DSCHRG MGMT >30: CPT | Mod: ,,, | Performed by: INTERNAL MEDICINE

## 2019-05-31 PROCEDURE — 97116 GAIT TRAINING THERAPY: CPT | Mod: 59

## 2019-05-31 PROCEDURE — G0378 HOSPITAL OBSERVATION PER HR: HCPCS

## 2019-05-31 PROCEDURE — 25000003 PHARM REV CODE 250: Performed by: NURSE PRACTITIONER

## 2019-05-31 PROCEDURE — 80048 BASIC METABOLIC PNL TOTAL CA: CPT

## 2019-05-31 PROCEDURE — 36415 COLL VENOUS BLD VENIPUNCTURE: CPT

## 2019-05-31 PROCEDURE — 83735 ASSAY OF MAGNESIUM: CPT

## 2019-05-31 PROCEDURE — 99239 PR HOSPITAL DISCHARGE DAY,>30 MIN: ICD-10-PCS | Mod: ,,, | Performed by: INTERNAL MEDICINE

## 2019-05-31 PROCEDURE — 25000003 PHARM REV CODE 250: Performed by: FAMILY MEDICINE

## 2019-05-31 PROCEDURE — 63600175 PHARM REV CODE 636 W HCPCS: Performed by: FAMILY MEDICINE

## 2019-05-31 PROCEDURE — 97530 THERAPEUTIC ACTIVITIES: CPT

## 2019-05-31 RX ORDER — POTASSIUM CHLORIDE 20 MEQ/1
20 TABLET, EXTENDED RELEASE ORAL ONCE
Status: COMPLETED | OUTPATIENT
Start: 2019-05-31 | End: 2019-05-31

## 2019-05-31 RX ORDER — LISINOPRIL 40 MG/1
40 TABLET ORAL DAILY
Qty: 30 TABLET | Refills: 0 | Status: SHIPPED | OUTPATIENT
Start: 2019-06-01 | End: 2019-06-23 | Stop reason: SDUPTHER

## 2019-05-31 RX ORDER — WITCH HAZEL 50 %
1 PADS, MEDICATED (EA) TOPICAL 2 TIMES DAILY
Qty: 60 TABLET | Refills: 0 | COMMUNITY
Start: 2019-05-31

## 2019-05-31 RX ORDER — PRIMIDONE 50 MG/1
100 TABLET ORAL 2 TIMES DAILY
Qty: 60 TABLET | Refills: 0 | Status: SHIPPED | OUTPATIENT
Start: 2019-05-31 | End: 2020-03-19

## 2019-05-31 RX ORDER — AMOXICILLIN AND CLAVULANATE POTASSIUM 875; 125 MG/1; MG/1
1 TABLET, FILM COATED ORAL 2 TIMES DAILY
Qty: 14 TABLET | Refills: 0 | Status: SHIPPED | OUTPATIENT
Start: 2019-05-31 | End: 2019-06-05 | Stop reason: SDUPTHER

## 2019-05-31 RX ORDER — POTASSIUM CHLORIDE 20 MEQ/1
40 TABLET, EXTENDED RELEASE ORAL DAILY
Qty: 60 TABLET | Refills: 0 | Status: SHIPPED | OUTPATIENT
Start: 2019-06-01 | End: 2019-06-22 | Stop reason: SDUPTHER

## 2019-05-31 RX ADMIN — AMLODIPINE BESYLATE 10 MG: 10 TABLET ORAL at 09:05

## 2019-05-31 RX ADMIN — TAMSULOSIN HYDROCHLORIDE 0.4 MG: 0.4 CAPSULE ORAL at 09:05

## 2019-05-31 RX ADMIN — POTASSIUM CHLORIDE 20 MEQ: 1500 TABLET, EXTENDED RELEASE ORAL at 01:05

## 2019-05-31 RX ADMIN — LISINOPRIL 40 MG: 40 TABLET ORAL at 09:05

## 2019-05-31 RX ADMIN — BUPROPION HYDROCHLORIDE 300 MG: 150 TABLET, EXTENDED RELEASE ORAL at 09:05

## 2019-05-31 RX ADMIN — POTASSIUM CHLORIDE 40 MEQ: 1500 TABLET, EXTENDED RELEASE ORAL at 09:05

## 2019-05-31 RX ADMIN — HYDROCHLOROTHIAZIDE 25 MG: 25 TABLET ORAL at 09:05

## 2019-05-31 RX ADMIN — PIPERACILLIN AND TAZOBACTAM 4.5 G: 4; .5 INJECTION, POWDER, LYOPHILIZED, FOR SOLUTION INTRAVENOUS; PARENTERAL at 09:05

## 2019-05-31 RX ADMIN — PRIMIDONE 100 MG: 50 TABLET ORAL at 09:05

## 2019-05-31 RX ADMIN — PANTOPRAZOLE SODIUM 40 MG: 40 TABLET, DELAYED RELEASE ORAL at 09:05

## 2019-05-31 RX ADMIN — METOPROLOL TARTRATE 100 MG: 100 TABLET ORAL at 09:05

## 2019-05-31 RX ADMIN — MESALAMINE 800 MG: 400 CAPSULE, DELAYED RELEASE ORAL at 09:05

## 2019-05-31 RX ADMIN — PIPERACILLIN AND TAZOBACTAM 4.5 G: 4; .5 INJECTION, POWDER, LYOPHILIZED, FOR SOLUTION INTRAVENOUS; PARENTERAL at 01:05

## 2019-05-31 NOTE — PLAN OF CARE
05/31/19 1057   Medicare Message   Important Message from Medicare regarding Discharge Appeal Rights Given to patient/caregiver;Explained to patient/caregiver;Signed/date by patient/caregiver   Date IMM was signed 05/31/19   Time IMM was signed 0957         Signed for discharge, copy given to patient.

## 2019-05-31 NOTE — ASSESSMENT & PLAN NOTE
# The patient is asked to make an attempt to improve diet and exercise patterns to aid in medical management of this problem.     # Eat  5 small meals a day    # Cut out high carbohydrate  foods : bread, rice, pasta, potatoes.     # Exercise/walk 5x/week for at least 30-45  minutes.

## 2019-05-31 NOTE — ASSESSMENT & PLAN NOTE
Treat with zosyn for now and follow sensitivies. Recent MDRUTI.  Concern for possible prostatic involvement add flomax and urinating better  PSA is okay though.  cont zosyn x 7 days ecoli sensitive UTI  Will d/c on Augmentin for atleast another week or until urology f/u. Questioning prostatitis. Also will need probiotic due to crohns   Needs close f/u with urology .

## 2019-05-31 NOTE — DISCHARGE INSTRUCTIONS
Dr. Guevara urology follow-up appointment on June 12, 2019 at 3:00 PM. [phone]    Dr. Morrison [phone] call to schedule follow-up appointment. Avail 8A-4:30 PM Mon-Thur                                                                                                                   8A-2P on Friday---please call to give information to schedule your appointment. Please call back your doctor or  is you are having any problems with scheduling your appointments.

## 2019-05-31 NOTE — NURSING
Received bedside report from SAKINA Aquino    Wife at bedside, saline locked, cardiac telemetry monitoring noted.    Physical to follow.

## 2019-05-31 NOTE — PLAN OF CARE
05/31/19 1125   Final Note   Assessment Type Final Discharge Note   Anticipated Discharge Disposition Home   What phone number can be called within the next 1-3 days to see how you are doing after discharge? [phone]   Hospital Follow Up  Appt(s) scheduled? Yes   Discharge plans and expectations educations in teach back method with documentation complete? Yes   Right Care Referral Info   Post Acute Recommendation No Care     No post acute care needs at this time.

## 2019-05-31 NOTE — NURSING
No falls or near misses noted on shift, No new skin issues or wounds, Refused SCDs for DVT prevention, Noted abdomen pain complaints, personal preferences provided as requested for food/drinks as diet orders allow, plan of care discussed with patient and wife at bedside, verbalized understanding, evidence of learning noted; however, patient needs reinforcement, antibiotics IV as ordered, plan is to maintain med/surg status and continue to monitor status.

## 2019-05-31 NOTE — PT/OT/SLP PROGRESS
"Physical Therapy Treatment    Patient Name:  Reymundo Dang Sr.   MRN:  [id number]    Recommendations:     Discharge Recommendations:  home   Discharge Equipment Recommendations: none   Barriers to discharge: None    Assessment:     Reymundo Dang Sr. is a 64 y.o. male admitted with a medical diagnosis of ESBL (extended spectrum beta-lactamase) producing bacteria infection.  He presents with the following impairments/functional limitations:  weakness, decreased lower extremity function, decreased upper extremity function, gait instability, impaired self care skills . Patient has nothing to complain and ready to go home today. Reviewed Home Exercise Program and exhibits good understanding with good carry over.    Rehab Prognosis: Good; patient would benefit from acute skilled PT services to address these deficits and reach maximum level of function.    Recent Surgery: * No surgery found *      Plan:     During this hospitalization, patient to be seen 5 x/week to address the identified rehab impairments via gait training, therapeutic activities, therapeutic exercises and progress toward the following goals:    · Plan of Care Expires:  05/31/19    Subjective     Chief Complaint: No complain. Patient is pleased with his health improvement.  Patient/Family Comments/goals:" To go home and take care myself".  Pain/Comfort:  · Pain Rating 1: 0/10  · Pain Rating Post-Intervention 1: 0/10      Objective:     Communicated with patient and wife prior to session.  Patient found up in chair with peripheral IV, telemetry upon PT entry to room.     General Precautions: Standard, contact   Orthopedic Precautions:N/A   Braces: N/A     Functional Mobility:  · Bed Mobility:     · Rolling Left:  independence  · Rolling Right: independence  · Scooting: independence  · Supine to Sit: independence  · Sit to Supine: independence  · Transfers:     · Sit to Stand:  independence with no AD  · Bed to Chair: independence with  no AD  using  Step " Transfer  · Gait: Ambulates Independently with no assitive x 300 feet. Exbits slight Right LE Circuution, no Right Heel Strike and slight dec right LE swing to stance ratio.  · Balance: Stand dynamic with Good + grade  · Stairs:  Pt ascended/descended 32 steps stair(s) with No Assistive Device with left handrail with Modified Independent.       AM-PAC 6 CLICK MOBILITY  Turning over in bed (including adjusting bedclothes, sheets and blankets)?: 4  Sitting down on and standing up from a chair with arms (e.g., wheelchair, bedside commode, etc.): 4  Moving from lying on back to sitting on the side of the bed?: 4  Moving to and from a bed to a chair (including a wheelchair)?: 4  Need to walk in hospital room?: 4  Climbing 3-5 steps with a railing?: 4  Basic Mobility Total Score: 24       Therapeutic Activities and Exercises:   Reviewed patient with Home Exercise Program. GAIT: Pt able to ambulate with none with Exhbits slight right LE circumduction, no right heel strike and slight dec right LE swing to stance ratio. x 300 ft with the following gait deviation(s): Exhibits Right LE slight circumduction gait pattern, no Right Heel strike and  slight dec Right LE swing to stance ratio. Performed Mod Independent ascending/descending 32 stair steps using Left hand rail.     Patient left up in chair with all lines intact, call button in reach, Nurse notified and wife present..    GOALS:   Multidisciplinary Problems     Physical Therapy Goals     Not on file          Multidisciplinary Problems (Resolved)        Problem: Physical Therapy Goal    Goal Priority Disciplines Outcome Goal Variances Interventions   Physical Therapy Goal   (Resolved)     PT, PT/OT Outcome(s) achieved     Description:  Goals to be met by: 7     Patient will increase functional independence with mobility by performin. Supine to sit with Independent - met 19  2. Sit to supine with Independent - met 19  3. Bed to chair transfer with  Modified Independentwith or without none using Step Transfer TECHNIQUE  4. Gait  x 200  feet with Modified Independent with or without none  5. Lower extremity exercise program x10 reps per handout, with assistance as needed - met 5/27/19  6.  Lower extremity exercise program x20 reps per handout, with assistance as needed - met 5/30/19  7. Ascend/Descend 30 stair steps using handrail/s with Modified Independent.                     Time Tracking:     PT Received On: 05/31/19  PT Start Time: 1220     PT Stop Time: 1255  PT Total Time (min): 35 min     Billable Minutes: Gait Training 15 and Therapeutic Activity 15    Treatment Type: Treatment  PT/PTA: PT           Tray Paez, PT  05/31/2019

## 2019-05-31 NOTE — NURSING
Called Ms. Downs,patient's wife to given appointment dates and times. Reviewed all needed appointments. Encouraged her to call back if having problems scheduling them and/or any anticipated needs.

## 2019-05-31 NOTE — PROGRESS NOTES
Nursing Notes  Bedside handoff completed with jazmín. Patient resting quietly. Appears asleep. Wife a bedside. Contact isolation maintained. Call bell within reach of patient.       Huddle Comments

## 2019-05-31 NOTE — PROGRESS NOTES
[facility] Hospital Medicine  Progress Note    Patient Name: Reymundo Dang Sr.  MRN: [id number]  Patient Class: IP- Inpatient   Admission Date: 5/24/2019  Length of Stay: 7 days  Attending Physician: Lori Perez MD  Primary Care Provider: Rosalinda Villalba NP        Subjective:     Principal Problem:ESBL (extended spectrum beta-lactamase) producing bacteria infection    HPI:  64 year old male comes in after fever started 3 days ago. The next day he says he started with burning with urinating. By yesterday, he says he couldn't get urine out at all. He was recently hospitalized for a UTI and was dc'd without abx but felt back to normal. His only other complaint was constipation today after diarrhea yesterday.    Hospital Course:  Patient with c/o vomiting yesterday and difficulty passing stool today. No fevers since admit.    5/26  Urine culture +. Awaiting sensitivies.  No more fevers. Belching/hiccups continue. Bowels moving.    5/27 urine culture with ecoli. Sensitive to the zosyn day 4. He completed 5 days of zosyn last visit for resistant UTI. He again has resistant UTI. Started on zosyn which it is sensitive to. He will need to cont 7 days of this prior to d/c. PSA was 1.2    5/28 ;  Urine culture ESBL only sensitive to iv abx. Started on Zosyn since admission. Will need to cont x 7 days. Last dose due Friday morning. CT abd shows mildly enlarged prostate, PSA normal. Started on flomax because he report that his has trouble getting the urine out. No fever. No elevated WBC.     5/29 No changes./Day 5/7 of zosyn. Last dose due Friday morning. Cont flomax for BPH. No fever. No elevated WBC.     5/30 No changes, day 6/7 on zosyn. Will complete full 7 day course Friday am. Urinating better with flomax. No fever. Blood cultures remain no growth    5/31/19 No changes, day 7/7 on zosyn.  Urinating better with flomax. No fever. Blood cultures remain no growth  Creat stable, K+ 3.3; replace   d/c  on Augmentin for atleast another week or until urology f/u. Questioning prostatitis. Also will need probiotic due to crohns   Needs close f/u with urology .      Review of Systems   Constitutional: Negative for chills and fever.   HENT: Negative for congestion, ear pain, postnasal drip, rhinorrhea and sore throat.    Eyes: Negative for pain and redness.   Respiratory: Negative for cough, shortness of breath and wheezing.    Cardiovascular: Negative for chest pain and palpitations.   Gastrointestinal: Negative for constipation, diarrhea and vomiting. Abdominal pain: chronic. Nausea: chronic.   Genitourinary: Negative for difficulty urinating, dysuria, frequency and urgency.   Skin: Negative for rash.   Neurological: Negative for weakness and headaches.     Objective:     Vital Signs (Most Recent):  Temp: 97 °F (36.1 °C) (05/31/19 0900)  Pulse: (!) 56 (05/31/19 1000)  Resp: 18 (05/31/19 0900)  BP: (!) 164/76 (05/31/19 0900)  SpO2: 100 % (05/31/19 0900) Vital Signs (24h Range):  Temp:  [97 °F (36.1 °C)-98.1 °F (36.7 °C)] 97 °F (36.1 °C)  Pulse:  [52-68] 56  Resp:  [18-20] 18  SpO2:  [97 %-100 %] 100 %  BP: (134-176)/(62-78) 164/76     Weight: 121.1 kg (267 lb)  Body mass index is 43.09 kg/m².    Intake/Output Summary (Last 24 hours) at 5/31/2019 1135  Last data filed at 5/31/2019 0500  Gross per 24 hour   Intake 100 ml   Output --   Net 100 ml      Physical Exam   Constitutional: He is oriented to person, place, and time. He appears well-developed. No distress.   Sitting up in chair   HENT:   Head: Normocephalic and atraumatic.   Right Ear: External ear normal.   Left Ear: External ear normal.   Eyes: Pupils are equal, round, and reactive to light. Conjunctivae and EOM are normal.   Neck: Normal range of motion. Neck supple. No tracheal deviation present.   Cardiovascular: Normal rate, regular rhythm and normal heart sounds.   Pulmonary/Chest: Effort normal and breath sounds normal. No respiratory distress. He has no  wheezes.   Abdominal: Soft. Bowel sounds are normal. There is no tenderness.   Musculoskeletal: Normal range of motion. He exhibits no edema.   Neurological: He is alert and oriented to person, place, and time.   Skin: Skin is warm and dry. No rash noted. No erythema. No pallor.   Psychiatric: He has a normal mood and affect. His behavior is normal.   Nursing note and vitals reviewed.      Significant Labs:   CBC: No results for input(s): WBC, HGB, HCT, PLT in the last 48 hours.  CMP:   Recent Labs   Lab 05/30/19  0552 05/31/19  0612    143   K 3.6 3.3*    107   CO2 27 28   GLU 96 102   BUN 8 11   CREATININE 1.0 1.0   CALCIUM 9.1 8.8   ANIONGAP 10 8   EGFRNONAA >60 >60         Assessment/Plan:      * ESBL (extended spectrum beta-lactamase) producing bacteria infection  Cont zosyn till Friday am  ESCHERICHIA COLI ESBL   >100,000 cfu/ml     Resulting Agency [facility]   Susceptibility      Escherichia coli esbl     CULTURE, URINE     Amikacin 32 mcg/mL Intermediate     Amox/K Clav'ate 16/8 mcg/mL Intermediate     Amp/Sulbactam 16/8 mcg/mL Intermediate     Ampicillin >16 mcg/mL Resistant     Cefazolin >16 mcg/mL Resistant     Cefepime >16 mcg/mL Resistant     Ceftriaxone >32 mcg/mL Resistant     Ciprofloxacin >2 mcg/mL Resistant     Ertapenem <=2 mcg/mL Sensitive     Gentamicin >8 mcg/mL Resistant     Levofloxacin >4 mcg/mL Resistant     Meropenem <=4 mcg/mL Sensitive     Nitrofurantoin >64 mcg/mL Resistant     Piperacillin/Tazo <=16 mcg/mL Sensitive     Tetracycline >8 mcg/mL Resistant     Tobramycin >8 mcg/mL Resistant     Trimeth/Sulfa >2/38 mcg/mL Resistant             Morbid obesity    # The patient is asked to make an attempt to improve diet and exercise patterns to aid in medical management of this problem.     # Eat  5 small meals a day    # Cut out high carbohydrate  foods : bread, rice, pasta, potatoes.     # Exercise/walk 5x/week for at least 30-45  minutes.            Acute cystitis without  hematuria  Treat with zosyn for now and follow sensitivies. Recent MDRUTI.  Concern for possible prostatic involvement add flomax and urinating better  PSA is okay though.  cont zosyn x 7 days ecoli sensitive UTI  Will d/c on Augmentin for atleast another week or until urology f/u. Questioning prostatitis. Also will need probiotic due to crohns   Needs close f/u with urology .    Recurrent major depressive disorder, in full remission  Resume home meds      Hypokalemia  Repeat replacement today better 3.8 today but will cont replacement  Mag 1.8.  Supplement K.      Hypertension  Resume home meds.  Double lisinopril today. Kidneys stable, bp 138//87  /71- 176/78.    Restless legs syndrome (RLS)  Resume home meds      Crohn's disease    Resume asacol stable.      VTE Risk Mitigation (From admission, onward)        Ordered     IP VTE HIGH RISK PATIENT  Once      05/24/19 1236     Place sequential compression device  Until discontinued      05/24/19 1236              Iram Yoder MD  Department of Hospital Medicine   [facility]

## 2019-05-31 NOTE — PT/OT/SLP DISCHARGE
Physical Therapy Discharge Summary    Name: Reymundo Dang Sr.  MRN: [id number]   Principal Problem: ESBL (extended spectrum beta-lactamase) producing bacteria infection     Patient Discharged from acute Physical Therapy on 19.  Please refer to prior PT noted date on 19 for functional status.     Assessment:     Patient has met all goals and is not appropriate for therapy.    Objective:     GOALS:   Multidisciplinary Problems     Physical Therapy Goals     Not on file          Multidisciplinary Problems (Resolved)        Problem: Physical Therapy Goal    Goal Priority Disciplines Outcome Goal Variances Interventions   Physical Therapy Goal   (Resolved)     PT, PT/OT Outcome(s) achieved     Description:  Goals to be met by: 7     Patient will increase functional independence with mobility by performin. Supine to sit with Independent - met 19  2. Sit to supine with Independent - met 19  3. Bed to chair transfer with Modified Independentwith or without none using Step Transfer TECHNIQUE  4. Gait  x 200  feet with Modified Independent with or without none  5. Lower extremity exercise program x10 reps per handout, with assistance as needed - met 19  6.  Lower extremity exercise program x20 reps per handout, with assistance as needed - met 19  7. Ascend/Descend 30 stair steps using handrail/s with Modified Independent.                     Reasons for Discontinuation of Therapy Services  Transfer to alternate level of care. and Satisfactory goal achievement.      Plan:     Patient Discharged to: Home no PT services needed.    Tray Paez, PT  2019

## 2019-05-31 NOTE — SUBJECTIVE & OBJECTIVE
Review of Systems   Constitutional: Negative for chills and fever.   HENT: Negative for congestion, ear pain, postnasal drip, rhinorrhea and sore throat.    Eyes: Negative for pain and redness.   Respiratory: Negative for cough, shortness of breath and wheezing.    Cardiovascular: Negative for chest pain and palpitations.   Gastrointestinal: Negative for constipation, diarrhea and vomiting. Abdominal pain: chronic. Nausea: chronic.   Genitourinary: Negative for difficulty urinating, dysuria, frequency and urgency.   Skin: Negative for rash.   Neurological: Negative for weakness and headaches.     Objective:     Vital Signs (Most Recent):  Temp: 97 °F (36.1 °C) (05/31/19 0900)  Pulse: (!) 56 (05/31/19 1000)  Resp: 18 (05/31/19 0900)  BP: (!) 164/76 (05/31/19 0900)  SpO2: 100 % (05/31/19 0900) Vital Signs (24h Range):  Temp:  [97 °F (36.1 °C)-98.1 °F (36.7 °C)] 97 °F (36.1 °C)  Pulse:  [52-68] 56  Resp:  [18-20] 18  SpO2:  [97 %-100 %] 100 %  BP: (134-176)/(62-78) 164/76     Weight: 121.1 kg (267 lb)  Body mass index is 43.09 kg/m².    Intake/Output Summary (Last 24 hours) at 5/31/2019 1135  Last data filed at 5/31/2019 0500  Gross per 24 hour   Intake 100 ml   Output --   Net 100 ml      Physical Exam   Constitutional: He is oriented to person, place, and time. He appears well-developed. No distress.   Sitting up in chair   HENT:   Head: Normocephalic and atraumatic.   Right Ear: External ear normal.   Left Ear: External ear normal.   Eyes: Pupils are equal, round, and reactive to light. Conjunctivae and EOM are normal.   Neck: Normal range of motion. Neck supple. No tracheal deviation present.   Cardiovascular: Normal rate, regular rhythm and normal heart sounds.   Pulmonary/Chest: Effort normal and breath sounds normal. No respiratory distress. He has no wheezes.   Abdominal: Soft. Bowel sounds are normal. There is no tenderness.   Musculoskeletal: Normal range of motion. He exhibits no edema.   Neurological: He is  alert and oriented to person, place, and time.   Skin: Skin is warm and dry. No rash noted. No erythema. No pallor.   Psychiatric: He has a normal mood and affect. His behavior is normal.   Nursing note and vitals reviewed.      Significant Labs:   CBC: No results for input(s): WBC, HGB, HCT, PLT in the last 48 hours.  CMP:   Recent Labs   Lab 05/30/19  0552 05/31/19  0612    143   K 3.6 3.3*    107   CO2 27 28   GLU 96 102   BUN 8 11   CREATININE 1.0 1.0   CALCIUM 9.1 8.8   ANIONGAP 10 8   EGFRNONAA >60 >60

## 2019-05-31 NOTE — DISCHARGE SUMMARY
[facility] Medicine  Discharge Summary      Patient Name: Reymundo Dang Sr.  MRN: [id number]  Admission Date: 5/24/2019  Hospital Length of Stay: 7 days  Discharge Date and Time:  05/31/2019 1:27 PM  Attending Physician: Lori Perez MD   Discharging Provider: Catherine Allen NP  Primary Care Provider: Rosalinda Villalba NP      HPI:   64 year old male comes in after fever started 3 days ago. The next day he says he started with burning with urinating. By yesterday, he says he couldn't get urine out at all. He was recently hospitalized for a UTI and was dc'd without abx but felt back to normal. His only other complaint was constipation today after diarrhea yesterday.    * No surgery found *      Hospital Course:   Patient with c/o vomiting yesterday and difficulty passing stool today. No fevers since admit.    5/26  Urine culture +. Awaiting sensitivies.  No more fevers. Belching/hiccups continue. Bowels moving.    5/27 urine culture with ecoli. Sensitive to the zosyn day 4. He completed 5 days of zosyn last visit for resistant UTI. He again has resistant UTI. Started on zosyn which it is sensitive to. He will need to cont 7 days of this prior to d/c. PSA was 1.2    5/28 ;  Urine culture ESBL only sensitive to iv abx. Started on Zosyn since admission. Will need to cont x 7 days. Last dose due Friday morning. CT abd shows mildly enlarged prostate, PSA normal. Started on flomax because he report that his has trouble getting the urine out. No fever. No elevated WBC.     5/29 No changes./Day 5/7 of zosyn. Last dose due Friday morning. Cont flomax for BPH. No fever. No elevated WBC.     5/30 No changes, day 6/7 on zosyn. Will complete full 7 day course Friday am. Urinating better with flomax. No fever. Blood cultures remain no growth    5/31/19 No changes, day 7/7 on zosyn.  Urinating better with flomax. No fever. Blood cultures remain no growth  Creat stable, K+ 3.3; replace   d/c on  Augmentin for atleast another week or until urology f/u. Questioning prostatitis. Also will need probiotic due to crohns   Needs close f/u with urology .       Consults:     * ESBL (extended spectrum beta-lactamase) producing bacteria infection  Cont zosyn till Friday am  ESCHERICHIA COLI ESBL   >100,000 cfu/ml     Resulting Agency [facility]   Susceptibility      Escherichia coli esbl     CULTURE, URINE     Amikacin 32 mcg/mL Intermediate     Amox/K Clav'ate 16/8 mcg/mL Intermediate     Amp/Sulbactam 16/8 mcg/mL Intermediate     Ampicillin >16 mcg/mL Resistant     Cefazolin >16 mcg/mL Resistant     Cefepime >16 mcg/mL Resistant     Ceftriaxone >32 mcg/mL Resistant     Ciprofloxacin >2 mcg/mL Resistant     Ertapenem <=2 mcg/mL Sensitive     Gentamicin >8 mcg/mL Resistant     Levofloxacin >4 mcg/mL Resistant     Meropenem <=4 mcg/mL Sensitive     Nitrofurantoin >64 mcg/mL Resistant     Piperacillin/Tazo <=16 mcg/mL Sensitive     Tetracycline >8 mcg/mL Resistant     Tobramycin >8 mcg/mL Resistant     Trimeth/Sulfa >2/38 mcg/mL Resistant             Morbid obesity    # The patient is asked to make an attempt to improve diet and exercise patterns to aid in medical management of this problem.     # Eat  5 small meals a day    # Cut out high carbohydrate  foods : bread, rice, pasta, potatoes.     # Exercise/walk 5x/week for at least 30-45  minutes.            Acute cystitis without hematuria  Treat with zosyn for now and follow sensitivies. Recent MDRUTI.  Concern for possible prostatic involvement add flomax and urinating better  PSA is okay though.  cont zosyn x 7 days ecoli sensitive UTI  Will d/c on Augmentin for atleast another week or until urology f/u. Questioning prostatitis. Also will need probiotic due to crohns   Needs close f/u with urology .    Recurrent major depressive disorder, in full remission  Resume home meds      Hypokalemia  Repeat replacement today better 3.8 today but will cont replacement  Mag  1.8.  Supplement K.      Hypertension  Resume home meds.  Double lisinopril today. Kidneys stable, bp 138//87  /71- 176/78.    Restless legs syndrome (RLS)  Resume home meds      Crohn's disease    Resume asacol stable.      Final Active Diagnoses:    Diagnosis Date Noted POA    PRINCIPAL PROBLEM:  ESBL (extended spectrum beta-lactamase) producing bacteria infection [A49.9, Z16.12] 05/28/2019 Yes    Morbid obesity [E66.01] 05/28/2019 Yes    Acute cystitis without hematuria [N30.00] 04/28/2019 Yes    Recurrent major depressive disorder, in full remission [F33.42] 04/03/2019 Yes    Hypokalemia [E87.6] 12/20/2014 Yes    Crohn's disease [K50.90]  Yes    Hypertension [I10]  Yes    Restless legs syndrome (RLS) [G25.81]  Yes      Problems Resolved During this Admission:       Discharged Condition: stable    Disposition: Home or Self Care    Follow Up:  Follow-up Information     Schedule an appointment as soon as possible for a visit with Rosalinda Villalba NP.    Specialty:  Family Medicine  Contact information:  [address] [place] [id number]  [phone]                 Patient Instructions:      Ambulatory Referral to Urology   Referral Priority: Routine Referral Type: Consultation   Referral Reason: Specialty Services Required   Requested Specialty: Urology   Number of Visits Requested: 1       Significant Diagnostic Studies:   Recent Labs   Lab 05/30/19  0552 05/31/19  0612    143   K 3.6 3.3*    107   CO2 27 28   GLU 96 102   BUN 8 11   CREATININE 1.0 1.0   CALCIUM 9.1 8.8   ANIONGAP 10 8   EGFRNONAA >60 >60      Mg+ 1.7    Pending Diagnostic Studies:     None         Medications:  Reconciled Home Medications:      Medication List      START taking these medications    amoxicillin-clavulanate 875-125mg 875-125 mg per tablet  Commonly known as:  AUGMENTIN  Take 1 tablet by mouth 2 (two) times daily. for 7 days     Lactobacillus acidophilus 1 billion cell Tab  Take 1 tablet by mouth 2  (two) times daily.        CHANGE how you take these medications    lisinopril 40 MG tablet  Commonly known as:  PRINIVIL,ZESTRIL  Take 1 tablet (40 mg total) by mouth once daily.  Start taking on:  6/1/2019  What changed:    · medication strength  · how much to take     potassium chloride SA 20 MEQ tablet  Commonly known as:  K-DUR,KLOR-CON  Take 2 tablets (40 mEq total) by mouth once daily.  Start taking on:  6/1/2019  What changed:  how much to take     primidone 50 MG Tab  Commonly known as:  MYSOLINE  Take 2 tablets (100 mg total) by mouth 2 (two) times daily.  What changed:    · how much to take  · when to take this        CONTINUE taking these medications    ACETAMINOPHEN PM ORAL  Take 2 tablets by mouth nightly as needed.     ALPRAZolam 0.5 MG tablet  Commonly known as:  XANAX  TAKE 1 TABLET BY MOUTH TWICE A DAY AS NEEDED     amLODIPine 10 MG tablet  Commonly known as:  NORVASC  Take 1 tablet (10 mg total) by mouth once daily.     buPROPion 300 MG 24 hr tablet  Commonly known as:  WELLBUTRIN XL  Take 300 mg by mouth once daily.     CENTRUM SILVER ORAL  Take 1 tablet by mouth once daily.     hydroCHLOROthiazide 25 MG tablet  Commonly known as:  HYDRODIURIL  Take 1 tablet (25 mg total) by mouth once daily.     mesalamine 800 mg Tbec  Commonly known as:  ASACOL HD  Take 1 tablet (800 mg total) by mouth once daily.     metoprolol tartrate 100 MG tablet  Commonly known as:  LOPRESSOR  Take 1 tablet (100 mg total) by mouth 2 (two) times daily.     mirabegron 25 mg Tb24 ER tablet  Commonly known as:  MYRBETRIQ  Take 1 tablet (25 mg total) by mouth once daily.     omeprazole 40 MG capsule  Commonly known as:  PRILOSEC  Take 40 mg by mouth once daily.     promethazine 25 MG tablet  Commonly known as:  PHENERGAN  Take 1 tablet (25 mg total) by mouth every 6 (six) hours as needed for Nausea.     simvastatin 20 MG tablet  Commonly known as:  ZOCOR  Take 1 tablet (20 mg total) by mouth every evening.     TRINTELLIX 10 mg  Tab  Generic drug:  vortioxetine  Take 1 tablet by mouth once daily.     VITAMIN B-12 1000 MCG tablet  Generic drug:  cyanocobalamin  Take 1 tablet by mouth once daily.     VITAMIN B-6 ORAL  Take 1 tablet by mouth once daily.     vitamin E 400 UNIT capsule  Take 400 Units by mouth once daily.        STOP taking these medications    nitrofurantoin (macrocrystal-monohydrate) 100 MG capsule  Commonly known as:  MACROBID            Indwelling Lines/Drains at time of discharge:   Lines/Drains/Airways          None          Time spent on the discharge of patient: 20 minutes  Patient was seen and examined on the date of discharge and determined to be suitable for discharge.         Catherine Allen, NP  Department of Hospital Medicine  [facility]

## 2019-06-05 ENCOUNTER — OFFICE VISIT (OUTPATIENT)
Dept: INTERNAL MEDICINE | Facility: CLINIC | Age: 64
End: 2019-06-05
Payer: MEDICARE

## 2019-06-05 ENCOUNTER — TELEPHONE (OUTPATIENT)
Dept: INTERNAL MEDICINE | Facility: CLINIC | Age: 64
End: 2019-06-05

## 2019-06-05 VITALS
DIASTOLIC BLOOD PRESSURE: 70 MMHG | OXYGEN SATURATION: 98 % | WEIGHT: 262.38 LBS | HEIGHT: 66 IN | BODY MASS INDEX: 42.17 KG/M2 | RESPIRATION RATE: 18 BRPM | SYSTOLIC BLOOD PRESSURE: 100 MMHG | HEART RATE: 68 BPM

## 2019-06-05 DIAGNOSIS — N39.0 RECURRENT UTI: Primary | ICD-10-CM

## 2019-06-05 DIAGNOSIS — Z16.30 RESISTANCE TO UNSPECIFIED ANTIMICROBIAL DRUGS: ICD-10-CM

## 2019-06-05 DIAGNOSIS — N39.0 RECURRENT UTI (URINARY TRACT INFECTION): Primary | ICD-10-CM

## 2019-06-05 PROCEDURE — 3074F PR MOST RECENT SYSTOLIC BLOOD PRESSURE < 130 MM HG: ICD-10-PCS | Mod: CPTII,S$GLB,, | Performed by: NURSE PRACTITIONER

## 2019-06-05 PROCEDURE — 3074F SYST BP LT 130 MM HG: CPT | Mod: CPTII,S$GLB,, | Performed by: NURSE PRACTITIONER

## 2019-06-05 PROCEDURE — 99214 OFFICE O/P EST MOD 30 MIN: CPT | Mod: S$GLB,,, | Performed by: NURSE PRACTITIONER

## 2019-06-05 PROCEDURE — 3008F PR BODY MASS INDEX (BMI) DOCUMENTED: ICD-10-PCS | Mod: CPTII,S$GLB,, | Performed by: NURSE PRACTITIONER

## 2019-06-05 PROCEDURE — 3078F DIAST BP <80 MM HG: CPT | Mod: CPTII,S$GLB,, | Performed by: NURSE PRACTITIONER

## 2019-06-05 PROCEDURE — 99999 PR PBB SHADOW E&M-EST. PATIENT-LVL IV: CPT | Mod: PBBFAC,,, | Performed by: NURSE PRACTITIONER

## 2019-06-05 PROCEDURE — 3008F BODY MASS INDEX DOCD: CPT | Mod: CPTII,S$GLB,, | Performed by: NURSE PRACTITIONER

## 2019-06-05 PROCEDURE — 3078F PR MOST RECENT DIASTOLIC BLOOD PRESSURE < 80 MM HG: ICD-10-PCS | Mod: CPTII,S$GLB,, | Performed by: NURSE PRACTITIONER

## 2019-06-05 PROCEDURE — 99214 PR OFFICE/OUTPT VISIT, EST, LEVL IV, 30-39 MIN: ICD-10-PCS | Mod: S$GLB,,, | Performed by: NURSE PRACTITIONER

## 2019-06-05 PROCEDURE — 99999 PR PBB SHADOW E&M-EST. PATIENT-LVL IV: ICD-10-PCS | Mod: PBBFAC,,, | Performed by: NURSE PRACTITIONER

## 2019-06-05 RX ORDER — AMOXICILLIN AND CLAVULANATE POTASSIUM 875; 125 MG/1; MG/1
1 TABLET, FILM COATED ORAL 2 TIMES DAILY
Qty: 28 TABLET | Refills: 0 | Status: SHIPPED | OUTPATIENT
Start: 2019-06-05 | End: 2019-06-12

## 2019-06-05 NOTE — TELEPHONE ENCOUNTER
Yes he was rx augmenting in hospital. He is to cont it for 3 weeks or until further orders from urology

## 2019-06-05 NOTE — TELEPHONE ENCOUNTER
Pt just had augmentin prescribed by another provider and is pt to continue this? The Rx was 28 tabs twice a day for 7 days. Please advise.

## 2019-06-05 NOTE — PROGRESS NOTES
"Subjective:       Patient ID: Reymundo Dang Sr. is a 64 y.o. male.    Chief Complaint: Follow-up    HPI: Pt presents to clinic today known to me with c/o difficulty urinating. He report sthat he was fine 2 days after d/c but then started having trouble again. His urine is still showing blood, nitrite and trace leuko. His last "roter router" for the prostate was 5-10 years ago. He has f/u with Dr Noland next Wed but can not make that. He is asking to change that appt.     Urine culture   Urine Culture, Routine ESCHERICHIA COLI ESBL   >100,000 cfu/ml    Resulting Agency [facility]   Susceptibility      Escherichia coli esbl     CULTURE, URINE     Amikacin 32 mcg/mL Intermediate     Amox/K Clav'ate 16/8 mcg/mL Intermediate     Amp/Sulbactam 16/8 mcg/mL Intermediate     Ampicillin >16 mcg/mL Resistant     Cefazolin >16 mcg/mL Resistant     Cefepime >16 mcg/mL Resistant     Ceftriaxone >32 mcg/mL Resistant     Ciprofloxacin >2 mcg/mL Resistant     Ertapenem <=2 mcg/mL Sensitive     Gentamicin >8 mcg/mL Resistant     Levofloxacin >4 mcg/mL Resistant     Meropenem <=4 mcg/mL Sensitive     Nitrofurantoin >64 mcg/mL Resistant     Piperacillin/Tazo <=16 mcg/mL Sensitive     Tetracycline >8 mcg/mL Resistant     Tobramycin >8 mcg/mL Resistant     Trimeth/Sulfa >2/38 mcg/mL Resistant           Review of Systems   Constitutional: Negative for chills and fever.   HENT: Negative for congestion, postnasal drip and sore throat.    Eyes: Negative for photophobia.   Respiratory: Negative for chest tightness and shortness of breath.    Cardiovascular: Negative for chest pain.   Gastrointestinal: Negative for abdominal distention, abdominal pain, blood in stool and vomiting.   Genitourinary: Positive for difficulty urinating and dysuria. Negative for flank pain and hematuria.   Musculoskeletal: Negative for back pain.   Skin: Negative for pallor.   Neurological: Negative for dizziness, seizures, facial asymmetry, speech difficulty and " numbness.   Hematological: Does not bruise/bleed easily.   Psychiatric/Behavioral: Negative for agitation and suicidal ideas. The patient is not nervous/anxious.        Objective:      Physical Exam   Constitutional: He is oriented to person, place, and time. He appears well-developed and well-nourished.   HENT:   Head: Normocephalic and atraumatic.   Neck: Normal range of motion. Neck supple.   Cardiovascular: Normal rate, regular rhythm, normal heart sounds and intact distal pulses.   No murmur heard.  Pulmonary/Chest: Effort normal and breath sounds normal. No stridor. No respiratory distress. He has no wheezes.   Abdominal: Soft. Bowel sounds are normal. He exhibits no distension and no mass. There is no tenderness. There is no guarding.   Musculoskeletal: Normal range of motion. He exhibits no edema.   Neurological: He is alert and oriented to person, place, and time.   Skin: Skin is warm and dry. Capillary refill takes less than 2 seconds.   Nursing note and vitals reviewed.      Assessment:       1. Recurrent UTI (urinary tract infection)        Plan:     Problem List Items Addressed This Visit     None      Visit Diagnoses     Recurrent UTI (urinary tract infection)    -  Primary    Relevant Medications    amoxicillin-clavulanate 875-125mg (AUGMENTIN) 875-125 mg per tablet    Other Relevant Orders    Ambulatory Referral to Urology      cont augmenting as he is intermediate to it and just finished 7 days zsyn. Will try and get urology sooner as he need cystoscope. If cont with this infection or fever returns on augmentin may need thornton vs PICC and 4-6 weeks IV abx prostatis although psa normal CT shows enlarged prostate.

## 2019-06-06 RX ORDER — TAMSULOSIN HYDROCHLORIDE 0.4 MG/1
0.4 CAPSULE ORAL DAILY
COMMUNITY
End: 2019-06-06 | Stop reason: SDUPTHER

## 2019-06-06 RX ORDER — TAMSULOSIN HYDROCHLORIDE 0.4 MG/1
0.4 CAPSULE ORAL DAILY
Qty: 30 CAPSULE | Refills: 0 | Status: SHIPPED | OUTPATIENT
Start: 2019-06-06 | End: 2019-06-28 | Stop reason: SDUPTHER

## 2019-06-06 NOTE — TELEPHONE ENCOUNTER
Flomax prescription verified with Rosalinda Villalba NP. Flomax 0.4mg one table daily. Rosalinda is unable to refill via epic. Please sign prescription.

## 2019-06-06 NOTE — TELEPHONE ENCOUNTER
----- Message from Mya Castanon sent at 2019  1:41 PM CDT -----  Contact: yfn/wife   Reymundo Dang Sr.  MRN: [id number]  : 1955  PCP: Rosalinda Villalba  Home Phone      [phone]  Work Phone      Not on file.  Mobile          [phone]      MESSAGE:  Pt wife states that  is needing a refill on his Flomax. Please advise, thanks.  Pharmacy: [facility] in [place]  Phone: [phone]

## 2019-06-06 NOTE — TELEPHONE ENCOUNTER
Patient requesting refill on Flomax. States he thinks he takes it B.I.D. No record in chart. Please advise.

## 2019-06-06 NOTE — TELEPHONE ENCOUNTER
Rx has 28 tabs twice daily but written to take for 7 days. Is this correct? Please verify correct directions for medication.

## 2019-06-07 ENCOUNTER — HOSPITAL ENCOUNTER (OUTPATIENT)
Dept: RADIOLOGY | Facility: HOSPITAL | Age: 64
Discharge: HOME | End: 2019-06-07
Attending: NURSE PRACTITIONER
Payer: MEDICARE

## 2019-06-07 DIAGNOSIS — Z16.30 RESISTANCE TO UNSPECIFIED ANTIMICROBIAL DRUGS: ICD-10-CM

## 2019-06-07 DIAGNOSIS — N39.0 RECURRENT UTI: ICD-10-CM

## 2019-06-07 PROCEDURE — 76770 US EXAM ABDO BACK WALL COMP: CPT | Mod: TC

## 2019-06-07 PROCEDURE — 76770 US RETROPERITONEAL COMPLETE: ICD-10-PCS | Mod: 26,,, | Performed by: RADIOLOGY

## 2019-06-07 PROCEDURE — 76770 US EXAM ABDO BACK WALL COMP: CPT | Mod: 26,,, | Performed by: RADIOLOGY

## 2019-06-13 ENCOUNTER — OFFICE VISIT (OUTPATIENT)
Dept: UROLOGY | Facility: CLINIC | Age: 64
End: 2019-06-13
Attending: UROLOGY
Payer: MEDICARE

## 2019-06-13 VITALS
HEART RATE: 70 BPM | BODY MASS INDEX: 41.12 KG/M2 | DIASTOLIC BLOOD PRESSURE: 77 MMHG | WEIGHT: 262 LBS | HEIGHT: 67 IN | SYSTOLIC BLOOD PRESSURE: 154 MMHG

## 2019-06-13 DIAGNOSIS — N39.0 RECURRENT UTI: ICD-10-CM

## 2019-06-13 DIAGNOSIS — N40.1 BPH WITH URINARY OBSTRUCTION: Primary | ICD-10-CM

## 2019-06-13 DIAGNOSIS — N13.8 BPH WITH URINARY OBSTRUCTION: Primary | ICD-10-CM

## 2019-06-13 LAB
BILIRUB SERPL-MCNC: NORMAL MG/DL
BLOOD URINE, POC: NORMAL
COLOR, POC UA: YELLOW
GLUCOSE UR QL STRIP: NORMAL
KETONES UR QL STRIP: NORMAL
LEUKOCYTE ESTERASE URINE, POC: NORMAL
NITRITE, POC UA: NORMAL
PH, POC UA: 5
POC RESIDUAL URINE VOLUME: 0 ML (ref 0–100)
PROTEIN, POC: NORMAL
SPECIFIC GRAVITY, POC UA: 1.02
UROBILINOGEN, POC UA: NORMAL

## 2019-06-13 PROCEDURE — 3008F PR BODY MASS INDEX (BMI) DOCUMENTED: ICD-10-PCS | Mod: CPTII,S$GLB,, | Performed by: NURSE PRACTITIONER

## 2019-06-13 PROCEDURE — 3078F PR MOST RECENT DIASTOLIC BLOOD PRESSURE < 80 MM HG: ICD-10-PCS | Mod: CPTII,S$GLB,, | Performed by: NURSE PRACTITIONER

## 2019-06-13 PROCEDURE — 3077F PR MOST RECENT SYSTOLIC BLOOD PRESSURE >= 140 MM HG: ICD-10-PCS | Mod: CPTII,S$GLB,, | Performed by: NURSE PRACTITIONER

## 2019-06-13 PROCEDURE — 81002 POCT URINE DIPSTICK WITHOUT MICROSCOPE: ICD-10-PCS | Mod: S$GLB,,, | Performed by: NURSE PRACTITIONER

## 2019-06-13 PROCEDURE — 99214 PR OFFICE/OUTPT VISIT, EST, LEVL IV, 30-39 MIN: ICD-10-PCS | Mod: 25,S$GLB,, | Performed by: NURSE PRACTITIONER

## 2019-06-13 PROCEDURE — 51798 US URINE CAPACITY MEASURE: CPT | Mod: S$GLB,,, | Performed by: NURSE PRACTITIONER

## 2019-06-13 PROCEDURE — 51798 PR MEAS,POST-VOID RES,US,NON-IMAGING: ICD-10-PCS | Mod: S$GLB,,, | Performed by: NURSE PRACTITIONER

## 2019-06-13 PROCEDURE — 3077F SYST BP >= 140 MM HG: CPT | Mod: CPTII,S$GLB,, | Performed by: NURSE PRACTITIONER

## 2019-06-13 PROCEDURE — 99999 PR PBB SHADOW E&M-EST. PATIENT-LVL V: CPT | Mod: PBBFAC,,, | Performed by: NURSE PRACTITIONER

## 2019-06-13 PROCEDURE — 99999 PR PBB SHADOW E&M-EST. PATIENT-LVL V: ICD-10-PCS | Mod: PBBFAC,,, | Performed by: NURSE PRACTITIONER

## 2019-06-13 PROCEDURE — 81002 URINALYSIS NONAUTO W/O SCOPE: CPT | Mod: S$GLB,,, | Performed by: NURSE PRACTITIONER

## 2019-06-13 PROCEDURE — 3078F DIAST BP <80 MM HG: CPT | Mod: CPTII,S$GLB,, | Performed by: NURSE PRACTITIONER

## 2019-06-13 PROCEDURE — 3008F BODY MASS INDEX DOCD: CPT | Mod: CPTII,S$GLB,, | Performed by: NURSE PRACTITIONER

## 2019-06-13 PROCEDURE — 99214 OFFICE O/P EST MOD 30 MIN: CPT | Mod: 25,S$GLB,, | Performed by: NURSE PRACTITIONER

## 2019-06-13 RX ORDER — CIPROFLOXACIN 250 MG/1
500 TABLET, FILM COATED ORAL ONCE
Status: CANCELLED | OUTPATIENT
Start: 2019-06-13 | End: 2019-06-13

## 2019-06-13 RX ORDER — LIDOCAINE HYDROCHLORIDE 20 MG/ML
JELLY TOPICAL ONCE
Status: CANCELLED | OUTPATIENT
Start: 2019-06-13 | End: 2019-06-13

## 2019-06-13 NOTE — PATIENT INSTRUCTIONS
1. Daily Probiotic (~25 billion mode)  2. D-Mannose 2 grams daily        Bladder Infection, Male (Adult)    You have a bladder infection.  Urine is normally free of bacteria. But bacteria can get into the urinary tract from the skin around the rectum or it may travel in the blood from elsewhere in the body.  This is called a urinary tract infection (UTI). An infection can occur anywhere in the urinary tract. It could be in a kidney (pyelonephritis)or in the bladder (cystitis) and urethra (urethritis). The urethra is the tube that drains the urine from the bladder through the tip of the penis.  The most common place for a UTI is in the bladder. This is called a bladder infection. Most bladder infections are easily treated. They are not serious unless the infection spreads up to the kidney.  The terms bladder infection, UTI, and cystitis are often used to describe the same thing, but they arent always the same. Cystitis is an inflammation of the bladder. The most common cause of cystitis is an infection.   Keep in mind:  · Infections in the urine are called UTIs.  · Cystitis is usually caused by a UTI.  · Not all UTIs and cases of cystitis are bladder infections.  · Bladder infections are the most common type of cystitis.  Symptoms of a bladder infection  The infection causes inflammation in the urethra and bladder. This inflammation causes many of the symptoms. The most common symptoms of a bladder infection are:  · Pain or burning when urinating  · Having to go more often than usual  · Feeling like you need to go right away  · Only a small amount comes out  · Blood in urine  · Discomfort in your belly (abdomen), usually in the lower abdomen, above the pubic bone  · Cloudy, strong, or bad smelling urine  · Unable to urinate (retention)  · Urinary incontinence  · Fever  · Loss of appetite  Older adults may also feel confused.  Causes of a bladder infection  Bladder infections are not contagious. You can't get one  from someone else, from a toilet seat, or from sharing a bath.  The most common cause of bladder infections is bacteria from the bowels. The bacteria get onto the skin around the opening of the urethra. From there they can get into the urine and travel up to the bladder. This causes inflammation and an infection. This usually happens because of:  · An enlarged prostate  · Poor cleaning of the genitals  · Procedures that put a tube in your bladder, like a Pace catheter  · Bowel incontinence  · Older age  · Not emptying your bladder (The urine stays there, giving the bacteria a chance to grow.)  · Dehydration (This allows urine to stay in the bladder longer.)  · Constipation (This can cause the bowels to push on the bladder or urethra and keep the bladder from emptying.)  Treatment  Bladder infections are treated with antibiotics. They usually clear up quickly without complications. Treatment helps prevent a more serious kidney infection.  Medicines  Medicines can help in the treatment of a bladder infection:  · You may have been given phenazopyridine to ease burning when you urinate. It will cause your urine to be bright orange. It can stain clothing.  · You may have been prescribed antibiotics. Take this medicine until you have finished it, even if you feel better. Taking all of the medicine will make sure the infection has cleared.  You can use acetaminophen or ibuprofen for pain, fever, or discomfort, unless another medicine was prescribed. You can also alternate them, or use both together. They work differently and are a different class of medicines, so taking them together is not an overdose. If you have chronic liver or kidney disease, talk with your healthcare provider before using these medicines. Also talk with your provider if youve had a stomach ulcer or GI bleeding or are taking blood thinner medicines.  Home care  Here are some guidelines to help you care for yourself at home:  · Drink plenty of  fluids, unless your healthcare provider told you not to. Fluids will prevent dehydration and flush out your bladder.  · Use good personal hygiene. Wipe from front to back after using the toilet, and clean your penis regularly. If you arent circumcised, retract the foreskin when cleaning.  · Urinate more frequently, and dont try to hold it in for long periods of time, if possible.  · Wear loose-fitting clothes and cotton underwear. Avoid tight-fitting pants. This helps keep you clean and dry.  · Change your diet to prevent constipation. This means eating more fresh foods and more fiber, and less junk and fatty foods.  · Avoid sex until your symptoms are gone.  · Avoid caffeine, alcohol, and spicy foods. These can irritate the bladder.  Follow-up care  Follow up with your healthcare provider, or as advised if all symptoms have not cleared up within 5 days. It is important to keep your follow-up appointment. You can talk with your provider to see if you need more tests of the urinary tract. This is especially important if you have infections that keep coming back.  If a culture was done, you will be told if your treatment needs to be changed. If directed, you can call to find out the results.  If X-rays were taken, you will be told of any findings that may affect your care.  Call 911  Call 911 if any of these occur:  · Trouble breathing  · Difficulty waking up  · Feeling confused  · Fainting or loss of consciousness  · Rapid heart rate  When to seek medical advice  Call your healthcare provider right away if any of these occur:  · Fever of 100.4ºF (38ºC) or higher, or as directed by your healthcare provider  · Your symptoms dont improve after 2 days of treatment  · Back or abdominal pain that gets worse  · Repeated vomiting, or you arent able to keep medicine down  · Weakness or dizziness  Date Last Reviewed: 10/1/2016  © 7695-5072 [employer]. [address] [place] [address], [place] [address]. All rights  reserved. This information is not intended as a substitute for professional medical care. Always follow your healthcare professional's instructions.        BPH (Enlarged Prostate)  The prostate is a gland at the base of the bladder. As some men get older, the prostate may get bigger in size. This problem is called benign prostatic hyperplasia (BPH). BPH puts pressure on the urethra. This is the tube that carries urine from the bladder to the penis. It may interfere with the flow of urine. It may also keep the bladder from emptying fully.    Symptoms of BPH include trouble starting urination and feeling as though the bladder isnt emptying all the way. It also includes a weak urine stream, dribbling and leaking of urine, and frequent and urgent urination (especially at night). BPH can increase the risk of urinary infections. It can also block off urine flow completely. If this occurs, a thin tube (catheter) may be passed into the bladder to help drain urine.  If symptoms are mild, no treatment may be needed right now. If symptoms are more severe, treatment is likely needed. The goal of treatment is to improve urine flow and reduce symptoms. Treatments can include medicine and procedures. Your healthcare provider will discuss treatment options with you as needed.  Home care  The following guidelines will help you care for yourself at home:  · Urinate as soon as you feel the urge. Don't try to hold your urine.  · Don't limit your fluid intake during the day. Drink 6 to 8 glasses of water or liquids a day. This prevents bacteria from building up in the bladder.  · Avoid drinking fluids after dinner to help reduce urination during the night.  · Avoid medicines that can worsen your symptoms. These include certain cold and allergy medicines and antidepressants. Diuretics used for high blood pressure can also worsen symptoms. Talk to your doctor about the medicines you take. Other choices may work better for you.  Prostate  cancer screening  BPH does not increase the risk of prostate cancer. But because prostate cancer is a common cancer in men, screening is sometimes recommended. This may help detect the cancer in its early stages when treatment is most effective. Factors that can increase the risk of prostate cancer include being -American or having a father or brother who had prostate cancer. A high-fat diet may also increase the risk of prostate cancer. Talk to your healthcare provider to see whether you should be screened for prostate cancer.  Follow-up care  Follow up with your healthcare provider, or as advised  To learn more, go to:  · National Kidney & Urologic Diseases Information Clearinghouse  kidney.niddk.nih.gov, 438.632.1927  When to seek medical advice  Call your healthcare provider right away if any of these occur:  · Fever of 100.4°F (38.0°C) or higher, or as advised  · Unable to pass urine for 8 hours  · Increasing pressure or pain in your bladder (lower abdomen)  · Blood in the urine  · Increasing low back pain, not related to injury  · Symptoms of urinary infection (increased urge to urinate, burning when passing urine, foul-smelling urine)  Date Last Reviewed: 7/1/2016 © 2000-2017 [employer]. [address] [place] [address]. All rights reserved. This information is not intended as a substitute for professional medical care. Always follow your healthcare professional's instructions.        Cystoscopy    Cystoscopy is a procedure that lets your doctor look directly inside your urethra and bladder. It can be used to:  · Help diagnose a problem with your urethra, bladder, or kidneys.  · Take a sample (biopsy) of bladder or urethral tissue.  · Treat certain problems (such as removing kidney stones).  · Place a stent to bypass an obstruction.  · Take special X-rays of the kidneys.  Based on the findings, your doctor may recommend other tests or treatments.  What is a cystoscope?  A  cystoscope is a telescope-like instrument that contains lenses and fiberoptics (small glass wires that make bright light). The cystoscope may be straight and rigid, or flexible to bend around curves in the urethra. The doctor may look directly into the cystoscope, or project the image onto a monitor.  Getting ready  · Ask your doctor if you should stop taking any medicines before the procedure.  · Ask whether you should avoid eating or drinking anything after midnight before the procedure.  · Follow any other instructions your doctor gives you.  Tell your doctor before the exam if you:  · Take any medicines, such as aspirin or blood thinners  · Have allergies to any medicines  · Are pregnant   The procedure  Cystoscopy is done in the doctors office, surgery center, or hospital. The doctor and a nurse are present during the procedure. It takes only a few minutes, longer if a biopsy, X-ray, or treatment needs to be done.  During the procedure:  · You lie on an exam table on your back, knees bent and legs apart. You are covered with a drape.  · Your urethra and the area around it are washed. Anesthetic jelly may be applied to numb the urethra. Other pain medicine is usually not needed. In some cases, you may be offered a mild sedative to help you relax. If a more extensive procedure is to be done, such as a biopsy or kidney stone removal, general anesthesia may be needed.  · The cystoscope is inserted. A sterile fluid is put into the bladder to expand it. You may feel pressure from this fluid.  · When the procedure is done, the cystoscope is removed.  After the procedure  If you had a sedative, general anesthesia, or spinal anesthesia, you must have someone drive you home. Once youre home:  · Drink plenty of fluids.  · You may have burning or light bleeding when you urinate--this is normal.  · Medicines may be prescribed to ease any discomfort or prevent infection. Take these as directed.  · Call your doctor if you  have heavy bleeding or blood clots, burning that lasts more than a day, a fever over 100°F  (38° C), or trouble urinating.  Date Last Reviewed: 1/1/2017  © 8555-2287 [employer]. [address] [place] [address], [place] [address]. All rights reserved. This information is not intended as a substitute for professional medical care. Always follow your healthcare professional's instructions.

## 2019-06-13 NOTE — LETTER
June 13, 2019      Catherine Allen, NP  [address] [address]           [facility]  [address]  [place] [address]  Phone: [phone]          Patient: Reymundo Dang   MR Number: [id number]   YOB: 1955   Date of Visit: 6/13/2019       Dear Catherine Allen:    Thank you for referring Reymundo Dang to me for evaluation. Attached you will find relevant portions of my assessment and plan of care.    If you have questions, please do not hesitate to call me. I look forward to following Reymundo Dang along with you.    Sincerely,    Liseth Ramirez, SAM    Enclosure  CC:  No Recipients    If you would like to receive this communication electronically, please contact [email or web address] or [phone] to request more information on [employer] access.    For providers and/or their staff who would like to refer a patient to [facility], please contact us through our one-stop-shop provider referral line, Reed Taylor, at [phone].    If you feel you have received this communication in error or would no longer like to receive these types of communications, please e-mail [email or web address]

## 2019-06-24 RX ORDER — POTASSIUM CHLORIDE 20 MEQ/1
TABLET, EXTENDED RELEASE ORAL
Qty: 60 TABLET | Refills: 0 | Status: SHIPPED | OUTPATIENT
Start: 2019-06-24 | End: 2019-07-19 | Stop reason: SDUPTHER

## 2019-06-24 RX ORDER — LISINOPRIL 40 MG/1
TABLET ORAL
Qty: 30 TABLET | Refills: 0 | Status: SHIPPED | OUTPATIENT
Start: 2019-06-24 | End: 2020-10-06 | Stop reason: SDUPTHER

## 2019-06-25 DIAGNOSIS — F41.9 ANXIETY AND DEPRESSION: ICD-10-CM

## 2019-06-25 DIAGNOSIS — F32.A ANXIETY AND DEPRESSION: ICD-10-CM

## 2019-06-25 RX ORDER — ALPRAZOLAM 0.5 MG/1
TABLET ORAL
Qty: 60 TABLET | Refills: 0 | Status: SHIPPED | OUTPATIENT
Start: 2019-06-25 | End: 2019-07-26 | Stop reason: SDUPTHER

## 2019-06-25 NOTE — TELEPHONE ENCOUNTER
Reymundo desire refill of Xanax. Last visit with Rosalinda Villalba NP 6/19. Patient does not have enough medication to last until Rosalinda returns from vacation. Please advise.   Patient Active Problem List   Diagnosis    Intractable chronic migraine without aura    Spasticity    Aneurysm    History of organic brain syndrome    Crohn's disease    Restless legs syndrome (RLS)    Hemiparesis affecting dominant side as late effect of cerebrovascular accident (CVA)    Hyperlipidemia    Hypertension    Hypophosphatemia    Tachypnea    Hypokalemia    Coronary artery disease involving native coronary artery of native heart with angina pectoris    Morbid obesity with BMI of 40.0-44.9, adult    Recurrent major depressive disorder, in full remission    Acute cystitis without hematuria    Fall    Morbid obesity    ESBL (extended spectrum beta-lactamase) producing bacteria infection     Prior to Admission medications    Medication Sig Start Date End Date Taking? Authorizing Provider   acetaminophen/diphenhydramine (ACETAMINOPHEN PM ORAL) Take 2 tablets by mouth nightly as needed.    Historical Provider, MD   ALPRAZolam (XANAX) 0.5 MG tablet TAKE 1 TABLET BY MOUTH TWICE A DAY AS NEEDED 5/22/19   Rosalinda Villalba NP   amlodipine (NORVASC) 10 MG tablet Take 1 tablet (10 mg total) by mouth once daily. 12/20/14 6/5/19  James Gorman MD   buPROPion (WELLBUTRIN XL) 300 MG 24 hr tablet Take 300 mg by mouth once daily.    Historical Provider, MD   cyanocobalamin (VITAMIN B-12) 1000 MCG tablet Take 1 tablet by mouth once daily.    Historical Provider, MD   hydrochlorothiazide (HYDRODIURIL) 25 MG tablet Take 1 tablet (25 mg total) by mouth once daily. 12/20/14 5/14/19  James Gorman MD   Lactobacillus acidophilus 1 billion cell Tab Take 1 tablet by mouth 2 (two) times daily. 5/31/19   Catherine Allen NP   lisinopril (PRINIVIL,ZESTRIL) 40 MG tablet TAKE 1 TABLET BY MOUTH EVERY DAY 6/24/19   Catherine Allen NP    mesalamine (ASACOL HD) 800 mg TbEC Take 1 tablet (800 mg total) by mouth once daily. 1/30/19 1/30/20  Rosalinda Villalba NP   metoprolol tartrate (LOPRESSOR) 100 MG tablet Take 1 tablet (100 mg total) by mouth 2 (two) times daily. 8/16/18   Rosalinda Villalba NP   mirabegron (MYRBETRIQ) 25 mg Tb24 ER tablet Take 1 tablet (25 mg total) by mouth once daily. 2/25/19 2/25/20  Rosalinda Villalba NP   MULTIVITAMIN W-MINERALS/LUTEIN (CENTRUM SILVER ORAL) Take 1 tablet by mouth once daily.      Historical Provider, MD   omeprazole (PRILOSEC) 40 MG capsule Take 40 mg by mouth once daily.    Historical Provider, MD   potassium chloride SA (K-DUR,KLOR-CON) 20 MEQ tablet TAKE 2 TABLETS BY MOUTH ONCE A DAY 6/24/19   Catherine Allen NP   primidone (MYSOLINE) 50 MG Tab Take 2 tablets (100 mg total) by mouth 2 (two) times daily. 5/31/19 5/30/20  Catherine Allen NP   promethazine (PHENERGAN) 25 MG tablet Take 1 tablet (25 mg total) by mouth every 6 (six) hours as needed for Nausea. 4/2/19   Rosalinda Villalba NP   pyridoxine HCl, vitamin B6, (VITAMIN B-6 ORAL) Take 1 tablet by mouth once daily.    Historical Provider, MD   simvastatin (ZOCOR) 20 MG tablet Take 1 tablet (20 mg total) by mouth every evening. 12/20/14   James Gorman MD   tamsulosin (FLOMAX) 0.4 mg Cap Take 1 capsule (0.4 mg total) by mouth once daily. 6/6/19   Iram Yoder MD   vitamin E 400 UNIT capsule Take 400 Units by mouth once daily.    Historical Provider, MD   vortioxetine (TRINTELLIX) 10 mg Tab Take 1 tablet by mouth once daily.    Historical Provider, MD

## 2019-06-25 NOTE — TELEPHONE ENCOUNTER
----- Message from Kanika Ray sent at 2019 10:45 AM CDT -----  Contact: Yareli (wife)  Reymundo Dang  MRN: [id number]  : 1955  PCP: Rosalinda Villalba  Home Phone      [phone]  Work Phone      Not on file.  Mobile          [phone]    MESSAGE:   She stated she contacted pharmacy for Rx refills - ALPRAZolam (XANAX) 0.5 MG tablet  The patient has enough medication for two days. Yareli (wife) is concerned he will run out of medication.     Phone # [phone]     [facility]/pharmacy #[id number] - [place], [place] - [address] [facility]

## 2019-07-01 ENCOUNTER — HOSPITAL ENCOUNTER (OUTPATIENT)
Dept: UROLOGY | Facility: HOSPITAL | Age: 64
Discharge: HOME | End: 2019-07-01
Attending: UROLOGY
Payer: MEDICARE

## 2019-07-01 VITALS
SYSTOLIC BLOOD PRESSURE: 132 MMHG | DIASTOLIC BLOOD PRESSURE: 74 MMHG | RESPIRATION RATE: 17 BRPM | BODY MASS INDEX: 41.11 KG/M2 | TEMPERATURE: 98 F | HEART RATE: 60 BPM | HEIGHT: 67 IN | WEIGHT: 261.94 LBS

## 2019-07-01 DIAGNOSIS — N39.0 RECURRENT UTI: ICD-10-CM

## 2019-07-01 DIAGNOSIS — N40.1 BPH WITH URINARY OBSTRUCTION: ICD-10-CM

## 2019-07-01 DIAGNOSIS — N13.8 BPH WITH URINARY OBSTRUCTION: ICD-10-CM

## 2019-07-01 PROCEDURE — 52000 PR CYSTOURETHROSCOPY: ICD-10-PCS | Mod: ,,, | Performed by: UROLOGY

## 2019-07-01 PROCEDURE — 52000 CYSTOURETHROSCOPY: CPT

## 2019-07-01 PROCEDURE — 52000 CYSTOURETHROSCOPY: CPT | Mod: ,,, | Performed by: UROLOGY

## 2019-07-01 RX ORDER — TAMSULOSIN HYDROCHLORIDE 0.4 MG/1
CAPSULE ORAL
Qty: 30 CAPSULE | Refills: 0 | Status: SHIPPED | OUTPATIENT
Start: 2019-07-01 | End: 2019-07-23 | Stop reason: SDUPTHER

## 2019-07-01 RX ORDER — TAMSULOSIN HYDROCHLORIDE 0.4 MG/1
0.8 CAPSULE ORAL DAILY
Qty: 30 CAPSULE | Refills: 12 | Status: SHIPPED | OUTPATIENT
Start: 2019-07-01 | End: 2019-12-16

## 2019-07-01 RX ORDER — CIPROFLOXACIN 500 MG/1
500 TABLET ORAL ONCE
Status: COMPLETED | OUTPATIENT
Start: 2019-07-01 | End: 2019-07-01

## 2019-07-01 RX ORDER — LIDOCAINE HYDROCHLORIDE 20 MG/ML
JELLY TOPICAL ONCE
Status: COMPLETED | OUTPATIENT
Start: 2019-07-01 | End: 2019-07-01

## 2019-07-01 RX ADMIN — CIPROFLOXACIN 500 MG: 500 TABLET ORAL at 12:07

## 2019-07-01 RX ADMIN — LIDOCAINE HYDROCHLORIDE: 20 JELLY TOPICAL at 12:07

## 2019-07-01 NOTE — OP NOTE
DATE OF PROCEDURE:  07/01/2019.    PREOPERATIVE DIAGNOSES:  Urinary frequency, elevated postvoid residual urine.    POSTOPERATIVE DIAGNOSES:  Urinary frequency, elevated postvoid residual urine.    OPERATION PERFORMED:  Flexible cystoscopy.    CLINICAL SUMMARY:  This patient has moderate liver failure with some ascites.    He takes Lasix and voids every 2 hours.  He states that he voided every 2 hours   even when he had stopped the Lasix.  He is not a liver transplant candidate yet.    PROCEDURE IN DETAIL:  The patient was prepped and draped in supine position.  He   had normal upper tracts.  Xylocaine jelly was instilled per urethra.  The   anterior urethra was normal.  Prostatic urethra was 4 cm with mild lateral lobe   enlargement.  There was no evidence of an enlarged median lobe.  There were   grade 2 trabeculations.  Both ureteral orifices were normal size, shape and   position with clear efflux.  There were no stones, tumors, foreign bodies or   active infections noted.    IMPRESSION:  Mild BPH with minimal symptoms.  Urinary frequency, probably   secondary to Lasix and excess bodily fluids.    RECOMMENDATIONS:  We will follow the patient.  I would not put him on Flomax at   this time since he has an excellent stream.  I will see him back in 3 months to   check symptoms and a postvoid residual urine since his PVR in the office was   225.      LEIGH  dd: 07/01/2019 12:47:44 (CDT)  td: 07/01/2019 16:23:51 (CDT)  Doc ID   #[id number]  Job ID #[id number]    CC:

## 2019-07-01 NOTE — PROGRESS NOTES
Subjective:       Patient ID: Reymundo Dang is a 64 y.o. male.    Chief Complaint: Consult    CC: weight    Current attempts at weight loss: New pt to me, referred by Kamlesh Ramos MD  [address] [facility] [address]  [place] [address] , with Patient Active Problem List:     Intractable chronic migraine without aura     Spasticity     Aneurysm     History of organic brain syndrome     Crohn's disease     Restless legs syndrome (RLS)     Hemiparesis affecting dominant side as late effect of cerebrovascular accident (CVA)     Hyperlipidemia     Hypertension     Hypophosphatemia     Tachypnea     Hypokalemia     Coronary artery disease involving native coronary artery of native heart with angina pectoris     Morbid obesity with BMI of 40.0-44.9, adult     Recurrent major depressive disorder, in full remission     Acute cystitis without hematuria     Fall     Morbid obesity     ESBL (extended spectrum beta-lactamase) producing bacteria infection     Has stopped eating donuts (3 a day). Sedentary. Can walk from parking garage to here.       Previous diet attempts:  Denies.     History of medication for loss: Denies.   checked today     Heaviest weight: 262#    Lightest weight:155#    Goal weight: 200#      Last eye exam:      3 years ago. No glaucoma.                                  Provider:    Typical eating patterns: Disabled. Lives with wife. Also saw me today. Wife does cooking. He will eat vegetables though, if she will cooks.   Breakfast: egg in a hole with velazquez and donuts.     Lunch: pork loin with potatoes. Boiled shrimp, sausage, pot and corn. Pasta. [place].     Dinner:  pork loin with potatoes. Boiled shrimp, sausage, pot and corn. Pasta.     Snacks: 2 cookies, cake/brownie, popsicle, ice cream.     Beverages: coffee with pet milk. not much water, coke 1/2 can a day, Denies ETOH    Willingness to change: 10/10    EKG: Normal sinus rhythm  Nonspecific ST and T wave abnormality  Abnormal ECG  When compared with  "ECG of 27-APR-2019 13:29,  No significant change was found    BMR: 1925    Review of Systems   Constitutional: Negative for chills and fever.   Respiratory: Negative for shortness of breath.         + snores   Cardiovascular: Negative for chest pain and leg swelling.   Gastrointestinal: Positive for constipation and diarrhea.        On PPI for GERD   Genitourinary: Positive for difficulty urinating and dysuria.        + Recent UTI   Musculoskeletal: Positive for arthralgias, back pain and gait problem.   Neurological: Positive for dizziness. Negative for light-headedness.   Psychiatric/Behavioral: Positive for dysphoric mood. The patient is nervous/anxious.        Objective:     /82   Pulse 60   Ht 5' 6.5" (1.689 m)   Wt 119.1 kg (262 lb 9.1 oz)   BMI 41.74 kg/m²    Physical Exam   Constitutional: He is oriented to person, place, and time. He appears well-developed and well-nourished.   Morbidly obese    HENT:   Head: Normocephalic and atraumatic.   Eyes: Pupils are equal, round, and reactive to light. No scleral icterus.   Neck: Normal range of motion. Neck supple. No thyromegaly present.   Cardiovascular: Normal rate, regular rhythm and normal heart sounds. Exam reveals no gallop and no friction rub.   No murmur heard.  Pulmonary/Chest: Effort normal and breath sounds normal. No respiratory distress. He has no wheezes.   Abdominal: Soft. Bowel sounds are normal. He exhibits no distension. There is no tenderness.   Musculoskeletal: Normal range of motion. He exhibits no edema.   Neurological: He is alert and oriented to person, place, and time.   Skin: Skin is warm and dry.   Psychiatric: He has a normal mood and affect. His behavior is normal. Judgment normal.   Vitals reviewed.      Assessment:       1. Class 3 severe obesity due to excess calories with serious comorbidity and body mass index (BMI) of 40.0 to 44.9 in adult    2. Hemiparesis affecting dominant side as late effect of cerebrovascular " accident (CVA)    3. Essential hypertension    4. Hyperlipidemia, unspecified hyperlipidemia type    5. Recurrent major depressive disorder, in full remission    6. Coronary artery disease involving native coronary artery of native heart with angina pectoris        Plan:         1. Class 3 severe obesity due to excess calories with serious comorbidity and body mass index (BMI) of 40.0 to 44.9 in adult  Will add naltrexone to his current medications. Could consider topiramate, but noted that memory issues had been discussed with neurology in the past.      Bupropion and naltrexone are in a long term weight loss medication. Side effects may include nausea or headache.  These side effects are usually temporary or will improve with stopping the medication.         Sit and Be Fit exercise program on [employer] or [employer]mr 3-4 times a week this month.     Limit starchy carbohydrates (bread, rice, pasta, potatoes, corn, peas, oatmeal, grits, tortillas, crackers, chips)      You need about 1300 calories a day to lose weight.     2. Hemiparesis affecting dominant side as late effect of cerebrovascular accident (CVA)  Sit and Be Fit exercise program on [employer], Butlr or Simmr 3-4 times a week this month. Increase low impact activity as tolerated.  Avoid high impact activity, very heavy lifting or other exercise regimens that may cause discomfort.      3. Essential hypertension  The current medical regimen is effective;  continue present plan and medications. Expect improvement with weight loss.     4. Hyperlipidemia, unspecified hyperlipidemia type  Recheck after 10% TBW lost.  Expect improvement with weight loss.     5. Recurrent major depressive disorder, in full remission  Watch for and report mood changes with weight loss medications.  I think it is preferable to add naltrexone to the stable regimen he is on currently, rather than tapering his bupropion up and down to start contrave.   6. Coronary artery disease involving  native coronary artery of native heart with angina pectoris  Avoid stimulant anorectics

## 2019-07-01 NOTE — PATIENT INSTRUCTIONS
What to Expect After a Cystoscopy  For the next 24-48 hours, you may feel a mild burning when you urinate. This burning is normal and expected. Drink plenty of water to dilute the urine to help relieve the burning sensation. You may also see a small amount of blood in your urine after the procedure.    Unless you are already taking antibiotics, you may be given an antibiotic after the test to prevent infection.    Signs and Symptoms to Report  Call the [facility] at [phone] if you develop any of the following:  · Fever of 101 degrees or higher  · Chills or persistent bleeding  · Inability to urinate

## 2019-07-01 NOTE — OP NOTE
DATE OF PROCEDURE:  07/01/2019.    PREOPERATIVE DIAGNOSIS:  BPH with outlet obstruction.    POSTOPERATIVE DIAGNOSIS:  BPH with outlet obstruction.    OPERATION PERFORMED:  Flexible cystoscopy.    INDICATIONS:  This patient is status post TURP several years ago.  He complains   of lower urinary tract symptoms.  He has also had a couple of recent urinary   tract infections.  Today, his urinalysis was negative.    PROCEDURE IN DETAIL:  The patient was prepped and draped in supine position.    Xylocaine jelly was instilled per urethra.  The anterior urethra was normal.    There were no strictures noted.  Prostatic urethra was 4 cm with lateral lobe   obstruction.  There was no evidence of a ball valve median effect.  Both   ureteral orifices were normal size, shape and position with clear efflux.  There   were no stones, tumors, foreign bodies or active infections noted.  There were   grade 2 trabeculations.    IMPRESSION:  Mild BPH with outlet obstruction.  The patient states that Flomax   0.4 mg is not helping him.    RECOMMENDATIONS:  We will increase Flomax to 0.8 mg p.o. at bedtime.    Appropriate side effect and warnings were given.  He will watch for dizziness   and postural hypotension.  I will see him back in 4 months in [place].      LEIGH  dd: 07/01/2019 12:30:57 (CDT)  td: 07/01/2019 15:40:20 (CDT)  Doc ID   #[id number]  Job ID #[id number]    CC:

## 2019-07-02 ENCOUNTER — INITIAL CONSULT (OUTPATIENT)
Dept: BARIATRICS | Facility: CLINIC | Age: 64
End: 2019-07-02
Payer: MEDICARE

## 2019-07-02 VITALS
BODY MASS INDEX: 41.21 KG/M2 | HEIGHT: 67 IN | SYSTOLIC BLOOD PRESSURE: 110 MMHG | HEART RATE: 60 BPM | DIASTOLIC BLOOD PRESSURE: 82 MMHG | WEIGHT: 262.56 LBS

## 2019-07-02 DIAGNOSIS — I69.359 HEMIPARESIS AFFECTING DOMINANT SIDE AS LATE EFFECT OF CEREBROVASCULAR ACCIDENT (CVA): ICD-10-CM

## 2019-07-02 DIAGNOSIS — I25.119 CORONARY ARTERY DISEASE INVOLVING NATIVE CORONARY ARTERY OF NATIVE HEART WITH ANGINA PECTORIS: ICD-10-CM

## 2019-07-02 DIAGNOSIS — I10 ESSENTIAL HYPERTENSION: ICD-10-CM

## 2019-07-02 DIAGNOSIS — F33.42 RECURRENT MAJOR DEPRESSIVE DISORDER, IN FULL REMISSION: ICD-10-CM

## 2019-07-02 DIAGNOSIS — E78.5 HYPERLIPIDEMIA, UNSPECIFIED HYPERLIPIDEMIA TYPE: ICD-10-CM

## 2019-07-02 DIAGNOSIS — E66.01 CLASS 3 SEVERE OBESITY DUE TO EXCESS CALORIES WITH SERIOUS COMORBIDITY AND BODY MASS INDEX (BMI) OF 40.0 TO 44.9 IN ADULT: Primary | ICD-10-CM

## 2019-07-02 PROCEDURE — 3008F PR BODY MASS INDEX (BMI) DOCUMENTED: ICD-10-PCS | Mod: CPTII,S$GLB,, | Performed by: INTERNAL MEDICINE

## 2019-07-02 PROCEDURE — 99215 OFFICE O/P EST HI 40 MIN: CPT | Mod: S$GLB,,, | Performed by: INTERNAL MEDICINE

## 2019-07-02 PROCEDURE — 3074F SYST BP LT 130 MM HG: CPT | Mod: CPTII,S$GLB,, | Performed by: INTERNAL MEDICINE

## 2019-07-02 PROCEDURE — 3079F DIAST BP 80-89 MM HG: CPT | Mod: CPTII,S$GLB,, | Performed by: INTERNAL MEDICINE

## 2019-07-02 PROCEDURE — 3079F PR MOST RECENT DIASTOLIC BLOOD PRESSURE 80-89 MM HG: ICD-10-PCS | Mod: CPTII,S$GLB,, | Performed by: INTERNAL MEDICINE

## 2019-07-02 PROCEDURE — 99999 PR PBB SHADOW E&M-EST. PATIENT-LVL III: CPT | Mod: PBBFAC,,, | Performed by: INTERNAL MEDICINE

## 2019-07-02 PROCEDURE — 3008F BODY MASS INDEX DOCD: CPT | Mod: CPTII,S$GLB,, | Performed by: INTERNAL MEDICINE

## 2019-07-02 PROCEDURE — 99215 PR OFFICE/OUTPT VISIT, EST, LEVL V, 40-54 MIN: ICD-10-PCS | Mod: S$GLB,,, | Performed by: INTERNAL MEDICINE

## 2019-07-02 PROCEDURE — 99999 PR PBB SHADOW E&M-EST. PATIENT-LVL III: ICD-10-PCS | Mod: PBBFAC,,, | Performed by: INTERNAL MEDICINE

## 2019-07-02 PROCEDURE — 3074F PR MOST RECENT SYSTOLIC BLOOD PRESSURE < 130 MM HG: ICD-10-PCS | Mod: CPTII,S$GLB,, | Performed by: INTERNAL MEDICINE

## 2019-07-02 RX ORDER — NALTREXONE HYDROCHLORIDE 50 MG/1
TABLET, FILM COATED ORAL
Qty: 45 TABLET | Refills: 0 | Status: SHIPPED | OUTPATIENT
Start: 2019-07-02 | End: 2019-10-03 | Stop reason: SDUPTHER

## 2019-07-02 RX ORDER — MOMETASONE FUROATE 1 MG/ML
SOLUTION TOPICAL
Refills: 1 | COMMUNITY
Start: 2019-06-06 | End: 2022-06-06

## 2019-07-02 NOTE — PATIENT INSTRUCTIONS
Bupropion and naltrexone are in a long term weight loss medication. Side effects may include nausea or headache.  These side effects are usually temporary or will improve with stopping the medication.         Sit and Be Fit exercise program on [employer] or youtube 3-4 times a week this month.     Limit starchy carbohydrates (bread, rice, pasta, potatoes, corn, peas, oatmeal, grits, tortillas, crackers, chips)      You need about 1300 calories a day to lose weight.     1200- 1500 calorie Sample meal plan   80-120g protein per day.   Protein drinks and bars: 0-4 grams sugar   Drink lots of water throughout the day and exercise!   MENU # 1   Breakfast: 2 eggs, 1 turkey sausage yuliana, 1 apple   Snack: P3 protein pack  Lunch: 2 roll-ups (sliced turkey, low-fat sliced cheese, mustard), 12 baby carrots dipped in 1 Tbsp natural peanut butter   Mid-Day Snack: ¼ cup unsalted almonds, ½ cup fruit   Dinner: 1 chicken thigh simmered in tomato sauce + 2 Tbsp mozzarella cheese, ½ cup black beans, 1/2 cup steamed carrots   Evening Snack: 1/2 cup grapes with 4 cubes low-fat cheddar cheese   ___________________________________________________   MENU # 2   Breakfast: 200 calorie protein drink   Mid-morning snack : ¼ cup unsalted nuts, medium banana   Lunch: 3oz tuna or chicken salad made with 2 tbsp light prado, over salad with tomatoes and cucumbers.   Snack: low-fat cheese stick   Dinner: 3oz hamburger yuliana, slice of low-fat cheese, 1 cup yellow squash and zucchini sauteed in nonstick cookspray  Snack: 6oz light yogurt   _______________________________________________________   Menu #3   Breakfast: 6oz plain Greek yogurt + fruit (½ banana OR ½ cup fruit - pineapple, blueberries, strawberries, peach), may add Splenda to rafia.   Lunch: Grilled chicken breast w/ slice low-fat pepper tiara cheese, 1/2 cup grilled/baked asparagus and small salad with Salad Spritzer.   Mid-Day snack: 200 calorie protein drink   Dinner: 4oz Grilled  fish, ½ cup white beans, ½ cup cooked spinach   Evening Snack: fudgsicle no-sugar-added   Menu # 4   Breakfast: 1 scoop vanilla protein powder + 4oz skim milk + 4oz coffee   Snack: Pure protein bar   Lunch: 2 Lettuce tacos: 3oz seasoned ground turkey wrapped in a [place] lettuce leaves with 1 Tbsp shredded cheese and dollop low-fat sour cream   Snack: ½ cup cottage cheese, ½ cup pineapple chunks   Dinner: Shrimp omelet: 2 eggs, ½ cup shrimp, green onions, and shredded cheese   ______________________________________________________   Menu #5   Breakfast: 1 cup low-fat cottage cheese, ½ cup peaches (no sugar added)   Snack: 1 apple, 4 cubes cheese   Lunch: 3oz baked pork chop, 1 cup okra and tomato stew   Snack: 1/2 cup black beans + salsa + dollop light sour cream   Dinner: Caprese chicken salad: 3 oz chicken breast, 1oz fresh mozzarella, sliced tomato, 1 Tbsp olive oil, basil   Snack: sugar-free popsicle   _______________________________________________________  Menu #6   Breakfast: ½ cup part-skim ricotta cheese, 2 Tbsp sugar-free strawberry preserves, sprinkle of slivered almonds   Snack: 1 orange + string cheese  Lunch: 3 oz canned chicken, 1oz shredded cheddar cheese, ½ cup black beans, 2 Tbsp salsa   Snack: 200 calorie Protein drink   Dinner: 4 oz grilled salmon steak, over mixed green salad with 2 tbsp light dressing   _______________________________________________________  Menu #7  Breakfast: crust-less quiche (bake 1 egg mixed w/ low fat cheese, broccoli and low sodium ham in a muffin cup until cooked inside)  Snack: 1 light yogurt  Lunch: protein shake (1 scoop powder + 8 oz skim milk, blended w/ ice)  Snack: small apple w/ 2 tbsp PB2 (powdered peanut butter)  Dinner: 2 Turkey meatballs w/ low sugar marinara sauce, 1/2 cup spiralized zucchini (sauteed on stove top w/ nonstick cookspray)      Eating well to be healthy and lose weight does not have to be hard. It also does not have to be time consuming or  expensive. There a lots of ways you can work in healthy choices into your day. Many of these are easy, quick and even family friendly!    Homemade hazelnut au lait  Brew your favorite brand of hazelnut flavored coffee ([employer] makes a good one). Microwave 1/2 cup of milk that fits your eating plan (whole, skim or sugar-free almond milk can all work). Add half to 1 oz sugar free hazelnut syrup.     Quick and easy breakfast  1-2 boiled eggs or mini-frittatas with a tangerine. The boiled eggs and mini-frittatas can both be made ahead and last for up to 4 days in the refrigerator. Bonus if you portion them out in ready to go containers or zipper bags.     Breakfast Egg Muffins with Velazquez and Spinach  Makes 12 muffins  Ingredients    6 eggs  ¼ cup milk  ¼ teaspoon salt  2 cups grated cheddar cheese  3/4 cup spinach, cooked and drained (about 8 oz fresh spinach)  6 velazquez slices, cooked, drained of fat, and chopped  1/2 cup grated Parmesan cheese (optional)    Instructions      Preheat oven to 350 degrees. Use a regular 12-cup muffin pan. Spray the muffin pan with non-stick cooking spray.  In a large bowl, beat eggs until smooth. Add milk, salt, Cheddar cheese and mix. Stir spinach, cooked velazquez into the egg mixture. Ladle the egg mixture into greased muffin cups ¾ full.  Top each muffin cup with grated Parmesan cheese.  Bake for 25 minutes. Remove from the oven, let the muffins cool for 30 minutes before removing them from the pan.      Be a brown bagger! When you make dinner, plan for an extra helping. When you serve your plate for dinner, serve an additional helping into a container that you can take with you the next day. If you don't have a refrigerator available during the day, an insulated lunch bag and ice packs will help you safely store you lunch.     Cold Brewed Iced tea. Fill a pitcher with 64 oz filtered water. Add either 4 regular tea bags of your choice or a large iced tea bag. Refrigerate over night then  remove the tea bags. The tea will not be bitter and is super flavorful. Get creative! Try combinations like green tea and hibiscus tea or black tea with lemon zinger. Add orange or lemon slices for even more flavor.     Snack wisely. Protein filled snacks will fill you up, allowing you to get by with fewer calories. String cheese, pork skins (chicharrones), turkey pepperoni, or celery with cream cheese will all fit the bill.       Ditch the take out. Turkey tacos (with or without a low carb tortilla), burgers (without the bun), or fun stir fries are all quick and easy. The whole family will be happy, and you can save calories and money.      Orange Chicken Stir krishna with asparagus   Makes 6 servings  Ingredients:    1.5 lbs boneless skinless chicken breast/tenders, diced into 1-inch pieces  1 Tbsp extra virgin olive or avocado oil, divided  2 lb asparagus, end portions trimmed and remainder diced into 1 1/2-inch pieces  1 small yellow onion, sliced into thin strips  8 oz button mushrooms, sliced  1 Tbsp peeled and finely grated fresh symone  4 cloves garlic, minced  1/2 cup low-sodium chicken broth  Juice of 2 fresh oranges  2 Tbsp low sodium soy sauce  2 Tbsp cornstarch  Sea salt and freshly ground black pepper    Directions:    In a 12-inch non-stick wok, heat 1/2 oil over moderately high heat. Once oil is hot, add diced chicken and season lightly with salt and pepper. Sauté until cooked through, tossing occasionally, about 5-6 minutes.  Place chicken on a large plate and set aside. Return wok, reduce to medium-high heat, add remaining oil.  Once oil is hot, add asparagus, yellow onion and mushrooms, and sauté until tender-crisp, about 4 - 5 minutes, adding in garlic and symone during the last 1 minute of sautéing.  Meanwhile, in a mixing bowl whisk together chicken broth, orange juice, soy sauce and cornstarch until well blended.  Pour chicken broth mixture into skillet with veggies, season with salt and pepper to  taste, and bring mixture to a light boil, stirring constantly. Allow mixture to gently boil, stirring constantly, until thickened, about 1 minute.  Toss chicken into mixture and serve immediately over cauliflower rice or Shirataki noodles.      Skinny Chicken Tortilla Soup  Makes 7 servings    2 teaspoons olive oil  1 cup onion, chopped (about 1 small)  2 cups celery, sliced (about 4 medium stalks)  4 garlic cloves, minced  4 medium tomatoes, chopped  2 cups water  4 cups low-sodium organic chicken broth  3 cups chopped and/or shredded rotisserie chicken, skinless  2 cups sliced carrots (about 3 medium)  1 teaspoon dried oregano leaves  2 teaspoons chili powder  1 teaspoon garlic powder  2 teaspoons cumin  ½ teaspoon cayenne pepper (add less or omit, if you don't want a spicy soup)  ½ teaspoon sea salt + more to taste  ½ teaspoon pepper + more to taste    Directions:   Put all ingredients into a large crock pot. Cook on low for 5-6 hours.     Optional garnish with chopped avocado, chopped fresh cilantro, crumbled Cotija cheese, sour cream, Greek yogurt, your favorite hot sauce.           Vegan Avocado Banana Chocolate Pudding  Makes 4 servings  Ingredients    1 1/2 ripe avocados  2 ripe bananas  6 tbsp raw cacao powder or unsweetened cocoa powder  2-3 tbsp maple syrup (or calorie free sweetener)  1/4 cup almond milk  Instructions    Blend everything together in a  until the consistency is smooth and velvety. Taste and see if more sweetener is needed and stir to make sure everything is evenly mixed. Blend a second time if needed.  Top with banana slices, raw cacao nibs, almond butter, or any other toppings and enjoy!

## 2019-07-02 NOTE — LETTER
July 8, 2019      Kamlesh Ramos MD  [address] [place] [id number]           [facility] - Bariatric Surgery  [address]  [facility] [address]  Phone: [phone]  Fax: [phone]          Patient: Reymundo Dang   MR Number: [id number]   YOB: 1955   Date of Visit: 7/2/2019       Dear Dr. Kamlesh Ramos:    Thank you for referring Reymundo Dang to me for evaluation. Attached you will find relevant portions of my assessment and plan of care.    If you have questions, please do not hesitate to call me. I look forward to following Reymundo Dang along with you.    Sincerely,    Davina Gilmore MD    Enclosure  CC:  No Recipients    If you would like to receive this communication electronically, please contact [email or web address] or [phone] to request more information on [employer] access.    For providers and/or their staff who would like to refer a patient to [facility], please contact us through our one-stop-shop provider referral line, [facility], at [phone].    If you feel you have received this communication in error or would no longer like to receive these types of communications, please e-mail [email or web address]

## 2019-07-02 NOTE — LETTER
[facility] - B[facility]ric [facility]  [address]  [facility] [address]  Phone: [phone]  Fax: [phone] July 8, 2019      Kamlesh Ramos MD  [address] [facility] [address]  [facility] [address]    Patient: Reymundo Dang   MR Number: [id number]   YOB: 1955   Date of Visit: 7/2/2019     Dear Dr. Ramos:    Thank you for referring Reymundo Dang to me for evaluation. Below are the relevant portions of my assessment and plan of care.    Mr. Dang's assessment is as follows:    1. Class 3 severe obesity due to excess calories with serious comorbidity and body mass index (BMI) of 40.0 to 44.9 in adult    2. Hemiparesis affecting dominant side as late effect of cerebrovascular accident (CVA)    3. Essential hypertension    4. Hyperlipidemia, unspecified hyperlipidemia type    5. Recurrent major depressive disorder, in full remission    6. Coronary artery disease involving native coronary artery of native heart with angina pectoris        PLAN:  1. Class 3 severe obesity due to excess calories with serious comorbidity and body mass index (BMI) of 40.0 to 44.9 in adult  Will add naltrexone to his current medications. Could consider topiramate, but noted that memory issues had been discussed with neurology in the past.       Bupropion and naltrexone are in a long term weight loss medication. Side effects may include nausea or headache.  These side effects are usually temporary or will improve with stopping the medication.        Sit and Be Fit exercise program on [employer] or "[employer] 3-4 times a week this month.      Limit starchy carbohydrates (bread, rice, pasta, potatoes, corn, peas, oatmeal, grits, tortillas, crackers, chips)     You need about 1300 calories a day to lose weight.      2. Hemiparesis affecting dominant side as late effect of cerebrovascular accident (CVA)  Sit and Be Fit exercise program on [employer] or [employer] 3-4 times a week this month. Increase low impact activity as tolerated.   Avoid high impact activity, very heavy lifting or other exercise regimens that may cause discomfort.       3. Essential hypertension  The current medical regimen is effective;  continue present plan and medications. Expect improvement with weight loss.      4. Hyperlipidemia, unspecified hyperlipidemia type  Recheck after 10% TBW lost.  Expect improvement with weight loss.      5. Recurrent major depressive disorder, in full remission  Watch for and report mood changes with weight loss medications.  I think it is preferable to add naltrexone to the stable regimen he is on currently, rather than tapering his bupropion up and down to start contrave.     6. Coronary artery disease involving native coronary artery of native heart with angina pectoris  Avoid stimulant anorectics     If you have questions, please do not hesitate to call me. I look forward to following Reymundo along with you.    Sincerely,      Davina Gilmore MD   Section of Bariatric Surgery  Department of Surgery  [facility]    [facility]

## 2019-07-18 ENCOUNTER — PROCEDURE VISIT (OUTPATIENT)
Dept: NEUROLOGY | Facility: CLINIC | Age: 64
End: 2019-07-18
Payer: MEDICARE

## 2019-07-18 DIAGNOSIS — G43.719 CHRONIC MIGRAINE WITHOUT AURA, INTRACTABLE, WITHOUT STATUS MIGRAINOSUS: Primary | ICD-10-CM

## 2019-07-18 PROCEDURE — 64615 PR CHEMODENERVATION OF MUSCLE FOR CHRONIC MIGRAINE: ICD-10-PCS | Mod: S$GLB,,, | Performed by: PSYCHIATRY & NEUROLOGY

## 2019-07-18 PROCEDURE — 64615 CHEMODENERV MUSC MIGRAINE: CPT | Mod: S$GLB,,, | Performed by: PSYCHIATRY & NEUROLOGY

## 2019-07-18 NOTE — PROCEDURES
Procedures     BOTOX PROCEDURE NOTE       Date of Procedure: 7/18/19      Reason for Procedure: Chronic Migraine and post CVA spasticity on the right          Procedure Details:  Informed consent was obtained prior to performing this procedure. Two patient identifiers were confirmed with the patient prior to performing injections. A time out to determine correct patient and and agreement on procedure performed was conducted prior to the injections     The patient's head and upper neck and upper right arm was cleansed with alcohol rub and 200 Units of Botox (diluted 1:1) was injected in the following bilateral muscles:    10 units in corregator* (total over 2 sites), 5 units in Procerus, 20 units Frontalis* (over 4 sites), 40 units in Temporalis* (over 4 sites), 30 units in occipitalis* (over 6 sites), 20 units in the cervical paraspinal muscles* (over 4 sites), and 30 units in Trapezius* (over 4 sites). 20 units were added to the right deltoid and 20 units in the right levator scapulae for upper limb spasticity from prior CVA. 5 units given to right pectoral major muscle for spasticity as well.    *= denotes bilateral injections        Medications used: botulinum toxin 200 units diluted 1:1 with normal saline used to dilute Botox       The patient tolerated the procedure well with no more than 0.50cc of blood loss. He was observed for several minutes post injection and given a handout from [employer] regarding when and where to seek help if side effects are experienced      Continue Primidone for tremor which is stable and mood is now treated by PCP  Botox helps chronic migraine for 3 months which helps headache prevention and spasticity- will continue current treatment  Patient has had chronic mild memory loss complaint. His prior neuropsychological testing was consistent with loss consistent with CVA/aneurysm- monitor,but his complaint is the same/stable now and he is functioning well/ no restrictions. .     RTC in  12 weeks or more for more Botox

## 2019-07-19 RX ORDER — POTASSIUM CHLORIDE 20 MEQ/1
TABLET, EXTENDED RELEASE ORAL
Qty: 60 TABLET | Refills: 0 | Status: SHIPPED | OUTPATIENT
Start: 2019-07-19 | End: 2019-08-17 | Stop reason: SDUPTHER

## 2019-07-22 DIAGNOSIS — F41.9 ANXIETY AND DEPRESSION: ICD-10-CM

## 2019-07-22 DIAGNOSIS — F32.A ANXIETY AND DEPRESSION: ICD-10-CM

## 2019-07-22 RX ORDER — ALPRAZOLAM 0.5 MG/1
TABLET ORAL
Qty: 60 TABLET | Refills: 0 | OUTPATIENT
Start: 2019-07-22

## 2019-07-23 RX ORDER — TAMSULOSIN HYDROCHLORIDE 0.4 MG/1
CAPSULE ORAL
Qty: 30 CAPSULE | Refills: 0 | Status: SHIPPED | OUTPATIENT
Start: 2019-07-23 | End: 2019-08-14 | Stop reason: SDUPTHER

## 2019-07-26 DIAGNOSIS — F41.9 ANXIETY AND DEPRESSION: ICD-10-CM

## 2019-07-26 DIAGNOSIS — F32.A ANXIETY AND DEPRESSION: ICD-10-CM

## 2019-07-26 RX ORDER — ALPRAZOLAM 0.5 MG/1
0.5 TABLET ORAL 2 TIMES DAILY PRN
Qty: 60 TABLET | Refills: 0 | Status: SHIPPED | OUTPATIENT
Start: 2019-07-26 | End: 2019-08-27 | Stop reason: SDUPTHER

## 2019-07-26 NOTE — TELEPHONE ENCOUNTER
----- Message from Bria Knox sent at 2019 11:57 AM CDT -----  Contact: Yareli/Wife  Reymundo Dang  MRN: [id number]  : 1955  PCP: Rosalinda Villalba  Home Phone      [phone]  Work Phone      Not on file.  Mobile          [phone]    Patient is almost out. Only has 2-3 left.    MESSAGE:   Needs RX  ALPRAZolam (XANAX) 0.5 MG tablet    Pharmacy: [id number] - [place], [place] - [address] [facility]    Phone: [phone]

## 2019-07-26 NOTE — TELEPHONE ENCOUNTER
Reymundo desire refill of Xanax. Last visit with Rosalinda Villalba NP 6/19  Patient Active Problem List   Diagnosis    Intractable chronic migraine without aura    Spasticity    Aneurysm    History of organic brain syndrome    Crohn's disease    Restless legs syndrome (RLS)    Hemiparesis affecting dominant side as late effect of cerebrovascular accident (CVA)    Hyperlipidemia    Hypertension    Hypophosphatemia    Tachypnea    Hypokalemia    Coronary artery disease involving native coronary artery of native heart with angina pectoris    Morbid obesity with BMI of 40.0-44.9, adult    Recurrent major depressive disorder, in full remission    Acute cystitis without hematuria    Fall    Morbid obesity    ESBL (extended spectrum beta-lactamase) producing bacteria infection     Prior to Admission medications    Medication Sig Start Date End Date Taking? Authorizing Provider   acetaminophen/diphenhydramine (ACETAMINOPHEN PM ORAL) Take 2 tablets by mouth nightly as needed.    Historical Provider, MD   ALPRAZolam (XANAX) 0.5 MG tablet TAKE 1 TABLET BY MOUTH TWICE A DAY AS NEEDED 6/25/19   Iram Yoder MD   amlodipine (NORVASC) 10 MG tablet Take 1 tablet (10 mg total) by mouth once daily. 12/20/14 6/5/19  James Gorman MD   buPROPion (WELLBUTRIN XL) 300 MG 24 hr tablet Take 300 mg by mouth once daily.    Historical Provider, MD   cyanocobalamin (VITAMIN B-12) 1000 MCG tablet Take 1 tablet by mouth once daily.    Historical Provider, MD   hydrochlorothiazide (HYDRODIURIL) 25 MG tablet Take 1 tablet (25 mg total) by mouth once daily. 12/20/14 5/14/19  James Gorman MD   Lactobacillus acidophilus 1 billion cell Tab Take 1 tablet by mouth 2 (two) times daily. 5/31/19   Catherine Allen NP   lisinopril (PRINIVIL,ZESTRIL) 40 MG tablet TAKE 1 TABLET BY MOUTH EVERY DAY 6/24/19   Catherine Allen NP   mesalamine (ASACOL HD) 800 mg TbEC Take 1 tablet (800 mg total) by mouth once daily. 1/30/19 1/30/20   Rosalinda Villalba NP   metoprolol tartrate (LOPRESSOR) 100 MG tablet Take 1 tablet (100 mg total) by mouth 2 (two) times daily. 8/16/18   Rosalinda Villalba NP   mirabegron (MYRBETRIQ) 25 mg Tb24 ER tablet Take 1 tablet (25 mg total) by mouth once daily. 2/25/19 2/25/20  Rosalinda Villalba NP   mometasone (ELOCON) 0.1 % solution APPLY TO SCALP TWICE A DAY 6/6/19   Historical Provider, MD   MULTIVITAMIN W-MINERALS/LUTEIN (CENTRUM SILVER ORAL) Take 1 tablet by mouth once daily.      Historical Provider, MD   naltrexone (DEPADE) 50 mg tablet 1/2 tab po daily 7/2/19   Davina Gilmore MD   omeprazole (PRILOSEC) 40 MG capsule Take 40 mg by mouth once daily.    Historical Provider, MD   potassium chloride SA (K-DUR,KLOR-CON) 20 MEQ tablet TAKE 2 TABLETS BY MOUTH ONCE A DAY 7/19/19   Catherine Allen NP   primidone (MYSOLINE) 50 MG Tab Take 2 tablets (100 mg total) by mouth 2 (two) times daily. 5/31/19 5/30/20  Catherine Allen NP   promethazine (PHENERGAN) 25 MG tablet Take 1 tablet (25 mg total) by mouth every 6 (six) hours as needed for Nausea. 4/2/19   Rosalinda Villalba NP   pyridoxine HCl, vitamin B6, (VITAMIN B-6 ORAL) Take 1 tablet by mouth once daily.    Historical Provider, MD   simvastatin (ZOCOR) 20 MG tablet Take 1 tablet (20 mg total) by mouth every evening. 12/20/14   James Gorman MD   tamsulosin (FLOMAX) 0.4 mg Cap Take 2 capsules (0.8 mg total) by mouth once daily. 7/1/19   Glenroy Noland Jr., MD   tamsulosin (FLOMAX) 0.4 mg Cap TAKE 1 CAPSULE BY MOUTH EVERY DAY 7/23/19   Iram Yoder MD   vitamin E 400 UNIT capsule Take 400 Units by mouth once daily.    Historical Provider, MD   vortioxetine (TRINTELLIX) 10 mg Tab Take 1 tablet by mouth once daily.    Historical Provider, MD

## 2019-07-29 ENCOUNTER — PATIENT OUTREACH (OUTPATIENT)
Dept: ADMINISTRATIVE | Facility: HOSPITAL | Age: 64
End: 2019-07-29

## 2019-08-06 ENCOUNTER — CLINICAL SUPPORT (OUTPATIENT)
Dept: INTERNAL MEDICINE | Facility: CLINIC | Age: 64
End: 2019-08-06
Payer: MEDICARE

## 2019-08-06 DIAGNOSIS — F41.1 GENERALIZED ANXIETY DISORDER: ICD-10-CM

## 2019-08-06 DIAGNOSIS — J01.90 ACUTE SINUSITIS, RECURRENCE NOT SPECIFIED, UNSPECIFIED LOCATION: Primary | ICD-10-CM

## 2019-08-06 DIAGNOSIS — I10 HYPERTENSION, UNSPECIFIED TYPE: ICD-10-CM

## 2019-08-06 DIAGNOSIS — E78.5 HYPERLIPIDEMIA, UNSPECIFIED HYPERLIPIDEMIA TYPE: ICD-10-CM

## 2019-08-06 DIAGNOSIS — Z13.29 SCREENING FOR HYPOTHYROIDISM: ICD-10-CM

## 2019-08-06 LAB
ALBUMIN SERPL BCP-MCNC: 3.9 G/DL (ref 3.5–5.2)
ALP SERPL-CCNC: 60 U/L (ref 55–135)
ALT SERPL W/O P-5'-P-CCNC: 29 U/L (ref 10–44)
ANION GAP SERPL CALC-SCNC: 13 MMOL/L (ref 8–16)
AST SERPL-CCNC: 22 U/L (ref 10–40)
BASOPHILS # BLD AUTO: 0.06 K/UL (ref 0–0.2)
BASOPHILS NFR BLD: 0.8 % (ref 0–1.9)
BILIRUB SERPL-MCNC: 0.5 MG/DL (ref 0.1–1)
BUN SERPL-MCNC: 12 MG/DL (ref 8–23)
CALCIUM SERPL-MCNC: 9.4 MG/DL (ref 8.7–10.5)
CHLORIDE SERPL-SCNC: 103 MMOL/L (ref 95–110)
CHOLEST SERPL-MCNC: 127 MG/DL (ref 120–199)
CHOLEST/HDLC SERPL: 3.1 {RATIO} (ref 2–5)
CO2 SERPL-SCNC: 28 MMOL/L (ref 23–29)
CREAT SERPL-MCNC: 1.1 MG/DL (ref 0.5–1.4)
DIFFERENTIAL METHOD: NORMAL
EOSINOPHIL # BLD AUTO: 0.3 K/UL (ref 0–0.5)
EOSINOPHIL NFR BLD: 3.5 % (ref 0–8)
ERYTHROCYTE [DISTWIDTH] IN BLOOD BY AUTOMATED COUNT: 12.5 % (ref 11.5–14.5)
EST. GFR  (AFRICAN AMERICAN): >60 ML/MIN/1.73 M^2
EST. GFR  (NON AFRICAN AMERICAN): >60 ML/MIN/1.73 M^2
GLUCOSE SERPL-MCNC: 90 MG/DL (ref 70–110)
HCT VFR BLD AUTO: 46.8 % (ref 40–54)
HDLC SERPL-MCNC: 41 MG/DL (ref 40–75)
HDLC SERPL: 32.3 % (ref 20–50)
HGB BLD-MCNC: 15.8 G/DL (ref 14–18)
IMM GRANULOCYTES # BLD AUTO: 0.02 K/UL (ref 0–0.04)
IMM GRANULOCYTES NFR BLD AUTO: 0.3 % (ref 0–0.5)
LDLC SERPL CALC-MCNC: 61.6 MG/DL (ref 63–159)
LYMPHOCYTES # BLD AUTO: 2.2 K/UL (ref 1–4.8)
LYMPHOCYTES NFR BLD: 29.1 % (ref 18–48)
MCH RBC QN AUTO: 28.8 PG (ref 27–31)
MCHC RBC AUTO-ENTMCNC: 33.8 G/DL (ref 32–36)
MCV RBC AUTO: 85 FL (ref 82–98)
MONOCYTES # BLD AUTO: 0.7 K/UL (ref 0.3–1)
MONOCYTES NFR BLD: 10 % (ref 4–15)
NEUTROPHILS # BLD AUTO: 4.2 K/UL (ref 1.8–7.7)
NEUTROPHILS NFR BLD: 56.3 % (ref 38–73)
NONHDLC SERPL-MCNC: 86 MG/DL
NRBC BLD-RTO: 0 /100 WBC
PLATELET # BLD AUTO: 255 K/UL (ref 150–350)
PMV BLD AUTO: 11.1 FL (ref 9.2–12.9)
POTASSIUM SERPL-SCNC: 3.1 MMOL/L (ref 3.5–5.1)
PROT SERPL-MCNC: 7.4 G/DL (ref 6–8.4)
RBC # BLD AUTO: 5.48 M/UL (ref 4.6–6.2)
SODIUM SERPL-SCNC: 144 MMOL/L (ref 136–145)
TRIGL SERPL-MCNC: 122 MG/DL (ref 30–150)
TSH SERPL DL<=0.005 MIU/L-ACNC: 1.69 UIU/ML (ref 0.4–4)
WBC # BLD AUTO: 7.38 K/UL (ref 3.9–12.7)

## 2019-08-06 PROCEDURE — 82043 UR ALBUMIN QUANTITATIVE: CPT

## 2019-08-06 PROCEDURE — 36415 COLL VENOUS BLD VENIPUNCTURE: CPT | Mod: S$GLB,,, | Performed by: NURSE PRACTITIONER

## 2019-08-06 PROCEDURE — 80053 COMPREHEN METABOLIC PANEL: CPT

## 2019-08-06 PROCEDURE — 80061 LIPID PANEL: CPT

## 2019-08-06 PROCEDURE — 36415 PR COLLECTION VENOUS BLOOD,VENIPUNCTURE: ICD-10-PCS | Mod: S$GLB,,, | Performed by: NURSE PRACTITIONER

## 2019-08-06 PROCEDURE — 85025 COMPLETE CBC W/AUTO DIFF WBC: CPT

## 2019-08-06 PROCEDURE — 84443 ASSAY THYROID STIM HORMONE: CPT

## 2019-08-06 RX ORDER — AZITHROMYCIN 500 MG/1
500 TABLET, FILM COATED ORAL DAILY
Qty: 5 TABLET | Refills: 0 | Status: SHIPPED | OUTPATIENT
Start: 2019-08-06 | End: 2019-08-14

## 2019-08-06 RX ORDER — GUAIFENESIN/DEXTROMETHORPHAN 100-10MG/5
5 SYRUP ORAL
Qty: 240 ML | Refills: 0 | Status: SHIPPED | OUTPATIENT
Start: 2019-08-06 | End: 2019-08-16

## 2019-08-06 NOTE — PROGRESS NOTES
Venipuncture Collected.     Number of Sticks: 2  Site #1: Left Hand  Site #2: Left Hand  Site #3: N/A    Complications? Other  Additional Comments:

## 2019-08-07 LAB
ALBUMIN/CREAT UR: 14.7 UG/MG (ref 0–30)
CREAT UR-MCNC: 252 MG/DL (ref 23–375)
MICROALBUMIN UR DL<=1MG/L-MCNC: 37 UG/ML

## 2019-08-14 ENCOUNTER — LAB VISIT (OUTPATIENT)
Dept: LAB | Facility: HOSPITAL | Age: 64
End: 2019-08-14
Attending: NURSE PRACTITIONER
Payer: MEDICARE

## 2019-08-14 ENCOUNTER — OFFICE VISIT (OUTPATIENT)
Dept: INTERNAL MEDICINE | Facility: CLINIC | Age: 64
End: 2019-08-14
Payer: MEDICARE

## 2019-08-14 VITALS
HEIGHT: 66 IN | DIASTOLIC BLOOD PRESSURE: 70 MMHG | OXYGEN SATURATION: 95 % | WEIGHT: 256.38 LBS | RESPIRATION RATE: 16 BRPM | SYSTOLIC BLOOD PRESSURE: 100 MMHG | HEART RATE: 58 BPM | BODY MASS INDEX: 41.2 KG/M2

## 2019-08-14 DIAGNOSIS — R05.9 COUGH: ICD-10-CM

## 2019-08-14 DIAGNOSIS — E87.6 HYPOKALEMIA: ICD-10-CM

## 2019-08-14 DIAGNOSIS — Z11.59 NEED FOR HEPATITIS C SCREENING TEST: ICD-10-CM

## 2019-08-14 DIAGNOSIS — E87.6 HYPOKALEMIA: Primary | ICD-10-CM

## 2019-08-14 LAB
ANION GAP SERPL CALC-SCNC: 8 MMOL/L (ref 8–16)
BUN SERPL-MCNC: 9 MG/DL (ref 8–23)
CALCIUM SERPL-MCNC: 9.1 MG/DL (ref 8.7–10.5)
CHLORIDE SERPL-SCNC: 107 MMOL/L (ref 95–110)
CO2 SERPL-SCNC: 26 MMOL/L (ref 23–29)
CREAT SERPL-MCNC: 1 MG/DL (ref 0.5–1.4)
EST. GFR  (AFRICAN AMERICAN): >60 ML/MIN/1.73 M^2
EST. GFR  (NON AFRICAN AMERICAN): >60 ML/MIN/1.73 M^2
GLUCOSE SERPL-MCNC: 93 MG/DL (ref 70–110)
POTASSIUM SERPL-SCNC: 4 MMOL/L (ref 3.5–5.1)
SODIUM SERPL-SCNC: 141 MMOL/L (ref 136–145)

## 2019-08-14 PROCEDURE — 99999 PR PBB SHADOW E&M-EST. PATIENT-LVL III: ICD-10-PCS | Mod: PBBFAC,,, | Performed by: NURSE PRACTITIONER

## 2019-08-14 PROCEDURE — 96372 THER/PROPH/DIAG INJ SC/IM: CPT | Mod: S$GLB,,, | Performed by: NURSE PRACTITIONER

## 2019-08-14 PROCEDURE — 99999 PR PBB SHADOW E&M-EST. PATIENT-LVL III: CPT | Mod: PBBFAC,,, | Performed by: NURSE PRACTITIONER

## 2019-08-14 PROCEDURE — 3008F BODY MASS INDEX DOCD: CPT | Mod: CPTII,S$GLB,, | Performed by: NURSE PRACTITIONER

## 2019-08-14 PROCEDURE — 36415 COLL VENOUS BLD VENIPUNCTURE: CPT

## 2019-08-14 PROCEDURE — 3008F PR BODY MASS INDEX (BMI) DOCUMENTED: ICD-10-PCS | Mod: CPTII,S$GLB,, | Performed by: NURSE PRACTITIONER

## 2019-08-14 PROCEDURE — 3074F PR MOST RECENT SYSTOLIC BLOOD PRESSURE < 130 MM HG: ICD-10-PCS | Mod: CPTII,S$GLB,, | Performed by: NURSE PRACTITIONER

## 2019-08-14 PROCEDURE — 80048 BASIC METABOLIC PNL TOTAL CA: CPT

## 2019-08-14 PROCEDURE — 3078F DIAST BP <80 MM HG: CPT | Mod: CPTII,S$GLB,, | Performed by: NURSE PRACTITIONER

## 2019-08-14 PROCEDURE — 3078F PR MOST RECENT DIASTOLIC BLOOD PRESSURE < 80 MM HG: ICD-10-PCS | Mod: CPTII,S$GLB,, | Performed by: NURSE PRACTITIONER

## 2019-08-14 PROCEDURE — 96372 PR INJECTION,THERAP/PROPH/DIAG2ST, IM OR SUBCUT: ICD-10-PCS | Mod: S$GLB,,, | Performed by: NURSE PRACTITIONER

## 2019-08-14 PROCEDURE — 3074F SYST BP LT 130 MM HG: CPT | Mod: CPTII,S$GLB,, | Performed by: NURSE PRACTITIONER

## 2019-08-14 PROCEDURE — 99214 PR OFFICE/OUTPT VISIT, EST, LEVL IV, 30-39 MIN: ICD-10-PCS | Mod: 25,S$GLB,, | Performed by: NURSE PRACTITIONER

## 2019-08-14 PROCEDURE — 86803 HEPATITIS C AB TEST: CPT

## 2019-08-14 PROCEDURE — 99214 OFFICE O/P EST MOD 30 MIN: CPT | Mod: 25,S$GLB,, | Performed by: NURSE PRACTITIONER

## 2019-08-14 RX ORDER — METHYLPREDNISOLONE ACETATE 80 MG/ML
80 INJECTION, SUSPENSION INTRA-ARTICULAR; INTRALESIONAL; INTRAMUSCULAR; SOFT TISSUE
Status: COMPLETED | OUTPATIENT
Start: 2019-08-14 | End: 2019-08-14

## 2019-08-14 RX ORDER — BENZONATATE 200 MG/1
200 CAPSULE ORAL 3 TIMES DAILY PRN
Qty: 30 CAPSULE | Refills: 0 | Status: SHIPPED | OUTPATIENT
Start: 2019-08-14 | End: 2019-08-24

## 2019-08-14 RX ADMIN — METHYLPREDNISOLONE ACETATE 80 MG: 80 INJECTION, SUSPENSION INTRA-ARTICULAR; INTRALESIONAL; INTRAMUSCULAR; SOFT TISSUE at 11:08

## 2019-08-14 NOTE — PROGRESS NOTES
Subjective:       Patient ID: Reymundo Dang is a 64 y.o. male.    Chief Complaint: Follow-up    HPI: Pt presents to clinic today known to me with c/o needing routine f/u. He report sthat he still has a cough. Not bring anything up. He reports that he has been having this cough since 2 weeks. He has completed azithromycin and now melissa cough syrup with no relief    He had f/uy with Dr Guevara with cystoscope. He was increased on flomax 0.8mg nightly. He denies any prob at present. He has f/u in 3 months with urology.     Also had labs   Micro albumin normal  TSH 1.6  Lab Results   Component Value Date    CHOL 127 08/06/2019    CHOL 146 02/05/2019     Lab Results   Component Value Date    HDL 41 08/06/2019    HDL 49 02/05/2019     Lab Results   Component Value Date    LDLCALC 61.6 (L) 08/06/2019    LDLCALC 69.0 02/05/2019     Lab Results   Component Value Date    TRIG 122 08/06/2019    TRIG 140 02/05/2019     Lab Results   Component Value Date    CHOLHDL 32.3 08/06/2019    CHOLHDL 33.6 02/05/2019     Lab Results   Component Value Date    WBC 7.38 08/06/2019    HGB 15.8 08/06/2019    HCT 46.8 08/06/2019    MCV 85 08/06/2019     08/06/2019     BMP  Lab Results   Component Value Date     08/06/2019    K 3.1 (L) 08/06/2019     08/06/2019    CO2 28 08/06/2019    BUN 12 08/06/2019    CREATININE 1.1 08/06/2019    CALCIUM 9.4 08/06/2019    ANIONGAP 13 08/06/2019    ESTGFRAFRICA >60 08/06/2019    EGFRNONAA >60 08/06/2019   stopped hctz 1 week ago  Lab Results   Component Value Date    ALT 29 08/06/2019    AST 22 08/06/2019    ALKPHOS 60 08/06/2019    BILITOT 0.5 08/06/2019         Review of Systems   Constitutional: Negative for chills and fever.   HENT: Negative for congestion, postnasal drip and sore throat.    Eyes: Negative for photophobia.   Respiratory: Positive for cough. Negative for chest tightness and shortness of breath.    Cardiovascular: Negative for chest pain.   Gastrointestinal:  Negative for abdominal distention, abdominal pain, blood in stool and vomiting.   Genitourinary: Negative for dysuria, flank pain and hematuria.   Musculoskeletal: Negative for back pain.   Skin: Negative for pallor.   Neurological: Negative for dizziness, seizures, facial asymmetry, speech difficulty and numbness.   Hematological: Does not bruise/bleed easily.   Psychiatric/Behavioral: Negative for agitation and suicidal ideas. The patient is not nervous/anxious.        Objective:      Physical Exam   Constitutional: He is oriented to person, place, and time. He appears well-developed and well-nourished.   HENT:   Head: Normocephalic and atraumatic.   Right Ear: External ear normal. Tympanic membrane is retracted.   Left Ear: External ear normal. Tympanic membrane is retracted.   Nose: Nose normal.   Mouth/Throat: Oropharynx is clear and moist. No oropharyngeal exudate.   Nasal congestion  Mild pharyngeal erythema; no exudate  Bilateral TM retracted with fluid noted   Eyes: Pupils are equal, round, and reactive to light.   Neck: Normal range of motion. Neck supple. No thyromegaly present.   Cardiovascular: Normal rate, regular rhythm, normal heart sounds and intact distal pulses.   No murmur heard.  Pulmonary/Chest: Effort normal and breath sounds normal. No stridor. No respiratory distress. He has no wheezes.   Abdominal: Soft. Bowel sounds are normal. He exhibits no mass. There is no tenderness.   Musculoskeletal: Normal range of motion. He exhibits no edema.   Lymphadenopathy:     He has no cervical adenopathy.   Neurological: He is alert and oriented to person, place, and time. He has normal reflexes.   Skin: Skin is warm and dry.   Psychiatric: He has a normal mood and affect. His behavior is normal. Judgment and thought content normal.       Assessment:       1. Hypokalemia    2. Cough        Plan:     Problem List Items Addressed This Visit     Hypokalemia - Primary    Relevant Orders    Basic metabolic  panel      Other Visit Diagnoses     Cough        Relevant Medications    methylPREDNISolone acetate injection 80 mg (Start on 8/14/2019 11:15 AM)    benzonatate (TESSALON) 200 MG capsule          Cont to hold HCTZ, check potassium, may be able to d/c that since no diuretics

## 2019-08-15 LAB — HCV AB SERPL QL IA: NEGATIVE

## 2019-08-20 RX ORDER — POTASSIUM CHLORIDE 20 MEQ/1
TABLET, EXTENDED RELEASE ORAL
Qty: 60 TABLET | Refills: 0 | Status: SHIPPED | OUTPATIENT
Start: 2019-08-20 | End: 2019-09-14 | Stop reason: SDUPTHER

## 2019-08-27 DIAGNOSIS — F41.9 ANXIETY AND DEPRESSION: ICD-10-CM

## 2019-08-27 DIAGNOSIS — F32.A ANXIETY AND DEPRESSION: ICD-10-CM

## 2019-08-27 RX ORDER — ALPRAZOLAM 0.5 MG/1
0.5 TABLET ORAL 2 TIMES DAILY PRN
Qty: 60 TABLET | Refills: 0 | Status: SHIPPED | OUTPATIENT
Start: 2019-08-27 | End: 2019-09-26 | Stop reason: SDUPTHER

## 2019-08-27 NOTE — TELEPHONE ENCOUNTER
----- Message from Julieta Deleon sent at 2019  2:55 PM CDT -----  Contact: Wife- Yareli Dang  MRN: [id number]  : 1955  PCP: Rosalinda Villalba  Home Phone      [phone]  Work Phone      Not on file.  Mobile          [phone]      MESSAGE:   Pt requesting refill or new Rx.   Is this a refill or new RX:  refill  RX name and strength: ALPRAZolam (XANAX) 0.5 MG tablet  Last office visit: 2019  Is this a 30-day or 90-day RX:  30-Day  [facility] [place]  Comments:      Phone:  [phone]

## 2019-08-27 NOTE — TELEPHONE ENCOUNTER
Reymundo desire refill of Xanax. Last visit 8/19  Patient Active Problem List   Diagnosis    Intractable chronic migraine without aura    Spasticity    Aneurysm    History of organic brain syndrome    Crohn's disease    Restless legs syndrome (RLS)    Hemiparesis affecting dominant side as late effect of cerebrovascular accident (CVA)    Hyperlipidemia    Hypertension    Hypophosphatemia    Tachypnea    Hypokalemia    Coronary artery disease involving native coronary artery of native heart with angina pectoris    Morbid obesity with BMI of 40.0-44.9, adult    Recurrent major depressive disorder, in full remission    Acute cystitis without hematuria    Fall    Morbid obesity    ESBL (extended spectrum beta-lactamase) producing bacteria infection     Prior to Admission medications    Medication Sig Start Date End Date Taking? Authorizing Provider   acetaminophen/diphenhydramine (ACETAMINOPHEN PM ORAL) Take 2 tablets by mouth nightly as needed.    Historical Provider, MD   ALPRAZolam (XANAX) 0.5 MG tablet Take 1 tablet (0.5 mg total) by mouth 2 (two) times daily as needed. 7/26/19   Genny Duval MD   amlodipine (NORVASC) 10 MG tablet Take 1 tablet (10 mg total) by mouth once daily. 12/20/14 8/14/19  James Gorman MD   buPROPion (WELLBUTRIN XL) 300 MG 24 hr tablet Take 300 mg by mouth once daily.    Historical Provider, MD   cyanocobalamin (VITAMIN B-12) 1000 MCG tablet Take 1 tablet by mouth once daily.    Historical Provider, MD   Lactobacillus acidophilus 1 billion cell Tab Take 1 tablet by mouth 2 (two) times daily. 5/31/19   Catherine Allen NP   lisinopril (PRINIVIL,ZESTRIL) 40 MG tablet TAKE 1 TABLET BY MOUTH EVERY DAY 6/24/19   Catherine Allen NP   mesalamine (ASACOL HD) 800 mg TbEC Take 1 tablet (800 mg total) by mouth once daily. 1/30/19 1/30/20  Rosalinda Villalba NP   metoprolol tartrate (LOPRESSOR) 100 MG tablet Take 1 tablet (100 mg total) by mouth 2 (two) times daily. 8/16/18    Rosalinda Villalba NP   mirabegron (MYRBETRIQ) 25 mg Tb24 ER tablet Take 1 tablet (25 mg total) by mouth once daily. 2/25/19 2/25/20  Rosalinda Villalba NP   mometasone (ELOCON) 0.1 % solution APPLY TO SCALP TWICE A DAY 6/6/19   Historical Provider, MD   MULTIVITAMIN W-MINERALS/LUTEIN (CENTRUM SILVER ORAL) Take 1 tablet by mouth once daily.      Historical Provider, MD   naltrexone (DEPADE) 50 mg tablet 1/2 tab po daily 7/2/19   Davina Gilmore MD   omeprazole (PRILOSEC) 40 MG capsule Take 40 mg by mouth once daily.    Historical Provider, MD   potassium chloride SA (K-DUR,KLOR-CON) 20 MEQ tablet TAKE 2 TABLETS BY MOUTH ONCE A DAY 8/20/19   Rosalinda Villalba NP   primidone (MYSOLINE) 50 MG Tab Take 2 tablets (100 mg total) by mouth 2 (two) times daily. 5/31/19 5/30/20  Catherine Allen NP   promethazine (PHENERGAN) 25 MG tablet Take 1 tablet (25 mg total) by mouth every 6 (six) hours as needed for Nausea. 4/2/19   Rosalinda Villalba NP   pyridoxine HCl, vitamin B6, (VITAMIN B-6 ORAL) Take 1 tablet by mouth once daily.    Historical Provider, MD   simvastatin (ZOCOR) 20 MG tablet Take 1 tablet (20 mg total) by mouth every evening. 12/20/14   Jaems Gorman MD   tamsulosin (FLOMAX) 0.4 mg Cap Take 2 capsules (0.8 mg total) by mouth once daily. 7/1/19   Glenroy Noland Jr., MD   vitamin E 400 UNIT capsule Take 400 Units by mouth once daily.    Historical Provider, MD   vortioxetine (TRINTELLIX) 10 mg Tab Take 1 tablet by mouth once daily.     Historical Provider, MD

## 2019-08-30 RX ORDER — TAMSULOSIN HYDROCHLORIDE 0.4 MG/1
CAPSULE ORAL
Qty: 30 CAPSULE | Refills: 0 | OUTPATIENT
Start: 2019-08-30

## 2019-09-17 RX ORDER — POTASSIUM CHLORIDE 20 MEQ/1
TABLET, EXTENDED RELEASE ORAL
Qty: 60 TABLET | Refills: 0 | Status: SHIPPED | OUTPATIENT
Start: 2019-09-17 | End: 2019-10-10 | Stop reason: SDUPTHER

## 2019-09-24 RX ORDER — METOPROLOL TARTRATE 100 MG/1
TABLET ORAL
Qty: 180 TABLET | Refills: 3 | Status: SHIPPED | OUTPATIENT
Start: 2019-09-24 | End: 2021-09-01

## 2019-09-25 RX ORDER — TAMSULOSIN HYDROCHLORIDE 0.4 MG/1
CAPSULE ORAL
Qty: 30 CAPSULE | Refills: 0 | Status: SHIPPED | OUTPATIENT
Start: 2019-09-25 | End: 2019-10-18 | Stop reason: SDUPTHER

## 2019-09-26 DIAGNOSIS — F32.A ANXIETY AND DEPRESSION: ICD-10-CM

## 2019-09-26 DIAGNOSIS — F41.9 ANXIETY AND DEPRESSION: ICD-10-CM

## 2019-09-30 RX ORDER — ALPRAZOLAM 0.5 MG/1
0.5 TABLET ORAL 2 TIMES DAILY PRN
Qty: 60 TABLET | Refills: 0 | Status: SHIPPED | OUTPATIENT
Start: 2019-09-30 | End: 2019-11-08 | Stop reason: SDUPTHER

## 2019-10-03 ENCOUNTER — OFFICE VISIT (OUTPATIENT)
Dept: BARIATRICS | Facility: CLINIC | Age: 64
End: 2019-10-03
Payer: MEDICARE

## 2019-10-03 VITALS
BODY MASS INDEX: 39.54 KG/M2 | DIASTOLIC BLOOD PRESSURE: 80 MMHG | SYSTOLIC BLOOD PRESSURE: 130 MMHG | WEIGHT: 246.06 LBS | HEIGHT: 66 IN | HEART RATE: 67 BPM

## 2019-10-03 DIAGNOSIS — E66.01 CLASS 2 SEVERE OBESITY WITH BODY MASS INDEX (BMI) OF 35 TO 39.9 WITH SERIOUS COMORBIDITY: Primary | ICD-10-CM

## 2019-10-03 PROCEDURE — 99999 PR PBB SHADOW E&M-EST. PATIENT-LVL III: ICD-10-PCS | Mod: PBBFAC,,, | Performed by: INTERNAL MEDICINE

## 2019-10-03 PROCEDURE — 3008F BODY MASS INDEX DOCD: CPT | Mod: CPTII,S$GLB,, | Performed by: INTERNAL MEDICINE

## 2019-10-03 PROCEDURE — 3079F PR MOST RECENT DIASTOLIC BLOOD PRESSURE 80-89 MM HG: ICD-10-PCS | Mod: CPTII,S$GLB,, | Performed by: INTERNAL MEDICINE

## 2019-10-03 PROCEDURE — 3075F PR MOST RECENT SYSTOLIC BLOOD PRESS GE 130-139MM HG: ICD-10-PCS | Mod: CPTII,S$GLB,, | Performed by: INTERNAL MEDICINE

## 2019-10-03 PROCEDURE — 99213 PR OFFICE/OUTPT VISIT, EST, LEVL III, 20-29 MIN: ICD-10-PCS | Mod: S$GLB,,, | Performed by: INTERNAL MEDICINE

## 2019-10-03 PROCEDURE — 99999 PR PBB SHADOW E&M-EST. PATIENT-LVL III: CPT | Mod: PBBFAC,,, | Performed by: INTERNAL MEDICINE

## 2019-10-03 PROCEDURE — 3079F DIAST BP 80-89 MM HG: CPT | Mod: CPTII,S$GLB,, | Performed by: INTERNAL MEDICINE

## 2019-10-03 PROCEDURE — 3008F PR BODY MASS INDEX (BMI) DOCUMENTED: ICD-10-PCS | Mod: CPTII,S$GLB,, | Performed by: INTERNAL MEDICINE

## 2019-10-03 PROCEDURE — 3075F SYST BP GE 130 - 139MM HG: CPT | Mod: CPTII,S$GLB,, | Performed by: INTERNAL MEDICINE

## 2019-10-03 PROCEDURE — 99213 OFFICE O/P EST LOW 20 MIN: CPT | Mod: S$GLB,,, | Performed by: INTERNAL MEDICINE

## 2019-10-03 RX ORDER — NALTREXONE HYDROCHLORIDE 50 MG/1
TABLET, FILM COATED ORAL
Qty: 45 TABLET | Refills: 1 | Status: SHIPPED | OUTPATIENT
Start: 2019-10-03 | End: 2020-02-06 | Stop reason: SDUPTHER

## 2019-10-03 NOTE — PATIENT INSTRUCTIONS
Continue Naltrexone and 1200 -1300 calorie diet.     Sample meal plan  80-120g protein; 3213-9900 calories  Protein drinks and bars: 0-4 grams sugar  Drink lots of water throughout the day and exercise!  MENU # 1  Breakfast: 2 eggs, 1 turkey sausage yuliana  Lunch: 2-3 roll-ups (sliced turkey, low-fat slice cheese), baby carrots dipped in 1 Tbsp natural peanut butter  Mid-Day Snack: ¼ cup unsalted almonds, ½ cup fruit  Supper: 1 chicken thigh simmered in tomato sauce + 2 Tbsp mozzarella cheese, ½ cup black beans, 1/2 cup steamed veggies  Evening Snack: 1/2 cup grapes with 4 cubes low-fat cheddar cheese   ___________________________________________________  MENU # 2  Breakfast: protein drink  Mid-morning snack : ¼ cup unsalted nuts  Lunch: 1 cup tuna or chicken salad made with light prado, over salad.   Supper: hamburger yuliana, slice of cheese, 1 cup steamed veggies.   Snack: light yogurt      Menu #3  Breakfast: 6oz plain Greek yogurt + fruit (½ banana, ½ cup fruit - pineapple, blueberries, strawberries, peach), may add Splenda to rafia.  Lunch: ½  chicken breast w/ slice pepper tiara cheese, 1/2 cup steamed veggies and small salad with Salad Spritzer.    Mid-Day snack: protein drink   Supper: 4oz Grilled fish, grilled veggie kabob ( mushrooms, onion, bell pepper, yellow squash, zucchini, cherry tomatoes)  Evening Snack: fudgsicle no-sugar-added    Menu # 4  Breakfast: vanilla iced coffee: 1 scoop vanilla protein powder + 4oz skim milk + 4oz coffee   Snack: protein bar  Lunch: 2 Lettuce tacos: ¼ cup seasoned ground turkey wrapped in a [place] lettuce leaf with 1 Tbsp shredded cheese and dollop low-fat sour cream  Dinner: Shrimp omelet: 2 eggs, ½ cup shrimp, green onions, and shredded cheese        Menu #5  Breakfast: 1 cup low-fat cottage cheese, ½ cup peaches (no sugar added)  Lunch: 2 oz baked pork chop, 1 cup okra and tomato stew  Snack: 1 cup black beans + salsa + dollop sour cream  Dinner: Caprese chicken salad:  2 oz chicken, 1oz fresh mozzarella, sliced tomato, 1 Tbsp olive oil, basil  Snack: sugar-free pudding cup      Menu #6  Breakfast: ½ cup part-skim ricotta cheese, 2 Tbsp sugar-free strawberry preserves, ¼ cup slivered almonds  Lunch: 2 oz canned chicken, 1oz shredded cheddar cheese, ½ cup black beans, 2 Tbsp salsa  Snack: Protein drink  Dinner: 4 oz grilled salmon steak, over mixed green salad with light dressing

## 2019-10-03 NOTE — PROGRESS NOTES
"Subjective:       Patient ID: Reymundo Dang is a 64 y.o. male.    Chief Complaint: Follow-up    Pt here today for follow-up. Has lost 16 lbs. Started 1300 kwesi diet and added naltrexone as he was already on bupropion. His wife has been tracking what they are eating and keeping them on eating plan. He is not very active. He denies SE with addition of naltrexone. His appetite is down.     Follow-up   Associated symptoms include arthralgias. Pertinent negatives include no chest pain, chills or fever.     Review of Systems   Constitutional: Negative for chills and fever.   Respiratory: Negative for shortness of breath.         + snores   Cardiovascular: Negative for chest pain and leg swelling.   Gastrointestinal: Positive for constipation and diarrhea.        On PPI for GERD   Genitourinary: Positive for difficulty urinating and dysuria.        + Recent UTI   Musculoskeletal: Positive for arthralgias, back pain and gait problem.   Neurological: Positive for dizziness. Negative for light-headedness.   Psychiatric/Behavioral: Positive for dysphoric mood. The patient is nervous/anxious.        Objective:     /80   Pulse 67   Ht 5' 6.2" (1.681 m)   Wt 111.6 kg (246 lb 0.5 oz)   BMI 39.47 kg/m²    Physical Exam   Constitutional: He is oriented to person, place, and time. He appears well-developed and well-nourished.   Morbidly obese    HENT:   Head: Normocephalic and atraumatic.   Eyes: Pupils are equal, round, and reactive to light. No scleral icterus.   Neck: Normal range of motion. Neck supple.   Cardiovascular: Normal rate.   Pulmonary/Chest: Effort normal.   Musculoskeletal: Normal range of motion. He exhibits no edema.   Neurological: He is alert and oriented to person, place, and time.   Skin: Skin is warm and dry.   Psychiatric: He has a normal mood and affect. His behavior is normal. Judgment normal.   Vitals reviewed.      Assessment:       1. Class 2 severe obesity with body mass index (BMI) of 35 " to 39.9 with serious comorbidity        Plan:         Reymundo was seen today for follow-up.    Diagnoses and all orders for this visit:    Class 2 severe obesity with body mass index (BMI) of 35 to 39.9 with serious comorbidity    Other orders  -     naltrexone (DEPADE) 50 mg tablet; 1/2 tab po daily      Continue Naltrexone and 1200 -1300 calorie diet.     1200 kwesi menu given.

## 2019-10-08 ENCOUNTER — PATIENT OUTREACH (OUTPATIENT)
Dept: ADMINISTRATIVE | Facility: OTHER | Age: 64
End: 2019-10-08

## 2019-10-10 ENCOUNTER — PROCEDURE VISIT (OUTPATIENT)
Dept: NEUROLOGY | Facility: CLINIC | Age: 64
End: 2019-10-10
Payer: MEDICARE

## 2019-10-10 VITALS — WEIGHT: 242.5 LBS | BODY MASS INDEX: 38.91 KG/M2

## 2019-10-10 PROCEDURE — 64615 PR CHEMODENERVATION OF MUSCLE FOR CHRONIC MIGRAINE: ICD-10-PCS | Mod: S$GLB,,, | Performed by: PSYCHIATRY & NEUROLOGY

## 2019-10-10 PROCEDURE — 64615 CHEMODENERV MUSC MIGRAINE: CPT | Mod: S$GLB,,, | Performed by: PSYCHIATRY & NEUROLOGY

## 2019-10-10 RX ORDER — POTASSIUM CHLORIDE 20 MEQ/1
TABLET, EXTENDED RELEASE ORAL
Qty: 60 TABLET | Refills: 0 | Status: SHIPPED | OUTPATIENT
Start: 2019-10-10 | End: 2019-11-04 | Stop reason: SDUPTHER

## 2019-10-10 NOTE — PROCEDURES
BOTOX PROCEDURE NOTE       Date of Procedure: 10/10/19      Reason for Procedure: Chronic Migraine and post CVA spasticity on the right          Procedure Details:  Informed consent was obtained prior to performing this procedure. Two patient identifiers were confirmed with the patient prior to performing injections. A time out to determine correct patient and and agreement on procedure performed was conducted prior to the injections     The patient's head and upper neck and upper right arm was cleansed with alcohol rub and 200 Units of Botox (diluted 1:1) was injected in the following bilateral muscles:    10 units in corregator* (total over 2 sites), 5 units in Procerus, 20 units Frontalis* (over 4 sites), 40 units in Temporalis* (over 4 sites), 30 units in occipitalis* (over 6 sites), 20 units in the cervical paraspinal muscles* (over 4 sites), and 30 units in Trapezius* (over 4 sites). 20 units were added to the right deltoid and 20 units in the right levator scapulae for upper limb spasticity from prior CVA. 5 units given to right pectoral major muscle for spasticity as well.    *= denotes bilateral injections        Medications used: botulinum toxin 200 units diluted 1:1 with normal saline used to dilute Botox       The patient tolerated the procedure well with no more than 0.25cc of blood loss. He was observed for several minutes post injection and given a handout from [employer] regarding when and where to seek help if side effects are experienced      Continue Primidone for tremor which is stable and mood is now treated by PCP  Botox helps chronic migraine for 3 months which helps headache prevention and spasticity- will continue current treatment  Patient has had chronic mild memory loss complaint. His prior neuropsychological testing was consistent with loss consistent with CVA/aneurysm- monitor,but his complaint is the same/stable now and he is functioning well/ no restrictions  He is seeing bariatric  medicine and loosing weight  RTC in 12 weeks or more for more Botox

## 2019-10-11 DIAGNOSIS — Z12.11 COLON CANCER SCREENING: ICD-10-CM

## 2019-10-21 RX ORDER — TAMSULOSIN HYDROCHLORIDE 0.4 MG/1
CAPSULE ORAL
Qty: 30 CAPSULE | Refills: 0 | Status: SHIPPED | OUTPATIENT
Start: 2019-10-21 | End: 2019-12-16

## 2019-11-04 RX ORDER — POTASSIUM CHLORIDE 20 MEQ/1
TABLET, EXTENDED RELEASE ORAL
Qty: 60 TABLET | Refills: 0 | Status: SHIPPED | OUTPATIENT
Start: 2019-11-04 | End: 2020-02-03

## 2019-11-05 NOTE — PT/OT/SLP EVAL
"Physical Therapy Evaluation    Patient Name:  Reymundo Dang Sr.   MRN:  [id number]    Recommendations:     Discharge Recommendations:  home health PT   Discharge Equipment Recommendations: none   Barriers to discharge: Inaccessible home    Assessment:     Reymundo Dang Sr. is a 64 y.o. male admitted with a medical diagnosis of Acute cystitis without hematuria.  He presents with the following impairments/functional limitations:  weakness, impaired self care skills, impaired functional mobilty, impaired balance, decreased lower extremity function, decreased upper extremity function, impaired endurance, gait instability, decreased safety awareness, other (comment), impaired coordination(Impulsive movt). Patient seen up in the chair with barefoot and wife at bedside. Advised patient to always wear foot wear(referring Kehinde boo) or gripper socks and always call for nursing assistance when trying to get out from the bed for increase safety. Patient had 2-3 falls a month ago while trying to get out from the bed at home - per patient and wife. Noted residual right hemiparesis from old stroke and refused to use Assistive device for gait functions. Patient will benefit from skilled PT tx to increase safety and increase independence with mobility, self care tasks and gait functions.    Rehab Prognosis: Good; patient would benefit from acute skilled PT services to address these deficits and reach maximum level of function.    Recent Surgery: * No surgery found *      Plan:     During this hospitalization, patient to be seen 5 x/week to address the identified rehab impairments via gait training, therapeutic activities, therapeutic exercises and progress toward the following goals:    · Plan of Care Expires:  05/31/19    Subjective     Chief Complaint: Noted patient is impulsive upon movt. Patient stated, "I don't need a walker or cane to walk".  Patient/Family Comments/goals: "To get rid of my infections, get better and go " "home. I don't need home health".  Pain/Comfort:  · Pain Rating 1: 0/10  · Pain Rating Post-Intervention 1: 0/10    Patients cultural, spiritual, Voodoo conflicts given the current situation: no    Living Environment:  Patient lives in a 2 story house with wife and family. Bedroom located upstairs and was using stair lift. No steps to enter home.  Prior to admission, patients level of function was Modified Independent with gait functions, functional mobility and self care tasks.  Equipment used at home: walker, rolling, cane, straight, bedside commode, rollator, shower chair.  DME owned (not currently used): rolling walker, single point cane, bedside commode, shower chair and Rollator, Stair Lift.  Upon discharge, patient will have assistance from wife and family.    Objective:     Communicated with patient and wife prior to session.  Patient found up in chair with peripheral IV, telemetry  upon PT entry to room.    General Precautions: Standard, fall, other (see comments)(Impulsive)   Orthopedic Precautions:N/A   Braces: N/A     Exams:  · Cognitive Exam:  Patient is oriented to Person, Place, Time and Situation  · Gross Motor Coordination:  WFL  · Skin Integrity/Edema:      · -       Skin integrity: Visible skin intact  · RLE ROM: WFL except limited Right Ankle dorsiflexion due to residual right side hemiparesis  · RLE Strength: WFL except Right ankle dorsiflexors limited  · LLE ROM: WFL  · LLE Strength: WFL    Functional Mobility:  · Bed Mobility:     · Rolling Left:  supervision  · Rolling Right: supervision  · Supine to Sit: supervision  · Sit to Supine: supervision  · Transfers:     · Sit to Stand:  stand by assistance with no AD  · Bed to Chair: stand by assistance with  no AD  using  Step Transfer  · Gait: Ambulated x 75 feet with SBA. Noted impulsive, dec Right ankle dorsiflexion, no right heel strike, dec right foot/floor clearance, and dec right le stride.  · Balance: Standing dynamic with Fair " grade      Therapeutic Activities and Exercises:   Completed PT evaluation. Educated patient for the  call first program to press the call button and ask for nursing assistance when needed especially in getting out from the bed such as using the bathroom  for increase safety. Also educated patient with proper body mechanics in out of bed activity.     AM-PAC 6 CLICK MOBILITY  Total Score:18     Patient left up in chair with all lines intact, call button in reach, Nurse Lakisha notified and wife present.    GOALS:   Multidisciplinary Problems     Physical Therapy Goals        Problem: Physical Therapy Goal    Goal Priority Disciplines Outcome Goal Variances Interventions   Physical Therapy Goal     PT, PT/OT      Description:  Goals to be met by: 7     Patient will increase functional independence with mobility by performin. Supine to sit with Independent  2. Sit to supine with Independent  3. Bed to chair transfer with Modified Independentwith or without none using Step Transfer TECHNIQUE  4. Gait  x 200  feet with Modified Independent with or without none  5. Lower extremity exercise program x10 reps per handout, with assistance as needed                    History:     Past Medical History:   Diagnosis Date    Aneurysm     s/p craniotomy prior to rupture     Crohn's disease     CVA (cerebral vascular accident)     unknown date, left sided with right sided hemiparesis    Edema     History of organic brain syndrome     Hyperlipidemia     Hypertension     Restless legs syndrome (RLS)        Past Surgical History:   Procedure Laterality Date    BRAIN SURGERY      Stent Aneurysm    CERVICAL SPINE SURGERY      CHOLECYSTECTOMY      HERNIA REPAIR      SHOULDER SURGERY      Right       Time Tracking:     PT Received On: 19  PT Start Time: 1130     PT Stop Time: 1202  PT Total Time (min): 32 min     Billable Minutes: Evaluation 15 and Therapeutic Activity 15      Tray Paez, PT  2019   Dorsal Nasal Flap Text: The defect edges were debeveled with a #15 scalpel blade.  Given the location of the defect and the proximity to free margins a dorsal nasal flap was deemed most appropriate.  Using a sterile surgical marker, an appropriate dorsal nasal flap was drawn around the defect.    The area thus outlined was incised deep to adipose tissue with a #15 scalpel blade.  The skin margins were undermined to an appropriate distance in all directions utilizing iris scissors.

## 2019-11-06 DIAGNOSIS — F32.A ANXIETY AND DEPRESSION: ICD-10-CM

## 2019-11-06 DIAGNOSIS — F41.9 ANXIETY AND DEPRESSION: ICD-10-CM

## 2019-11-06 NOTE — TELEPHONE ENCOUNTER
----- Message from Kanika Ray sent at 2019 10:17 AM CST -----  Contact: Yareli (wife)  Reymundo Dang  MRN: [id number]  : 1955  PCP: Rosalinda Villalba  Home Phone      [phone]  Work Phone      Not on file.  Mobile          [phone]    MESSAGE:   Rx refill - ALPRAZolam (XANAX) 0.5 MG tablet. Patient out of medication.    Phone # [phone]    Pharmacy -  [facility]/pharmacy #[id number] - [facility] [address] [facility]

## 2019-11-08 DIAGNOSIS — F32.A ANXIETY AND DEPRESSION: ICD-10-CM

## 2019-11-08 DIAGNOSIS — F41.9 ANXIETY AND DEPRESSION: ICD-10-CM

## 2019-11-08 RX ORDER — ALPRAZOLAM 0.5 MG/1
0.5 TABLET ORAL 2 TIMES DAILY PRN
Qty: 60 TABLET | Refills: 0 | Status: SHIPPED | OUTPATIENT
Start: 2019-11-08 | End: 2019-12-02 | Stop reason: SDUPTHER

## 2019-11-08 NOTE — TELEPHONE ENCOUNTER
----- Message from Kanika Ray sent at 2019 10:47 AM CST -----  Contact: Zhane with [facility]   Reymundo Dang  MRN: [id number]  : 1955  PCP: Rosalinda Villalba  Home Phone      [phone]  Work Phone      Not on file.  Mobile          [phone]    MESSAGE:   Rx refill - ALPRAZolam (XANAX) 0.5 MG tablet. Request sent no response.     Phone # [phone]    [id number] [place], [place] - [address] [address] [facility]

## 2019-11-12 RX ORDER — ALPRAZOLAM 0.5 MG/1
0.5 TABLET ORAL 2 TIMES DAILY PRN
Qty: 60 TABLET | Refills: 0 | OUTPATIENT
Start: 2019-11-12

## 2019-11-12 RX ORDER — ALPRAZOLAM 0.5 MG/1
TABLET ORAL
Qty: 60 TABLET | Refills: 0 | OUTPATIENT
Start: 2019-11-12

## 2019-12-02 DIAGNOSIS — F41.9 ANXIETY AND DEPRESSION: ICD-10-CM

## 2019-12-02 DIAGNOSIS — F32.A ANXIETY AND DEPRESSION: ICD-10-CM

## 2019-12-02 RX ORDER — ALPRAZOLAM 0.5 MG/1
0.5 TABLET ORAL 2 TIMES DAILY PRN
Qty: 60 TABLET | Refills: 0 | Status: SHIPPED | OUTPATIENT
Start: 2019-12-02 | End: 2020-01-06

## 2019-12-02 RX ORDER — TAMSULOSIN HYDROCHLORIDE 0.4 MG/1
CAPSULE ORAL
Qty: 30 CAPSULE | Refills: 0 | OUTPATIENT
Start: 2019-12-02

## 2019-12-04 ENCOUNTER — TELEPHONE (OUTPATIENT)
Dept: INTERNAL MEDICINE | Facility: CLINIC | Age: 64
End: 2019-12-04

## 2019-12-04 NOTE — TELEPHONE ENCOUNTER
Contacted pharmacy and they stated it is too early to refill patient's xanax. Contacted patient's wife Yareli and informed her that it is to early to refill his xanax and that it will be filled on 12/6/2019 because the patient's last picked up his xanax on 11/8/2019. Verbalized understanding.

## 2019-12-04 NOTE — TELEPHONE ENCOUNTER
----- Message from Bria Knox sent at 2019 10:03 AM CST -----  Contact: Yareli/Wife  Reymundo Dang  MRN: [id number]  : 1955  PCP: Rosalinda Villalba  Home Phone      [phone]  Work Phone      Not on file.  Mobile          [phone]    MESSAGE:   Having problems getting RX ALPRAZolam (XANAX) 0.5 MG tablet refilled. States that pharmacy has requested refill but they are having difficulty getting our response. Please call to advise.    Pharmacy: [facility]/pharmacy #[id number] - [place], [place] - [address] [address]    Phone: [phone]

## 2019-12-09 ENCOUNTER — LAB VISIT (OUTPATIENT)
Dept: LAB | Facility: HOSPITAL | Age: 64
End: 2019-12-09
Payer: MEDICARE

## 2019-12-09 DIAGNOSIS — Z12.11 COLON CANCER SCREENING: ICD-10-CM

## 2019-12-09 PROCEDURE — 82274 ASSAY TEST FOR BLOOD FECAL: CPT

## 2019-12-11 ENCOUNTER — PATIENT OUTREACH (OUTPATIENT)
Dept: ADMINISTRATIVE | Facility: OTHER | Age: 64
End: 2019-12-11

## 2019-12-16 LAB — HEMOCCULT STL QL IA: NEGATIVE

## 2019-12-16 RX ORDER — TAMSULOSIN HYDROCHLORIDE 0.4 MG/1
1 CAPSULE ORAL DAILY
Qty: 90 CAPSULE | Refills: 1 | Status: SHIPPED | OUTPATIENT
Start: 2019-12-16 | End: 2020-06-10

## 2019-12-16 NOTE — TELEPHONE ENCOUNTER
----- Message from Mya Castanon sent at 2019 11:38 AM CST -----  Contact: yfn/spouse  Reymundo Dang  MRN: [id number]  : 1955  PCP: Rosalinda Villalba  Home Phone      [phone]  Work Phone      Not on file.  Mobile          [phone]      MESSAGE:  Pt needing a refill on tamsulosin (FLOMAX) 0.4 mg Cap, out of medication. Pt's wife would like to know what medication is for as well.  [facility] [place] [facility] [address]  Phone: [phone]

## 2019-12-16 NOTE — TELEPHONE ENCOUNTER
Requested Prescriptions     Pending Prescriptions Disp Refills    tamsulosin (FLOMAX) 0.4 mg Cap 90 capsule 1     Sig: Take 1 capsule (0.4 mg total) by mouth once daily.   Advised Yareli that tamsulosin is used to improve urine flow. Rx pended for refill.

## 2019-12-31 DIAGNOSIS — F32.A ANXIETY AND DEPRESSION: ICD-10-CM

## 2019-12-31 DIAGNOSIS — F41.9 ANXIETY AND DEPRESSION: ICD-10-CM

## 2020-01-03 DIAGNOSIS — F41.9 ANXIETY AND DEPRESSION: ICD-10-CM

## 2020-01-03 DIAGNOSIS — F32.A ANXIETY AND DEPRESSION: ICD-10-CM

## 2020-01-03 RX ORDER — ALPRAZOLAM 0.5 MG/1
0.5 TABLET ORAL 2 TIMES DAILY PRN
Qty: 60 TABLET | Refills: 0 | Status: CANCELLED | OUTPATIENT
Start: 2020-01-03

## 2020-01-03 NOTE — TELEPHONE ENCOUNTER
Reymundo desire refill of Xanax. Last visit 8/19  Patient Active Problem List   Diagnosis    Intractable chronic migraine without aura    Spasticity    Aneurysm    History of organic brain syndrome    Crohn's disease    Restless legs syndrome (RLS)    Hemiparesis affecting dominant side as late effect of cerebrovascular accident (CVA)    Hyperlipidemia    Hypertension    Hypophosphatemia    Tachypnea    Hypokalemia    Coronary artery disease involving native coronary artery of native heart with angina pectoris    Morbid obesity with BMI of 40.0-44.9, adult    Recurrent major depressive disorder, in full remission    Acute cystitis without hematuria    Fall    Morbid obesity    ESBL (extended spectrum beta-lactamase) producing bacteria infection     Prior to Admission medications    Medication Sig Start Date End Date Taking? Authorizing Provider   acetaminophen/diphenhydramine (ACETAMINOPHEN PM ORAL) Take 2 tablets by mouth nightly as needed.    Historical Provider, MD   ALPRAZolam (XANAX) 0.5 MG tablet TAKE 1 TABLET (0.5 MG TOTAL) BY MOUTH 2 (TWO) TIMES DAILY AS NEEDED. 12/2/19   Genny Duval MD   amlodipine (NORVASC) 10 MG tablet Take 1 tablet (10 mg total) by mouth once daily. 12/20/14 10/3/19  James Gorman MD   buPROPion (WELLBUTRIN XL) 300 MG 24 hr tablet Take 300 mg by mouth once daily.    Historical Provider, MD   cyanocobalamin (VITAMIN B-12) 1000 MCG tablet Take 1 tablet by mouth once daily.    Historical Provider, MD   Lactobacillus acidophilus 1 billion cell Tab Take 1 tablet by mouth 2 (two) times daily. 5/31/19   Catherine Allen NP   lisinopril (PRINIVIL,ZESTRIL) 40 MG tablet TAKE 1 TABLET BY MOUTH EVERY DAY 6/24/19   Catherine Allen NP   mesalamine (ASACOL HD) 800 mg TbEC Take 1 tablet (800 mg total) by mouth once daily. 1/30/19 1/30/20  Rosalinda Villalba NP   metoprolol tartrate (LOPRESSOR) 100 MG tablet Take 1 tablet (100 mg total) by mouth 2 (two) times daily. 8/16/18    Rosalinda Villalba NP   metoprolol tartrate (LOPRESSOR) 100 MG tablet TAKE 1 TABLET BY MOUTH TWICE A DAY 9/24/19   Rosalinda Villalba NP   mirabegron (MYRBETRIQ) 25 mg Tb24 ER tablet Take 1 tablet (25 mg total) by mouth once daily. 2/25/19 2/25/20  Rosalinda Villalba NP   mometasone (ELOCON) 0.1 % solution APPLY TO SCALP TWICE A DAY 6/6/19   Historical Provider, MD   MULTIVITAMIN W-MINERALS/LUTEIN (CENTRUM SILVER ORAL) Take 1 tablet by mouth once daily.      Historical Provider, MD   naltrexone (DEPADE) 50 mg tablet 1/2 tab po daily 10/3/19   Davina Gilmore MD   omeprazole (PRILOSEC) 40 MG capsule Take 40 mg by mouth once daily.    Historical Provider, MD   potassium chloride SA (K-DUR,KLOR-CON) 20 MEQ tablet TAKE 2 TABLETS BY MOUTH EVERY DAY 11/4/19   Rosalinda Villalba NP   primidone (MYSOLINE) 50 MG Tab Take 2 tablets (100 mg total) by mouth 2 (two) times daily. 5/31/19 5/30/20  Catherine Allen NP   promethazine (PHENERGAN) 25 MG tablet Take 1 tablet (25 mg total) by mouth every 6 (six) hours as needed for Nausea. 4/2/19   Rosalinda Villalba NP   pyridoxine HCl, vitamin B6, (VITAMIN B-6 ORAL) Take 1 tablet by mouth once daily.    Historical Provider, MD   simvastatin (ZOCOR) 20 MG tablet Take 1 tablet (20 mg total) by mouth every evening. 12/20/14   James Gorman MD   tamsulosin (FLOMAX) 0.4 mg Cap Take 1 capsule (0.4 mg total) by mouth once daily. 12/16/19   Rosalinda Villalba NP   vitamin E 400 UNIT capsule Take 400 Units by mouth once daily.    Historical Provider, MD   vortioxetine (TRINTELLIX) 10 mg Tab Take 1 tablet by mouth once daily.     Historical Provider, MD

## 2020-01-03 NOTE — TELEPHONE ENCOUNTER
----- Message from Bria Knox sent at 1/3/2020 10:55 AM CST -----  Contact: [facility] in [place]  Reymundo Dang  MRN: [id number]  : 1955  PCP: Rosalinda Villalba  Home Phone      [phone]  Work Phone      Not on file.  Mobile          [phone]    MESSAGE:     Requesting authorization to refill  ALPRAZolam (XANAX) 0.5 MG tablet    [id number] [place], [place] - [address] [address]  Phone: [phone]

## 2020-01-06 DIAGNOSIS — F32.A ANXIETY AND DEPRESSION: ICD-10-CM

## 2020-01-06 DIAGNOSIS — F41.9 ANXIETY AND DEPRESSION: ICD-10-CM

## 2020-01-06 RX ORDER — ALPRAZOLAM 0.5 MG/1
0.5 TABLET ORAL 2 TIMES DAILY PRN
Qty: 60 TABLET | Refills: 0 | OUTPATIENT
Start: 2020-01-06

## 2020-01-06 RX ORDER — ALPRAZOLAM 0.5 MG/1
TABLET ORAL
Qty: 60 TABLET | Refills: 0 | Status: SHIPPED | OUTPATIENT
Start: 2020-01-06 | End: 2020-02-03

## 2020-01-06 NOTE — TELEPHONE ENCOUNTER
----- Message from Bria Knox sent at 2020 10:19 AM CST -----  Contact: Yareli/Wife  Reymundo Dang  MRN: [id number]  : 1955  PCP: Rosalinda Villalba  Home Phone      [phone]  Work Phone      Not on file.  Mobile          [phone]      MESSAGE:   Requesting refill of RX  ALPRAZolam (XANAX) 0.5 MG tablet    [id number] [place], [place] - [address] [address] [address] [facility] [address]    Phone: [phone]

## 2020-01-10 ENCOUNTER — TELEPHONE (OUTPATIENT)
Dept: INTERNAL MEDICINE | Facility: CLINIC | Age: 65
End: 2020-01-10

## 2020-01-10 NOTE — TELEPHONE ENCOUNTER
Patients wife Yareli states Trinetellix prescription cost $99 due to new year deductible. Yareli states they can not afford medication. Requesting medication change. Patient and wife notified you are out of the office until Monday. Patient has medication on hand now.

## 2020-01-10 NOTE — TELEPHONE ENCOUNTER
----- Message from Bria Knox sent at 1/10/2020 10:29 AM CST -----  Contact: Yareli/Wife  Reymundo Dang  MRN: [id number]  : 1955  PCP: Rosalinda Villalba  Home Phone      [phone]  Work Phone      Not on file.  Mobile          [phone]    MESSAGE:     Calling because RX vortioxetine (TRINTELLIX) 10 mg Tab is too expensive ($99/month) and would like to know if something different could be called in less expensive.  Please call to advise.    [id number] [place], [place] - [address]    Phone: [phone]

## 2020-01-13 NOTE — TELEPHONE ENCOUNTER
I would advise he see psych for medication changes. he came to me on those medications from previous PCP.

## 2020-01-14 ENCOUNTER — TELEPHONE (OUTPATIENT)
Dept: INTERNAL MEDICINE | Facility: CLINIC | Age: 65
End: 2020-01-14

## 2020-01-14 NOTE — TELEPHONE ENCOUNTER
Patients wife Yareli provided with [facility] phone number. Yareli states she will set up appointment.

## 2020-01-14 NOTE — TELEPHONE ENCOUNTER
Unable to reach patient or leave message. Marilia at [facility] states they do not take patients insurance. Marilia advises patient be referred to [facility].

## 2020-01-23 ENCOUNTER — PROCEDURE VISIT (OUTPATIENT)
Dept: NEUROLOGY | Facility: CLINIC | Age: 65
End: 2020-01-23
Payer: MEDICARE

## 2020-01-23 PROCEDURE — 64615 PR CHEMODENERVATION OF MUSCLE FOR CHRONIC MIGRAINE: ICD-10-PCS | Mod: HCNC,S$GLB,, | Performed by: PSYCHIATRY & NEUROLOGY

## 2020-01-23 PROCEDURE — 99499 UNLISTED E&M SERVICE: CPT | Mod: HCNC,S$GLB,, | Performed by: PSYCHIATRY & NEUROLOGY

## 2020-01-23 PROCEDURE — 64615 CHEMODENERV MUSC MIGRAINE: CPT | Mod: HCNC,S$GLB,, | Performed by: PSYCHIATRY & NEUROLOGY

## 2020-01-23 PROCEDURE — 99499 RISK ADDL DX/OHS AUDIT: ICD-10-PCS | Mod: HCNC,S$GLB,, | Performed by: PSYCHIATRY & NEUROLOGY

## 2020-01-23 NOTE — PROCEDURES
Procedures     BOTOX PROCEDURE NOTE       Date of Procedure: 1/23/2020      Reason for Procedure: Chronic Migraine and post CVA spasticity on the right          Procedure Details:  Informed consent was obtained prior to performing this procedure. Two patient identifiers were confirmed with the patient prior to performing injections. A time out to determine correct patient and and agreement on procedure performed was conducted prior to the injections     The patient's head and upper neck and upper right arm was cleansed with alcohol rub and 200 Units of Botox (diluted 1:1) was injected in the following bilateral muscles:    10 units in corregator* (total over 2 sites), 5 units in Procerus, 20 units Frontalis* (over 4 sites), 40 units in Temporalis* (over 4 sites), 30 units in occipitalis* (over 6 sites), 20 units in the cervical paraspinal muscles* (over 4 sites), and 30 units in Trapezius* (over 4 sites). 20 units were added to the right deltoid and 20 units in the right levator scapulae for upper limb spasticity from prior CVA. 5 units given to right pectoral major muscle for spasticity as well.    *= denotes bilateral injections        Medications used: botulinum toxin 200 units diluted 1:1 with normal saline used to dilute Botox       The patient tolerated the procedure well with no more than 0.25cc of blood loss. He was observed for several minutes post injection and given a handout from [employer] regarding when and where to seek help if side effects are experienced      Continue Primidone for tremor which is stable and mood is now treated by PCP  Botox helps chronic migraine for 3 months which helps headache prevention and spasticity- will continue current treatment  Patient has had chronic mild memory loss complaint. His prior neuropsychological testing was consistent with loss consistent with CVA/aneurysm- monitor,but his complaint is the same/stable now and he is functioning well/ no restrictions  He is  seeing bariatric medicine and loosing weight    Note MRA brain 2014: There is attenuation and diminished caliber of the left MCA branches, likely sequela from decreased demand from the atrophic ipsilateral brain parenchyma.  Bilateral posterior communicating arteries are noted.  There is a small P1 segment of the right   PCA.  Anterior circulation vessels are not well evaluated due to susceptibility artifact from adjacent coil pack. No further work up needed at this time    RTC in 12 weeks or more for more Botox

## 2020-02-03 DIAGNOSIS — F41.9 ANXIETY AND DEPRESSION: ICD-10-CM

## 2020-02-03 DIAGNOSIS — F32.A ANXIETY AND DEPRESSION: ICD-10-CM

## 2020-02-03 RX ORDER — POTASSIUM CHLORIDE 20 MEQ/1
TABLET, EXTENDED RELEASE ORAL
Qty: 60 TABLET | Refills: 0 | Status: SHIPPED | OUTPATIENT
Start: 2020-02-03 | End: 2020-03-01

## 2020-02-03 RX ORDER — ALPRAZOLAM 0.5 MG/1
TABLET ORAL
Qty: 60 TABLET | Refills: 0 | Status: SHIPPED | OUTPATIENT
Start: 2020-02-03 | End: 2020-03-05

## 2020-02-04 ENCOUNTER — TELEPHONE (OUTPATIENT)
Dept: INTERNAL MEDICINE | Facility: CLINIC | Age: 65
End: 2020-02-04

## 2020-02-04 NOTE — TELEPHONE ENCOUNTER
----- Message from Kanika Ray sent at 2020 12:37 PM CST -----  Contact: Yareli (spouse)  Reymundo Dang  MRN: [id number]  : 1955  PCP: Rosalinda Villalba  Home Phone      [phone]  Work Phone      Not on file.  Mobile          [phone]    MESSAGE:   Rx request - potassium chloride SA (K-DUR,KLOR-CON) 20 MEQ tablet.  / Pharmacy instructed patient no Rx received to contact PCP.     Phone # [phone]    [id number] [place], [place] - [address] [address]

## 2020-02-04 NOTE — TELEPHONE ENCOUNTER
Spoke with pharmacy and they stated they have the patient's prescription of potassium chloride. Contacted and informed the patient's wife Yareli that they have the script. Yareli verbalized understanding.

## 2020-02-06 ENCOUNTER — OFFICE VISIT (OUTPATIENT)
Dept: BARIATRICS | Facility: CLINIC | Age: 65
End: 2020-02-06
Payer: MEDICARE

## 2020-02-06 VITALS
SYSTOLIC BLOOD PRESSURE: 126 MMHG | BODY MASS INDEX: 39.72 KG/M2 | WEIGHT: 247.13 LBS | HEIGHT: 66 IN | HEART RATE: 62 BPM | DIASTOLIC BLOOD PRESSURE: 78 MMHG

## 2020-02-06 DIAGNOSIS — E66.01 CLASS 2 SEVERE OBESITY WITH BODY MASS INDEX (BMI) OF 35 TO 39.9 WITH SERIOUS COMORBIDITY: Primary | ICD-10-CM

## 2020-02-06 PROCEDURE — 99999 PR PBB SHADOW E&M-EST. PATIENT-LVL III: CPT | Mod: PBBFAC,HCNC,, | Performed by: INTERNAL MEDICINE

## 2020-02-06 PROCEDURE — 3074F SYST BP LT 130 MM HG: CPT | Mod: HCNC,CPTII,S$GLB, | Performed by: INTERNAL MEDICINE

## 2020-02-06 PROCEDURE — 99213 PR OFFICE/OUTPT VISIT, EST, LEVL III, 20-29 MIN: ICD-10-PCS | Mod: HCNC,S$GLB,, | Performed by: INTERNAL MEDICINE

## 2020-02-06 PROCEDURE — 3078F DIAST BP <80 MM HG: CPT | Mod: HCNC,CPTII,S$GLB, | Performed by: INTERNAL MEDICINE

## 2020-02-06 PROCEDURE — 99213 OFFICE O/P EST LOW 20 MIN: CPT | Mod: HCNC,S$GLB,, | Performed by: INTERNAL MEDICINE

## 2020-02-06 PROCEDURE — 3008F PR BODY MASS INDEX (BMI) DOCUMENTED: ICD-10-PCS | Mod: HCNC,CPTII,S$GLB, | Performed by: INTERNAL MEDICINE

## 2020-02-06 PROCEDURE — 99999 PR PBB SHADOW E&M-EST. PATIENT-LVL III: ICD-10-PCS | Mod: PBBFAC,HCNC,, | Performed by: INTERNAL MEDICINE

## 2020-02-06 PROCEDURE — 3074F PR MOST RECENT SYSTOLIC BLOOD PRESSURE < 130 MM HG: ICD-10-PCS | Mod: HCNC,CPTII,S$GLB, | Performed by: INTERNAL MEDICINE

## 2020-02-06 PROCEDURE — 3078F PR MOST RECENT DIASTOLIC BLOOD PRESSURE < 80 MM HG: ICD-10-PCS | Mod: HCNC,CPTII,S$GLB, | Performed by: INTERNAL MEDICINE

## 2020-02-06 PROCEDURE — 3008F BODY MASS INDEX DOCD: CPT | Mod: HCNC,CPTII,S$GLB, | Performed by: INTERNAL MEDICINE

## 2020-02-06 RX ORDER — ALBUTEROL SULFATE 90 UG/1
AEROSOL, METERED RESPIRATORY (INHALATION)
COMMUNITY
End: 2022-06-06

## 2020-02-06 RX ORDER — ARIPIPRAZOLE 5 MG/1
TABLET ORAL
COMMUNITY
End: 2023-01-30

## 2020-02-06 RX ORDER — NALTREXONE HYDROCHLORIDE 50 MG/1
TABLET, FILM COATED ORAL
Qty: 90 TABLET | Refills: 1 | Status: SHIPPED | OUTPATIENT
Start: 2020-02-06 | End: 2020-04-23 | Stop reason: SDUPTHER

## 2020-02-06 RX ORDER — ALBUTEROL SULFATE 1.25 MG/3ML
SOLUTION RESPIRATORY (INHALATION)
COMMUNITY
End: 2022-06-06

## 2020-02-06 RX ORDER — ARIPIPRAZOLE 15 MG/1
TABLET ORAL
COMMUNITY
End: 2023-01-30

## 2020-02-06 NOTE — PROGRESS NOTES
"Subjective:       Patient ID: Reymundo Dang is a 64 y.o. male.    Chief Complaint: No chief complaint on file.    Pt here today for follow-up. Has gained 1lbs, net neg 15 lbs. Started 1300 kwesi diet and added naltrexone as he was already on bupropion. His wife has been tracking what they are eating and keeping them on eating plan. He is still not very active. He denies SE with addition of naltrexone. His appetite is about the same.       Follow-up   Associated symptoms include arthralgias. Pertinent negatives include no chest pain, chills or fever.     Review of Systems   Constitutional: Negative for chills and fever.   Respiratory: Negative for shortness of breath.         + snores   Cardiovascular: Negative for chest pain and leg swelling.   Gastrointestinal: Positive for constipation and diarrhea.        On PPI for GERD   Genitourinary: Positive for difficulty urinating and dysuria.        + Recent UTI   Musculoskeletal: Positive for arthralgias, back pain and gait problem.   Neurological: Positive for dizziness. Negative for light-headedness.   Psychiatric/Behavioral: Positive for dysphoric mood. The patient is nervous/anxious.        Objective:     /78 (BP Location: Left arm, Patient Position: Sitting)   Pulse 62   Ht 5' 6.2" (1.681 m)   Wt 112.1 kg (247 lb 2.2 oz)   BMI 39.65 kg/m²    Physical Exam   Constitutional: He is oriented to person, place, and time. He appears well-developed and well-nourished.   Morbidly obese    HENT:   Head: Normocephalic and atraumatic.   Eyes: Pupils are equal, round, and reactive to light. No scleral icterus.   Neck: Normal range of motion. Neck supple.   Cardiovascular: Normal rate.   Pulmonary/Chest: Effort normal.   Musculoskeletal: Normal range of motion. He exhibits no edema.   Neurological: He is alert and oriented to person, place, and time.   Skin: Skin is warm and dry.   Psychiatric: He has a normal mood and affect. His behavior is normal. Judgment " normal.   Vitals reviewed.      Assessment:       1. Class 2 severe obesity with body mass index (BMI) of 35 to 39.9 with serious comorbidity        Plan:         Reymundo was seen today for follow-up.    Diagnoses and all orders for this visit:    Class 2 severe obesity with body mass index (BMI) of 35 to 39.9 with serious comorbidity    Other orders  -     naltrexone (DEPADE) 50 mg tablet; 1 tab po daily      Continue Naltrexone and 1200 -1300 calorie diet.     1200 kwesi menu given.

## 2020-02-06 NOTE — PATIENT INSTRUCTIONS
"Increase naltrexone to 1 whole pill and 1200 -1300 calorie diet.       Exercise is one of those "bite the bullet" propositions. Sometimes you just have to get started. What I suggest is setting a timer for 5-10 minutes. Do whatever activity you plan to start for the 5-10 minutes. If the timer goes off and you want to stop, fine. You can stop. If you feel like you want to go on a little longer, you can go on. Do this a few times a week. Eventually you will likely decide to keep going. Every 2 weeks, add 5 more minutes before you give yourself permission to stop.       Snack ideas     Savory  · Avocado with chili powder, garlic powder and black pepper  · String cheese or cheese cubes/slices  · Tuna, chicken or egg salad lettuce wraps  · Ham/turkey and cheese roll-up  · Turkey jerky  · Hard boiled egg  · Steamed edamame  · Olives, pickles (watch sodium)  · Baked zucchini chips with salsa  · Cottage cheese with tomatoes & dill  · Salad with light dressing & veggies  Nuts and Seeds: Pistachios, almonds, cashews, pecans, sunflower seeds, peanuts, walnuts, pumpkin seeds, hazelnuts, brazil nuts (salt free)     Sweet  · Plain yogurt: Triple Zero Oikos, Fage or Powerful with fruit, nuts, seeds, cinnamon  · Sugar free jello  · Sugar free popsicles  · Homemade protein shake  · Atkins bars/shakes  · Premier protein shakes  · Power crunch bar  · Emerald cocoa dusted almonds (1/4 cup)     Sweet and Savory  · Grilled pineapple and ham skewers  · Cottage Cheese with fruit, nuts, seeds, cinnamon  · Homemade smoothie with spinach, avocado, frozen fruit & unsweetened vanilla almond milk           Peanut Butter/Owanka Butter  Witwith apples, ½ banana, celery, carrots, strawberries        Hummus/Guacamole/Light Cream Cheese/Greek yogurt + Ranch powder mix        cucumber, carrots, celery, bell pepper, tomato, cauliflower, broccoli, snap peas, green        beans, hard boiled egg, turkey pepperoni slices, salami slices, ham, grilled " chicken                           Tips when Cravings Hit:  · Drink water or sugar free Crystal Light when a craving begins.  · Avoid eating blind: pre-portion foods instead of leaving them in their original package.  · Eat without distractions: phone, tv, laptop etc.    · Eat slowly, chew foods well (30 times).  · Find snacks high in protein to keep you full longer.

## 2020-02-26 RX ORDER — MIRABEGRON 25 MG/1
TABLET, FILM COATED, EXTENDED RELEASE ORAL
Qty: 30 TABLET | Refills: 11 | Status: SHIPPED | OUTPATIENT
Start: 2020-02-26 | End: 2020-02-26 | Stop reason: SDUPTHER

## 2020-03-01 RX ORDER — POTASSIUM CHLORIDE 20 MEQ/1
TABLET, EXTENDED RELEASE ORAL
Qty: 60 TABLET | Refills: 0 | Status: SHIPPED | OUTPATIENT
Start: 2020-03-01 | End: 2020-03-26

## 2020-03-05 DIAGNOSIS — F32.A ANXIETY AND DEPRESSION: ICD-10-CM

## 2020-03-05 DIAGNOSIS — F41.9 ANXIETY AND DEPRESSION: ICD-10-CM

## 2020-03-05 RX ORDER — ALPRAZOLAM 0.5 MG/1
TABLET ORAL
Qty: 60 TABLET | Refills: 0 | Status: SHIPPED | OUTPATIENT
Start: 2020-03-05 | End: 2020-04-02

## 2020-03-19 RX ORDER — PRIMIDONE 50 MG/1
TABLET ORAL
Qty: 270 TABLET | Refills: 3 | Status: SHIPPED | OUTPATIENT
Start: 2020-03-19 | End: 2021-03-23

## 2020-03-26 RX ORDER — POTASSIUM CHLORIDE 20 MEQ/1
TABLET, EXTENDED RELEASE ORAL
Qty: 60 TABLET | Refills: 0 | Status: SHIPPED | OUTPATIENT
Start: 2020-03-26 | End: 2020-04-27

## 2020-03-27 NOTE — TELEPHONE ENCOUNTER
----- Message from Denise Dang sent at 3/27/2020 11:24 AM CDT -----  Contact: self  Reymundo Dang  MRN: [id number]  : 1955  PCP: Rosalinda Villalba  Home Phone      [phone]  Work Phone      Not on file.  Mobile          [phone]      MESSAGE:   Pt needs a refill on  [facility] [place] Please return call @ [phone]. Thanks.

## 2020-04-02 DIAGNOSIS — F32.A ANXIETY AND DEPRESSION: ICD-10-CM

## 2020-04-02 DIAGNOSIS — F41.9 ANXIETY AND DEPRESSION: ICD-10-CM

## 2020-04-02 RX ORDER — ALPRAZOLAM 0.5 MG/1
TABLET ORAL
Qty: 60 TABLET | Refills: 0 | Status: SHIPPED | OUTPATIENT
Start: 2020-04-02 | End: 2020-05-04

## 2020-04-21 ENCOUNTER — PATIENT OUTREACH (OUTPATIENT)
Dept: ADMINISTRATIVE | Facility: OTHER | Age: 65
End: 2020-04-21

## 2020-04-23 ENCOUNTER — TELEPHONE (OUTPATIENT)
Dept: BARIATRICS | Facility: CLINIC | Age: 65
End: 2020-04-23

## 2020-04-23 ENCOUNTER — OFFICE VISIT (OUTPATIENT)
Dept: BARIATRICS | Facility: CLINIC | Age: 65
End: 2020-04-23
Payer: MEDICARE

## 2020-04-23 DIAGNOSIS — E66.01 CLASS 2 SEVERE OBESITY WITH BODY MASS INDEX (BMI) OF 35 TO 39.9 WITH SERIOUS COMORBIDITY: Primary | ICD-10-CM

## 2020-04-23 PROCEDURE — 99442 PR PHYSICIAN TELEPHONE EVALUATION 11-20 MIN: CPT | Mod: 95,HCNC,, | Performed by: INTERNAL MEDICINE

## 2020-04-23 PROCEDURE — 99442 PR PHYSICIAN TELEPHONE EVALUATION 11-20 MIN: ICD-10-PCS | Mod: 95,HCNC,, | Performed by: INTERNAL MEDICINE

## 2020-04-23 RX ORDER — NALTREXONE HYDROCHLORIDE 50 MG/1
TABLET, FILM COATED ORAL
Qty: 90 TABLET | Refills: 1 | Status: SHIPPED | OUTPATIENT
Start: 2020-04-23 | End: 2020-07-29 | Stop reason: SDUPTHER

## 2020-04-23 NOTE — TELEPHONE ENCOUNTER
----- Message from Davina Gilmore MD sent at 4/23/2020  9:46 AM CDT -----  Pt does not have active [facility]. Thought he could do visit via his wifes account. Please move to 245 slot and change to Audio only.   Thank you,  Dr. Gilmore

## 2020-04-23 NOTE — TELEPHONE ENCOUNTER
Spoke with patient wife Ms Downs. To let them know i was moving his appt to 2:45pm and also told them they will be getting a phone call instead of a virtual visit.

## 2020-04-23 NOTE — PROGRESS NOTES
Established Patient - Audio Only Telehealth Visit     The patient location is: [place]  The chief complaint leading to consultation is: follow-up  Visit type: Virtual visit with audio only (telephone)- 6 min     The reason for the audio only service rather than synchronous audio and video virtual visit was related to technical difficulties or patient preference/necessity.     Each patient to whom I provide medical services by telemedicine is:  (1) informed of the relationship between the physician and patient and the respective role of any other health care provider with respect to management of the patient; and (2) notified that they may decline to receive medical services by telemedicine and may withdraw from such care at any time. Patient verbally consented to receive this service via voice-only telephone call.       HPI:   Pt here today for follow-up. Has gained 1lbs, net neg 15 lbs. Started 1300 kwesi diet and added naltrexone as he was already on bupropion. He and his  He is still not very active. He denies SE with increase of naltrexone.      Prev 247# Current home weight 244#    Assessment and plan:       Reymundo was seen today for follow-up.    Diagnoses and all orders for this visit:    Class 2 severe obesity with body mass index (BMI) of 35 to 39.9 with serious comorbidity    Other orders  -     naltrexone (DEPADE) 50 mg tablet; 1 tab po daily                         This service was not originating from a related E/M service provided within the previous 7 days nor will  to an E/M service or procedure within the next 24 hours or my soonest available appointment.  Prevailing standard of care was able to be met in this audio-only visit.

## 2020-04-27 RX ORDER — MIRABEGRON 25 MG/1
TABLET, FILM COATED, EXTENDED RELEASE ORAL
Qty: 30 TABLET | Refills: 0 | Status: SHIPPED | OUTPATIENT
Start: 2020-04-27 | End: 2020-06-03 | Stop reason: SDUPTHER

## 2020-04-27 RX ORDER — POTASSIUM CHLORIDE 20 MEQ/1
TABLET, EXTENDED RELEASE ORAL
Qty: 60 TABLET | Refills: 0 | Status: SHIPPED | OUTPATIENT
Start: 2020-04-27 | End: 2020-05-27

## 2020-05-01 DIAGNOSIS — F41.9 ANXIETY AND DEPRESSION: ICD-10-CM

## 2020-05-01 DIAGNOSIS — F32.A ANXIETY AND DEPRESSION: ICD-10-CM

## 2020-05-04 ENCOUNTER — TELEPHONE (OUTPATIENT)
Dept: INTERNAL MEDICINE | Facility: CLINIC | Age: 65
End: 2020-05-04

## 2020-05-04 RX ORDER — ALPRAZOLAM 0.5 MG/1
TABLET ORAL
Qty: 60 TABLET | Refills: 0 | Status: SHIPPED | OUTPATIENT
Start: 2020-05-04

## 2020-05-04 NOTE — TELEPHONE ENCOUNTER
----- Message from Bria Knox sent at 2020  1:30 PM CDT -----  Contact: Yareli/Wife  Reymundo Dang  MRN: [id number]  : 1955  PCP: Rosalinda Villalba  Home Phone      [phone]  Work Phone      Not on file.  Mobile          [phone]      MESSAGE:   Refill  ALPRAZolam (XANAX) 0.5 MG tablet    [id number] [place], [place] - [address] [facility]    Phone: [phone]

## 2020-05-04 NOTE — TELEPHONE ENCOUNTER
Patient has not been seen since 8/14/19. Appt needed for med refill. Appt scheduled with Rosalinda for tomorrow @ 10:00 am.

## 2020-05-05 ENCOUNTER — OFFICE VISIT (OUTPATIENT)
Dept: INTERNAL MEDICINE | Facility: CLINIC | Age: 65
End: 2020-05-05
Payer: MEDICARE

## 2020-05-05 VITALS
HEART RATE: 65 BPM | SYSTOLIC BLOOD PRESSURE: 132 MMHG | HEIGHT: 66 IN | WEIGHT: 249.13 LBS | DIASTOLIC BLOOD PRESSURE: 76 MMHG | BODY MASS INDEX: 40.04 KG/M2 | TEMPERATURE: 98 F | RESPIRATION RATE: 16 BRPM | OXYGEN SATURATION: 96 %

## 2020-05-05 DIAGNOSIS — E66.01 MORBID OBESITY WITH BMI OF 40.0-44.9, ADULT: ICD-10-CM

## 2020-05-05 DIAGNOSIS — I10 ESSENTIAL HYPERTENSION: ICD-10-CM

## 2020-05-05 DIAGNOSIS — I69.359 HEMIPARESIS AFFECTING DOMINANT SIDE AS LATE EFFECT OF CEREBROVASCULAR ACCIDENT (CVA): ICD-10-CM

## 2020-05-05 DIAGNOSIS — E87.6 HYPOKALEMIA: ICD-10-CM

## 2020-05-05 DIAGNOSIS — G43.719 INTRACTABLE CHRONIC MIGRAINE WITHOUT AURA AND WITHOUT STATUS MIGRAINOSUS: Primary | ICD-10-CM

## 2020-05-05 DIAGNOSIS — E78.5 HYPERLIPIDEMIA, UNSPECIFIED HYPERLIPIDEMIA TYPE: ICD-10-CM

## 2020-05-05 PROBLEM — F33.41 RECURRENT MAJOR DEPRESSIVE DISORDER, IN PARTIAL REMISSION: Status: ACTIVE | Noted: 2019-04-03

## 2020-05-05 PROCEDURE — 99499 RISK ADDL DX/OHS AUDIT: ICD-10-PCS | Mod: HCNC,S$GLB,, | Performed by: NURSE PRACTITIONER

## 2020-05-05 PROCEDURE — 3078F DIAST BP <80 MM HG: CPT | Mod: HCNC,CPTII,S$GLB, | Performed by: NURSE PRACTITIONER

## 2020-05-05 PROCEDURE — 99499 UNLISTED E&M SERVICE: CPT | Mod: HCNC,S$GLB,, | Performed by: NURSE PRACTITIONER

## 2020-05-05 PROCEDURE — 99999 PR PBB SHADOW E&M-EST. PATIENT-LVL V: ICD-10-PCS | Mod: PBBFAC,HCNC,, | Performed by: NURSE PRACTITIONER

## 2020-05-05 PROCEDURE — 3008F PR BODY MASS INDEX (BMI) DOCUMENTED: ICD-10-PCS | Mod: HCNC,CPTII,S$GLB, | Performed by: NURSE PRACTITIONER

## 2020-05-05 PROCEDURE — 3008F BODY MASS INDEX DOCD: CPT | Mod: HCNC,CPTII,S$GLB, | Performed by: NURSE PRACTITIONER

## 2020-05-05 PROCEDURE — 3078F PR MOST RECENT DIASTOLIC BLOOD PRESSURE < 80 MM HG: ICD-10-PCS | Mod: HCNC,CPTII,S$GLB, | Performed by: NURSE PRACTITIONER

## 2020-05-05 PROCEDURE — 99214 PR OFFICE/OUTPT VISIT, EST, LEVL IV, 30-39 MIN: ICD-10-PCS | Mod: HCNC,S$GLB,, | Performed by: NURSE PRACTITIONER

## 2020-05-05 PROCEDURE — 99999 PR PBB SHADOW E&M-EST. PATIENT-LVL V: CPT | Mod: PBBFAC,HCNC,, | Performed by: NURSE PRACTITIONER

## 2020-05-05 PROCEDURE — 1101F PT FALLS ASSESS-DOCD LE1/YR: CPT | Mod: HCNC,CPTII,S$GLB, | Performed by: NURSE PRACTITIONER

## 2020-05-05 PROCEDURE — 3075F PR MOST RECENT SYSTOLIC BLOOD PRESS GE 130-139MM HG: ICD-10-PCS | Mod: HCNC,CPTII,S$GLB, | Performed by: NURSE PRACTITIONER

## 2020-05-05 PROCEDURE — 1101F PR PT FALLS ASSESS DOC 0-1 FALLS W/OUT INJ PAST YR: ICD-10-PCS | Mod: HCNC,CPTII,S$GLB, | Performed by: NURSE PRACTITIONER

## 2020-05-05 PROCEDURE — 3075F SYST BP GE 130 - 139MM HG: CPT | Mod: HCNC,CPTII,S$GLB, | Performed by: NURSE PRACTITIONER

## 2020-05-05 PROCEDURE — 99214 OFFICE O/P EST MOD 30 MIN: CPT | Mod: HCNC,S$GLB,, | Performed by: NURSE PRACTITIONER

## 2020-05-05 NOTE — PROGRESS NOTES
Subjective:       Patient ID: Reymundo Dang is a 65 y.o. male.    Chief Complaint: Follow-up    HPI: Pt presents to clinic today known to me with c/o needing routine f/u. He is here by himself. He reports that he was dx with covid 4/2020. He reports that he did his quarantine and is now symptom free. He had cough and fever x 2 days but that has all resolved. He has not had labs since 8/2019. He reports that he has no medication changes. He needs refills. Reports that he has not been able to get his trintellix filled. Has been off x 3 months. He does feel more depressed without it. He is sleeping ok. He also report sthat he is not getting his abilify rx. He still has some but our office has refused to fill it without a visit. I do not know why the trintellix is now not covered because he has done well with this. I am not a psych doctor and I do not feel comfortable rx abilify and trintellix with wellbutrin and xanax. He is reports that he can not afford the trintellix but was feeling good on it. He denies thoughts of wanting to hurt self or others. He also torres not know. He is upset in office because his abilify was not filled. He has not gotten that medication from us. Natalie myersot filled this bariatrics    He is also using topamax for weight loss from bariatrics in [place]. He is on way too many medications that I am not comfortable rx all together. I have referred him to a psych  Doctor but he is upset and does not want to go. He is seeing neurology as well as bariatrics, dermatology and urology.   Review of Systems   Constitutional: Negative for chills and fever.   HENT: Negative for congestion, postnasal drip and sore throat.    Eyes: Negative for photophobia.   Respiratory: Negative for chest tightness and shortness of breath.    Cardiovascular: Negative for chest pain.   Gastrointestinal: Negative for abdominal distention, abdominal pain, blood in stool and vomiting.   Genitourinary: Negative for  dysuria, flank pain and hematuria.   Musculoskeletal: Negative for back pain.   Skin: Negative for pallor.   Neurological: Negative for dizziness, seizures, facial asymmetry, speech difficulty and numbness.   Hematological: Does not bruise/bleed easily.   Psychiatric/Behavioral: Positive for agitation, confusion and decreased concentration. Negative for self-injury, sleep disturbance and suicidal ideas. The patient is nervous/anxious.        Objective:      Physical Exam   Constitutional: He is oriented to person, place, and time. He appears well-developed and well-nourished.   HENT:   Head: Normocephalic and atraumatic.   Nose: Nose normal.   Mouth/Throat: Oropharynx is clear and moist.   Eyes: Pupils are equal, round, and reactive to light. Conjunctivae and EOM are normal.   Neck: Normal range of motion. Neck supple. No JVD present. No thyromegaly present.   Cardiovascular: Normal rate, regular rhythm, normal heart sounds and intact distal pulses.   No murmur heard.  Pulmonary/Chest: Effort normal and breath sounds normal. No stridor. No respiratory distress. He has no wheezes.   Abdominal: Soft. Bowel sounds are normal. He exhibits no distension and no mass. There is no tenderness. There is no guarding.   Musculoskeletal: Normal range of motion. He exhibits no edema.   Lymphadenopathy:     He has no cervical adenopathy.   Neurological: He is alert and oriented to person, place, and time. He has normal reflexes. No cranial nerve deficit.   Skin: Skin is warm and dry. No rash noted. No pallor.   Psychiatric: He has a normal mood and affect.   Nursing note and vitals reviewed.      Assessment:       1. Intractable chronic migraine without aura and without status migrainosus    2. Hemiparesis affecting dominant side as late effect of cerebrovascular accident (CVA)    3. Hyperlipidemia, unspecified hyperlipidemia type    4. Essential hypertension    5. Hypokalemia    6. Morbid obesity with BMI of 40.0-44.9, adult         Plan:     Problem List Items Addressed This Visit     Intractable chronic migraine without aura - Primary    Hemiparesis affecting dominant side as late effect of cerebrovascular accident (CVA)    Hyperlipidemia    Hypertension    Hypokalemia    Morbid obesity with BMI of 40.0-44.9, adult      I inderjit had another long discussion with Mr Ruiz with his depression meds. I can not help that the insurance no longer covers the trintellix. I do not feel comfortable chaning this along with continuing to rx his other psychiatric meds. HE NEEDS PSYCH. I have offered to get him appt and he refuses. I have spoken to his wife and she can not make him go. He is not a danger to himself ort others. He says he will find another doctor and I encourage him to do so because he needs to get established with someone whoi can write for all these medications.     I spent 40 min face to face going in circles with patient. I called his wife and let her know that he will need courtney find another MD if he will not see psychiatry because I will not rx these medications especially with bariatrics mixing meds into the fold. She verbalized understanding.

## 2020-05-06 ENCOUNTER — PATIENT OUTREACH (OUTPATIENT)
Dept: ADMINISTRATIVE | Facility: OTHER | Age: 65
End: 2020-05-06

## 2020-05-07 ENCOUNTER — PROCEDURE VISIT (OUTPATIENT)
Dept: NEUROLOGY | Facility: CLINIC | Age: 65
End: 2020-05-07
Payer: MEDICARE

## 2020-05-07 ENCOUNTER — TELEPHONE (OUTPATIENT)
Dept: INTERNAL MEDICINE | Facility: CLINIC | Age: 65
End: 2020-05-07

## 2020-05-07 PROCEDURE — 64615 CHEMODENERV MUSC MIGRAINE: CPT | Mod: HCNC,S$GLB,, | Performed by: PSYCHIATRY & NEUROLOGY

## 2020-05-07 PROCEDURE — 99499 UNLISTED E&M SERVICE: CPT | Mod: HCNC,S$GLB,, | Performed by: PSYCHIATRY & NEUROLOGY

## 2020-05-07 PROCEDURE — 99499 RISK ADDL DX/OHS AUDIT: ICD-10-PCS | Mod: HCNC,S$GLB,, | Performed by: PSYCHIATRY & NEUROLOGY

## 2020-05-07 PROCEDURE — 64615 PR CHEMODENERVATION OF MUSCLE FOR CHRONIC MIGRAINE: ICD-10-PCS | Mod: HCNC,S$GLB,, | Performed by: PSYCHIATRY & NEUROLOGY

## 2020-05-07 NOTE — TELEPHONE ENCOUNTER
Advised pt's wife to contact [facility]. Phone number to their office given to pt.

## 2020-05-07 NOTE — TELEPHONE ENCOUNTER
----- Message from Bria Knox sent at 2020  8:52 AM CDT -----  Contact: Yareli/Wife  Reymundo Dang  MRN: [id number]  : 1955  PCP: Rosalinda Villalba  Home Phone      [phone]  Work Phone      Not on file.  Mobile          [phone]      MESSAGE:   Would like to speak to nurse about getting a referral psychiatrist and getting appointment made. Patient has changed his mind and would like to schedule. Please call to assist.    Phone: [phone]

## 2020-05-07 NOTE — PROCEDURES
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 Two patient identifiers were confirmed with the patient prior to performing injections. A time out to determine correct patient and and agreement on procedure performed was conducted prior to the injections     The patient's head and upper neck and upper right arm was cleansed with alcohol rub and 200 Units of Botox (diluted 1:1) was injected in the following bilateral muscles:    10 units in corregator* (total over 2 sites), 5 units in Procerus, 20 units Frontalis* (over 4 sites), 40 units in Temporalis* (over 4 sites), 30 units in occipitalis* (over 6 sites), 20 units in the cervical paraspinal muscles* (over 4 sites), and 30 units in Trapezius* (over 4 sites). 20 units were added to the right deltoid and 20 units in the right levator scapulae for upper limb spasticity from prior CVA. 5 units given to right pectoral major muscle for spasticity as well.    *= denotes bilateral injections        Medications used: botulinum toxin 200 units diluted 1:1 with normal saline used to dilute Botox       The patient tolerated the procedure well with no more than 0.25cc of blood loss. He was observed for several minutes post injection and given a handout from [facility] regarding when and where to seek help if side effects are experienced        Patient is s/p Covid infection in 4/2020 and states symptoms resolved  Continue Primidone for tremor which is stable and mood is now treated by PCP  Botox helps chronic migraine for 3 months which helps headache prevention and spasticity- will continue current treatment  Patient has had chronic mild memory loss complaint. His prior neuropsychological testing was consistent with loss consistent with CVA/aneurysm- monitor,but his complaint is the  same/stable now and he is functioning well/ no restrictions  He is seeing bariatric medicine about obesity     Note MRA brain 2014: There is attenuation and diminished caliber of the left MCA branches, likely sequela from decreased demand from the atrophic ipsilateral brain parenchyma.  Bilateral posterior communicating arteries are noted.  There is a small P1 segment of the right   PCA.  Anterior circulation vessels are not well evaluated due to susceptibility artifact from adjacent coil pack. No further work up needed at this time     RTC in 12 weeks or more for more Botox

## 2020-05-27 RX ORDER — POTASSIUM CHLORIDE 20 MEQ/1
TABLET, EXTENDED RELEASE ORAL
Qty: 60 TABLET | Refills: 0 | Status: SHIPPED | OUTPATIENT
Start: 2020-05-27 | End: 2020-06-11 | Stop reason: SDUPTHER

## 2020-06-01 ENCOUNTER — TELEPHONE (OUTPATIENT)
Dept: INTERNAL MEDICINE | Facility: CLINIC | Age: 65
End: 2020-06-01

## 2020-06-01 NOTE — TELEPHONE ENCOUNTER
----- Message from Bria Knox sent at 2020  3:30 PM CDT -----  Contact: Yareli/Wife  Reymundo Dang  MRN: [id number]  : 1955  PCP: Rosalinda Villalba  Home Phone      [phone]  Work Phone      Not on file.  Mobile          [phone]      MESSAGE:     Calling to find out the name and address of the therapist that Rosalinda referred him to.  Please call to advise.    Phone: [phone]

## 2020-06-01 NOTE — TELEPHONE ENCOUNTER
Patient's wife requesting the number to [facility] so she can follow up on psych referral. Information provided

## 2020-06-03 NOTE — TELEPHONE ENCOUNTER
----- Message from Bria Knox sent at 6/3/2020 10:05 AM CDT -----  Contact: Latonia/LOPEZ / [place]  Reymundo Dang  MRN: [id number]  : 1955  PCP: Rosalinda Villalba  Home Phone      [phone]  Work Phone      Not on file.  Mobile          [phone]      MESSAGE:   Refill  MYRBETRIQ 25 mg Tb24 ER tablet    [id number] [place], [place] - [address] [facility]    Phone: [phone]

## 2020-06-10 RX ORDER — TAMSULOSIN HYDROCHLORIDE 0.4 MG/1
CAPSULE ORAL
Qty: 90 CAPSULE | Refills: 1 | Status: SHIPPED | OUTPATIENT
Start: 2020-06-10 | End: 2020-12-16

## 2020-06-11 RX ORDER — POTASSIUM CHLORIDE 20 MEQ/1
40 TABLET, EXTENDED RELEASE ORAL DAILY
Qty: 60 TABLET | Refills: 0 | Status: SHIPPED | OUTPATIENT
Start: 2020-06-11 | End: 2020-07-05

## 2020-06-17 DIAGNOSIS — F32.A ANXIETY AND DEPRESSION: ICD-10-CM

## 2020-06-17 DIAGNOSIS — F41.9 ANXIETY AND DEPRESSION: ICD-10-CM

## 2020-06-17 RX ORDER — VORTIOXETINE 10 MG/1
1 TABLET, FILM COATED ORAL DAILY
Qty: 30 TABLET | Refills: 0 | OUTPATIENT
Start: 2020-06-17

## 2020-06-17 RX ORDER — ALPRAZOLAM 0.5 MG/1
0.5 TABLET ORAL 2 TIMES DAILY PRN
Qty: 60 TABLET | Refills: 0 | OUTPATIENT
Start: 2020-06-17

## 2020-06-17 RX ORDER — BUPROPION HYDROCHLORIDE 300 MG/1
300 TABLET ORAL DAILY
Qty: 30 TABLET | Refills: 0 | OUTPATIENT
Start: 2020-06-17

## 2020-06-17 NOTE — TELEPHONE ENCOUNTER
----- Message from Bria Knox sent at 2020 10:34 AM CDT -----  Contact: Yareli/Wife  Reymundo Dang  MRN: [id number]  : 1955  PCP: Rosalinda Villalba  Home Phone      [phone]  Work Phone      Not on file.  Mobile          [phone]      MESSAGE:   Refill  vortioxetine (TRINTELLIX) 10 mg Tab  buPROPion (WELLBUTRIN XL) 300 MG 24 hr tablet  ALPRAZolam (XANAX) 0.5 MG tablet    (Requesting refills until he can get in with specialist on 20)    Pharmacy: [id number] - [place], [place] - [address] [address] [address] [facility] [address]    Phone: [phone]

## 2020-06-22 NOTE — TELEPHONE ENCOUNTER
He can not afford the trintellix, he wanted me to change this but I am not comfortable treating his psych condition as he is on multiple psych meds, he was encouraged to seek psychiatry

## 2020-07-06 ENCOUNTER — TELEPHONE (OUTPATIENT)
Dept: INTERNAL MEDICINE | Facility: CLINIC | Age: 65
End: 2020-07-06

## 2020-07-06 NOTE — TELEPHONE ENCOUNTER
----- Message from Kanika Ray sent at 2020  9:34 AM CDT -----  Regarding: Rx Refill  Contact: Yareli (spouse)  Reymundo Dang  MRN: [id number]  : 1955  PCP: Rosalinda Villalba  Home Phone      [phone]  Work Phone      Not on file.  Mobile          [phone]      MESSAGE:    Rx Refill - mirabegron (MYRBETRIQ) 25 mg Tb24 ER tablet - Patient is out of medication.     Phone #   [phone]    Pharmacy - [id number] - [place], [place] - [address] [facility]

## 2020-07-08 RX ORDER — MIRABEGRON 25 MG/1
TABLET, FILM COATED, EXTENDED RELEASE ORAL
Qty: 30 TABLET | Refills: 0 | Status: SHIPPED | OUTPATIENT
Start: 2020-07-08 | End: 2020-08-03

## 2020-07-28 ENCOUNTER — PATIENT OUTREACH (OUTPATIENT)
Dept: ADMINISTRATIVE | Facility: OTHER | Age: 65
End: 2020-07-28

## 2020-07-29 ENCOUNTER — TELEPHONE (OUTPATIENT)
Dept: BARIATRICS | Facility: CLINIC | Age: 65
End: 2020-07-29

## 2020-07-29 RX ORDER — NALTREXONE HYDROCHLORIDE 50 MG/1
TABLET, FILM COATED ORAL
Qty: 90 TABLET | Refills: 1 | Status: SHIPPED | OUTPATIENT
Start: 2020-07-29 | End: 2022-06-06

## 2020-07-29 NOTE — TELEPHONE ENCOUNTER
Phoned pt in regards to 6 mo recall notification date. Spoke with wife, informed wife both her and  will receive recall letter in mail around 11/26/20. Wife verbalized understanding

## 2020-08-12 ENCOUNTER — PATIENT OUTREACH (OUTPATIENT)
Dept: ADMINISTRATIVE | Facility: OTHER | Age: 65
End: 2020-08-12

## 2020-08-12 NOTE — PROGRESS NOTES
Health Maintenance Due   Topic Date Due    HIV Screening  04/15/1970    Aspirin/Antiplatelet Therapy  04/15/1973    Shingles Vaccine (1 of 2) 04/15/2005    Pneumococcal Vaccine (65+ Low/Medium Risk) (1 of 2 - PCV13) 04/15/2020     Updates were requested from care everywhere.  Chart was reviewed for overdue Proactive [facility] Encounters ([facility]) topics (CRS, Breast Cancer Screening, Eye exam)  Health Maintenance has been updated.  LINKS immunization registry triggered.  Immunizations were reconciled.

## 2020-08-13 ENCOUNTER — PROCEDURE VISIT (OUTPATIENT)
Dept: NEUROLOGY | Facility: CLINIC | Age: 65
End: 2020-08-13
Payer: MEDICARE

## 2020-08-13 DIAGNOSIS — G43.719 INTRACTABLE CHRONIC MIGRAINE WITHOUT AURA AND WITHOUT STATUS MIGRAINOSUS: ICD-10-CM

## 2020-08-13 PROCEDURE — 99499 UNLISTED E&M SERVICE: CPT | Mod: S$GLB,,, | Performed by: PSYCHIATRY & NEUROLOGY

## 2020-08-13 PROCEDURE — 64615 CHEMODENERV MUSC MIGRAINE: CPT | Mod: HCNC,S$GLB,, | Performed by: PSYCHIATRY & NEUROLOGY

## 2020-08-13 PROCEDURE — 64615 PR CHEMODENERVATION OF MUSCLE FOR CHRONIC MIGRAINE: ICD-10-PCS | Mod: HCNC,S$GLB,, | Performed by: PSYCHIATRY & NEUROLOGY

## 2020-08-13 PROCEDURE — 99499 RISK ADDL DX/OHS AUDIT: ICD-10-PCS | Mod: S$GLB,,, | Performed by: PSYCHIATRY & NEUROLOGY

## 2020-08-13 NOTE — PROCEDURES
Procedures     BOTOX PROCEDURE NOTE       Date of Procedure: 8/13/2020      Reason for Procedure: Chronic Migraine and post CVA spasticity on the right          Procedure Details:  Informed consent was obtained prior to performing this procedure. Two patient identifiers were confirmed with the patient prior to performing injections. A time out to determine correct patient and and agreement on procedure performed was conducted prior to the injections     The patient's head and upper neck and upper right arm was cleansed with alcohol rub and 200 Units of Botox (diluted 1:1) was injected in the following bilateral muscles:    10 units in corregator* (total over 2 sites), 5 units in Procerus, 20 units Frontalis* (over 4 sites), 40 units in Temporalis* (over 4 sites), 30 units in occipitalis* (over 6 sites), 20 units in the cervical paraspinal muscles* (over 4 sites), and 30 units in Trapezius* (over 4 sites). 20 units were added to the right deltoid and 20 units in the right levator scapulae for upper limb spasticity from prior CVA. 5 units given to right pectoral major muscle for spasticity as well.    *= denotes bilateral injections        Medications used: botulinum toxin 200 units diluted 1:1 with normal saline used to dilute Botox       The patient tolerated the procedure well with no more than 0.25cc of blood loss. He was observed for several minutes post injection and given a handout from [employer] regarding when and where to seek help if side effects are experienced           Continues Primidone for tremor which is stable and mood is now treated by PCP  Botox helps chronic migraine for 3 months which helps headache prevention and spasticity- will continue current treatment  Patient has had chronic mild memory loss complaint. His prior neuropsychological testing was consistent with loss consistent with CVA/aneurysm- monitor,but his complaint is the same/stable now and he is functioning well/ no restrictions  He is  seeing bariatric medicine about obesity     Note MRA brain 2014: There is attenuation and diminished caliber of the left MCA branches, likely sequela from decreased demand from the atrophic ipsilateral brain parenchyma.  Bilateral posterior communicating arteries are noted.  There is a small P1 segment of the right   PCA.  Anterior circulation vessels are not well evaluated due to susceptibility artifact from adjacent coil pack. No further work up needed at this time     RTC in 12 weeks or more for more Botox

## 2020-10-06 ENCOUNTER — TELEPHONE (OUTPATIENT)
Dept: INTERNAL MEDICINE | Facility: CLINIC | Age: 65
End: 2020-10-06

## 2020-10-06 RX ORDER — BUPROPION HYDROCHLORIDE 300 MG/1
300 TABLET ORAL DAILY
Qty: 30 TABLET | Refills: 1 | Status: SHIPPED | OUTPATIENT
Start: 2020-10-06

## 2020-10-06 RX ORDER — SIMVASTATIN 20 MG/1
20 TABLET, FILM COATED ORAL NIGHTLY
Qty: 30 TABLET | Refills: 1 | Status: SHIPPED | OUTPATIENT
Start: 2020-10-06 | End: 2021-01-03

## 2020-10-06 RX ORDER — LISINOPRIL 40 MG/1
40 TABLET ORAL DAILY
Qty: 30 TABLET | Refills: 1 | Status: SHIPPED | OUTPATIENT
Start: 2020-10-06 | End: 2020-10-29

## 2020-10-06 RX ORDER — AMLODIPINE BESYLATE 10 MG/1
10 TABLET ORAL DAILY
Qty: 30 TABLET | Refills: 1 | Status: SHIPPED | OUTPATIENT
Start: 2020-10-06 | End: 2020-11-30

## 2020-10-06 NOTE — TELEPHONE ENCOUNTER
myrbetriq for 3 month supply is over $100. They would like a cheaper alternative. It is covered by insurance, just expensive.     Needs refill on simvastatin, amlodipine, lisinopril, bupropion.

## 2020-10-27 RX ORDER — OMEPRAZOLE 40 MG/1
40 CAPSULE, DELAYED RELEASE ORAL DAILY
Qty: 30 CAPSULE | Refills: 1 | Status: SHIPPED | OUTPATIENT
Start: 2020-10-27 | End: 2021-06-14 | Stop reason: SDUPTHER

## 2020-10-27 NOTE — TELEPHONE ENCOUNTER
----- Message from Zandra Vásquez sent at 10/27/2020  1:42 PM CDT -----  Contact: self  Reymundo Dang  MRN: [id number]  : 1955  PCP: Rosalinda Villalba  Home Phone      [phone]  Work Phone      Not on file.  Mobile          [phone]      MESSAGE:   Pt requesting refill or new Rx.   Is this a refill or new RX:  refill  RX name and strength: omeprazole (PRILOSEC) 40 MG capsule  Last office visit: 20  Is this a 30-day or 90-day RX:  30  [facility] [place]  Comments:      Phone:  [phone]

## 2020-11-02 ENCOUNTER — TELEPHONE (OUTPATIENT)
Dept: INTERNAL MEDICINE | Facility: CLINIC | Age: 65
End: 2020-11-02

## 2020-11-02 NOTE — TELEPHONE ENCOUNTER
----- Message from Kanika Ray sent at 2020  3:26 PM CST -----  Regarding: Rx Verification  Contact: Latonia with [facility]  Reymundo Dang  MRN: [id number]  : 1955  PCP: Rosalinda Villalba  Home Phone      [phone]  Work Phone      Not on file.  Mobile          [phone]      MESSAGE:    Rx verification - lisinopriL (PRINIVIL,ZESTRIL) 40 MG tablet    Phone # [phone]    Pharmacy - [id number] - [facility] [address] [address] [facility]

## 2020-11-12 ENCOUNTER — PATIENT OUTREACH (OUTPATIENT)
Dept: ADMINISTRATIVE | Facility: OTHER | Age: 65
End: 2020-11-12

## 2020-11-12 NOTE — PROGRESS NOTES
Updates were requested from care everywhere.  Chart was reviewed for overdue [facility] Encounters ([facility]) topics (CRS, Breast Cancer Screening, Eye exam)  Health Maintenance has been updated.  LINKS not responding.

## 2020-11-19 ENCOUNTER — IMMUNIZATION (OUTPATIENT)
Dept: INTERNAL MEDICINE | Facility: CLINIC | Age: 65
End: 2020-11-19
Payer: MEDICARE

## 2020-11-19 ENCOUNTER — PROCEDURE VISIT (OUTPATIENT)
Dept: NEUROLOGY | Facility: CLINIC | Age: 65
End: 2020-11-19
Payer: MEDICARE

## 2020-11-19 PROCEDURE — 90694 FLU VACCINE - QUADRIVALENT - ADJUVANTED: ICD-10-PCS | Mod: HCNC,S$GLB,, | Performed by: INTERNAL MEDICINE

## 2020-11-19 PROCEDURE — 99499 UNLISTED E&M SERVICE: CPT | Mod: S$GLB,,, | Performed by: PSYCHIATRY & NEUROLOGY

## 2020-11-19 PROCEDURE — 90694 VACC AIIV4 NO PRSRV 0.5ML IM: CPT | Mod: HCNC,S$GLB,, | Performed by: INTERNAL MEDICINE

## 2020-11-19 PROCEDURE — 64615 CHEMODENERV MUSC MIGRAINE: CPT | Mod: HCNC,S$GLB,, | Performed by: PSYCHIATRY & NEUROLOGY

## 2020-11-19 PROCEDURE — 64615 PR CHEMODENERVATION OF MUSCLE FOR CHRONIC MIGRAINE: ICD-10-PCS | Mod: HCNC,S$GLB,, | Performed by: PSYCHIATRY & NEUROLOGY

## 2020-11-19 PROCEDURE — G0008 FLU VACCINE - QUADRIVALENT - ADJUVANTED: ICD-10-PCS | Mod: HCNC,S$GLB,, | Performed by: INTERNAL MEDICINE

## 2020-11-19 PROCEDURE — 99499 RISK ADDL DX/OHS AUDIT: ICD-10-PCS | Mod: S$GLB,,, | Performed by: PSYCHIATRY & NEUROLOGY

## 2020-11-19 PROCEDURE — G0008 ADMIN INFLUENZA VIRUS VAC: HCPCS | Mod: HCNC,S$GLB,, | Performed by: INTERNAL MEDICINE

## 2020-11-19 NOTE — PROCEDURES
Procedures     BOTOX PROCEDURE NOTE       Date of Procedure: 11/18/2020      Reason for Procedure: Chronic Migraine and post CVA spasticity on the right          Procedure Details:  Informed consent was obtained prior to performing this procedure. Two patient identifiers were confirmed with the patient prior to performing injections. A time out to determine correct patient and and agreement on procedure performed was conducted prior to the injections     The patient's head and upper neck and upper right arm was cleansed with alcohol rub and 200 Units of Botox (diluted 1:1) was injected in the following bilateral muscles:    10 units in corregator* (total over 2 sites), 5 units in Procerus, 20 units Frontalis* (over 4 sites), 40 units in Temporalis* (over 4 sites), 30 units in occipitalis* (over 6 sites), 20 units in the cervical paraspinal muscles* (over 4 sites), and 30 units in Trapezius* (over 4 sites). 20 units were added to the right deltoid and 20 units in the right levator scapulae for upper limb spasticity from prior CVA. 5 units given to right pectoral major muscle for spasticity as well.    *= denotes bilateral injections        Medications used: botulinum toxin 200 units diluted 1:1 with normal saline used to dilute Botox       The patient tolerated the procedure well with no more than 0.25cc of blood loss. He was observed for several minutes post injection and given a handout from [employer] regarding when and where to seek help if side effects are experienced            Continues Primidone for tremor which is stable and mood is now treated by PCP  Botox helps chronic migraine for 3 months which helps headache prevention and spasticity- will continue current treatment  Patient has had chronic mild memory loss complaint. His prior neuropsychological testing was consistent with loss consistent with CVA/aneurysm- monitor,but his complaint is the same/stable now and he is functioning well/ no restrictions  He  is seeing bariatric medicine about obesity     Note MRA brain 2014: There is attenuation and diminished caliber of the left MCA branches, likely sequela from decreased demand from the atrophic ipsilateral brain parenchyma.  Bilateral posterior communicating arteries are noted.  There is a small P1 segment of the right   PCA.  Anterior circulation vessels are not well evaluated due to susceptibility artifact from adjacent coil pack. No further work up needed at this time    Having some long standing gait difficulty related to chronic right HP chronically. Offered PT/ consideration for AFO, but he is not bothered by this, symptoms are long standing and declines. No falls this year. Exam is unchanged since remote clinic visits- long standing HP noted. Monitor for worsening/ new symptoms     RTC in 12 weeks or more for more Botox

## 2020-12-30 ENCOUNTER — HOSPITAL ENCOUNTER (EMERGENCY)
Facility: HOSPITAL | Age: 65
Discharge: HOME | End: 2020-12-30
Attending: SURGERY
Payer: MEDICARE

## 2020-12-30 ENCOUNTER — OFFICE VISIT (OUTPATIENT)
Dept: INTERNAL MEDICINE | Facility: CLINIC | Age: 65
End: 2020-12-30
Payer: MEDICARE

## 2020-12-30 ENCOUNTER — TELEPHONE (OUTPATIENT)
Dept: FAMILY MEDICINE | Facility: CLINIC | Age: 65
End: 2020-12-30

## 2020-12-30 VITALS
HEART RATE: 78 BPM | SYSTOLIC BLOOD PRESSURE: 132 MMHG | TEMPERATURE: 97 F | DIASTOLIC BLOOD PRESSURE: 87 MMHG | RESPIRATION RATE: 16 BRPM | OXYGEN SATURATION: 98 %

## 2020-12-30 VITALS — BODY MASS INDEX: 39.97 KG/M2 | HEIGHT: 66 IN
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
## 2020-12-30 DIAGNOSIS — S80.11XA CONTUSION OF RIGHT LOWER LEG, INITIAL ENCOUNTER: ICD-10-CM

## 2020-12-30 DIAGNOSIS — E66.01 MORBID OBESITY WITH BMI OF 40.0-44.9, ADULT: ICD-10-CM

## 2020-12-30 DIAGNOSIS — T14.90XA TRAUMA: ICD-10-CM

## 2020-12-30 DIAGNOSIS — S60.221A CONTUSION OF RIGHT HAND, INITIAL ENCOUNTER: ICD-10-CM

## 2020-12-30 DIAGNOSIS — M54.50 ACUTE BILATERAL LOW BACK PAIN, UNSPECIFIED WHETHER SCIATICA PRESENT: ICD-10-CM

## 2020-12-30 PROCEDURE — 99499 NO LOS: ICD-10-PCS | Mod: HCNC,S$GLB,, | Performed by: FAMILY MEDICINE

## 2020-12-30 PROCEDURE — 99999 PR PBB SHADOW E&M-EST. PATIENT-LVL II: CPT | Mod: PBBFAC,HCNC,, | Performed by: FAMILY MEDICINE

## 2020-12-30 PROCEDURE — 3008F BODY MASS INDEX DOCD: CPT | Mod: HCNC,CPTII,S$GLB, | Performed by: FAMILY MEDICINE

## 2020-12-30 PROCEDURE — 3008F PR BODY MASS INDEX (BMI) DOCUMENTED: ICD-10-PCS | Mod: HCNC,CPTII,S$GLB, | Performed by: FAMILY MEDICINE

## 2020-12-30 PROCEDURE — 25000003 PHARM REV CODE 250: Mod: HCNC | Performed by: SURGERY

## 2020-12-30 PROCEDURE — 99284 EMERGENCY DEPT VISIT MOD MDM: CPT | Mod: 25,HCNC

## 2020-12-30 PROCEDURE — 99499 UNLISTED E&M SERVICE: CPT | Mod: HCNC,S$GLB,, | Performed by: FAMILY MEDICINE

## 2020-12-30 PROCEDURE — 99999 PR PBB SHADOW E&M-EST. PATIENT-LVL II: ICD-10-PCS | Mod: PBBFAC,HCNC,, | Performed by: FAMILY MEDICINE

## 2020-12-30 RX ORDER — HYDROCODONE BITARTRATE AND ACETAMINOPHEN 10; 325 MG/1; MG/1
1 TABLET ORAL
Status: COMPLETED | OUTPATIENT
Start: 2020-12-30 | End: 2020-12-30

## 2020-12-30 RX ORDER — HYDROCODONE BITARTRATE AND ACETAMINOPHEN 5; 325 MG/1; MG/1
1 TABLET ORAL EVERY 4 HOURS PRN
Qty: 10 TABLET | Refills: 0 | Status: SHIPPED | OUTPATIENT
Start: 2020-12-30 | End: 2021-01-09

## 2020-12-30 RX ADMIN — HYDROCODONE BITARTRATE AND ACETAMINOPHEN 1 TABLET: 10; 325 TABLET ORAL at 04:12

## 2020-12-30 NOTE — PROGRESS NOTES
Patient brought in by regular vehicle and required major help to get out of the vehicle and into a wheelchair.  He is very obese.  The story is, he fell 6 ft 3 days ago.  He is now having severe back pain and can not get out of bed.  The ambulance service was called and they were able to get him up and patient refused to go to the emergency room.  Family was able to put him in bed.  He has not gotten out of bed since.  They were able to get him in a wheelchair into the car.  Patient was brought to the clinic by his wife.  Getting mild a car required major effort.  Now he is in too much pain to even evaluate.  He is sitting in a wheelchair unable to move.  The smallest movement causes a lot of pain.  He will need to be transferred over to the emergency room for further evaluation.

## 2020-12-30 NOTE — TELEPHONE ENCOUNTER
----- Message from Kanika Ray sent at 2020 10:30 AM CST -----  Regarding: Request same day appointment  Contact: Yareli (spouse)  Reymundo Dang  MRN: [id number]  : 1955  PCP: Rosalinda Villalba  Home Phone      [phone]  Work Phone      Not on file.  Mobile          [phone]      MESSAGE:   Patient request same day appointment due to symptoms c/o pain to back, and right side due to fall down stairs x 3 days. The pain is increasing, and trouble walking.    Phone # [phone]    [id number] [place], [place] - [address] [facility]

## 2021-01-05 ENCOUNTER — TELEPHONE (OUTPATIENT)
Dept: INTERNAL MEDICINE | Facility: CLINIC | Age: 66
End: 2021-01-05

## 2021-02-19 ENCOUNTER — PATIENT OUTREACH (OUTPATIENT)
Dept: ADMINISTRATIVE | Facility: OTHER | Age: 66
End: 2021-02-19

## 2021-02-25 ENCOUNTER — PROCEDURE VISIT (OUTPATIENT)
Dept: NEUROLOGY | Facility: CLINIC | Age: 66
End: 2021-02-25
Payer: MEDICARE

## 2021-02-25 ENCOUNTER — OFFICE VISIT (OUTPATIENT)
Dept: INTERNAL MEDICINE | Facility: CLINIC | Age: 66
End: 2021-02-25
Payer: MEDICARE

## 2021-02-25 VITALS
HEIGHT: 67 IN | HEART RATE: 61 BPM | RESPIRATION RATE: 16 BRPM | DIASTOLIC BLOOD PRESSURE: 70 MMHG | TEMPERATURE: 99 F | SYSTOLIC BLOOD PRESSURE: 126 MMHG | BODY MASS INDEX: 39.61 KG/M2 | OXYGEN SATURATION: 95 %

## 2021-02-25 DIAGNOSIS — R79.89 OTHER SPECIFIED ABNORMAL FINDINGS OF BLOOD CHEMISTRY: ICD-10-CM
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
## 2021-02-25 DIAGNOSIS — F41.8 OTHER SPECIFIED ANXIETY DISORDERS: ICD-10-CM

## 2021-02-25 DIAGNOSIS — Z13.29 SCREENING FOR HYPOTHYROIDISM: ICD-10-CM

## 2021-02-25 DIAGNOSIS — E78.5 HYPERLIPIDEMIA, UNSPECIFIED HYPERLIPIDEMIA TYPE: ICD-10-CM

## 2021-02-25 DIAGNOSIS — S81.801A NON-HEALING WOUND OF RIGHT LOWER EXTREMITY: Primary | ICD-10-CM

## 2021-02-25 DIAGNOSIS — Z00.00 HEALTHCARE MAINTENANCE: ICD-10-CM

## 2021-02-25 DIAGNOSIS — Z12.5 SCREENING FOR PROSTATE CANCER: ICD-10-CM

## 2021-02-25 DIAGNOSIS — E87.6 HYPOKALEMIA: ICD-10-CM

## 2021-02-25 PROCEDURE — 1100F PR PT FALLS ASSESS DOC 2+ FALLS/FALL W/INJURY/YR: ICD-10-PCS | Mod: CPTII,S$GLB,, | Performed by: NURSE PRACTITIONER

## 2021-02-25 PROCEDURE — 99499 RISK ADDL DX/OHS AUDIT: ICD-10-PCS | Mod: S$GLB,,, | Performed by: NURSE PRACTITIONER

## 2021-02-25 PROCEDURE — 99499 RISK ADDL DX/OHS AUDIT: ICD-10-PCS | Mod: S$GLB,,, | Performed by: PSYCHIATRY & NEUROLOGY

## 2021-02-25 PROCEDURE — 3078F DIAST BP <80 MM HG: CPT | Mod: CPTII,S$GLB,, | Performed by: NURSE PRACTITIONER

## 2021-02-25 PROCEDURE — 3288F PR FALLS RISK ASSESSMENT DOCUMENTED: ICD-10-PCS | Mod: CPTII,S$GLB,, | Performed by: NURSE PRACTITIONER

## 2021-02-25 PROCEDURE — 99499 UNLISTED E&M SERVICE: CPT | Mod: S$GLB,,, | Performed by: PSYCHIATRY & NEUROLOGY

## 2021-02-25 PROCEDURE — 99214 OFFICE O/P EST MOD 30 MIN: CPT | Mod: S$GLB,,, | Performed by: NURSE PRACTITIONER

## 2021-02-25 PROCEDURE — 99499 UNLISTED E&M SERVICE: CPT | Mod: S$GLB,,, | Performed by: NURSE PRACTITIONER

## 2021-02-25 PROCEDURE — 3078F PR MOST RECENT DIASTOLIC BLOOD PRESSURE < 80 MM HG: ICD-10-PCS | Mod: CPTII,S$GLB,, | Performed by: NURSE PRACTITIONER

## 2021-02-25 PROCEDURE — 1125F PR PAIN SEVERITY QUANTIFIED, PAIN PRESENT: ICD-10-PCS | Mod: S$GLB,,, | Performed by: NURSE PRACTITIONER

## 2021-02-25 PROCEDURE — 3288F FALL RISK ASSESSMENT DOCD: CPT | Mod: CPTII,S$GLB,, | Performed by: NURSE PRACTITIONER

## 2021-02-25 PROCEDURE — 99999 PR PBB SHADOW E&M-EST. PATIENT-LVL III: ICD-10-PCS | Mod: PBBFAC,,, | Performed by: NURSE PRACTITIONER

## 2021-02-25 PROCEDURE — 99214 PR OFFICE/OUTPT VISIT, EST, LEVL IV, 30-39 MIN: ICD-10-PCS | Mod: S$GLB,,, | Performed by: NURSE PRACTITIONER

## 2021-02-25 PROCEDURE — 3074F PR MOST RECENT SYSTOLIC BLOOD PRESSURE < 130 MM HG: ICD-10-PCS | Mod: CPTII,S$GLB,, | Performed by: NURSE PRACTITIONER

## 2021-02-25 PROCEDURE — 64615 CHEMODENERV MUSC MIGRAINE: CPT | Mod: S$GLB,,, | Performed by: PSYCHIATRY & NEUROLOGY

## 2021-02-25 PROCEDURE — 1100F PTFALLS ASSESS-DOCD GE2>/YR: CPT | Mod: CPTII,S$GLB,, | Performed by: NURSE PRACTITIONER

## 2021-02-25 PROCEDURE — 3074F SYST BP LT 130 MM HG: CPT | Mod: CPTII,S$GLB,, | Performed by: NURSE PRACTITIONER

## 2021-02-25 PROCEDURE — 3008F PR BODY MASS INDEX (BMI) DOCUMENTED: ICD-10-PCS | Mod: CPTII,S$GLB,, | Performed by: NURSE PRACTITIONER

## 2021-02-25 PROCEDURE — 99999 PR PBB SHADOW E&M-EST. PATIENT-LVL III: CPT | Mod: PBBFAC,,, | Performed by: NURSE PRACTITIONER

## 2021-02-25 PROCEDURE — 1125F AMNT PAIN NOTED PAIN PRSNT: CPT | Mod: S$GLB,,, | Performed by: NURSE PRACTITIONER

## 2021-02-25 PROCEDURE — 3008F BODY MASS INDEX DOCD: CPT | Mod: CPTII,S$GLB,, | Performed by: NURSE PRACTITIONER

## 2021-02-25 PROCEDURE — 64615 PR CHEMODENERVATION OF MUSCLE FOR CHRONIC MIGRAINE: ICD-10-PCS | Mod: S$GLB,,, | Performed by: PSYCHIATRY & NEUROLOGY

## 2021-02-25 RX ORDER — FOAM BANDAGE 8"X5.5"
1 BANDAGE TOPICAL DAILY
Qty: 10 EACH | Refills: 0 | Status: SHIPPED | OUTPATIENT
Start: 2021-02-25 | End: 2023-11-07

## 2021-02-25 RX ORDER — COLLAGENASE SANTYL 250 [ARB'U]/G
OINTMENT TOPICAL DAILY
Qty: 30 G | Refills: 0 | Status: SHIPPED | OUTPATIENT
Start: 2021-02-25 | End: 2021-03-03

## 2021-03-03 ENCOUNTER — OFFICE VISIT (OUTPATIENT)
Dept: INTERNAL MEDICINE | Facility: CLINIC | Age: 66
End: 2021-03-03
Payer: MEDICARE

## 2021-03-03 ENCOUNTER — HOSPITAL ENCOUNTER (OUTPATIENT)
Dept: RADIOLOGY | Facility: HOSPITAL | Age: 66
Discharge: HOME | End: 2021-03-03
Attending: PSYCHIATRY & NEUROLOGY
Payer: MEDICARE

## 2021-03-03 VITALS
BODY MASS INDEX: 39.61 KG/M2 | SYSTOLIC BLOOD PRESSURE: 132 MMHG | HEIGHT: 67 IN | RESPIRATION RATE: 18 BRPM | TEMPERATURE: 98 F | HEART RATE: 63 BPM | DIASTOLIC BLOOD PRESSURE: 70 MMHG | OXYGEN SATURATION: 97 %

## 2021-03-03 DIAGNOSIS — Z86.59: ICD-10-CM

## 2021-03-03 DIAGNOSIS — Z23 IMMUNIZATION DUE: Primary | ICD-10-CM

## 2021-03-03 DIAGNOSIS — I25.119 CORONARY ARTERY DISEASE INVOLVING NATIVE CORONARY ARTERY OF NATIVE HEART WITH ANGINA PECTORIS: ICD-10-CM

## 2021-03-03 DIAGNOSIS — E66.01 MORBID OBESITY WITH BMI OF 40.0-44.9, ADULT: ICD-10-CM

## 2021-03-03 DIAGNOSIS — E87.6 HYPOKALEMIA: ICD-10-CM
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
## 2021-03-03 DIAGNOSIS — I10 ESSENTIAL HYPERTENSION: ICD-10-CM

## 2021-03-03 DIAGNOSIS — F33.41 RECURRENT MAJOR DEPRESSIVE DISORDER, IN PARTIAL REMISSION: ICD-10-CM

## 2021-03-03 DIAGNOSIS — M21.371 RIGHT FOOT DROP: ICD-10-CM

## 2021-03-03 DIAGNOSIS — I69.359 HEMIPARESIS AFFECTING DOMINANT SIDE AS LATE EFFECT OF CEREBROVASCULAR ACCIDENT (CVA): ICD-10-CM

## 2021-03-03 DIAGNOSIS — E78.5 HYPERLIPIDEMIA, UNSPECIFIED HYPERLIPIDEMIA TYPE: ICD-10-CM

## 2021-03-03 PROCEDURE — 3008F BODY MASS INDEX DOCD: CPT | Mod: CPTII,S$GLB,, | Performed by: NURSE PRACTITIONER

## 2021-03-03 PROCEDURE — 99499 RISK ADDL DX/OHS AUDIT: ICD-10-PCS | Mod: S$GLB,,, | Performed by: NURSE PRACTITIONER

## 2021-03-03 PROCEDURE — 99999 PR PBB SHADOW E&M-EST. PATIENT-LVL III: ICD-10-PCS | Mod: PBBFAC,,, | Performed by: NURSE PRACTITIONER

## 2021-03-03 PROCEDURE — G0009 ADMIN PNEUMOCOCCAL VACCINE: HCPCS | Mod: S$GLB,,, | Performed by: NURSE PRACTITIONER

## 2021-03-03 PROCEDURE — 3008F PR BODY MASS INDEX (BMI) DOCUMENTED: ICD-10-PCS | Mod: CPTII,S$GLB,, | Performed by: NURSE PRACTITIONER

## 2021-03-03 PROCEDURE — 1100F PR PT FALLS ASSESS DOC 2+ FALLS/FALL W/INJURY/YR: ICD-10-PCS | Mod: CPTII,S$GLB,, | Performed by: NURSE PRACTITIONER

## 2021-03-03 PROCEDURE — 3288F FALL RISK ASSESSMENT DOCD: CPT | Mod: CPTII,S$GLB,, | Performed by: NURSE PRACTITIONER

## 2021-03-03 PROCEDURE — 70450 CT HEAD WITHOUT CONTRAST: ICD-10-PCS | Mod: 26,,, | Performed by: RADIOLOGY

## 2021-03-03 PROCEDURE — 1100F PTFALLS ASSESS-DOCD GE2>/YR: CPT | Mod: CPTII,S$GLB,, | Performed by: NURSE PRACTITIONER

## 2021-03-03 PROCEDURE — 3078F DIAST BP <80 MM HG: CPT | Mod: CPTII,S$GLB,, | Performed by: NURSE PRACTITIONER

## 2021-03-03 PROCEDURE — 90670 PCV13 VACCINE IM: CPT | Mod: S$GLB,,, | Performed by: NURSE PRACTITIONER

## 2021-03-03 PROCEDURE — 3075F PR MOST RECENT SYSTOLIC BLOOD PRESS GE 130-139MM HG: ICD-10-PCS | Mod: CPTII,S$GLB,, | Performed by: NURSE PRACTITIONER

## 2021-03-03 PROCEDURE — 99999 PR PBB SHADOW E&M-EST. PATIENT-LVL III: CPT | Mod: PBBFAC,,, | Performed by: NURSE PRACTITIONER

## 2021-03-03 PROCEDURE — 1125F PR PAIN SEVERITY QUANTIFIED, PAIN PRESENT: ICD-10-PCS | Mod: S$GLB,,, | Performed by: NURSE PRACTITIONER

## 2021-03-03 PROCEDURE — 70450 CT HEAD/BRAIN W/O DYE: CPT | Mod: TC

## 2021-03-03 PROCEDURE — 1125F AMNT PAIN NOTED PAIN PRSNT: CPT | Mod: S$GLB,,, | Performed by: NURSE PRACTITIONER

## 2021-03-03 PROCEDURE — 3075F SYST BP GE 130 - 139MM HG: CPT | Mod: CPTII,S$GLB,, | Performed by: NURSE PRACTITIONER

## 2021-03-03 PROCEDURE — 70450 CT HEAD/BRAIN W/O DYE: CPT | Mod: 26,,, | Performed by: RADIOLOGY

## 2021-03-03 PROCEDURE — 3078F PR MOST RECENT DIASTOLIC BLOOD PRESSURE < 80 MM HG: ICD-10-PCS | Mod: CPTII,S$GLB,, | Performed by: NURSE PRACTITIONER

## 2021-03-03 PROCEDURE — 99214 PR OFFICE/OUTPT VISIT, EST, LEVL IV, 30-39 MIN: ICD-10-PCS | Mod: 25,S$GLB,, | Performed by: NURSE PRACTITIONER

## 2021-03-03 PROCEDURE — 99214 OFFICE O/P EST MOD 30 MIN: CPT | Mod: 25,S$GLB,, | Performed by: NURSE PRACTITIONER

## 2021-03-03 PROCEDURE — 90670 PNEUMOCOCCAL CONJUGATE VACCINE 13-VALENT LESS THAN 5YO & GREATER THAN: ICD-10-PCS | Mod: S$GLB,,, | Performed by: NURSE PRACTITIONER

## 2021-03-03 PROCEDURE — 99499 UNLISTED E&M SERVICE: CPT | Mod: S$GLB,,, | Performed by: NURSE PRACTITIONER

## 2021-03-03 PROCEDURE — 3288F PR FALLS RISK ASSESSMENT DOCUMENTED: ICD-10-PCS | Mod: CPTII,S$GLB,, | Performed by: NURSE PRACTITIONER

## 2021-03-03 PROCEDURE — G0009 PNEUMOCOCCAL CONJUGATE VACCINE 13-VALENT LESS THAN 5YO & GREATER THAN: ICD-10-PCS | Mod: S$GLB,,, | Performed by: NURSE PRACTITIONER

## 2021-03-08 DIAGNOSIS — S81.801D WOUND OF RIGHT LOWER EXTREMITY, SUBSEQUENT ENCOUNTER: ICD-10-CM

## 2021-03-08 DIAGNOSIS — R25.2 SPASTICITY: ICD-10-CM
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
## 2021-03-10 PROCEDURE — G0180 PR HOME HEALTH MD CERTIFICATION: ICD-10-PCS | Mod: ,,, | Performed by: PSYCHIATRY & NEUROLOGY

## 2021-03-10 PROCEDURE — G0180 MD CERTIFICATION HHA PATIENT: HCPCS | Mod: ,,, | Performed by: PSYCHIATRY & NEUROLOGY

## 2021-03-11 ENCOUNTER — TELEPHONE (OUTPATIENT)
Dept: INTERNAL MEDICINE | Facility: CLINIC | Age: 66
End: 2021-03-11

## 2021-03-18 ENCOUNTER — DOCUMENT SCAN (OUTPATIENT)
Dept: HOME HEALTH SERVICES | Facility: HOSPITAL | Age: 66
End: 2021-03-18
Payer: MEDICARE

## 2021-03-30 ENCOUNTER — TELEPHONE (OUTPATIENT)
Dept: NEUROLOGY | Facility: CLINIC | Age: 66
End: 2021-03-30

## 2021-03-31 ENCOUNTER — TELEPHONE (OUTPATIENT)
Dept: NEUROLOGY | Facility: CLINIC | Age: 66
End: 2021-03-31

## 2021-03-31 DIAGNOSIS — M21.379 FOOT-DROP, UNSPECIFIED LATERALITY: Primary | ICD-10-CM

## 2021-04-07 ENCOUNTER — DOCUMENT SCAN (OUTPATIENT)
Dept: HOME HEALTH SERVICES | Facility: HOSPITAL | Age: 66
End: 2021-04-07
Payer: MEDICARE

## 2021-04-15 ENCOUNTER — DOCUMENT SCAN (OUTPATIENT)
Dept: HOME HEALTH SERVICES | Facility: HOSPITAL | Age: 66
End: 2021-04-15
Payer: MEDICARE

## 2021-04-16 ENCOUNTER — EXTERNAL HOME HEALTH (OUTPATIENT)
Dept: HOME HEALTH SERVICES | Facility: HOSPITAL | Age: 66
End: 2021-04-16
Payer: MEDICARE

## 2021-05-04 ENCOUNTER — DOCUMENT SCAN (OUTPATIENT)
Dept: HOME HEALTH SERVICES | Facility: HOSPITAL | Age: 66
End: 2021-05-04
Payer: MEDICARE

## 2021-05-05 ENCOUNTER — DOCUMENT SCAN (OUTPATIENT)
Dept: HOME HEALTH SERVICES | Facility: HOSPITAL | Age: 66
End: 2021-05-05
Payer: MEDICARE

## 2021-05-06 ENCOUNTER — TELEPHONE (OUTPATIENT)
Dept: INTERNAL MEDICINE | Facility: CLINIC | Age: 66
End: 2021-05-06

## 2021-05-07 ENCOUNTER — DOCUMENT SCAN (OUTPATIENT)
Dept: HOME HEALTH SERVICES | Facility: HOSPITAL | Age: 66
End: 2021-05-07
Payer: MEDICARE

## 2021-05-09 PROCEDURE — G0179 MD RECERTIFICATION HHA PT: HCPCS | Mod: ,,, | Performed by: PSYCHIATRY & NEUROLOGY

## 2021-05-09 PROCEDURE — G0179 PR HOME HEALTH MD RECERTIFICATION: ICD-10-PCS | Mod: ,,, | Performed by: PSYCHIATRY & NEUROLOGY

## 2021-05-11 ENCOUNTER — DOCUMENT SCAN (OUTPATIENT)
Dept: HOME HEALTH SERVICES | Facility: HOSPITAL | Age: 66
End: 2021-05-11
Payer: MEDICARE

## 2021-05-17 ENCOUNTER — DOCUMENT SCAN (OUTPATIENT)
Dept: HOME HEALTH SERVICES | Facility: HOSPITAL | Age: 66
End: 2021-05-17
Payer: MEDICARE

## 2021-05-18 ENCOUNTER — DOCUMENT SCAN (OUTPATIENT)
Dept: HOME HEALTH SERVICES | Facility: HOSPITAL | Age: 66
End: 2021-05-18
Payer: MEDICARE

## 2021-05-24 ENCOUNTER — PATIENT OUTREACH (OUTPATIENT)
Dept: ADMINISTRATIVE | Facility: OTHER | Age: 66
End: 2021-05-24

## 2021-05-27 ENCOUNTER — PROCEDURE VISIT (OUTPATIENT)
Dept: NEUROLOGY | Facility: CLINIC | Age: 66
End: 2021-05-27
Payer: MEDICARE

## 2021-05-27 PROCEDURE — 64615 CHEMODENERV MUSC MIGRAINE: CPT | Mod: S$GLB,,, | Performed by: PSYCHIATRY & NEUROLOGY

## 2021-05-27 PROCEDURE — 64615 PR CHEMODENERVATION OF MUSCLE FOR CHRONIC MIGRAINE: ICD-10-PCS | Mod: S$GLB,,, | Performed by: PSYCHIATRY & NEUROLOGY

## 2021-05-27 PROCEDURE — 99499 RISK ADDL DX/OHS AUDIT: ICD-10-PCS | Mod: S$GLB,,, | Performed by: PSYCHIATRY & NEUROLOGY

## 2021-05-27 PROCEDURE — 99499 UNLISTED E&M SERVICE: CPT | Mod: S$GLB,,, | Performed by: PSYCHIATRY & NEUROLOGY

## 2021-05-31 RX ORDER — POTASSIUM CHLORIDE 20 MEQ/1
TABLET, EXTENDED RELEASE ORAL
Qty: 60 TABLET | Refills: 0 | Status: SHIPPED | OUTPATIENT
Start: 2021-05-31 | End: 2021-06-22

## 2021-05-31 RX ORDER — MIRABEGRON 25 MG/1
TABLET, FILM COATED, EXTENDED RELEASE ORAL
Qty: 30 TABLET | Refills: 0 | Status: SHIPPED | OUTPATIENT
Start: 2021-05-31 | End: 2021-06-22

## 2021-05-31 RX ORDER — SIMVASTATIN 20 MG/1
TABLET, FILM COATED ORAL
Qty: 30 TABLET | Refills: 0 | Status: SHIPPED | OUTPATIENT
Start: 2021-05-31 | End: 2021-06-28

## 2021-05-31 RX ORDER — AMLODIPINE BESYLATE 10 MG/1
TABLET ORAL
Qty: 30 TABLET | Refills: 0 | Status: SHIPPED | OUTPATIENT
Start: 2021-05-31 | End: 2021-06-22

## 2021-06-01 ENCOUNTER — DOCUMENT SCAN (OUTPATIENT)
Dept: HOME HEALTH SERVICES | Facility: HOSPITAL | Age: 66
End: 2021-06-01
Payer: MEDICARE

## 2021-06-08 RX ORDER — TAMSULOSIN HYDROCHLORIDE 0.4 MG/1
CAPSULE ORAL
Qty: 90 CAPSULE | Refills: 0 | Status: SHIPPED | OUTPATIENT
Start: 2021-06-08 | End: 2021-09-08

## 2021-06-09 ENCOUNTER — EXTERNAL HOME HEALTH (OUTPATIENT)
Dept: HOME HEALTH SERVICES | Facility: HOSPITAL | Age: 66
End: 2021-06-09
Payer: MEDICARE

## 2021-06-14 ENCOUNTER — DOCUMENT SCAN (OUTPATIENT)
Dept: HOME HEALTH SERVICES | Facility: HOSPITAL | Age: 66
End: 2021-06-14
Payer: MEDICARE

## 2021-06-14 RX ORDER — OMEPRAZOLE 40 MG/1
40 CAPSULE, DELAYED RELEASE ORAL DAILY
Qty: 30 CAPSULE | Refills: 1 | Status: SHIPPED | OUTPATIENT
Start: 2021-06-14 | End: 2022-06-06 | Stop reason: SDUPTHER

## 2021-06-22 RX ORDER — AMLODIPINE BESYLATE 10 MG/1
TABLET ORAL
Qty: 30 TABLET | Refills: 0 | Status: SHIPPED | OUTPATIENT
Start: 2021-06-22 | End: 2021-07-19

## 2021-06-22 RX ORDER — MIRABEGRON 25 MG/1
TABLET, FILM COATED, EXTENDED RELEASE ORAL
Qty: 30 TABLET | Refills: 0 | Status: SHIPPED | OUTPATIENT
Start: 2021-06-22 | End: 2021-07-19

## 2021-06-28 RX ORDER — SIMVASTATIN 20 MG/1
TABLET, FILM COATED ORAL
Qty: 30 TABLET | Refills: 0 | Status: SHIPPED | OUTPATIENT
Start: 2021-06-28 | End: 2021-07-21

## 2021-08-04 NOTE — DISCHARGE INSTRUCTIONS

## 2021-09-20 ENCOUNTER — TELEPHONE (OUTPATIENT)
Dept: NEUROLOGY | Facility: CLINIC | Age: 66
End: 2021-09-20

## 2021-12-02 ENCOUNTER — PATIENT OUTREACH (OUTPATIENT)
Dept: ADMINISTRATIVE | Facility: OTHER | Age: 66
End: 2021-12-02
Payer: MEDICARE

## 2021-12-06 ENCOUNTER — PROCEDURE VISIT (OUTPATIENT)
Dept: NEUROLOGY | Facility: CLINIC | Age: 66
End: 2021-12-06
Payer: MEDICARE

## 2021-12-06 ENCOUNTER — LAB VISIT (OUTPATIENT)
Dept: LAB | Facility: HOSPITAL | Age: 66
End: 2021-12-06
Attending: PSYCHIATRY & NEUROLOGY
Payer: MEDICARE

## 2021-12-06 DIAGNOSIS — G43.719 INTRACTABLE CHRONIC MIGRAINE WITHOUT AURA AND WITHOUT STATUS MIGRAINOSUS: ICD-10-CM

## 2021-12-06 DIAGNOSIS — R41.3 MEMORY LOSS: Primary | ICD-10-CM

## 2021-12-06 DIAGNOSIS — R41.3 MEMORY LOSS: ICD-10-CM

## 2021-12-06 PROCEDURE — 64615 CHEMODENERV MUSC MIGRAINE: CPT | Mod: HCNC,S$GLB,, | Performed by: PSYCHIATRY & NEUROLOGY

## 2021-12-06 PROCEDURE — 99499 RISK ADDL DX/OHS AUDIT: ICD-10-PCS | Mod: HCNC,S$GLB,, | Performed by: PSYCHIATRY & NEUROLOGY

## 2021-12-06 PROCEDURE — 64615 PR CHEMODENERVATION OF MUSCLE FOR CHRONIC MIGRAINE: ICD-10-PCS | Mod: HCNC,S$GLB,, | Performed by: PSYCHIATRY & NEUROLOGY

## 2021-12-06 PROCEDURE — 99499 UNLISTED E&M SERVICE: CPT | Mod: HCNC,S$GLB,, | Performed by: PSYCHIATRY & NEUROLOGY

## 2021-12-06 PROCEDURE — 36415 COLL VENOUS BLD VENIPUNCTURE: CPT | Mod: HCNC | Performed by: PSYCHIATRY & NEUROLOGY

## 2021-12-06 PROCEDURE — 82607 VITAMIN B-12: CPT | Mod: HCNC | Performed by: PSYCHIATRY & NEUROLOGY

## 2021-12-07 LAB — VIT B12 SERPL-MCNC: >2000 PG/ML (ref 210–950)

## 2021-12-15 NOTE — ED TRIAGE NOTES
Pt presents with C/O pain with urination, back, and abdominal pain, vomiting and diarrhea that began 2 days ago  
no

## 2022-01-06 ENCOUNTER — OFFICE VISIT (OUTPATIENT)
Dept: NEUROLOGY | Facility: CLINIC | Age: 67
End: 2022-01-06
Payer: MEDICARE

## 2022-01-06 ENCOUNTER — TELEPHONE (OUTPATIENT)
Dept: NEUROLOGY | Facility: CLINIC | Age: 67
End: 2022-01-06
Payer: MEDICARE

## 2022-01-06 DIAGNOSIS — I72.9 ANEURYSM: ICD-10-CM

## 2022-01-06 PROCEDURE — 99499 UNLISTED E&M SERVICE: CPT | Mod: HCNC,95,, | Performed by: CLINICAL NEUROPSYCHOLOGIST

## 2022-01-06 PROCEDURE — 99499 NO LOS: ICD-10-PCS | Mod: HCNC,95,, | Performed by: CLINICAL NEUROPSYCHOLOGIST

## 2022-01-06 NOTE — TELEPHONE ENCOUNTER
Patient called for our 8:30 AM appointment. No answer and mailbox was full so unable to leave a message.

## 2022-01-06 NOTE — PROGRESS NOTES
NEUROPSYCHOLOGICAL EVALUATION - CONFIDENTIAL    Referring Provider: Kamlesh Ramos MD   Medical Necessity: Evaluate cognitive and emotional functioning, participate in treatment planning/management, and provide supportive therapy in the setting of ***  Date Conducted: ***  Present At Visit: ***  Billin/84107 or 84044 = *** minutes  Referral Diagnoses: R41.3 (ICD-10-CM) - Memory changes  Consent: The patient expressed an understanding of the purpose of the evaluation and consented to all procedures. He/she additionally provided consent to speak with ***, who was present during the clinical interview. We discussed the limits of confidentiality and discussed an emergency plan.    Telemedicine Details:   Established Patient - Audio Only Telehealth Visit  The patient location is: ***  The chief complaint leading to consultation is: ***  Visit type: Virtual visit with audio only (telephone)  Total time spent with patient: ***  The reason for the audio only service rather than synchronous audio and video virtual visit was related to technical difficulties or patient preference/necessity.  Each patient to whom I provide medical services by telemedicine is:  (1) informed of the relationship between the physician and patient and the respective role of any other health care provider with respect to management of the patient; and (2) notified that they may decline to receive medical services by telemedicine and may withdraw from such care at any time. Patient verbally consented to receive this service via voice-only telephone call.    ASSESSMENT & PLAN:   Full report to follow completion of testing.   Problem List Items Addressed This Visit    None       Thank you for allowing me to assist in Mr. Reymundo Dang's care. If you have any questions, please contact me at [phone].      Kenna Villanueva, PhD  Licensed Clinical Neuropsychologist  [facility]  "    CLINICAL INTERVIEW & RECORD REVIEW   Mr. Reymundo Dang is an 66 y.o., male with *** years of education who was referred for a neuropsychological evaluation in the setting of ***      Cognitive Functioning   Cognitive screener: ***  Previous evaluation(s): ***  Onset & course of difficulty: ***  Fluctuations: ***  Severity of changes: ***  Examples: ***  Attention/Working Memory/Executive Functioning:   Processing Speed:   Language:   Visuospatial:  Learning & Memory:   Exacerbating factors: ***  Ameliorating factors: ***  Medication for cognition: ***   Attempts to stay cognitively active: ***    Daily Functioning (I/ADLs)  ADLs: {Level of independence:01615::"Independent"} and without difficulty  IADLs:  Finances: {Level of independence:09293::"Independent"} and without difficulty  Medication Mgmt: {Level of independence:97055::"Independent"} and without difficulty  Driving: {Level of independence:07425::"Independent"} and without difficulty  Household Mgmt: {Level of independence:94830::"Independent"} and without difficulty  Cooking/Meal Preparation: {Level of independence:90915::"Independent"} and without difficulty.  Shopping: {Level of independence:01696::"Independent"} and without difficulty.  Appointment Mgmt: {Level of independence:51437::"Independent"}  and without difficulty  Employment: {Level of independence:97350::"Independent"} and without difficulty    Psychiatric/Neuropsychiatric Symptoms  Mood: ***  Depression: {RESPONSE; YES/NO/NA:24371}  Anxiety: {RESPONSE; YES/NO/NA:31160}  Stress: {RESPONSE; YES/NO/NA:92115}  Social Withdrawal: {RESPONSE; YES/NO/NA:26567}  Neurovegetative Sxs:  Appetite: ***  Sleep: ***  Energy: ***  Hallucinations: {RESPONSE; YES/NO/NA:70414}  Delusional/Paranoid Thinking: {RESPONSE; YES/NO/NA:14490}  Impulsivity: {RESPONSE; YES/NO/NA:89126}  Obsessive/Compulsive Behaviors: {RESPONSE; YES/NO/NA:42185}  Disinhibition: {RESPONSE; " YES/NO/NA:33727}  Irritability/Agitation: {RESPONSE; YES/NO/NA:27259}  Aggression: {RESPONSE; YES/NO/NA:82608}  Apathy/Indifference: {RESPONSE; YES/NO/NA:49048}  Other changes in personality: {RESPONSE; YES/NO/NA:88958}    Physical Functioning  Motor/Gait Sxs: {RESPONSE; YES/NO/NA:42303}  Autonomic Sxs: {RESPONSE; YES/NO/NA:49863}  Bulbar Sxs: {RESPONSE; YES/NO/NA:32155}  Sensory Sxs: {RESPONSE; YES/NO/NA:10470}  Pain: ***  Physical Exercise Routine: ***    RELEVANT HISTORY  This patient has a past medical history of Aneurysm, Crohn's disease, CVA (cerebral vascular accident), Edema, History of organic brain syndrome, Hyperlipidemia, Hypertension, and Restless legs syndrome (RLS).    Past Surgical History:   Procedure Laterality Date    BRAIN SURGERY      Stent Aneurysm    CERVICAL SPINE SURGERY      CHOLECYSTECTOMY      HERNIA REPAIR      SHOULDER SURGERY      Right     Recent ED visits: {RESPONSE; YES/NO/NA:63228}  History of UTIs: {RESPONSE; YES/NO/NA:71126}    Neurological History   Headaches/Migraines: {RESPONSE; YES/NO/NA:85991}  TBI: {RESPONSE; YES/NO/NA:58775}  Seizures: {RESPONSE; YES/NO/NA:64458}  Stroke: {RESPONSE; YES/NO/NA:59163}  Tumor: {RESPONSE; YES/NO/NA:15996}  Previous Episodes of Delirium: {RESPONSE; YES/NO/NA:78479}  Movement Disorder: {RESPONSE; YES/NO/NA:11820}  CNS Infection: {RESPONSE; YES/NO/NA:67895}  Other: {RESPONSE; YES/NO/NA:35661}    Neurodiagnostics  Results for orders placed or performed during the hospital encounter of 03/03/21   CT Head Without Contrast    Narrative    EXAMINATION:  CT HEAD WITHOUT CONTRAST    CLINICAL HISTORY:  frequent falls, right sided weakness;  Chronic migraine without aura, not intractable, without status migrainosus    TECHNIQUE:  Multiple sequential 5 mm axial images of the head without contrast.  Coronal and sagittal reformatted imaging from the axial acquisition.    COMPARISON:  04/30/2019    FINDINGS:  Age-appropriate generalized cerebral volume  loss.  Dilatation of the left lateral ventricle similar to prior examinations with encephalomalacia of the left frontal parietal lobes.  No mass effect or midline shift.  No evidence for active hydrocephalus.  No intracranial hemorrhage.  Postoperative changes with surgical clip in the region of the anterior communicating arteries.  Right-sided craniotomy change.  There is no midline shift or mass effect.  Visualized paranasal sinuses and mastoid air cells are clear.      Impression    Postoperative changes of right frontotemporal craniotomy with chronic encephalomalacia of the left cerebral hemisphere with compensatory enlargement of the left lateral ventricle, similar to before.  No acute intracranial process is seen.      Electronically signed by: Lisa Wolfe MD  Date:    03/03/2021  Time:    10:03   Results for orders placed or performed during the hospital encounter of 12/18/14   MRI Brain W WO Contrast    Narrative    Technique: Routine MRI/MRA head and neck performed with without use of 20 cc of Multihance IV contrast.    Comparison: CT 12-18-14.    Findings:    MRI BRAIN:     There are postsurgical changes consistent with right pterion craniotomy for a right sided approach aneurysmal clipping at the level of the anterior communicating artery.    There is no abnormal restricted diffusion to suggest acute infarction.  No gradient susceptibility to suggest hemorrhage.  There is continued generalized cerebral volume loss that is advanced for the patient's age and demonstrate marked encephalomalacia   throughout the left cerebral hemisphere suggesting prior infarction.  There is compensatory increase in the size of the ventricular system.  No mass, mass effect or midline shift.  No abnormal intra-or extra-axial fluid collections.  The sellar region is   unremarkable.  The cerebellar tonsils are in their expected location.  The major T2 flow voids are not well evaluated due to susceptibility artifact from the  adjacent coil pack.      There is mild mucosal membrane thickening within the left maxillary antrum.  Orbits are unremarkable.  There is mild fluid within the inferior aspect of the left mastoid air cells.  Osseous structures are unremarkable.    MRA BRAIN:    There is attenuation and diminished caliber of the left MCA branches, likely sequela from decreased demand from the atrophic ipsilateral brain parenchyma.  Bilateral posterior communicating arteries are noted.  There is a small P1 segment of the right   PCA.  Anterior circulation vessels are not well evaluated due to susceptibility artifact from adjacent coil pack.    Impression    No acute intracranial abnormalities. Specifically, no evidence of acute infarction or enhancing mass lesion.    Attenuation and diminished caliber of the left MCA branches, likely sequela from decreased demand from the atrophic/chronically infarcted ipsilateral brain parenchyma.  No intracranial aneurysm or focal stenosis identified.    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    Moderate, age advanced generalized edema volume loss with superimposed encephalomalacia throughout the left cerebral hemisphere suggesting prior infarction.  ______________________________________     Electronically signed by resident: David Stewart MD  Date:     12/19/14  Time:    15:32          As the supervising and teaching physician, I personally reviewed the images and resident's interpretation and I agree with the findings.          Electronically signed by: KASH LINARES MD  Date:     12/19/14  Time:    16:36    MRA Brain without contrast    Narrative    RESULTS: See MRI Brain W WO Contrast study report done of same date for interpretation.    Impression     See MRI Brain W WO Contrast study report done of same date for interpretation.        Electronically signed by: KASH LINARES MD  Date:     12/30/14  Time:    16:40      ***    Pertinent Lab Work  Lab  Results   Component Value Date    [id number] >2000 (H) 12/06/2021     Lab Results   Component Value Date    RPR Non-reactive 12/18/2014     Lab Results   Component Value Date    FOLATE 12.9 01/14/2013     Lab Results   Component Value Date    TSH 3.194 03/03/2021     Lab Results   Component Value Date    HGBA1C 5.2 02/05/2019     No results found for: HIV1X2, [id number]    Medications  Current Outpatient Medications   Medication Instructions    acetaminophen/diphenhydramine (ACETAMINOPHEN PM ORAL) 2 tablets, Oral, Nightly PRN    albuterol (ACCUNEB) 1.25 mg/3 mL Nebu albuterol sulfate 1.25 mg/3 mL solution for nebulization    albuterol (PROAIR HFA) 90 mcg/actuation inhaler ProAir HFA 90 mcg/actuation aerosol inhaler    ALPRAZolam (XANAX) 0.5 MG tablet TAKE 1 TABLET BY MOUTH TWICE A DAY AS NEEDED    amLODIPine (NORVASC) 10 MG tablet TAKE 1 TABLET BY MOUTH EVERY DAY    ARIPiprazole (ABILIFY) 15 MG Tab Abilify 15 mg tablet    ARIPiprazole (ABILIFY) 5 MG Tab Abilify 5 mg tablet   1 po qhs    buPROPion (WELLBUTRIN XL) 300 mg, Oral, Daily    kwesi/vit B12/folic acid/vit B6 (CALCIUM-VITAMINS B6-B12-FA ORAL) calcium-vitamins B6-B12-FA   daily    cyanocobalamin (VITAMIN B-12) 1000 MCG tablet 1 tablet, Oral, Daily    Lactobacillus acidophilus 1 billion cell Tab 1 tablet, Oral, 2 times daily    lisinopriL (PRINIVIL,ZESTRIL) 40 MG tablet TAKE 1 TABLET BY MOUTH EVERY DAY    mesalamine (ASACOL HD) 800 mg, Oral, Daily    metoprolol tartrate (LOPRESSOR) 100 MG tablet TAKE 1 TABLET BY MOUTH TWICE A DAY    mometasone (ELOCON) 0.1 % solution APPLY TO SCALP TWICE A DAY    multivit-min-FA-lycopen-lutein (CENTRUM SILVER) 0.4-300-250 mg-mcg-mcg Tab Centrum Silver   daily    MYRBETRIQ 25 mg Tb24 ER tablet TAKE 1 TABLET BY MOUTH EVERY DAY    naltrexone (DEPADE) 50 mg tablet 1 tab po daily    omeprazole (PRILOSEC) 40 mg, Oral, Daily    potassium chloride (K-TAB) 20 mEq TAKE 2 TABLETS BY MOUTH EVERY DAY    primidone  "(MYSOLINE) 50 MG Tab TAKE 1 TABLET BY MOUTH THREE TIMES A DAY    promethazine (PHENERGAN) 25 mg, Oral, Every 6 hours PRN    pyridoxine HCl, vitamin B6, (VITAMIN B-6 ORAL) 1 tablet, Oral, Daily    silver-foam bandage (AQUACEL AG FOAM) 1.2 %- 4" X 4" Bndg 1 application, Topical (Top), Daily    simvastatin (ZOCOR) 20 MG tablet TAKE 1 TABLET BY MOUTH EVERY DAY IN THE EVENING    tamsulosin (FLOMAX) 0.4 mg Cap TAKE 1 CAPSULE BY MOUTH EVERY DAY    vitamin E 400 Units, Oral, Daily    vortioxetine (TRINTELLIX) 10 mg Tab 1 tablet, Oral, Daily     Recent changes to medications: {RESPONSE; YES/NO/NA:87493}    Psychiatric History  Prior Diagnoses: ***  History of Trauma: {RESPONSE; YES/NO/NA:36862}  History of Abuse: {RESPONSE; YES/NO/NA:17221}  History of Suicide Attempts: {RESPONSE; YES/NO/NA:44881}  Current Ideation, Intention, or Plan: {RESPONSE; YES/NO/NA:74299}  Homicidal Ideation: {RESPONSE; YES/NO/NA:80322}  Medication(s): {RESPONSE; YES/NO/NA:21455}  Hospitalization(s): {RESPONSE; YES/NO/NA:09469}  Psychotherapy/Counseling: {RESPONSE; YES/NO/NA:55291}    Substance Use History  Social History     Tobacco Use    Smoking status: Never Smoker    Smokeless tobacco: Never Used   Substance and Sexual Activity    Alcohol use: No    Drug use: No    Sexual activity: Yes     Partners: Female     History of abuse/overuse: {RESPONSE; YES/NO/NA:74771}    Family Neurological & Psychiatric History    Family History   Problem Relation Age of Onset    Glaucoma Mother     Clotting disorder Mother     Aneurysm Father     Lung cancer Brother      Neurologic: *** Negative for heritable risk factors.   Psychiatric: *** Negative for heritable risk factors.    Development   Prenatal and  development: ***  Developmental milestones: ***    Education  Level Attained: ***  Learning/Attention/Behavior Difficulties: {RESPONSE; YES/NO/NA:81983}  Repeated Grade(s): {RESPONSE; YES/NO/NA:53955}  Typical Grades: " "***    Occupation   Service: {RESPONSE; YES/NO/NA:00966}  Occupational Status: ***  Primary Occupation: ***    Social  Family Status: ***   Support System: ***  Hobbies/Activities: ***  Current Living Situation: ***  Typical Day: ***    Legal  Current: ***  Past: ***    OBJECTIVE:     MENTAL STATUS AND OBSERVATIONS:   Appearance: {NPSYAppearance:20062::"Casually dressed","Well groomed"} Casually dressed and adequate grooming/hygiene.   Alertness: Attentive and alert.   Orientation:   O x 4 ***   Gait:  Unable to assess   Psychomotor:  Unable to assess   Handedness:  ***   Vision & Hearing:  Adequate for session   Speech/language: Normal in rate, rhythm, tone, and volume. No significant word finding difficulty observed. Comprehension was normal.   Mood/Affect:  The patients stated mood was "***" Affect was congruent with stated mood.    Interpersonal Behavior:  Rapport was quickly and easily established    Suicidality/Homicidality: Denied   Hallucinations/Delusions:  None evidenced or endorsed   Thought Content: Logical   Though Processes: Goal-directed   Insight & Judgment:  Appropriate   Participation in Interview:  Full/minimal/dependent on collateral     PROCEDURES/TESTS ADMINISTERED: Performed a review of pertinent medical records, reviewed limits to confidentiality, conducted a clinical interview, and explained procedures.                   This service was not originating from a related E/M service provided within the previous 7 days nor will  to an E/M service or procedure within the next 24 hours or my soonest available appointment.  Prevailing standard of care was able to be met in this audio-only visit.        "

## 2022-02-08 NOTE — PROGRESS NOTES
"NEUROPSYCHOLOGICAL EVALUATION - CONFIDENTIAL    Referring Provider: Kamlesh Ramos MD   Medical Necessity: Evaluate cognitive and emotional functioning, participate in treatment planning/management, and provide supportive therapy in the setting of memory changes.  Date Conducted: 2022  Present At Visit: the patient   Billin/[id number] = 35 minutes  Referral Diagnoses: R41.3 (ICD-10-CM) - Memory changes  Consent: The patient expressed an understanding of the purpose of the evaluation and consented to all procedures. He additionally provided consent to speak with his wife, who was present during the clinical interview. We discussed the limits of confidentiality and discussed an emergency plan.    Telemedicine Details:   Established Patient - Audio Only Telehealth Visit  The patient location is: home  The chief complaint leading to consultation is: memory changes  Visit type: Virtual visit with audio only (telephone)  Total time spent with patient: 35 minutes  The reason for the audio only service rather than synchronous audio and video virtual visit was related to technical difficulties or patient preference/necessity.  Each patient to whom I provide medical services by telemedicine is: (1) informed of the relationship between the physician and patient and the respective role of any other health care provider with respect to management of the patient; and (2) notified that they may decline to receive medical services by telemedicine and may withdraw from such care at any time. Patient verbally consented to receive this service via voice-only telephone call.    ASSESSMENT & PLAN:   Mr. Reymundo Dang is an 66 y.o., male with 9 years of education and pertinent medical history including s/p right cerebral craniotomy with aneurysm coiling, with chronic migraine headaches post craniotomy and Left CVA with mild right hemiparesis apparently unknown date (chronic "compensatory" left cortical atrophy on CT) " and HTN who was referred for a neuropsychological evaluation in the setting of memory changes. Dr. Ramos's neurological exam has remain unchanged except psychomotor slowing. Speech is fluent at times (10-14 words) and then patient struggle to get out 1-2 words which varies throughout the exam. She questions if there is a somatic/conversion element present.     Full report to follow completion of testing.   Problem List Items Addressed This Visit    None     Visit Diagnoses     Memory loss    -  Primary      Thank you for allowing me to assist in Mr. Reymundo Dang's care. If you have any questions, please contact me at [phone].      Kenna Villanueva, PhD  Licensed Clinical Neuropsychologist  [facility]     CLINICAL INTERVIEW & RECORD REVIEW      -Continues Primidone for tremor which is stable and mood is now treated by psychiatry/PCP. Will watch for tremor worsening on current treatment with psychiatry/ ongoing follow up noted with Dr Wallace  -Botox helps chronic migraine for 3 months-helps headache prevention and spasticity- will continue   -Patient has had chronic mild memory loss complaint. His prior neuropsychological testing was consistent with loss consistent with CVA/aneurysm- but his wife notices worsening since this past year and states his speech is slower over time (CT head unchanged as noted below). TSH/CMP, CBC normal 2021.     Exam is unchanged except psychomotor slowing. Speech is fluent at times (10-14 words) and then patient struggle to get out 1-2 words and is variable throughout the exam.   Check B12 level and update neuropsychological testing per orders (All related to mood disorder as prior?Suspect some conversion/ psychosomatic symptoms as well) Prior testing: [facility] [place] neuropsychology            Cognitive Functioning   Cognitive screener: none  Previous evaluation(s): Completed an outpatient neuropsychological evaluation with   López in 2012. Results revealed the following:   Mr. Dang was referred for Neuropsychological Evaluation on an outpatient basis due to subjective memory decline following right craniotomy for coiling of aneurysm and left CVA.  His general cognitive abilities as assessed by the RBANS were in the severely impaired range, with low average visuospatial/constructional abilities; moderately impaired language; and severely impaired immediate verbal memory, attention, and delayed memory.  Further assessment of specific cognitive abilities revealed no deficits in temporal orientation and facial recognition but naming, verbal fluency, and constructional ability were impaired.  Additional memory assessment revealed severely impaired immediate and delayed memory. Personality test data suggested the presence of moderate depression and moderate to severe anxiety but there was overlap with physical symptoms on the anxiety inventory.  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 Naming and constructional ability are variable with performances in each area ranging from average to impaired. Mr. Dang will continue to follow up with his PCP for depression.  Onset and course of difficulty: stable cognitive changes since aneurysm coiling in 2005. His thinking got a little worse after he fell this past December 24, 2021. His wife explains that he fell 10 feet on Vo[employer]. He did not lose consciousness and did not appear to break anything. He refused medical treatment. CT remained unchanged after this occurred.   Fluctuations: no big fluctuations in mentation, but there are times when cognition is worse w/o any real pattern identified.   Examples:   Attention/Working Memory/Executive Functioning: wife doing all executive functioning activities for him. He follows along in  "tv shows and plays solitaire on his tablet with out issues.    Processing Speed: reduced   Language: Wife says seems to be getting worse. He can't always get his words out (of note, he was unable to really participate in the phone call today). Seems to understand what his wife is saying to him w/o difficulty.   Visuospatial: no problems identified  Learning & Memory: asking the same questions over and over again  Exacerbating factors: none  Ameliorating factors: none  Medication for cognition: none    Daily Functioning (I/ADLs)  ADLs: Independent and without difficulty  IADLs:  Finances: wife manages  Medication Mgmt: wife manages. She doesn't trust him with this.   Driving: does not drive since he fell. Wife won't allow it. Was doing okay before then.  Household Mgmt: wife manages  Cooking/Meal Preparation: wife managing. "If he could stand that long, he could probably be okay with cooking."  Shopping: wife manages. patient rides with his wife to run errans but no longer wants to get out of the car  Appointment Mgmt: wife manages    Psychiatric/Neuropsychiatric Symptoms  Mood: fine  Depression: no - managed by Dr. Wallace   Marilyn/Hypomania: no  Anxiety: meds for anxiety for years. Nothing he just started.   Stress: no  Social Withdrawal: no  Neurovegetative Sxs:  Appetite: normal  Sleep: always had trouble sleeping. Takes 2 PM Tylenol to help him sleep. Sleeping all night and part of the day. Sleeping a lot lately.   Energy: low  Hallucinations: no  Delusional/Paranoid Thinking: no  Impulsivity: no  Obsessive/Compulsive Behaviors: no  Disinhibition: no  Irritability/Agitation: no  Aggression: no  Apathy/Indifference: no  Other changes in personality: no    Physical Functioning  Motor Sxs: continues Primidone for tremor control. RLS. Psychomotor slowing  Gait Sxs: long standing gait difficulty related to chronic right HP. One recent fall w/o LOC.     Sensory Sxs: right sided hemiparesis. Otherwise unknown.   Pain: " none  Physical Exercise Routine: none    RELEVANT HISTORY  This patient has a past medical history of Aneurysm, Crohn's disease, CVA (cerebral vascular accident), Edema, History of organic brain syndrome, Hyperlipidemia, Hypertension, and Restless legs syndrome (RLS).    Past Surgical History:   Procedure Laterality Date    BRAIN SURGERY      Stent Aneurysm    CERVICAL SPINE SURGERY      CHOLECYSTECTOMY      HERNIA REPAIR      SHOULDER SURGERY      Right     Neurological History   Headaches/Migraines: yes - receiving Botox currently   TBI: unknown  Seizures: no  Stroke: yes - s/p right cerebral craniotomy with aneurysm coiling, with chronic migraine headaches post craniotomy and Left CVA with mild right hemiparesis apparently unknown date. His wife questions if he had another stroke.   Tumor: no  Previous Episodes of Delirium: no  Movement Disorder: tremor  CNS Infection: no  Other: no    Neurodiagnostics  Results for orders placed or performed during the hospital encounter of 03/03/21   CT Head Without Contrast    Narrative    EXAMINATION:  CT HEAD WITHOUT CONTRAST    CLINICAL HISTORY:  frequent falls, right sided weakness;  Chronic migraine without aura, not intractable, without status migrainosus    TECHNIQUE:  Multiple sequential 5 mm axial images of the head without contrast.  Coronal and sagittal reformatted imaging from the axial acquisition.    COMPARISON:  04/30/2019    FINDINGS:  Age-appropriate generalized cerebral volume loss.  Dilatation of the left lateral ventricle similar to prior examinations with encephalomalacia of the left frontal parietal lobes.  No mass effect or midline shift.  No evidence for active hydrocephalus.  No intracranial hemorrhage.  Postoperative changes with surgical clip in the region of the anterior communicating arteries.  Right-sided craniotomy change.  There is no midline shift or mass effect.  Visualized paranasal sinuses and mastoid air cells are clear.      Impression     Postoperative changes of right frontotemporal craniotomy with chronic encephalomalacia of the left cerebral hemisphere with compensatory enlargement of the left lateral ventricle, similar to before.  No acute intracranial process is seen.      Electronically signed by: Lisa Wolfe MD  Date:    03/03/2021  Time:    10:03   Results for orders placed or performed during the hospital encounter of 12/18/14   MRI Brain W WO Contrast    Narrative    Technique: Routine MRI/MRA head and neck performed with without use of 20 cc of Multihance IV contrast.    Comparison: CT 12-18-14.    Findings:    MRI BRAIN:     There are postsurgical changes consistent with right pterion craniotomy for a right sided approach aneurysmal clipping at the level of the anterior communicating artery.    There is no abnormal restricted diffusion to suggest acute infarction.  No gradient susceptibility to suggest hemorrhage.  There is continued generalized cerebral volume loss that is advanced for the patient's age and demonstrate marked encephalomalacia   throughout the left cerebral hemisphere suggesting prior infarction.  There is compensatory increase in the size of the ventricular system.  No mass, mass effect or midline shift.  No abnormal intra-or extra-axial fluid collections.  The sellar region is   unremarkable.  The cerebellar tonsils are in their expected location.  The major T2 flow voids are not well evaluated due to susceptibility artifact from the adjacent coil pack.      There is mild mucosal membrane thickening within the left maxillary antrum.  Orbits are unremarkable.  There is mild fluid within the inferior aspect of the left mastoid air cells.  Osseous structures are unremarkable.    MRA BRAIN:    There is attenuation and diminished caliber of the left MCA branches, likely sequela from decreased demand from the atrophic ipsilateral brain parenchyma.  Bilateral posterior communicating arteries are noted.  There is a small  P1 segment of the right   PCA.  Anterior circulation vessels are not well evaluated due to susceptibility artifact from adjacent coil pack.    Impression    No acute intracranial abnormalities. Specifically, no evidence of acute infarction or enhancing mass lesion.    Attenuation and diminished caliber of the left MCA branches, likely sequela from decreased demand from the atrophic/chronically infarcted ipsilateral brain parenchyma.  No intracranial aneurysm or focal stenosis identified.    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    Moderate, age advanced generalized edema volume loss with superimposed encephalomalacia throughout the left cerebral hemisphere suggesting prior infarction.  ______________________________________     Electronically signed by resident: David Stewart MD  Date:     12/19/14  Time:    15:32          As the supervising and teaching physician, I personally reviewed the images and resident's interpretation and I agree with the findings.          Electronically signed by: KASH LINARES MD  Date:     12/19/14  Time:    16:36    MRA Brain without contrast    Narrative    RESULTS: See MRI Brain W WO Contrast study report done of same date for interpretation.    Impression     See MRI Brain W WO Contrast study report done of same date for interpretation.        Electronically signed by: KASH LINARES MD  Date:     12/30/14  Time:    16:40      Pertinent Lab Work  Lab Results   Component Value Date    OSXORGWV91 >2000 (H) 12/06/2021     Lab Results   Component Value Date    RPR Non-reactive 12/18/2014     Lab Results   Component Value Date    FOLATE 12.9 01/14/2013     Lab Results   Component Value Date    TSH 3.194 03/03/2021     Lab Results   Component Value Date    HGBA1C 5.2 02/05/2019     No results found for: HIV1X2, WIU50RUFU    Medications  Current Outpatient Medications   Medication Instructions    acetaminophen/diphenhydramine (ACETAMINOPHEN PM  "ORAL) 2 tablets, Oral, Nightly PRN    albuterol (ACCUNEB) 1.25 mg/3 mL Nebu albuterol sulfate 1.25 mg/3 mL solution for nebulization    albuterol (PROAIR HFA) 90 mcg/actuation inhaler ProAir HFA 90 mcg/actuation aerosol inhaler    ALPRAZolam (XANAX) 0.5 MG tablet TAKE 1 TABLET BY MOUTH TWICE A DAY AS NEEDED    amLODIPine (NORVASC) 10 MG tablet TAKE 1 TABLET BY MOUTH EVERY DAY    ARIPiprazole (ABILIFY) 15 MG Tab Abilify 15 mg tablet    ARIPiprazole (ABILIFY) 5 MG Tab Abilify 5 mg tablet   1 po qhs    buPROPion (WELLBUTRIN XL) 300 mg, Oral, Daily    kwesi/vit B12/folic acid/vit B6 (CALCIUM-VITAMINS B6-B12-FA ORAL) calcium-vitamins B6-B12-FA   daily    cyanocobalamin (VITAMIN B-12) 1000 MCG tablet 1 tablet, Oral, Daily    Lactobacillus acidophilus 1 billion cell Tab 1 tablet, Oral, 2 times daily    lisinopriL (PRINIVIL,ZESTRIL) 40 MG tablet TAKE 1 TABLET BY MOUTH EVERY DAY    mesalamine (ASACOL HD) 800 mg, Oral, Daily    metoprolol tartrate (LOPRESSOR) 100 MG tablet TAKE 1 TABLET BY MOUTH TWICE A DAY    mometasone (ELOCON) 0.1 % solution APPLY TO SCALP TWICE A DAY    multivit-min-FA-lycopen-lutein (CENTRUM SILVER) 0.4-300-250 mg-mcg-mcg Tab Centrum Silver   daily    MYRBETRIQ 25 mg Tb24 ER tablet TAKE 1 TABLET BY MOUTH EVERY DAY    naltrexone (DEPADE) 50 mg tablet 1 tab po daily    omeprazole (PRILOSEC) 40 mg, Oral, Daily    potassium chloride (K-TAB) 20 mEq TAKE 2 TABLETS BY MOUTH EVERY DAY    primidone (MYSOLINE) 50 MG Tab TAKE 1 TABLET BY MOUTH THREE TIMES A DAY    promethazine (PHENERGAN) 25 mg, Oral, Every 6 hours PRN    pyridoxine HCl, vitamin B6, (VITAMIN B-6 ORAL) 1 tablet, Oral, Daily    silver-foam bandage (AQUACEL AG FOAM) 1.2 %- 4" X 4" Bndg 1 application, Topical (Top), Daily    simvastatin (ZOCOR) 20 MG tablet TAKE 1 TABLET BY MOUTH EVERY DAY IN THE EVENING    tamsulosin (FLOMAX) 0.4 mg Cap TAKE 1 CAPSULE BY MOUTH EVERY DAY    vitamin E 400 Units, Oral, Daily    vortioxetine " (TRINTELLIX) 10 mg Tab 1 tablet, Oral, Daily     Psychiatric History  Prior Diagnoses: anxiety   History of Trauma/Abuse: unknown  History of Suicide Attempts: unknown  Current Ideation, Intention, or Plan: no  Homicidal Ideation: no  Medication(s): Xanax, Wellbutrin, Abilify, and Trintellix   Hospitalization(s): no  Psychotherapy/Counseling: currently attending sessions with psychiatry     Substance Use History  Social History     Tobacco Use    Smoking status: Never Smoker    Smokeless tobacco: Never Used   Substance and Sexual Activity    Alcohol use: No    Drug use: No    Sexual activity: Yes     Partners: Female     History of abuse/overuse: no    Family Neurological & Psychiatric History    Family History   Problem Relation Age of Onset    Glaucoma Mother     Clotting disorder Mother     Aneurysm Father     Lung cancer Brother      Neurologic: see above   Psychiatric: Negative for heritable risk factors.    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     Social  Family Status: . 4 chidlren.    Support System: good - wife mainly  Hobbies/Activities: spending time with family   Current Living Situation: Lives with wife,daughter, and daughter's fiance    OBJECTIVE:     MENTAL STATUS AND OBSERVATIONS:   Appearance: Unable to assess   Alertness: Attentive and alert.   Orientation:   Unable to assess   Gait:  Unable to assess   Psychomotor:  Unable to assess   Handedness:     Vision & Hearing:  Adequate for session   Speech/language: Comprehension was reduced. Patient was unable to participate in the phone call.   Mood/Affect:  The patients mood and affect were not able to be  assessed.    Interpersonal Behavior:  Rapport was quickly and easily established    Suicidality/Homicidality: Denied   Hallucinations/Delusions:  None evidenced or endorsed   Thought Content: Unable to assess   Though Processes: Unable to assess   Insight & Judgment:  Appropriate   Participation in Interview:  Minimally participated. Collateral answered most questions.      PROCEDURES/TESTS ADMINISTERED: Performed a review of pertinent medical records, reviewed limits to confidentiality, conducted a clinical interview, and explained procedures.                 This service was not originating from a related E/M service provided within the previous 7 days nor will  to an E/M service or procedure within the next 24 hours or my soonest available appointment.  Prevailing standard of care was able to be met in this audio-only visit.

## 2022-02-09 ENCOUNTER — OFFICE VISIT (OUTPATIENT)
Dept: NEUROLOGY | Facility: CLINIC | Age: 67
End: 2022-02-09
Payer: MEDICARE

## 2022-02-09 DIAGNOSIS — R41.3 MEMORY LOSS: Primary | ICD-10-CM

## 2022-02-09 PROCEDURE — 96116 PR NEUROBEHAVIORAL STATUS EXAM BY PSYCH/PHYS: ICD-10-PCS | Mod: 95,,, | Performed by: CLINICAL NEUROPSYCHOLOGIST

## 2022-02-09 PROCEDURE — 96116 NUBHVL XM PHYS/QHP 1ST HR: CPT | Mod: 95,,, | Performed by: CLINICAL NEUROPSYCHOLOGIST

## 2022-02-09 PROCEDURE — 99499 UNLISTED E&M SERVICE: CPT | Mod: 95,,, | Performed by: CLINICAL NEUROPSYCHOLOGIST

## 2022-02-09 PROCEDURE — 99499 NO LOS: ICD-10-PCS | Mod: 95,,, | Performed by: CLINICAL NEUROPSYCHOLOGIST

## 2022-02-24 ENCOUNTER — TELEPHONE (OUTPATIENT)
Dept: NEUROLOGY | Facility: CLINIC | Age: 67
End: 2022-02-24
Payer: MEDICARE

## 2022-03-02 ENCOUNTER — PATIENT OUTREACH (OUTPATIENT)
Dept: ADMINISTRATIVE | Facility: OTHER | Age: 67
End: 2022-03-02
Payer: MEDICARE

## 2022-03-03 ENCOUNTER — PATIENT OUTREACH (OUTPATIENT)
Dept: ADMINISTRATIVE | Facility: HOSPITAL | Age: 67
End: 2022-03-03
Payer: MEDICARE

## 2022-03-03 DIAGNOSIS — Z12.11 COLON CANCER SCREENING: Primary | ICD-10-CM

## 2022-03-03 NOTE — PROGRESS NOTES
Chart reviewed, immunization record updated.  No new results noted on [facility] or [facility] web site.  Care Everywhere updated.   Patient care coordination note updated.  Left detailed message for patient to call to discuss Colorectal Cancer Screening and scheduling routine PCP visit.

## 2022-03-03 NOTE — PROGRESS NOTES
Health Maintenance Due   Topic Date Due    Shingles Vaccine (1 of 2) Never done    Colorectal Cancer Screening  12/16/2020    Influenza Vaccine (1) 09/01/2021    COVID-19 Vaccine (3 - Booster for Pfizer series) 09/14/2021    Pneumococcal Vaccines (Age 65+) (2 of 2 - PPSV23) 03/03/2022     Updates were requested from care everywhere.  Chart was reviewed for overdue Proactive [facility] Encounters ([facility]) topics (CRS, Breast Cancer Screening, Eye exam)  Health Maintenance has been updated.  [facility] immunization registry triggered.  Immunizations were reconciled.

## 2022-03-07 ENCOUNTER — PROCEDURE VISIT (OUTPATIENT)
Dept: NEUROLOGY | Facility: CLINIC | Age: 67
End: 2022-03-07
Payer: MEDICARE

## 2022-03-07 DIAGNOSIS — F80.9 LANGUAGE IMPAIRMENT: ICD-10-CM

## 2022-03-07 DIAGNOSIS — G43.719 INTRACTABLE CHRONIC MIGRAINE WITHOUT AURA AND WITHOUT STATUS MIGRAINOSUS: Primary | ICD-10-CM

## 2022-03-07 PROCEDURE — 64615 PR CHEMODENERVATION OF MUSCLE FOR CHRONIC MIGRAINE: ICD-10-PCS | Mod: S$GLB,,, | Performed by: PSYCHIATRY & NEUROLOGY

## 2022-03-07 PROCEDURE — 64615 CHEMODENERV MUSC MIGRAINE: CPT | Mod: S$GLB,,, | Performed by: PSYCHIATRY & NEUROLOGY

## 2022-03-07 RX ORDER — LISINOPRIL 40 MG/1
TABLET ORAL
Qty: 90 TABLET | Refills: 0 | Status: SHIPPED | OUTPATIENT
Start: 2022-03-07 | End: 2022-04-18

## 2022-03-07 NOTE — PROCEDURES
Procedures          BOTOX PROCEDURE NOTE       Date of Procedure: 3/7/2022      Reason for Procedure: Chronic Migraine and post CVA spasticity on the right          Procedure Details:  Informed consent was obtained prior to performing this procedure. Two patient identifiers were confirmed with the patient prior to performing injections. A time out to determine correct patient and and agreement on procedure performed was conducted prior to the injections     The patient's head and upper neck and upper right arm was cleansed with alcohol rub and 200 Units of Botox (diluted 1:1) was injected in the following bilateral muscles:    10 units in corregator* (total over 2 sites), 5 units in Procerus, 20 units Frontalis* (over 4 sites), 40 units in Temporalis* (over 4 sites), 30 units in occipitalis* (over 6 sites), 20 units in the cervical paraspinal muscles* (over 4 sites), and 30 units in Trapezius* (over 4 sites).     Not done today but done prior and can do PRN: 20 units were added to the right deltoid and 20 units in the right levator scapulae for upper limb spasticity from prior CVA. 5 units given to right pectoral major muscle for spasticity as well.    *= denotes bilateral injections        Medications used: botulinum toxin 200 units diluted 1:1 with normal saline used to dilute Botox       The patient tolerated the procedure well with no more than 0.25cc of blood loss. He was observed for several minutes post injection and given a handout from [employer] regarding when and where to seek help if side effects are experienced            -Continues Primidone for tremor which is stable and mood is now treated by psychiatry/PCP. Will watch for tremor worsening on current treatment with psychiatry/ ongoing follow up noted with Dr aWllace  -Botox helps chronic migraine for 3 months-helps headache prevention and spasticity- will continue   -Patient has had chronic mild memory loss complaint. His prior neuropsychological testing  was consistent with loss consistent with CVA/aneurysm- but his wife notices worsening since this past year and states his speech is slower over time (CT head unchanged as noted below). TSH/CMP, CBC normal 2021.  Exam is unchanged except psychomotor slowing as noted at the last visit and less fluent speech today/ but effort is variablel Last visit: Speech is fluent at times (10-14 words) and then patient struggle to get out 1-2 words and is variable throughout the exam.    Today: no more than 3 words but all appropriate. Does not repeat.  Update CT head now per orders (can't have MRI due to coils)   Labs 2022: No B12 deficiency found.  Pending updated neuropsychological testing per orders (All related to mood disorder as prior?Suspect some conversion/ psychosomatic symptoms based on exam)  He saw bariatric medicine about obesity prior     Note MRA brain 2014: There is attenuation and diminished caliber of the left MCA branches, likely sequela from decreased demand from the atrophic ipsilateral brain parenchyma.  Bilateral posterior communicating arteries are noted.  There is a small P1 segment of the right   PCA.  Anterior circulation vessels are not well evaluated due to susceptibility artifact from adjacent coil pack. No further work up needed at this time     -Having some long standing gait difficulty related to chronic right HP   -In 2021: home health with home PT, has PFO recommended prior  -updated CT head unchanged 3/2021        RTC in 12 weeks or more for more Botox

## 2022-03-08 ENCOUNTER — HOSPITAL ENCOUNTER (OUTPATIENT)
Dept: RADIOLOGY | Facility: HOSPITAL | Age: 67
Discharge: HOME | End: 2022-03-08
Attending: PSYCHIATRY & NEUROLOGY
Payer: MEDICARE

## 2022-03-08 DIAGNOSIS — F80.9 LANGUAGE IMPAIRMENT: ICD-10-CM

## 2022-03-08 PROCEDURE — 70450 CT HEAD/BRAIN W/O DYE: CPT | Mod: 26,,, | Performed by: RADIOLOGY

## 2022-03-08 PROCEDURE — 70450 CT HEAD/BRAIN W/O DYE: CPT | Mod: TC

## 2022-03-08 PROCEDURE — 70450 CT HEAD WITHOUT CONTRAST: ICD-10-PCS | Mod: 26,,, | Performed by: RADIOLOGY

## 2022-03-23 ENCOUNTER — OFFICE VISIT (OUTPATIENT)
Dept: NEUROLOGY | Facility: CLINIC | Age: 67
End: 2022-03-23
Payer: MEDICARE

## 2022-03-23 DIAGNOSIS — I69.359 HEMIPARESIS AFFECTING DOMINANT SIDE AS LATE EFFECT OF CEREBROVASCULAR ACCIDENT (CVA): ICD-10-CM

## 2022-03-23 DIAGNOSIS — R41.3 MEMORY LOSS: ICD-10-CM

## 2022-03-23 DIAGNOSIS — Z86.59: ICD-10-CM

## 2022-03-23 DIAGNOSIS — F02.818 MAJOR NEUROCOGNITIVE DISORDER DUE TO ANOTHER MEDICAL CONDITION WITH BEHAVIORAL DISTURBANCE: Primary | ICD-10-CM

## 2022-03-23 DIAGNOSIS — F33.41 RECURRENT MAJOR DEPRESSIVE DISORDER, IN PARTIAL REMISSION: ICD-10-CM

## 2022-03-23 PROCEDURE — 96132 NRPSYC TST EVAL PHYS/QHP 1ST: CPT | Mod: S$GLB,,, | Performed by: CLINICAL NEUROPSYCHOLOGIST

## 2022-03-23 PROCEDURE — 99999 PR PBB SHADOW E&M-EST. PATIENT-LVL I: ICD-10-PCS | Mod: PBBFAC,,,

## 2022-03-23 PROCEDURE — 96133 NRPSYC TST EVAL PHYS/QHP EA: CPT | Mod: S$GLB,,, | Performed by: CLINICAL NEUROPSYCHOLOGIST

## 2022-03-23 PROCEDURE — 4010F PR ACE/ARB THEARPY RXD/TAKEN: ICD-10-PCS | Mod: CPTII,S$GLB,, | Performed by: CLINICAL NEUROPSYCHOLOGIST

## 2022-03-23 PROCEDURE — 96139 PSYCL/NRPSYC TST TECH EA: CPT | Mod: S$GLB,,, | Performed by: CLINICAL NEUROPSYCHOLOGIST

## 2022-03-23 PROCEDURE — 4010F ACE/ARB THERAPY RXD/TAKEN: CPT | Mod: CPTII,S$GLB,, | Performed by: CLINICAL NEUROPSYCHOLOGIST

## 2022-03-23 PROCEDURE — 99499 NO LOS: ICD-10-PCS | Mod: S$GLB,,, | Performed by: CLINICAL NEUROPSYCHOLOGIST

## 2022-03-23 PROCEDURE — 96139 PR PSYCH/NEUROPSYCH TEST ADMIN/SCORING, BY TECH, 2+ TESTS, EA ADDTL 30 MIN: ICD-10-PCS | Mod: S$GLB,,, | Performed by: CLINICAL NEUROPSYCHOLOGIST

## 2022-03-23 PROCEDURE — 99999 PR PBB SHADOW E&M-EST. PATIENT-LVL I: CPT | Mod: PBBFAC,,,

## 2022-03-23 PROCEDURE — 96138 PR PSYCH/NEUROPSYCH TEST ADMIN/SCORING, BY TECH, 2+ TESTS, 1ST 30 MIN: ICD-10-PCS | Mod: S$GLB,,, | Performed by: CLINICAL NEUROPSYCHOLOGIST

## 2022-03-23 PROCEDURE — 96133 PR NEUROPSYCHOLOGIC TEST EVAL SVCS, EA ADDTL HR: ICD-10-PCS | Mod: S$GLB,,, | Performed by: CLINICAL NEUROPSYCHOLOGIST

## 2022-03-23 PROCEDURE — 96138 PSYCL/NRPSYC TECH 1ST: CPT | Mod: S$GLB,,, | Performed by: CLINICAL NEUROPSYCHOLOGIST

## 2022-03-23 PROCEDURE — 96132 PR NEUROPSYCHOLOGIC TEST EVAL SVCS, 1ST HR: ICD-10-PCS | Mod: S$GLB,,, | Performed by: CLINICAL NEUROPSYCHOLOGIST

## 2022-03-23 PROCEDURE — 99499 UNLISTED E&M SERVICE: CPT | Mod: S$GLB,,, | Performed by: CLINICAL NEUROPSYCHOLOGIST

## 2022-03-30 NOTE — PROGRESS NOTES
"NEUROPSYCHOLOGICAL EVALUATION - CONFIDENTIAL    Referring Provider: Kamlesh Ramos MD   Medical Necessity: Evaluate cognitive and emotional functioning, participate in treatment planning/management, and provide supportive therapy in the setting of memory changes.  Date Conducted: 2/9/2022 & 3/23/2022  Present At Visit: the patient   Referral Diagnoses: R41.3 (ICD-10-CM) - Memory changes  Consent: The patient expressed an understanding of the purpose of the evaluation and consented to all procedures. He additionally provided consent to speak with his wife, who was present during the clinical interview. We discussed the limits of confidentiality and discussed an emergency plan.    ASSESSMENT & PLAN:   Mr. Reymundo Dang is an 66 y.o., male with 9 years of education and pertinent medical history including s/p right cerebral craniotomy with aneurysm coiling, with chronic migraine headaches post craniotomy and Left CVA with mild right hemiparesis apparently unknown date (chronic "compensatory" left cortical atrophy on CT) and HTN who was referred for a neuropsychological evaluation in the setting of memory changes. Dr. Ramos's neurological exam has remain unchanged except psychomotor slowing. Speech is fluent at times (10-14 words), but then he struggled to get out 1-2 words, which varied throughout her exam. She questions if there is a somatic/conversion element present.     Compared to his 2012 evaluation, results reveal a relatively stable, but globally impaired, cognitive profile. Positively, he is still consolidating a good portion of the visually-based information he learned and some of the verbally-based information he learned. His symptoms of depression and anxiety were reported to be mild now, when they were moderate in 2012. His confrontation naming performances is slightly worse than previously. He refused to take a guess for most temporal orientation questions (did know the month). All other " testing (that was able to be compared) is stable.     A diagnosis of Major Neurocognitive Disorder continues to be most appropriate. While his wife reports noticing subjective worsening of cognition following a fall in December 2021, imaging has been unrevealing of changes. His cognitive profile is also unrevealing of significant changes. His pattern of difficulty does not clearly point to any neurodegenerative conditions either. It's certainly possible some of his symptoms are psychogenic in nature, which continued support and psychiatric care should help to attenuate.     I believe his wife is providing the appropriate amount of oversight. This should continue. He should not return to driving. I can refer them to our care management team for dementia resources and support. It will be important to maintain good control over vascular risk factors. Brain health behaviors will be emphasized. Re-evaluation in one year to track cognition. He and his wife are welcome to return sooner should they notice any worsening in his cognition.     Problem List Items Addressed This Visit        Neuro    Hemiparesis affecting dominant side as late effect of cerebrovascular accident (CVA)    Major neurocognitive disorder due to another medical condition with behavioral disturbance - Primary       Psychiatric    History of organic brain syndrome    Recurrent major depressive disorder, in partial remission      Other Visit Diagnoses     Memory loss          Thank you for allowing me to assist in Mr. Reymundo Dang's care. If you have any questions, please contact me at [phone].      Kenna Villanueva, PhD  Licensed Clinical Neuropsychologist  [facility]     CLINICAL INTERVIEW & RECORD REVIEW      Cognitive Functioning   Cognitive screener: none  Previous evaluation(s): Completed an outpatient neuropsychological evaluation with Dr. Dawn in 2012. Results revealed the  following:   Mr. Dang was referred for Neuropsychological Evaluation on an outpatient basis due to subjective memory decline following right craniotomy for coiling of aneurysm and left CVA.  His general cognitive abilities as assessed by the RBANS were in the severely impaired range, with low average visuospatial/constructional abilities; moderately impaired language; and severely impaired immediate verbal memory, attention, and delayed memory.  Further assessment of specific cognitive abilities revealed no deficits in temporal orientation and facial recognition but naming, verbal fluency, and constructional ability were impaired.  Additional memory assessment revealed severely impaired immediate and delayed memory. Personality test data suggested the presence of moderate depression and moderate to severe anxiety but there was overlap with physical symptoms on the anxiety inventory.  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 Naming and constructional ability are variable with performances in each area ranging from average to impaired. Mr. Dang will continue to follow up with his PCP for depression.  Onset and course of difficulty: stable cognitive changes since aneurysm coiling in 2005. His thinking got a little worse after he fell this past December 24, 2021. His wife explains that he fell 10 feet on [place]. He did not lose consciousness and did not appear to break anything. He refused medical treatment. CT remained unchanged after this occurred.   Fluctuations: no big fluctuations in mentation, but there are times when cognition is worse w/o any real pattern identified.   Examples:   Attention/Working Memory/Executive Functioning: wife doing all executive functioning activities for him. He follows along in tv shows and plays solitaire on his  "tablet with out issues.    Processing Speed: reduced   Language: Wife says seems to be getting worse. He can't always get his words out (of note, he was unable to really participate in the phone call today). Seems to understand what his wife is saying to him w/o difficulty.   Visuospatial: no problems identified  Learning & Memory: asking the same questions over and over again  Exacerbating factors: none  Ameliorating factors: none  Medication for cognition: none    Daily Functioning (I/ADLs)  ADLs: Independent and without difficulty  IADLs:  Finances: wife manages  Medication Mgmt: wife manages. She doesn't trust him with this.   Driving: does not drive since he fell. Wife won't allow it. Was doing okay before then.  Household Mgmt: wife manages  Cooking/Meal Preparation: wife managing. "If he could stand that long, he could probably be okay with cooking."  Shopping: wife manages. patient rides with his wife to run errans but no longer wants to get out of the car  Appointment Mgmt: wife manages    Psychiatric/Neuropsychiatric Symptoms  Mood: fine  Depression: no - managed by Dr. Wallace   Marilyn/Hypomania: no  Anxiety: meds for anxiety for years. Nothing he just started.   Stress: no  Social Withdrawal: no  Neurovegetative Sxs:  Appetite: normal  Sleep: always had trouble sleeping. Takes 2 PM Tylenol to help him sleep. Sleeping all night and part of the day. Sleeping a lot lately.   Energy: low  Hallucinations: no  Delusional/Paranoid Thinking: no  Impulsivity: no  Obsessive/Compulsive Behaviors: no  Disinhibition: no  Irritability/Agitation: no  Aggression: no  Apathy/Indifference: no  Other changes in personality: no    Physical Functioning  Motor Sxs: continues Primidone for tremor control. RLS. Psychomotor slowing  Gait Sxs: long standing gait difficulty related to chronic right HP. One recent fall w/o LOC.     Sensory Sxs: right sided hemiparesis. Otherwise unknown.   Pain: none  Physical Exercise Routine: " none    RELEVANT HISTORY  This patient has a past medical history of Aneurysm, Crohn's disease, CVA (cerebral vascular accident), Edema, History of organic brain syndrome, Hyperlipidemia, Hypertension, and Restless legs syndrome (RLS).    Past Surgical History:   Procedure Laterality Date    BRAIN SURGERY      Stent Aneurysm    CERVICAL SPINE SURGERY      CHOLECYSTECTOMY      HERNIA REPAIR      SHOULDER SURGERY      Right     Neurological History   Headaches/Migraines: yes - receiving Botox currently   TBI: unknown  Seizures: no  Stroke: yes - s/p right cerebral craniotomy with aneurysm coiling, with chronic migraine headaches post craniotomy and Left CVA with mild right hemiparesis apparently unknown date. His wife questions if he had another stroke.   Tumor: no  Previous Episodes of Delirium: no  Movement Disorder: tremor  CNS Infection: no  Other: no    Neurodiagnostics  Results for orders placed or performed during the hospital encounter of 03/03/21   CT Head Without Contrast    Narrative    EXAMINATION:  CT HEAD WITHOUT CONTRAST    CLINICAL HISTORY:  frequent falls, right sided weakness;  Chronic migraine without aura, not intractable, without status migrainosus    TECHNIQUE:  Multiple sequential 5 mm axial images of the head without contrast.  Coronal and sagittal reformatted imaging from the axial acquisition.    COMPARISON:  04/30/2019    FINDINGS:  Age-appropriate generalized cerebral volume loss.  Dilatation of the left lateral ventricle similar to prior examinations with encephalomalacia of the left frontal parietal lobes.  No mass effect or midline shift.  No evidence for active hydrocephalus.  No intracranial hemorrhage.  Postoperative changes with surgical clip in the region of the anterior communicating arteries.  Right-sided craniotomy change.  There is no midline shift or mass effect.  Visualized paranasal sinuses and mastoid air cells are clear.      Impression    Postoperative changes of  right frontotemporal craniotomy with chronic encephalomalacia of the left cerebral hemisphere with compensatory enlargement of the left lateral ventricle, similar to before.  No acute intracranial process is seen.      Electronically signed by: Lisa Wolfe MD  Date:    03/03/2021  Time:    10:03   Results for orders placed or performed during the hospital encounter of 12/18/14   MRI Brain W WO Contrast    Narrative    Technique: Routine MRI/MRA head and neck performed with without use of 20 cc of Multihance IV contrast.    Comparison: CT 12-18-14.    Findings:    MRI BRAIN:     There are postsurgical changes consistent with right pterion craniotomy for a right sided approach aneurysmal clipping at the level of the anterior communicating artery.    There is no abnormal restricted diffusion to suggest acute infarction.  No gradient susceptibility to suggest hemorrhage.  There is continued generalized cerebral volume loss that is advanced for the patient's age and demonstrate marked encephalomalacia   throughout the left cerebral hemisphere suggesting prior infarction.  There is compensatory increase in the size of the ventricular system.  No mass, mass effect or midline shift.  No abnormal intra-or extra-axial fluid collections.  The sellar region is   unremarkable.  The cerebellar tonsils are in their expected location.  The major T2 flow voids are not well evaluated due to susceptibility artifact from the adjacent coil pack.      There is mild mucosal membrane thickening within the left maxillary antrum.  Orbits are unremarkable.  There is mild fluid within the inferior aspect of the left mastoid air cells.  Osseous structures are unremarkable.    MRA BRAIN:    There is attenuation and diminished caliber of the left MCA branches, likely sequela from decreased demand from the atrophic ipsilateral brain parenchyma.  Bilateral posterior communicating arteries are noted.  There is a small P1 segment of the right    PCA.  Anterior circulation vessels are not well evaluated due to susceptibility artifact from adjacent coil pack.    Impression    No acute intracranial abnormalities. Specifically, no evidence of acute infarction or enhancing mass lesion.    Attenuation and diminished caliber of the left MCA branches, likely sequela from decreased demand from the atrophic/chronically infarcted ipsilateral brain parenchyma.  No intracranial aneurysm or focal stenosis identified.    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    Moderate, age advanced generalized edema volume loss with superimposed encephalomalacia throughout the left cerebral hemisphere suggesting prior infarction.  ______________________________________     Electronically signed by resident: David Stewart MD  Date:     12/19/14  Time:    15:32          As the supervising and teaching physician, I personally reviewed the images and resident's interpretation and I agree with the findings.          Electronically signed by: KASH LINARES MD  Date:     12/19/14  Time:    16:36    MRA Brain without contrast    Narrative    RESULTS: See MRI Brain W WO Contrast study report done of same date for interpretation.    Impression     See MRI Brain W WO Contrast study report done of same date for interpretation.        Electronically signed by: KASH LINARES MD  Date:     12/30/14  Time:    16:40      Pertinent Lab Work  Lab Results   Component Value Date    OUBUJYMX01 >2000 (H) 12/06/2021     Lab Results   Component Value Date    RPR Non-reactive 12/18/2014     Lab Results   Component Value Date    FOLATE 12.9 01/14/2013     Lab Results   Component Value Date    TSH 3.194 03/03/2021     Lab Results   Component Value Date    HGBA1C 5.2 02/05/2019     No results found for: HIV1X2, [id number]    Medications  Current Outpatient Medications   Medication Instructions    acetaminophen/diphenhydramine (ACETAMINOPHEN PM ORAL) 2 tablets, Oral,  "Nightly PRN    albuterol (ACCUNEB) 1.25 mg/3 mL Nebu albuterol sulfate 1.25 mg/3 mL solution for nebulization    albuterol (PROAIR HFA) 90 mcg/actuation inhaler ProAir HFA 90 mcg/actuation aerosol inhaler    ALPRAZolam (XANAX) 0.5 MG tablet TAKE 1 TABLET BY MOUTH TWICE A DAY AS NEEDED    amLODIPine (NORVASC) 10 MG tablet TAKE 1 TABLET BY MOUTH EVERY DAY    ARIPiprazole (ABILIFY) 15 MG Tab Abilify 15 mg tablet    ARIPiprazole (ABILIFY) 5 MG Tab Abilify 5 mg tablet   1 po qhs    buPROPion (WELLBUTRIN XL) 300 mg, Oral, Daily    kwesi/vit B12/folic acid/vit B6 (CALCIUM-VITAMINS B6-B12-FA ORAL) calcium-vitamins B6-B12-FA   daily    cyanocobalamin (VITAMIN B-12) 1000 MCG tablet 1 tablet, Oral, Daily    Lactobacillus acidophilus 1 billion cell Tab 1 tablet, Oral, 2 times daily    lisinopriL (PRINIVIL,ZESTRIL) 40 MG tablet TAKE 1 TABLET BY MOUTH EVERY DAY    mesalamine (ASACOL HD) 800 mg, Oral, Daily    metoprolol tartrate (LOPRESSOR) 100 MG tablet TAKE 1 TABLET BY MOUTH TWICE A DAY    mometasone (ELOCON) 0.1 % solution APPLY TO SCALP TWICE A DAY    multivit-min-FA-lycopen-lutein (CENTRUM SILVER) 0.4-300-250 mg-mcg-mcg Tab Centrum Silver   daily    MYRBETRIQ 25 mg Tb24 ER tablet TAKE 1 TABLET BY MOUTH EVERY DAY    naltrexone (DEPADE) 50 mg tablet 1 tab po daily    omeprazole (PRILOSEC) 40 mg, Oral, Daily    potassium chloride (K-TAB) 20 mEq TAKE 2 TABLETS BY MOUTH EVERY DAY    primidone (MYSOLINE) 50 MG Tab TAKE 1 TABLET BY MOUTH THREE TIMES A DAY    promethazine (PHENERGAN) 25 mg, Oral, Every 6 hours PRN    pyridoxine HCl, vitamin B6, (VITAMIN B-6 ORAL) 1 tablet, Oral, Daily    silver-foam bandage (AQUACEL AG FOAM) 1.2 %- 4" X 4" Bndg 1 application, Topical (Top), Daily    simvastatin (ZOCOR) 20 MG tablet TAKE 1 TABLET BY MOUTH EVERY DAY IN THE EVENING    tamsulosin (FLOMAX) 0.4 mg Cap TAKE 1 CAPSULE BY MOUTH EVERY DAY    vitamin E 400 Units, Oral, Daily    vortioxetine (TRINTELLIX) 10 mg Tab 1 " "tablet, Oral, Daily     Psychiatric History  Prior Diagnoses: anxiety   History of Trauma/Abuse: unknown  History of Suicide Attempts: unknown  Current Ideation, Intention, or Plan: no  Homicidal Ideation: no  Medication(s): Xanax, Wellbutrin, Abilify, and Trintellix   Hospitalization(s): no  Psychotherapy/Counseling: currently attending sessions with psychiatry     Substance Use History  Social History     Tobacco Use    Smoking status: Never Smoker    Smokeless tobacco: Never Used   Substance and Sexual Activity    Alcohol use: No    Drug use: No    Sexual activity: Yes     Partners: Female     History of abuse/overuse: no    Family Neurological & Psychiatric History    Family History   Problem Relation Age of Onset    Glaucoma Mother     Clotting disorder Mother     Aneurysm Father     Lung cancer Brother      Neurologic: see above   Psychiatric: Negative for heritable risk factors.    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     Social  Family Status: . 4 chidlren.    Support System: good - wife mainly  Hobbies/Activities: spending time with family   Current Living Situation: Lives with wife,daughter, and daughter's fiance    OBJECTIVE:     MENTAL STATUS AND OBSERVATIONS:   Appearance: Appropriate to setting   Alertness: Alert but easily distractible. He required frequent prompting and redirection back to the test material. Made poor eye contact.   Orientation:   He stated "I don't know" when asked most orientation questions. He knew it was the "3rd" month but did not state the name of the month. He knew the name of the hospital and what floor he was on but stated "I " "don't know" for the remainder of    Gait:  In a wheelchair   Psychomotor:  Tremor observed in his left hand   Handedness:  He stated that he was right-handed, but he only used his left hand during testing and commented that his right hand "doesn't work."   Vision & Hearing:  Adequate for session   Speech/language: Rate of speech was slowed and he was dysarthric. He stuttered at times and had difficulty getting his words out. Volume was low. Comprehension was reduced.    Mood/Affect:  The patients mood and affect were euthymic.    Interpersonal Behavior:  Rapport was quickly and easily established    Suicidality/Homicidality: Denied   Hallucinations/Delusions:  None evidenced or endorsed   Thought Content: Logical    Though Processes: Goal-directed at times. Other times processes were perseverative.   Insight & Judgment:  Appropriate   Participation in Interview:  Minimally participated. Collateral answered most questions.      PROCEDURES/TESTS ADMINISTERED: In addition to performing a review of pertinent medical records, reviewing limits to confidentiality, conducting a clinical interview, and explaining procedures, the following measures were administered: Mini Mental Status Examination (MMSE); MSVT; Test of Premorbid Functioning (TOPF); Wechsler Adult Intelligence Scale, Fourth Edition (WAIS-IV) [Digit Span subtest]; Symbol Digit Modalities Test (SDMT); Brief Visuospatial Memory Test-Revised (BVMT-R, form 1); Repeatable Battery for the Assessment of Neuropsychological Status (RBANS, form A, Duff et al., 2003 norms); Neuropsychological Assessment Battery (NAB) [Naming & Auditory Comprehension subtests, form 1]; Verbal fluency tests (FAS & animal naming; Alli et al., 2004 norms); Sentence Repetition Test; Rom Complex Figure Test (RCFT) [copy only]; Trail Making Test, parts A and B (Alli et al., 2004 norms); Frontal Assessment Battery (Appollino et al., 2005 norms); Geriatric Depression Scale (GDS-30); and " "Generalized Anxiety Disorder - 7 Item Scale (MERLIN-7). Manual norms were used unless otherwise indicated.      TEST TAKING BEHAVIOR AND VALIDITY: Mr. Dang was sleeping in his wheelchair when the examiner got him from the waiting room. His wife had to wake him up. After being taken to the testing room, he remained alert but was easily distractible. He required frequent prompting and redirection back to the test material. He generally made poor eye contact and often stated "I don't know" or sat in silence after being asked a test question. He reverted back to previous rule sets while completing current tests a few times and struggled to switch between rule sets on a divided attention task. He confused symbols that looked similar on a digit/symbol matching test. When asked to make a copy of a complex geometric figure drawing, he asia elements on top of each other and the same elements more than once. He was slow to respond and required near constant redirection. Scores on stand-alone and embedded performance validity measures were variable, with most falling within normal limits. The current results are interpreted with some caution, however, given that he did not always provide responses to questions.     TEST RESULTS   2012 Evaluation  2022 Evaluation      Raw Score Standardized Score Raw Score Type of Standardized Score Standardized Score Percentile/CP Descriptor   MSVT IR   95 - - - -   MSVT DR   95 - - - -   MSVT Cons   90 - - - -   MSVT PA   0 - - - -   MSVT FR   5 - - - -   ACS RDS   4 - - - -   RBANS EI   0 - -      PREMORBID FUNCTIONING Raw Score Standardized Score Raw Score Type of Standardized Score Standardized Score Percentile/CP Descriptor   TOPF simple dem. eFSIQ   - SS 94 34 Average   TOPF pred. eFSIQ   - SS 63 1 Exceptionally Low    TOPF simple + pred. eFSIQ   - SS 80 9 Low Average   COGNITIVE SCREENING Raw Score Standardized Score Raw Score Type of Standardized Score Standardized Score Percentile/CP " Descriptor   MMSE   8 - - - Impaired   Orientation - Place   2/5 - - - -   Orientation - Date  4/5 1/5 - - - -   RBANS           Immediate Memory  49 - SS 62 0.5 Exceptionally Low    VS/Construction  87 - SS 85 16 Low Average   Language  75 - SS 62 0.5 Exceptionally Low    Attention  60 - SS 62 0.5 Exceptionally Low    Delayed Memory  48 - SS 65 1 Exceptionally Low    Total Scale  54 - SS 62 0.5 Exceptionally Low    LANGUAGE FUNCTIONING Raw Score Standardized Score Raw Score Type of Standardized Score Standardized Score Percentile/CP Descriptor   RBANS Naming   5 Tscore 33 4 Below Average   RBANS Semantic Fluency   4 Tscore 27 1 Exceptionally Low    TOPF Word Reading   10 SS 70 2 Below Average   NAB Auditory Comprehension   71 Tscore 19 <0.1 Exceptionally Low    NAB Naming  Moderate Impairment 10 Tscore 19 <0.1 Exceptionally Low    FAS  Very Defective 1 Tscore 21 0.2 Exceptionally Low    Animal Naming   3 Tscore 13 <0.1 Exceptionally Low    Sentence Repetition   5 - - <1 Exceptionally Low   VISUOSPATIAL FUNCTIONING Raw Score Standardized Score Raw Score Type of Standardized Score Standardized Score Percentile/CP Descriptor   RBANS Line Orientation    7 Tscore 37 9 Low Average   RBANS Figure Copy   18 Tscore 50 50 Average   RCFT Copy   15.5 - - <1 Exceptionally Low   RCFT Time to Copy   505 - - 2-5 Below Average   BVMT-R Copy   8 - - - -   LEARNING & MEMORY Raw Score Standardized Score Raw Score Type of Standardized Score Standardized Score Percentile/CP Descriptor   RBANS           Immediate Memory  49 - SS 62 0.5 Exceptionally Low    Delayed Memory  48 - SS 65 1 Exceptionally Low    List Learning   2 Tscore 27 1 Exceptionally Low    List Recall   0 Tscore 33 4 Below Average   List Recognition   15 Tscore 33 4 Below Average   Story Memory   2 Tscore 27 1 Exceptionally Low    Story Recall   0 Tscore 30 2 Below Average   Story Recognition    4 - 0 <1 Exceptionally Low   Figure Recall   7 Tscore 40 16 Low Average    Figure Recognition   1 - - - -   BVMT-R           IR   4 Tscore <20 <1 Exceptionally Low   DR   1 Tscore <20 <1 Exceptionally Low   Discrimination Index   6 - - >16 WNL   ATTENTION/WORKING MEMORY Raw Score Standardized Score Raw Score Type of Standardized Score Standardized Score Percentile/CP Descriptor   WAIS-IV Digit Span   6 ss 1 0.1 Exceptionally Low          DS Forward   3 ss 2 0.4 Exceptionally Low          DS Backward   3 ss 3 1 Exceptionally Low          DS Sequence   0 ss 1 0.1 Exceptionally Low          LDF   3 - - - -         LDB   2 - - - -         LDS   0 - - - -   RBANS Digit Span    4 Tscore 27 1 Exceptionally Low    MENTAL PROCESSING SPEED Raw Score Standardized Score Raw Score Type of Standardized Score Standardized Score Percentile/CP Descriptor   SMDT Written   7 zscore -2.92 0 Exceptionally Low    SMDT Oral   10 zscore -2.85 0 Exceptionally Low    RBANS Coding   4 Tscore 33 4 Below Average   TMT A    226 Tscore 20 0.1 Exceptionally Low    TMT A errors   0 - - - -   EXECUTIVE FUNCTIONING Raw Score Standardized Score Raw Score Type of Standardized Score Standardized Score Percentile/CP Descriptor   TMT B   DC Tscore - - -   TMT B errors   - - - - -   MARISA   8/18 - - - Below Normal Limits   MOOD & PERSONALITY Raw Score Standardized Score Raw Score Type of Standardized Score Standardized Score Percentile/CP Descriptor   GDS-30 BDI-2=22  19 - - - Mild   MERLIN-7 ARIADNA=27  9 - - - Mild   ss = scaled score (mean = 10, SD = 3); SS = standard score (mean = 100, SD = 15); Tscore mean = 50, SD = 10; zscore (mean = 0.00, SD = 1)  It is important to note that scores/percentiles should only be interpreted by a neuropsychologist. It is common for healthy individuals to have 1-3 isolated low/unusual scores that are not indicative of any significant cognitive dysfunction.       BILLING  Code Description Minutes Units   09118 Psychiatric Interview 0    00363 Nubhvl xm phys/qhp 1st hr 0    47258 Nubhvl xm phy/qhp ea  addl hr 0    13158 Psycl tst eval phys/qhp 1st 0    95224 Psycl tst eval phys/qhp ea 0    34247 Nrpsyc tst eval phys/qhp 1st 60 1   32036 Nrpsyc tst eval phys/qhp ea 97 2     Referral review/test selection 25      Tech consult/test review/modifications 10      Patient limitation management 0      Patient behavior management 0      Patient symptom monitoring 0      Record Review/Integration/Report Generation 87      Face-to-Face interpretive Feedback 35    08002 Psycl/nrpsyc tst phy/qhp 1st 0    04132 Psycl/nrpsyc tst phy/qhp ea 0    22935 Psycl/nrpsyc tech 1st 30 1   43684 Psycl/nrpsyc tst tech ea 259 9

## 2022-04-01 ENCOUNTER — PATIENT MESSAGE (OUTPATIENT)
Dept: NEUROLOGY | Facility: CLINIC | Age: 67
End: 2022-04-01
Payer: MEDICARE

## 2022-04-01 ENCOUNTER — OFFICE VISIT (OUTPATIENT)
Dept: NEUROLOGY | Facility: CLINIC | Age: 67
End: 2022-04-01
Payer: MEDICARE

## 2022-04-01 DIAGNOSIS — F02.818 MAJOR NEUROCOGNITIVE DISORDER DUE TO ANOTHER MEDICAL CONDITION WITH BEHAVIORAL DISTURBANCE: Primary | ICD-10-CM

## 2022-04-01 PROCEDURE — 99499 UNLISTED E&M SERVICE: CPT | Mod: 95,,, | Performed by: CLINICAL NEUROPSYCHOLOGIST

## 2022-04-01 PROCEDURE — 99499 NO LOS: ICD-10-PCS | Mod: 95,,, | Performed by: CLINICAL NEUROPSYCHOLOGIST

## 2022-04-01 PROCEDURE — 4010F PR ACE/ARB THEARPY RXD/TAKEN: ICD-10-PCS | Mod: CPTII,95,, | Performed by: CLINICAL NEUROPSYCHOLOGIST

## 2022-04-01 PROCEDURE — 4010F ACE/ARB THERAPY RXD/TAKEN: CPT | Mod: CPTII,95,, | Performed by: CLINICAL NEUROPSYCHOLOGIST

## 2022-04-01 NOTE — PATIENT INSTRUCTIONS
MANAGING VASCULAR RISK FACTORS  A personal history of disorders that affect the cardiovascular system (e.g., hypertension, high cholesterol, diabetes, heart disease) can have a negative impact on brain functioning especially over many years. Therefore, it is very important for this patient to maintain good control over his/her risk factors. The following is recommended:  Take all medications as prescribed and follow-up with recommendations above.  Get regular physical exercise to the extent that it is possible. Family may need to structure this into their loved one's day or week and develop a transportation plan.  Eat a well-balanced diet and following the MIND diet (see handout) has been shown to be most brain protective.   Check your blood pressure, cholesterol levels, blood sugar, and others as appropriate.    PRACTICE GOOD COGNITIVE HYGIENE  Engage in regular exercise, which increases alertness and arousal and can improve attention and focus.  Consider lower impact exercises, such as yoga or light walking. Try to exercise for at least 150 minutes per week  Get a good night's sleep, as this can enhance alertness and cognition.  Eat healthy foods and balanced meals. It is notable that research indicates certain nutrients may aid in brain function, such as B vitamins (especially B6, B12, and folic acid), antioxidants (such as vitamins C and E, and beta carotene), and Omega-3 fatty acids. Here are some common tips for diet (Adopted from Priya et al, [place], 2018):  Eat primarily plant-based foods, such as fruits and vegetables, whole grains, legumes   (beans) and nuts.  Limit refined carbohydrates (white pasta, bread, rice).  Replace butter with healthy fats such as olive oil.  Use herbs and spices instead of salt to flavor foods.  Limit red meat and processed meats to no more than a few times a month.  Avoid sugary sodas, bakery goods, and sweets.  Eat fish and poultry at least twice a week.  Keep your brain  active. Find activities to stay mentally active, such as reading, games (cards, checkers), puzzles (crosswords, Sudoku, jig saw), crafts (models, woodworking), gardening, or participating in activities in the community.  Stay socially engaged. Continue staying active with your family and friends.    RESOURCE  Consider purchasing the book, High-Octane Brain: 5 Science-Based Steps to Sharpen Your Memory and Reduce Your Risk of Alzheimer's by Dr. Kristyn Hodges.    Consider purchasing the book, Bouncing Back: Skills for Adaptation to Injury, Aging, Illness, and Pain by James Guo, PhD    COGNITIVE TIPS AND STRATEGIES  The following tips and strategies are provided to help assist in daily activities:      Attention: Remember that inattention and lack of focus are major culprits to forgetting information so be sure and practice paying attention for adequate learning of information. If you rely on passive attention to remembering something (e.g., yeah, uh-huh approach), you'll find you cannot recall it later. I recommend the following to improve attention, which may aid in later recall:  1. Reduce distractions in the area as much as possible  2. Look at the person as they are speaking to you.   3. Paraphrase as they are speaking  4. Write down important pieces of information   5. Ask them to repeat if you zone out.  6. Have them simplify and reduce information that you need to attend to during conversation.  7. Have visual cues to remind you if you need to do something later.     Processing Speed:  1. Using multiple modalities (e.g., listening, writing notes, asking questions, recording) to learn new information is likely to allow additional time for processing, thus improving memory for the material.   2. Allowing sufficient time to complete tasks will reduce frustration and help to ensure completion.     Executive Functionin. Don't attempt to multi-task.  Separate tasks so that each can be completed one at  a time  2. Consider using a calendar/day planner, as that may be effective to help you plan and stay on track.  Color-coding specific tasks by importance may add additional benefit to your planner  3. Break down large projects into smaller tasks and write down the steps to completing the task.  Taking notes while reading can help with recall.     Storing Information: Use the below strategies to help you further enhance how information is stored  1. Rehearse - Immediately after seeing/hearing something, try to recall it.  Wait a few minutes, then check again.  Gradually lengthen the intervals between rehearsals.  2. Repetition of learned material is critical to ensure storage of information to be learned. Self-test at home to ensure learning.  3. Write down important information to improve your attention and focus and to have something to look back on when you need to recall it.  4. Make sure the person doesn't rattle off, but presents in a clear, logical, and unhurried manner.      Recalling Information:  1. Jog your memory - Lose something?  Think back to when you last had it.  What did you do next?  And after that?  Mentally walk yourself through each activity that followed.  Prodding your memory this way may enable you to recall the location of the missing item.  2. Use a cue - Symbolic reminders (the proverbial string around the finger) are helpful.  So too are memos, timers, calendar notes, etc.--keep them in visible, appropriate place  3. Get organized - Have fixed locations for all important papers, key phone numbers, medications, keys, wallet, glasses, tools, etc.  4. Develop routines - Routines can anchor memories so they do not drift away.

## 2022-04-01 NOTE — PROGRESS NOTES
NEUROPSYCHOLOGICAL EVALUATION FEEDBACK    TELEMEDICINE DETAILS:   Established Patient - Audio Only Telehealth Visit  The patient location is: home  The chief complaint leading to consultation is: feedback regarding neuropsychological test results  Visit type: Virtual visit with audio only (telephone)  Total time spent with patient: 31 minutes  The reason for the audio only service rather than synchronous audio and video virtual visit was related to technical difficulties or patient preference/necessity.  Each patient to whom I provide medical services by telemedicine is:  (1) informed of the relationship between the physician and patient and the respective role of any other health care provider with respect to management of the patient; and (2) notified that they may decline to receive medical services by telemedicine and may withdraw from such care at any time. Patient verbally consented to receive this service via voice-only telephone call.    Reymundo Dang attended a feedback session today and was accompanied by his wife. We discussed the results of the neuropsychological evaluation and I gave time to discuss questions and concerns. For full evaluation details, please see the note from this provider dated 3/23/2022. A copy of the report was provided via [employer]. A second copy was placed in the mail per his wife's request.     Problem List Items Addressed This Visit        Neuro    Major neurocognitive disorder due to another medical condition with behavioral disturbance - Primary        Kenna Villanueva, PhD  Licensed Clinical Neuropsychologist  [facility] - Department of Neurology       This service was not originating from a related E/M service provided within the previous 7 days nor will  to an E/M service or procedure within the next 24 hours or my soonest available appointment.  Prevailing standard of care was able to be met in this audio-only visit.

## 2022-04-04 ENCOUNTER — PATIENT MESSAGE (OUTPATIENT)
Dept: ADMINISTRATIVE | Facility: HOSPITAL | Age: 67
End: 2022-04-04
Payer: MEDICARE

## 2022-06-06 ENCOUNTER — TELEPHONE (OUTPATIENT)
Dept: INTERNAL MEDICINE | Facility: CLINIC | Age: 67
End: 2022-06-06

## 2022-06-06 ENCOUNTER — OFFICE VISIT (OUTPATIENT)
Dept: INTERNAL MEDICINE | Facility: CLINIC | Age: 67
End: 2022-06-06
Payer: MEDICARE

## 2022-06-06 VITALS
DIASTOLIC BLOOD PRESSURE: 78 MMHG | BODY MASS INDEX: 40.21 KG/M2 | HEART RATE: 60 BPM | HEIGHT: 66 IN | SYSTOLIC BLOOD PRESSURE: 124 MMHG | RESPIRATION RATE: 16 BRPM | OXYGEN SATURATION: 95 %

## 2022-06-06 DIAGNOSIS — N32.81 OAB (OVERACTIVE BLADDER): ICD-10-CM

## 2022-06-06 DIAGNOSIS — F32.5 MAJOR DEPRESSIVE DISORDER, SINGLE EPISODE, IN FULL REMISSION: ICD-10-CM

## 2022-06-06 DIAGNOSIS — R53.81 DEBILITY: ICD-10-CM

## 2022-06-06 DIAGNOSIS — E66.01 MORBID OBESITY WITH BMI OF 40.0-44.9, ADULT: ICD-10-CM

## 2022-06-06 DIAGNOSIS — K50.90 CROHN'S DISEASE WITHOUT COMPLICATION, UNSPECIFIED GASTROINTESTINAL TRACT LOCATION: ICD-10-CM

## 2022-06-06 DIAGNOSIS — I10 HYPERTENSION, UNSPECIFIED TYPE: Primary | ICD-10-CM

## 2022-06-06 DIAGNOSIS — I25.119 CORONARY ARTERY DISEASE INVOLVING NATIVE CORONARY ARTERY OF NATIVE HEART WITH ANGINA PECTORIS: ICD-10-CM

## 2022-06-06 DIAGNOSIS — I69.351 HEMIPLEGIA AND HEMIPARESIS FOLLOWING CEREBRAL INFARCTION AFFECTING RIGHT DOMINANT SIDE: ICD-10-CM

## 2022-06-06 DIAGNOSIS — E78.5 HYPERLIPIDEMIA, UNSPECIFIED HYPERLIPIDEMIA TYPE: ICD-10-CM

## 2022-06-06 DIAGNOSIS — R93.89 ABNORMAL FINDINGS ON DIAGNOSTIC IMAGING OF OTHER SPECIFIED BODY STRUCTURES: ICD-10-CM

## 2022-06-06 DIAGNOSIS — Z12.5 SCREENING FOR PROSTATE CANCER: ICD-10-CM

## 2022-06-06 PROCEDURE — 3078F DIAST BP <80 MM HG: CPT | Mod: CPTII,S$GLB,, | Performed by: NURSE PRACTITIONER

## 2022-06-06 PROCEDURE — 99999 PR PBB SHADOW E&M-EST. PATIENT-LVL IV: ICD-10-PCS | Mod: PBBFAC,,, | Performed by: NURSE PRACTITIONER

## 2022-06-06 PROCEDURE — 1160F RVW MEDS BY RX/DR IN RCRD: CPT | Mod: CPTII,S$GLB,, | Performed by: NURSE PRACTITIONER

## 2022-06-06 PROCEDURE — 4010F PR ACE/ARB THEARPY RXD/TAKEN: ICD-10-PCS | Mod: CPTII,S$GLB,, | Performed by: NURSE PRACTITIONER

## 2022-06-06 PROCEDURE — 4010F ACE/ARB THERAPY RXD/TAKEN: CPT | Mod: CPTII,S$GLB,, | Performed by: NURSE PRACTITIONER

## 2022-06-06 PROCEDURE — 1159F MED LIST DOCD IN RCRD: CPT | Mod: CPTII,S$GLB,, | Performed by: NURSE PRACTITIONER

## 2022-06-06 PROCEDURE — 3078F PR MOST RECENT DIASTOLIC BLOOD PRESSURE < 80 MM HG: ICD-10-PCS | Mod: CPTII,S$GLB,, | Performed by: NURSE PRACTITIONER

## 2022-06-06 PROCEDURE — 3288F FALL RISK ASSESSMENT DOCD: CPT | Mod: CPTII,S$GLB,, | Performed by: NURSE PRACTITIONER

## 2022-06-06 PROCEDURE — 99214 OFFICE O/P EST MOD 30 MIN: CPT | Mod: S$GLB,,, | Performed by: NURSE PRACTITIONER

## 2022-06-06 PROCEDURE — 3074F PR MOST RECENT SYSTOLIC BLOOD PRESSURE < 130 MM HG: ICD-10-PCS | Mod: CPTII,S$GLB,, | Performed by: NURSE PRACTITIONER

## 2022-06-06 PROCEDURE — 3074F SYST BP LT 130 MM HG: CPT | Mod: CPTII,S$GLB,, | Performed by: NURSE PRACTITIONER

## 2022-06-06 PROCEDURE — 3288F PR FALLS RISK ASSESSMENT DOCUMENTED: ICD-10-PCS | Mod: CPTII,S$GLB,, | Performed by: NURSE PRACTITIONER

## 2022-06-06 PROCEDURE — 3008F BODY MASS INDEX DOCD: CPT | Mod: CPTII,S$GLB,, | Performed by: NURSE PRACTITIONER

## 2022-06-06 PROCEDURE — 1126F AMNT PAIN NOTED NONE PRSNT: CPT | Mod: CPTII,S$GLB,, | Performed by: NURSE PRACTITIONER

## 2022-06-06 PROCEDURE — 99214 PR OFFICE/OUTPT VISIT, EST, LEVL IV, 30-39 MIN: ICD-10-PCS | Mod: S$GLB,,, | Performed by: NURSE PRACTITIONER

## 2022-06-06 PROCEDURE — 3008F PR BODY MASS INDEX (BMI) DOCUMENTED: ICD-10-PCS | Mod: CPTII,S$GLB,, | Performed by: NURSE PRACTITIONER

## 2022-06-06 PROCEDURE — 1159F PR MEDICATION LIST DOCUMENTED IN MEDICAL RECORD: ICD-10-PCS | Mod: CPTII,S$GLB,, | Performed by: NURSE PRACTITIONER

## 2022-06-06 PROCEDURE — 1101F PT FALLS ASSESS-DOCD LE1/YR: CPT | Mod: CPTII,S$GLB,, | Performed by: NURSE PRACTITIONER

## 2022-06-06 PROCEDURE — 1160F PR REVIEW ALL MEDS BY PRESCRIBER/CLIN PHARMACIST DOCUMENTED: ICD-10-PCS | Mod: CPTII,S$GLB,, | Performed by: NURSE PRACTITIONER

## 2022-06-06 PROCEDURE — 1126F PR PAIN SEVERITY QUANTIFIED, NO PAIN PRESENT: ICD-10-PCS | Mod: CPTII,S$GLB,, | Performed by: NURSE PRACTITIONER

## 2022-06-06 PROCEDURE — 99999 PR PBB SHADOW E&M-EST. PATIENT-LVL IV: CPT | Mod: PBBFAC,,, | Performed by: NURSE PRACTITIONER

## 2022-06-06 PROCEDURE — 1101F PR PT FALLS ASSESS DOC 0-1 FALLS W/OUT INJ PAST YR: ICD-10-PCS | Mod: CPTII,S$GLB,, | Performed by: NURSE PRACTITIONER

## 2022-06-06 RX ORDER — METOPROLOL TARTRATE 100 MG/1
100 TABLET ORAL 2 TIMES DAILY
Qty: 180 TABLET | Refills: 3 | Status: SHIPPED | OUTPATIENT
Start: 2022-06-06 | End: 2023-06-13

## 2022-06-06 RX ORDER — ONABOTULINUMTOXINA 100 [USP'U]/1
INJECTION, POWDER, LYOPHILIZED, FOR SOLUTION INTRADERMAL; INTRAMUSCULAR
COMMUNITY
Start: 2022-03-07 | End: 2023-01-30

## 2022-06-06 RX ORDER — AMLODIPINE BESYLATE 10 MG/1
10 TABLET ORAL DAILY
Qty: 90 TABLET | Refills: 3 | Status: SHIPPED | OUTPATIENT
Start: 2022-06-06 | End: 2023-03-21

## 2022-06-06 RX ORDER — LISINOPRIL 40 MG/1
40 TABLET ORAL DAILY
Qty: 90 TABLET | Refills: 3 | Status: SHIPPED | OUTPATIENT
Start: 2022-06-06 | End: 2023-03-21

## 2022-06-06 RX ORDER — SIMVASTATIN 20 MG/1
20 TABLET, FILM COATED ORAL NIGHTLY
Qty: 90 TABLET | Refills: 3 | Status: SHIPPED | OUTPATIENT
Start: 2022-06-06 | End: 2023-03-21

## 2022-06-06 RX ORDER — MESALAMINE 800 MG/1
800 TABLET, DELAYED RELEASE ORAL DAILY
Qty: 90 TABLET | Refills: 3 | Status: SHIPPED | OUTPATIENT
Start: 2022-06-06 | End: 2023-11-07

## 2022-06-06 RX ORDER — MIRABEGRON 25 MG/1
25 TABLET, FILM COATED, EXTENDED RELEASE ORAL DAILY
Qty: 90 TABLET | Refills: 3 | Status: SHIPPED | OUTPATIENT
Start: 2022-06-06 | End: 2022-06-19

## 2022-06-06 RX ORDER — OMEPRAZOLE 40 MG/1
40 CAPSULE, DELAYED RELEASE ORAL DAILY
Qty: 90 CAPSULE | Refills: 3 | Status: SHIPPED | OUTPATIENT
Start: 2022-06-06 | End: 2023-06-27

## 2022-06-06 RX ORDER — ESCITALOPRAM OXALATE 10 MG/1
10 TABLET ORAL DAILY
COMMUNITY
Start: 2022-03-27
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
## 2022-06-07 ENCOUNTER — LAB VISIT (OUTPATIENT)
Dept: LAB | Facility: HOSPITAL | Age: 67
End: 2022-06-07
Attending: NURSE PRACTITIONER
Payer: MEDICARE

## 2022-06-07 DIAGNOSIS — Z12.5 SCREENING FOR PROSTATE CANCER: ICD-10-CM

## 2022-06-07 DIAGNOSIS — I10 HYPERTENSION, UNSPECIFIED TYPE: ICD-10-CM

## 2022-06-07 DIAGNOSIS — E66.01 MORBID OBESITY WITH BMI OF 40.0-44.9, ADULT: ICD-10-CM

## 2022-06-07 DIAGNOSIS — E78.5 HYPERLIPIDEMIA, UNSPECIFIED HYPERLIPIDEMIA TYPE: ICD-10-CM

## 2022-06-07 DIAGNOSIS — R93.89 ABNORMAL FINDINGS ON DIAGNOSTIC IMAGING OF OTHER SPECIFIED BODY STRUCTURES: ICD-10-CM

## 2022-06-07 LAB
ALBUMIN SERPL BCP-MCNC: 3.5 G/DL (ref 3.5–5.2)
ALP SERPL-CCNC: 75 U/L (ref 55–135)
ALT SERPL W/O P-5'-P-CCNC: 36 U/L (ref 10–44)
ANION GAP SERPL CALC-SCNC: 13 MMOL/L (ref 8–16)
AST SERPL-CCNC: 21 U/L (ref 10–40)
BASOPHILS # BLD AUTO: 0.04 K/UL (ref 0–0.2)
BASOPHILS NFR BLD: 0.6 % (ref 0–1.9)
BILIRUB SERPL-MCNC: 0.5 MG/DL (ref 0.1–1)
BUN SERPL-MCNC: 13 MG/DL (ref 8–23)
CALCIUM SERPL-MCNC: 9.1 MG/DL (ref 8.7–10.5)
CHLORIDE SERPL-SCNC: 104 MMOL/L (ref 95–110)
CHOLEST SERPL-MCNC: 120 MG/DL (ref 120–199)
CHOLEST/HDLC SERPL: 2.8 {RATIO} (ref 2–5)
CO2 SERPL-SCNC: 26 MMOL/L (ref 23–29)
COMPLEXED PSA SERPL-MCNC: 0.68 NG/ML (ref 0–4)
CREAT SERPL-MCNC: 0.9 MG/DL (ref 0.5–1.4)
DIFFERENTIAL METHOD: NORMAL
EOSINOPHIL # BLD AUTO: 0.2 K/UL (ref 0–0.5)
EOSINOPHIL NFR BLD: 3.2 % (ref 0–8)
ERYTHROCYTE [DISTWIDTH] IN BLOOD BY AUTOMATED COUNT: 12.4 % (ref 11.5–14.5)
EST. GFR  (AFRICAN AMERICAN): >60 ML/MIN/1.73 M^2
EST. GFR  (NON AFRICAN AMERICAN): >60 ML/MIN/1.73 M^2
GLUCOSE SERPL-MCNC: 92 MG/DL (ref 70–110)
HCT VFR BLD AUTO: 45.5 % (ref 40–54)
HDLC SERPL-MCNC: 43 MG/DL (ref 40–75)
HDLC SERPL: 35.8 % (ref 20–50)
HGB BLD-MCNC: 15.3 G/DL (ref 14–18)
IMM GRANULOCYTES # BLD AUTO: 0.02 K/UL (ref 0–0.04)
IMM GRANULOCYTES NFR BLD AUTO: 0.3 % (ref 0–0.5)
LDLC SERPL CALC-MCNC: 57.4 MG/DL (ref 63–159)
LYMPHOCYTES # BLD AUTO: 1.9 K/UL (ref 1–4.8)
LYMPHOCYTES NFR BLD: 29.5 % (ref 18–48)
MCH RBC QN AUTO: 30.2 PG (ref 27–31)
MCHC RBC AUTO-ENTMCNC: 33.6 G/DL (ref 32–36)
MCV RBC AUTO: 90 FL (ref 82–98)
MONOCYTES # BLD AUTO: 0.7 K/UL (ref 0.3–1)
MONOCYTES NFR BLD: 11.7 % (ref 4–15)
NEUTROPHILS # BLD AUTO: 3.5 K/UL (ref 1.8–7.7)
NEUTROPHILS NFR BLD: 54.7 % (ref 38–73)
NONHDLC SERPL-MCNC: 77 MG/DL
NRBC BLD-RTO: 0 /100 WBC
PLATELET # BLD AUTO: 220 K/UL (ref 150–450)
PMV BLD AUTO: 10.3 FL (ref 9.2–12.9)
POTASSIUM SERPL-SCNC: 3.6 MMOL/L (ref 3.5–5.1)
PROT SERPL-MCNC: 6.7 G/DL (ref 6–8.4)
RBC # BLD AUTO: 5.06 M/UL (ref 4.6–6.2)
SODIUM SERPL-SCNC: 143 MMOL/L (ref 136–145)
TRIGL SERPL-MCNC: 98 MG/DL (ref 30–150)
TSH SERPL DL<=0.005 MIU/L-ACNC: 3.67 UIU/ML (ref 0.4–4)
WBC # BLD AUTO: 6.34 K/UL (ref 3.9–12.7)

## 2022-06-07 PROCEDURE — 36415 COLL VENOUS BLD VENIPUNCTURE: CPT | Performed by: NURSE PRACTITIONER

## 2022-06-07 PROCEDURE — 84153 ASSAY OF PSA TOTAL: CPT | Performed by: NURSE PRACTITIONER

## 2022-06-07 PROCEDURE — 80053 COMPREHEN METABOLIC PANEL: CPT | Performed by: NURSE PRACTITIONER

## 2022-06-07 PROCEDURE — 84443 ASSAY THYROID STIM HORMONE: CPT | Performed by: NURSE PRACTITIONER

## 2022-06-07 PROCEDURE — 80061 LIPID PANEL: CPT | Performed by: NURSE PRACTITIONER

## 2022-06-07 PROCEDURE — 85025 COMPLETE CBC W/AUTO DIFF WBC: CPT | Performed by: NURSE PRACTITIONER

## 2022-06-08 PROCEDURE — G0180 MD CERTIFICATION HHA PATIENT: HCPCS | Mod: ,,, | Performed by: NURSE PRACTITIONER

## 2022-06-08 PROCEDURE — G0180 PR HOME HEALTH MD CERTIFICATION: ICD-10-PCS | Mod: ,,, | Performed by: NURSE PRACTITIONER

## 2022-06-09 ENCOUNTER — PROCEDURE VISIT (OUTPATIENT)
Dept: NEUROLOGY | Facility: CLINIC | Age: 67
End: 2022-06-09
Payer: MEDICARE

## 2022-06-09 DIAGNOSIS — G43.719 INTRACTABLE CHRONIC MIGRAINE WITHOUT AURA AND WITHOUT STATUS MIGRAINOSUS: Primary | ICD-10-CM

## 2022-06-09 PROCEDURE — 99499 UNLISTED E&M SERVICE: CPT | Mod: S$GLB,,, | Performed by: PSYCHIATRY & NEUROLOGY

## 2022-06-09 PROCEDURE — 64615 PR CHEMODENERVATION OF MUSCLE FOR CHRONIC MIGRAINE: ICD-10-PCS | Mod: S$GLB,,, | Performed by: PSYCHIATRY & NEUROLOGY

## 2022-06-09 PROCEDURE — 64615 CHEMODENERV MUSC MIGRAINE: CPT | Mod: S$GLB,,, | Performed by: PSYCHIATRY & NEUROLOGY

## 2022-06-09 PROCEDURE — 99499 RISK ADDL DX/OHS AUDIT: ICD-10-PCS | Mod: S$GLB,,, | Performed by: PSYCHIATRY & NEUROLOGY

## 2022-06-09 NOTE — PROCEDURES
Procedures     BOTOX PROCEDURE NOTE       Date of Procedure: 6/9/2022      Reason for Procedure: Chronic Migraine and post CVA spasticity on the right          Procedure Details:  Informed consent was obtained prior to performing this procedure. Two patient identifiers were confirmed with the patient prior to performing injections. A time out to determine correct patient and and agreement on procedure performed was conducted prior to the injections     The patient's head and upper neck and upper right arm was cleansed with alcohol rub and 200 Units of Botox (diluted 1:1) was injected in the following bilateral muscles:    10 units in corregator* (total over 2 sites), 5 units in Procerus, 20 units Frontalis* (over 4 sites), 40 units in Temporalis* (over 4 sites), 30 units in occipitalis* (over 6 sites), 20 units in the cervical paraspinal muscles* (over 4 sites), and 30 units in Trapezius* (over 4 sites).      Not done today but done prior and can do PRN: 20 units were added to the right deltoid and 20 units in the right levator scapulae for upper limb spasticity from prior CVA. 5 units given to right pectoral major muscle for spasticity as well.    *= denotes bilateral injections        Medications used: botulinum toxin 200 units diluted 1:1 with normal saline used to dilute Botox       The patient tolerated the procedure well with no more than 0.25cc of blood loss. He was observed for several minutes post injection and given a handout from [employer] regarding when and where to seek help if side effects are experienced            -Continues Primidone for tremor which is stable and mood is now treated by psychiatry/PCP. Will watch for tremor worsening on current treatment with psychiatry/ ongoing follow up noted with Dr Wallace  -Botox helps chronic migraine for 3 months-helps headache prevention and spasticity- will continue   -Patient has had chronic mild memory loss complaint. His prior neuropsychological testing was  "consistent with loss consistent with CVA/aneurysm- but his wife noticed worsening since this past year and states his speech is slower over time (CT head unchanged as noted below). TSH/CMP, CBC normal 2021.  2021: Exam is unchanged except psychomotor slowing as noted at the last visit  but effort is variable  Speech is fluent at times (10-14 words) and then patient struggle to get out 1-2 words and is variable throughout the exam.    Last visit: no more than 3 words but all appropriate. Does not repeat. Suspected some contribution from conversion disorder  Today: Again clear poor effort with speech at times then can get several words out sensibly. Uses non-verbal gestures very quickly and appropriately  Updated CT head 3/2022 unchanged (can't have MRI due to coils)   Labs 2022: No B12 deficiency found.  3/2022 updated neuropsychological testing per orders is unchanged overall from prior: "A diagnosis of Major Neurocognitive Disorder continues to be most appropriate. While his wife reports noticing subjective worsening of cognition following a fall in December 2021, imaging has been unrevealing of changes. His cognitive profile is also unrevealing of significant changes. His pattern of difficulty does not clearly point to any neurodegenerative conditions either. It's certainly possible some of his symptoms are psychogenic in nature, which continued support and psychiatric care should help to attenuate. "  -NEEDS NEUROLOGY CLINIC VISIT IN 6 weeks to further review /appointment made today. I told the patient I think his variable language problems could be from stress at least in part. He continues to see psychiatry. I asked his wife to pay more attention to his language output consistency over time. Also pending home health evaluation.   He saw bariatric medicine about obesity prior     Note MRA brain 2014: There is attenuation and diminished caliber of the left MCA branches, likely sequela from decreased demand from " the atrophic ipsilateral brain parenchyma.  Bilateral posterior communicating arteries are noted.  There is a small P1 segment of the right   PCA.  Anterior circulation vessels are not well evaluated due to susceptibility artifact from adjacent coil pack. No further work up needed at this time     -Having some long standing gait difficulty related to chronic right HP   -In 2021: home health with home PT, has PFO recommended prior  -updated CT head unchanged 3/2021        RTC in 12 weeks or more for more Botox as well as clinic visit above

## 2022-06-15 ENCOUNTER — TELEPHONE (OUTPATIENT)
Dept: INTERNAL MEDICINE | Facility: CLINIC | Age: 67
End: 2022-06-15
Payer: MEDICARE

## 2022-06-15 NOTE — TELEPHONE ENCOUNTER
----- Message from Samanta Garcia sent at 6/15/2022  1:34 PM CDT -----  Contact: yfn/tacos  Reymundo Dang  MRN: [id number]  : 1955  PCP: Rosalinda Villalba  Home Phone      [phone]  Work Phone      Not on file.  Mobile          [phone]      MESSAGE:Calling for lab results.         Phone [phone]

## 2022-06-17 ENCOUNTER — TELEPHONE (OUTPATIENT)
Dept: INTERNAL MEDICINE | Facility: CLINIC | Age: 67
End: 2022-06-17
Payer: MEDICARE

## 2022-06-17 NOTE — TELEPHONE ENCOUNTER
----- Message from Leeanna Oconnor sent at 2022  3:28 PM CDT -----  Contact: Yareli Dang  MRN: [id number]  : 1955  PCP: Rosalinda Villalba  Home Phone      [phone]  Work Phone      Not on file.  Mobile          [phone]      MESSAGE: [facility] in [place] asked patient to call the provider to have his mirabegron (MYRBETRIQ) 25 mg Tb24 and his Potassium pills filled.       Pharmacy: [facility] [place]        Phone: [phone]

## 2022-07-22 ENCOUNTER — EXTERNAL HOME HEALTH (OUTPATIENT)
Dept: HOME HEALTH SERVICES | Facility: HOSPITAL | Age: 67
End: 2022-07-22
Payer: MEDICARE

## 2022-08-07 ENCOUNTER — DOCUMENT SCAN (OUTPATIENT)
Dept: HOME HEALTH SERVICES | Facility: HOSPITAL | Age: 67
End: 2022-08-07
Payer: MEDICARE

## 2022-08-08 ENCOUNTER — DOCUMENT SCAN (OUTPATIENT)
Dept: HOME HEALTH SERVICES | Facility: HOSPITAL | Age: 67
End: 2022-08-08
Payer: MEDICARE

## 2022-08-18 ENCOUNTER — DOCUMENT SCAN (OUTPATIENT)
Dept: HOME HEALTH SERVICES | Facility: HOSPITAL | Age: 67
End: 2022-08-18
Payer: MEDICARE

## 2022-08-19 ENCOUNTER — TELEPHONE (OUTPATIENT)
Dept: INTERNAL MEDICINE | Facility: CLINIC | Age: 67
End: 2022-08-19
Payer: MEDICARE

## 2022-08-19 NOTE — TELEPHONE ENCOUNTER
----- Message from Alana Castanon MA sent at 2022  9:32 AM CDT -----  Contact: Yareli / spouse  Reymundo Dang  MRN: [id number]  : 1955  PCP: Rosalinda Villalba  Home Phone      [phone]  Work Phone      Not on file.  Mobile          [phone]      MESSAGE: Would like to see about getting Rx for motorized wheelchair.      Phone:  [phone]

## 2022-09-07 ENCOUNTER — TELEPHONE (OUTPATIENT)
Dept: INTERNAL MEDICINE | Facility: CLINIC | Age: 67
End: 2022-09-07

## 2022-09-07 ENCOUNTER — HOSPITAL ENCOUNTER (OUTPATIENT)
Dept: RADIOLOGY | Facility: HOSPITAL | Age: 67
Discharge: HOME | End: 2022-09-07
Attending: NURSE PRACTITIONER
Payer: MEDICARE

## 2022-09-07 ENCOUNTER — OFFICE VISIT (OUTPATIENT)
Dept: INTERNAL MEDICINE | Facility: CLINIC | Age: 67
End: 2022-09-07
Payer: MEDICARE

## 2022-09-07 VITALS
HEART RATE: 68 BPM | HEIGHT: 66 IN | SYSTOLIC BLOOD PRESSURE: 158 MMHG | DIASTOLIC BLOOD PRESSURE: 88 MMHG | BODY MASS INDEX: 40.21 KG/M2 | RESPIRATION RATE: 18 BRPM

## 2022-09-07 DIAGNOSIS — R05.9 COUGH: ICD-10-CM

## 2022-09-07 DIAGNOSIS — R06.02 SOB (SHORTNESS OF BREATH): ICD-10-CM

## 2022-09-07 DIAGNOSIS — R25.2 SPASTICITY: ICD-10-CM

## 2022-09-07 DIAGNOSIS — E66.01 CLASS 3 SEVERE OBESITY WITH SERIOUS COMORBIDITY IN ADULT, UNSPECIFIED BMI, UNSPECIFIED OBESITY TYPE: ICD-10-CM

## 2022-09-07 DIAGNOSIS — I69.359 HEMIPARESIS AS LATE EFFECT OF CEREBROVASCULAR ACCIDENT (CVA): Primary | ICD-10-CM

## 2022-09-07 PROCEDURE — 71045 XR CHEST 1 VIEW: ICD-10-PCS | Mod: 26,,, | Performed by: RADIOLOGY

## 2022-09-07 PROCEDURE — 71045 X-RAY EXAM CHEST 1 VIEW: CPT | Mod: TC

## 2022-09-07 PROCEDURE — 1160F PR REVIEW ALL MEDS BY PRESCRIBER/CLIN PHARMACIST DOCUMENTED: ICD-10-PCS | Mod: CPTII,S$GLB,, | Performed by: NURSE PRACTITIONER

## 2022-09-07 PROCEDURE — 99214 PR OFFICE/OUTPT VISIT, EST, LEVL IV, 30-39 MIN: ICD-10-PCS | Mod: S$GLB,,, | Performed by: NURSE PRACTITIONER

## 2022-09-07 PROCEDURE — 3008F PR BODY MASS INDEX (BMI) DOCUMENTED: ICD-10-PCS | Mod: CPTII,S$GLB,, | Performed by: NURSE PRACTITIONER

## 2022-09-07 PROCEDURE — 1160F RVW MEDS BY RX/DR IN RCRD: CPT | Mod: CPTII,S$GLB,, | Performed by: NURSE PRACTITIONER

## 2022-09-07 PROCEDURE — 1159F MED LIST DOCD IN RCRD: CPT | Mod: CPTII,S$GLB,, | Performed by: NURSE PRACTITIONER

## 2022-09-07 PROCEDURE — 99999 PR PBB SHADOW E&M-EST. PATIENT-LVL III: ICD-10-PCS | Mod: PBBFAC,,, | Performed by: NURSE PRACTITIONER

## 2022-09-07 PROCEDURE — 99999 PR PBB SHADOW E&M-EST. PATIENT-LVL III: CPT | Mod: PBBFAC,,, | Performed by: NURSE PRACTITIONER

## 2022-09-07 PROCEDURE — 3077F PR MOST RECENT SYSTOLIC BLOOD PRESSURE >= 140 MM HG: ICD-10-PCS | Mod: CPTII,S$GLB,, | Performed by: NURSE PRACTITIONER

## 2022-09-07 PROCEDURE — 1159F PR MEDICATION LIST DOCUMENTED IN MEDICAL RECORD: ICD-10-PCS | Mod: CPTII,S$GLB,, | Performed by: NURSE PRACTITIONER

## 2022-09-07 PROCEDURE — 3008F BODY MASS INDEX DOCD: CPT | Mod: CPTII,S$GLB,, | Performed by: NURSE PRACTITIONER

## 2022-09-07 PROCEDURE — 99214 OFFICE O/P EST MOD 30 MIN: CPT | Mod: S$GLB,,, | Performed by: NURSE PRACTITIONER

## 2022-09-07 PROCEDURE — 3079F DIAST BP 80-89 MM HG: CPT | Mod: CPTII,S$GLB,, | Performed by: NURSE PRACTITIONER

## 2022-09-07 PROCEDURE — 1126F PR PAIN SEVERITY QUANTIFIED, NO PAIN PRESENT: ICD-10-PCS | Mod: CPTII,S$GLB,, | Performed by: NURSE PRACTITIONER

## 2022-09-07 PROCEDURE — 3079F PR MOST RECENT DIASTOLIC BLOOD PRESSURE 80-89 MM HG: ICD-10-PCS | Mod: CPTII,S$GLB,, | Performed by: NURSE PRACTITIONER

## 2022-09-07 PROCEDURE — 71045 X-RAY EXAM CHEST 1 VIEW: CPT | Mod: 26,,, | Performed by: RADIOLOGY

## 2022-09-07 PROCEDURE — 1126F AMNT PAIN NOTED NONE PRSNT: CPT | Mod: CPTII,S$GLB,, | Performed by: NURSE PRACTITIONER

## 2022-09-07 PROCEDURE — 4010F ACE/ARB THERAPY RXD/TAKEN: CPT | Mod: CPTII,S$GLB,, | Performed by: NURSE PRACTITIONER

## 2022-09-07 PROCEDURE — 4010F PR ACE/ARB THEARPY RXD/TAKEN: ICD-10-PCS | Mod: CPTII,S$GLB,, | Performed by: NURSE PRACTITIONER

## 2022-09-07 PROCEDURE — 3077F SYST BP >= 140 MM HG: CPT | Mod: CPTII,S$GLB,, | Performed by: NURSE PRACTITIONER

## 2022-09-07 RX ORDER — FUROSEMIDE 20 MG/1
20 TABLET ORAL DAILY
Qty: 30 TABLET | Refills: 11 | Status: SHIPPED | OUTPATIENT
Start: 2022-09-07 | End: 2023-06-13

## 2022-09-07 RX ORDER — ASCORBIC ACID 500 MG
500 TABLET ORAL DAILY
COMMUNITY

## 2022-09-07 RX ORDER — MULTIVIT WITH IRON,MINERALS
1 TABLET,CHEWABLE ORAL DAILY
COMMUNITY

## 2022-09-07 RX ORDER — TAMSULOSIN HYDROCHLORIDE 0.4 MG/1
0.4 CAPSULE ORAL DAILY
COMMUNITY
End: 2022-09-20 | Stop reason: SDUPTHER

## 2022-09-07 RX ORDER — DIPHENHYDRAMINE HCL 25 MG
25 CAPSULE ORAL 2 TIMES DAILY PRN
COMMUNITY

## 2022-09-07 RX ORDER — BENZONATATE 200 MG/1
200 CAPSULE ORAL 3 TIMES DAILY PRN
Qty: 30 CAPSULE | Refills: 0 | Status: SHIPPED | OUTPATIENT
Start: 2022-09-07 | End: 2022-09-17

## 2022-09-07 NOTE — PROGRESS NOTES
Subjective:       Patient ID: Reymundo Dang is a 67 y.o. male.    Chief Complaint: Follow-up (Discuss motorized wheelchair and cough x 2 weeks)    HPI: Pt presents to clinic today known to me with his wife ms Coulter. He has been sick x 1-2 weeks. Coughing. Non productive. No fever. No sore throa. No nasal congestion,. On benadryl OTC and tessalon     He is also w/c bound. He weights 249# and Ms coultre is unable to lift him. Has had CVA/spasticity/ and morbid obesity.   Review of Systems   Constitutional:  Positive for fatigue. Negative for activity change, chills and fever.        Low grade fever   HENT:  Negative for congestion, postnasal drip, rhinorrhea and sore throat.    Eyes:  Negative for pain, discharge and visual disturbance.   Respiratory:  Positive for cough, shortness of breath and wheezing.    Cardiovascular:  Positive for leg swelling. Negative for chest pain and palpitations.   Gastrointestinal:  Negative for abdominal distention, abdominal pain, constipation, diarrhea, nausea and vomiting.   Musculoskeletal:  Negative for back pain and joint swelling.   Skin:  Negative for rash and wound.   Neurological:  Negative for dizziness, syncope, weakness, light-headedness and headaches.   Psychiatric/Behavioral:  Negative for confusion.      Objective:      Physical Exam  Vitals and nursing note reviewed.   Constitutional:       Appearance: He is obese.   Cardiovascular:      Rate and Rhythm: Normal rate and regular rhythm.   Pulmonary:      Breath sounds: Wheezing and rales present.      Comments: Coughing causes SOB> wheezing  Left lower lobe crackles  Abdominal:      General: There is distension.      Palpations: Abdomen is soft.   Musculoskeletal:         General: Swelling (bilatyeral lower ext swelling 2+) present.   Skin:     General: Skin is warm and dry.      Capillary Refill: Capillary refill takes less than 2 seconds.       Assessment:       1. Hemiparesis as late effect of cerebrovascular  accident (CVA)    2. Class 3 severe obesity with serious comorbidity in adult, unspecified BMI, unspecified obesity type    3. Spasticity    4. Cough    5. SOB (shortness of breath)        Plan:     Problem List Items Addressed This Visit       Spasticity    Relevant Orders    MOTORIZED SCOOTER FOR HOME USE     Other Visit Diagnoses       Hemiparesis as late effect of cerebrovascular accident (CVA)    -  Primary    Relevant Orders    MOTORIZED SCOOTER FOR HOME USE    Class 3 severe obesity with serious comorbidity in adult, unspecified BMI, unspecified obesity type        Relevant Orders    MOTORIZED SCOOTER FOR HOME USE    Cough        Relevant Orders    CBC Auto Differential    Comprehensive Metabolic Panel    B-TYPE NATRIURETIC PEPTIDE    X-Ray Chest PA And Lateral    SOB (shortness of breath)        Relevant Orders    B-TYPE NATRIURETIC PEPTIDE    X-Ray Chest PA And Lateral            NAD> but definitely appears ill- check BNp and cxr- consider abx and nebs if looks like infitrate but needs diuretics as well

## 2022-09-07 NOTE — TELEPHONE ENCOUNTER
PA for Tessalon denied. Called to find out if they were able to  the medication. States she hasn't been to the pharmacy yet. She will contact the pharmacy to find out cost. If it is too much she will call back and let us know. Notified of xray results and 2 week f/u scheduled.

## 2022-09-20 RX ORDER — TAMSULOSIN HYDROCHLORIDE 0.4 MG/1
0.4 CAPSULE ORAL DAILY
Qty: 90 CAPSULE | Refills: 1 | Status: SHIPPED | OUTPATIENT
Start: 2022-09-20 | End: 2023-01-22

## 2022-09-20 NOTE — TELEPHONE ENCOUNTER
LOV 9/7/2022    Requested Prescriptions     Pending Prescriptions Disp Refills    tamsulosin (FLOMAX) 0.4 mg Cap 90 capsule 1     Sig: Take 1 capsule (0.4 mg total) by mouth once daily.

## 2022-09-20 NOTE — TELEPHONE ENCOUNTER
----- Message from Kanika Ray sent at 2022  1:31 PM CDT -----  Regarding: Rx Refill  Contact: Yareli (spouse)  Reymundo Dang  MRN: [id number]  : 1955  PCP: Rosalinda Villalba  Home Phone      [phone]  Work Phone      Not on file.  Mobile          [phone]      MESSAGE:   Rx Refill - tamsulosin (FLOMAX) 0.4 mg Cap. Patient has a week of medication left.   90 day Rx refill requested     Phone # [phone]    Pharmacy - [id number] - [place], [place] - [address] [facility]     as needed per insurance. ALCOHOL SWABS PADS    Use qid    ASPIRIN (JOSEE ASPIRIN) 325 MG TABLET    Take 0.5 tablets by mouth daily    FREESTYLE LANCETS MISC    1 each by Does not apply route daily    GLUCOSE BLOOD VI TEST STRIPS (FREESTYLE LITE) STRIP    Use 6 times a day   Pt is pregnant    HYDROCORTISONE 2.5 % CREAM    Apply topically 2 times daily. INSULIN LISPRO (HUMALOG KWIKPEN) 100 UNIT/ML PEN    10-14  units at each meals    INSULIN NPH (HUMULIN N KWIKPEN) 100 UNIT/ML INJECTION PEN    60 units am and 50 units at dinner    INSULIN PEN NEEDLE (BD PEN NEEDLE EMRE U/F) 32G X 4 MM MISC    Use 3 times a day    METFORMIN (GLUCOPHAGE) 500 MG TABLET    Take 1 tablet by mouth 2 times daily (with meals)       ALLERGIES     Review of patient's allergies indicates no known allergies. FAMILY HISTORY     History reviewed. No pertinent family history. SOCIAL HISTORY       Social History     Social History    Marital status: Single     Spouse name: N/A    Number of children: N/A    Years of education: N/A     Social History Main Topics    Smoking status: Former Smoker     Quit date: 8/15/2017    Smokeless tobacco: Never Used    Alcohol use No    Drug use: No    Sexual activity: Yes     Partners: Male     Other Topics Concern    None     Social History Narrative    None         PHYSICAL EXAM       ED Triage Vitals [10/14/17 1533]   BP Temp Temp Source Pulse Resp SpO2 Height Weight   139/83 98 °F (36.7 °C) Oral 87 14 100 % 5' 3\" (1.6 m) 175 lb (79.4 kg)       Physical Exam   Constitutional: She is oriented to person, place, and time. She appears well-developed. HENT:   Head: Normocephalic. Right Ear: External ear normal.   Left Ear: External ear normal.   Mouth/Throat: Oropharynx is clear and moist.   Eyes: Conjunctivae are normal. Pupils are equal, round, and reactive to light. Neck: Normal range of motion. Neck supple. Full flexion of neck with encouragement.   +Tenderness noted over L

## 2022-09-25 NOTE — PLAN OF CARE
05/28/19 1114   Post-Acute Status   Post-Acute Authorization HME   HME Status Pending Clinical Review         Spoke with kaittre about CPAP order. She states that patient can not be discharged from the hospital with CPAP. This is not DME that is delivered to home, patient would have to  CPAP if approved at [facility]. Fax sent to [facility] with all documentation and order for CPAP.  It is pending clinical review. They will call patient with decision after discharge. SAM Quiros updated and states agreement.     0700- recieved report from previous shift  Client remains calm and content in bedroom  No issues or concerns at this time  Q 7 min checks continued  Will continue to monitor    0800- assessment complete  Positive interactions with peers  Denies A/V hallucinations  Denies SI/SIB Contracts for safety  No issues or concerns at this time  Will continue to monitor   1025 pt reported to this nurse he feels unsafe on the unit  Reports he is having fleeting thoughts of SI without a plan  States his protective factor is his brother  Reports he may or may not tell staff if he feels unsafe  Pt journal and given PRN atarax 25 mg for relief of sx  Dr Amanda Marie aware and evaluated pt  Received nursing communication to remove sporks from lunch and dinner tray     1200- PRN atarax effective  Pt states his deterrents and control over thoughts improved  States he feels safe and will report increasing sx to staff

## 2022-12-21 ENCOUNTER — PATIENT OUTREACH (OUTPATIENT)
Dept: ADMINISTRATIVE | Facility: HOSPITAL | Age: 67
End: 2022-12-21
Payer: MEDICARE

## 2023-01-30 ENCOUNTER — OFFICE VISIT (OUTPATIENT)
Dept: NEUROLOGY | Facility: CLINIC | Age: 68
End: 2023-01-30
Payer: MEDICARE

## 2023-01-30 ENCOUNTER — PATIENT OUTREACH (OUTPATIENT)
Dept: ADMINISTRATIVE | Facility: HOSPITAL | Age: 68
End: 2023-01-30
Payer: MEDICARE

## 2023-01-30 VITALS
DIASTOLIC BLOOD PRESSURE: 84 MMHG | HEIGHT: 68 IN | HEART RATE: 58 BPM | RESPIRATION RATE: 12 BRPM | BODY MASS INDEX: 37.88 KG/M2 | SYSTOLIC BLOOD PRESSURE: 122 MMHG

## 2023-01-30 DIAGNOSIS — R41.3 MEMORY LOSS: ICD-10-CM

## 2023-01-30 DIAGNOSIS — F44.9 CONVERSION DISORDER: ICD-10-CM

## 2023-01-30 DIAGNOSIS — G43.719 INTRACTABLE CHRONIC MIGRAINE WITHOUT AURA AND WITHOUT STATUS MIGRAINOSUS: Primary | ICD-10-CM

## 2023-01-30 PROCEDURE — 99999 PR PBB SHADOW E&M-EST. PATIENT-LVL IV: CPT | Mod: PBBFAC,HCNC,, | Performed by: PSYCHIATRY & NEUROLOGY

## 2023-01-30 PROCEDURE — 3074F PR MOST RECENT SYSTOLIC BLOOD PRESSURE < 130 MM HG: ICD-10-PCS | Mod: HCNC,CPTII,S$GLB, | Performed by: PSYCHIATRY & NEUROLOGY

## 2023-01-30 PROCEDURE — 99214 PR OFFICE/OUTPT VISIT, EST, LEVL IV, 30-39 MIN: ICD-10-PCS | Mod: HCNC,S$GLB,, | Performed by: PSYCHIATRY & NEUROLOGY

## 2023-01-30 PROCEDURE — 1126F AMNT PAIN NOTED NONE PRSNT: CPT | Mod: HCNC,CPTII,S$GLB, | Performed by: PSYCHIATRY & NEUROLOGY

## 2023-01-30 PROCEDURE — 99214 OFFICE O/P EST MOD 30 MIN: CPT | Mod: HCNC,S$GLB,, | Performed by: PSYCHIATRY & NEUROLOGY

## 2023-01-30 PROCEDURE — 3008F PR BODY MASS INDEX (BMI) DOCUMENTED: ICD-10-PCS | Mod: HCNC,CPTII,S$GLB, | Performed by: PSYCHIATRY & NEUROLOGY

## 2023-01-30 PROCEDURE — 99499 UNLISTED E&M SERVICE: CPT | Mod: S$GLB,,, | Performed by: PSYCHIATRY & NEUROLOGY

## 2023-01-30 PROCEDURE — 1160F RVW MEDS BY RX/DR IN RCRD: CPT | Mod: HCNC,CPTII,S$GLB, | Performed by: PSYCHIATRY & NEUROLOGY

## 2023-01-30 PROCEDURE — 1159F MED LIST DOCD IN RCRD: CPT | Mod: HCNC,CPTII,S$GLB, | Performed by: PSYCHIATRY & NEUROLOGY

## 2023-01-30 PROCEDURE — 99499 RISK ADDL DX/OHS AUDIT: ICD-10-PCS | Mod: S$GLB,,, | Performed by: PSYCHIATRY & NEUROLOGY

## 2023-01-30 PROCEDURE — 3079F DIAST BP 80-89 MM HG: CPT | Mod: HCNC,CPTII,S$GLB, | Performed by: PSYCHIATRY & NEUROLOGY

## 2023-01-30 PROCEDURE — 3074F SYST BP LT 130 MM HG: CPT | Mod: HCNC,CPTII,S$GLB, | Performed by: PSYCHIATRY & NEUROLOGY

## 2023-01-30 PROCEDURE — 3079F PR MOST RECENT DIASTOLIC BLOOD PRESSURE 80-89 MM HG: ICD-10-PCS | Mod: HCNC,CPTII,S$GLB, | Performed by: PSYCHIATRY & NEUROLOGY

## 2023-01-30 PROCEDURE — 1160F PR REVIEW ALL MEDS BY PRESCRIBER/CLIN PHARMACIST DOCUMENTED: ICD-10-PCS | Mod: HCNC,CPTII,S$GLB, | Performed by: PSYCHIATRY & NEUROLOGY

## 2023-01-30 PROCEDURE — 3008F BODY MASS INDEX DOCD: CPT | Mod: HCNC,CPTII,S$GLB, | Performed by: PSYCHIATRY & NEUROLOGY

## 2023-01-30 PROCEDURE — 99999 PR PBB SHADOW E&M-EST. PATIENT-LVL IV: ICD-10-PCS | Mod: PBBFAC,HCNC,, | Performed by: PSYCHIATRY & NEUROLOGY

## 2023-01-30 PROCEDURE — 1126F PR PAIN SEVERITY QUANTIFIED, NO PAIN PRESENT: ICD-10-PCS | Mod: HCNC,CPTII,S$GLB, | Performed by: PSYCHIATRY & NEUROLOGY

## 2023-01-30 PROCEDURE — 1159F PR MEDICATION LIST DOCUMENTED IN MEDICAL RECORD: ICD-10-PCS | Mod: HCNC,CPTII,S$GLB, | Performed by: PSYCHIATRY & NEUROLOGY

## 2023-01-30 NOTE — PROGRESS NOTES
"HPI:    Reymundo Dang Sr. is a 67 y.o. male s/p Right Cerebral Craniotomy with Aneurysm Coiling, with chronic migraine headaches post craniotomy and Left CVA with mild right hemiparesis apparently unknown date (chronic "compensatory" left cortical atrophy on CT). He has HTN. Having a good response to Botox in terms of chronic and intractable  headaches. He complains of parasthesia down the right arm  and severe shoulder pain. Degenerative C spine changes by CT of the C spine and calcific tendinitis of the right shoulder Now in physical therapy. Tremor improved with depakote taper but not resolved on my exams. Neuopsychological testing showed loss consistent with prior CVA/ aneurysm.          Since the last visit, he had Botox until 6/2022      He cancelled his September follow for unclear reasons and hasn't been back.    He was having good sustains relief with Botox for 3 months and needs to get back with injections.       Headaches are severe, disabling and lasting more than 4 hours , more than 15 per months.       Tremor is well controlled     Primidone is helpful and continued    NO longer on abilify.    Still seeing psychiatry and he is continuing care at [facility].    Wife states his speech comes and goes and he is able to put out a good amount of words at times and then whispers or can't get the words out at others.     He walks with a walker now    Memory is about the same    Review of Systems   Constitutional:  Negative for fever.   HENT:  Negative for nosebleeds.    Eyes:  Negative for double vision.   Respiratory:  Negative for hemoptysis.    Cardiovascular:  Negative for leg swelling.   Gastrointestinal:  Negative for blood in stool.   Genitourinary:  Negative for hematuria.   Musculoskeletal:  Positive for falls.        Rare slips without serious injury   Skin:  Negative for rash.   Neurological:  Positive for headaches.   Endo/Heme/Allergies:  Does not bruise/bleed easily.     Exam:   Gen " "Appearance, well developed/nourished in no apparent distress  CV: 2+ distal pulses with no edema or swelling  Neuro:  MS: Awake, alert, Sustains attention. Recent/remote memory intact , Language is full to spontaneous speech/comprehension. Fund of Knowledge is less then expected and thought processes are normal  Speech is long winded at times then he can't get works or phrases out and sometimes he whispers  CN: Optic discs are flat with normal vasculature, PERRL, Extraoccular movements and visual fields are full. Normal facial sensation and strength decreased on the right lower face, Tongue and Palate are midline and strong. Shoulder Shrug symmetric and strong. Somewhat reduced ROM, chronic and likely related to HP  Motor: Normal bulk, tone increased to spasticity on the right, no abnormal movements and episodic pill rolling tremor today , 5/5 strength bilateral upper/lower extremities except 4+/5 right triceps, hand intrinsics and hip flexors and feet movements, with 2+ reflexes on the left and 3+ on the right and no clonus  Sensory: Romberg negative and decreased to light touch and temp on the right side (he reports since aneurysm)   Gait: Normal stance, no ataxia, but mildright hemiparesis, wearing AFO    Labs: 2013 folate level normalized        Assessment/Recommendation: Reymundo Dang is a 67 y.o. male s/p Right Cerebral Craniotomy with Aneurysm Coiling, with chronic migraine headaches post craniotomy and Left CVA with mild right hemiparesis apparently unknown date (chronic "compensatory" left cortical atrophy on CT). He has HTN. Having a good response to Botox in terms of chronic and intractable  headaches. He complains of parasthesia down the right arm  and severe shoulder pain. Degenerative C spine changes by CT of the C spine and calcific tendinitis of the right shoulder Now in physical therapy. Tremor improved with depakote taper but not resolved on my exams. Neuopsychological testing showed loss " "consistent with prior CVA/ aneurysm.  I recommend:      Continue Primidone for tremor which is stable and mood is now treated by psychiatry/PCP. NO longer on Abilify per Dr Wallace  Botox helps chronic migraine for 3 months-helps headache prevention and spasticity- will continue / resume as he cancelled for unclear reasons  -Tried and failed Topamax, Vimpat, VPA, and is on SSRIs and anti-HTN meds  3. Patient has had chronic mild memory loss complaint chronically . His prior neuropsychological testing was consistent with loss consistent with CVA/aneurysm- but his wife noticed worsening over time and stated his speech is slower over time (CT head unchanged 2021). TSH/CMP, CBC normal 2021.  2021: Exam is unchanged except psychomotor slowing as noted at the last visit  but effort is variable.  Speech is fluent at times (10-14 words) and then patient struggle to get out 1-2 words and is variable throughout the exam.    - Suspected some contribution from conversion disorder with continued symptoms  -Updated CT head 3/2022 unchanged (can't have MRI due to coils)   -Labs 2022: No B12 deficiency found.  -3/2022 updated neuropsychological testing per orders is unchanged overall from prior: "A diagnosis of Major Neurocognitive Disorder continues to be most appropriate. While his wife reports noticing subjective worsening of cognition following a fall in December 2021, imaging has been unrevealing of changes. His cognitive profile is also unrevealing of significant changes. His pattern of difficulty does not clearly point to any neurodegenerative conditions either. It's certainly possible some of his symptoms are psychogenic in nature, which continued support and psychiatric care should help to attenuate. "  He continues to see psychiatry.  4. He saw bariatric medicine about obesity prior   5. Note MRA brain 2014: There is attenuation and diminished caliber of the left MCA branches, likely sequela from decreased demand from the " atrophic ipsilateral brain parenchyma.  Bilateral posterior communicating arteries are noted.  There is a small P1 segment of the right   PCA.  Anterior circulation vessels are not well evaluated due to susceptibility artifact from adjacent coil pack. No further work up needed at this time   -Having some long standing gait difficulty related to chronic right HP   -In 2021: home health with home PT, has PFO recommended prior  -For stroke prevention: he was told to avoid ASA b/c of prior anuerysm and avoid NSAID chronic use. Continue statin for DLD and anti-hypertension treatment for stroke prevention       RTC  for Botox      Planned Botox dose and injection sites are as follows:   muscle- 10 units divided in 2 sites  -Procerus muscle- 5 units in 1 site  -Frontalis muscle- 20 units divided in 4 sites  -Temporalis muscle- 40 units divided in 8 sites  -Occipitalis muscle- 30 units divided in 6 sites  -Cervical paraspinals muscle- 20 units divided in 4 sites  -Trapezius muscle- 30 units divided in 6 sites  =Total - 155 units divided in 31 sites     * Doses administered bilaterally

## 2023-02-13 ENCOUNTER — PROCEDURE VISIT (OUTPATIENT)
Dept: NEUROLOGY | Facility: CLINIC | Age: 68
End: 2023-02-13
Payer: MEDICARE

## 2023-02-13 DIAGNOSIS — G43.719 INTRACTABLE CHRONIC MIGRAINE WITHOUT AURA AND WITHOUT STATUS MIGRAINOSUS: Primary | ICD-10-CM

## 2023-02-13 PROCEDURE — 64615 CHEMODENERV MUSC MIGRAINE: CPT | Mod: HCNC,S$GLB,, | Performed by: PSYCHIATRY & NEUROLOGY

## 2023-02-13 PROCEDURE — 64615 PR CHEMODENERVATION OF MUSCLE FOR CHRONIC MIGRAINE: ICD-10-PCS | Mod: HCNC,S$GLB,, | Performed by: PSYCHIATRY & NEUROLOGY

## 2023-02-13 NOTE — PROCEDURES
Procedures    BOTOX PROCEDURE NOTE       Date of Procedure: 2/13/2023      Reason for Procedure: Chronic Migraine and post CVA spasticity on the right          Procedure Details:  Informed consent was obtained prior to performing this procedure. Two patient identifiers were confirmed with the patient prior to performing injections. A time out to determine correct patient and and agreement on procedure performed was conducted prior to the injections     The patient's head and upper neck and upper right arm was cleansed with alcohol rub and 200 Units of Botox (diluted 1:1) was injected in the following bilateral muscles:    10 units in corregator* (total over 2 sites), 5 units in Procerus, 20 units Frontalis* (over 4 sites), 40 units in Temporalis* (over 4 sites), 30 units in occipitalis* (over 6 sites), 20 units in the cervical paraspinal muscles* (over 4 sites), and 30 units in Trapezius* (over 4 sites).      ADDED AS PRIOR:  20 units were added to the right deltoid and 20 units in the right levator scapulae for upper limb spasticity from prior CVA. 5 units given to right pectoral major muscle for spasticity as well.    *= denotes bilateral injections        Medications used: botulinum toxin 200 units diluted 1:1 with normal saline used to dilute Botox       The patient tolerated the procedure well with no more than 0.25cc of blood loss. He was observed for several minutes post injection and given a handout from [facility] regarding when and where to seek help if side effects are experienced     Wife notes his right sided weakness is improving again. No changes otherwise from my 1/30/2023 clinic noted    RTC in 3 months for Botox

## 2023-05-18 ENCOUNTER — PROCEDURE VISIT (OUTPATIENT)
Dept: NEUROLOGY | Facility: CLINIC | Age: 68
End: 2023-05-18
Payer: MEDICARE

## 2023-05-18 DIAGNOSIS — G43.719 INTRACTABLE CHRONIC MIGRAINE WITHOUT AURA AND WITHOUT STATUS MIGRAINOSUS: Primary | ICD-10-CM

## 2023-05-18 PROCEDURE — 64615 PR CHEMODENERVATION OF MUSCLE FOR CHRONIC MIGRAINE: ICD-10-PCS | Mod: S$GLB,,, | Performed by: PSYCHIATRY & NEUROLOGY

## 2023-05-18 PROCEDURE — 64615 CHEMODENERV MUSC MIGRAINE: CPT | Mod: S$GLB,,, | Performed by: PSYCHIATRY & NEUROLOGY

## 2023-05-18 NOTE — PROCEDURES
Procedures    BOTOX PROCEDURE NOTE       Date of Procedure: 5/18/2023      Reason for Procedure: Chronic Migraine and post CVA spasticity on the right          Procedure Details:  Informed consent was obtained prior to performing this procedure. Two patient identifiers were confirmed with the patient prior to performing injections. A time out to determine correct patient and and agreement on procedure performed was conducted prior to the injections     The patient's head and upper neck and upper right arm was cleansed with alcohol rub and 200 Units of Botox (diluted 1:1) was injected in the following bilateral muscles:    10 units in corregator* (total over 2 sites), 5 units in Procerus, 20 units Frontalis* (over 4 sites), 40 units in Temporalis* (over 4 sites), 30 units in occipitalis* (over 6 sites), 20 units in the cervical paraspinal muscles* (over 4 sites), and 30 units in Trapezius* (over 4 sites).      ADDED AS PRIOR:  20 units were added to the right deltoid and 20 units in the right levator scapulae for upper limb spasticity from prior CVA. 5 units given to right pectoral major muscle for spasticity as well.    *= denotes bilateral injections        Medications used: botulinum toxin 200 units diluted 1:1 with normal saline used to dilute Botox       The patient tolerated the procedure well with no more than 0.25cc of blood loss. He was observed for several minutes post injection and given a handout from [facility] regarding when and where to seek help if side effects are experienced      Wife noted his right sided weakness  improved again. No changes otherwise from my 1/30/2023 clinic noted     RTC in 3 months for Botox

## 2023-06-13 RX ORDER — METOPROLOL TARTRATE 100 MG/1
TABLET ORAL
Qty: 180 TABLET | Refills: 3 | Status: SHIPPED | OUTPATIENT
Start: 2023-06-13

## 2023-06-13 RX ORDER — FUROSEMIDE 20 MG/1
TABLET ORAL
Qty: 90 TABLET | Refills: 3 | Status: SHIPPED | OUTPATIENT
Start: 2023-06-13

## 2023-06-16 NOTE — TELEPHONE ENCOUNTER
Medication called in during your absence.     Requested Prescriptions     Pending Prescriptions Disp Refills    tamsulosin (FLOMAX) 0.4 mg Cap [Pharmacy Med Name: TAMSULOSIN HCL 0.4 MG CAPSULE] 90 capsule 0     Sig: TAKE 1 CAPSULE BY MOUTH EVERY DAY

## 2023-06-17 RX ORDER — TAMSULOSIN HYDROCHLORIDE 0.4 MG/1
CAPSULE ORAL
Qty: 90 CAPSULE | Refills: 0 | Status: SHIPPED | OUTPATIENT
Start: 2023-06-17 | End: 2023-09-11 | Stop reason: SDUPTHER

## 2023-06-27 RX ORDER — MIRABEGRON 25 MG/1
TABLET, FILM COATED, EXTENDED RELEASE ORAL
Qty: 30 TABLET | Refills: 1 | Status: SHIPPED | OUTPATIENT
Start: 2023-06-27 | End: 2023-09-11

## 2023-06-27 RX ORDER — OMEPRAZOLE 40 MG/1
CAPSULE, DELAYED RELEASE ORAL
Qty: 90 CAPSULE | Refills: 3 | Status: SHIPPED | OUTPATIENT
Start: 2023-06-27

## 2023-06-29 ENCOUNTER — PES CALL (OUTPATIENT)
Dept: ADMINISTRATIVE | Facility: CLINIC | Age: 68
End: 2023-06-29
Payer: MEDICARE

## 2023-08-15 ENCOUNTER — PES CALL (OUTPATIENT)
Dept: ADMINISTRATIVE | Facility: CLINIC | Age: 68
End: 2023-08-15
Payer: MEDICARE

## 2023-08-24 ENCOUNTER — PROCEDURE VISIT (OUTPATIENT)
Dept: NEUROLOGY | Facility: CLINIC | Age: 68
End: 2023-08-24
Payer: MEDICARE

## 2023-08-24 DIAGNOSIS — G43.719 INTRACTABLE CHRONIC MIGRAINE WITHOUT AURA AND WITHOUT STATUS MIGRAINOSUS: Primary | ICD-10-CM

## 2023-08-24 PROCEDURE — 64615 CHEMODENERV MUSC MIGRAINE: CPT | Mod: HCNC,S$GLB,, | Performed by: PSYCHIATRY & NEUROLOGY

## 2023-08-24 PROCEDURE — 64615 PR CHEMODENERVATION OF MUSCLE FOR CHRONIC MIGRAINE: ICD-10-PCS | Mod: HCNC,S$GLB,, | Performed by: PSYCHIATRY & NEUROLOGY

## 2023-08-24 NOTE — PROCEDURES
Procedures    BOTOX PROCEDURE NOTE       Date of Procedure: 8/24/20235/18/2023      Reason for Procedure: Chronic Migraine and post CVA spasticity on the right          Procedure Details:  Informed consent was obtained prior to performing this procedure. Two patient identifiers were confirmed with the patient prior to performing injections. A time out to determine correct patient and and agreement on procedure performed was conducted prior to the injections     The patient's head and upper neck and upper right arm was cleansed with alcohol rub and 200 Units of Botox (diluted 1:1) was injected in the following bilateral muscles:    10 units in corregator* (total over 2 sites), 5 units in Procerus, 20 units Frontalis* (over 4 sites), 40 units in Temporalis* (over 4 sites), 30 units in occipitalis* (over 6 sites), 20 units in the cervical paraspinal muscles* (over 4 sites), and 30 units in Trapezius* (over 4 sites).      ADDED AS PRIOR:  20 units were added to the right deltoid and 20 units in the right levator scapulae for upper limb spasticity from prior CVA. 5 units given to right pectoral major muscle for spasticity as well.    *= denotes bilateral injections        Medications used: botulinum toxin 200 units diluted 1:1 with normal saline used to dilute Botox       The patient tolerated the procedure well with no more than 0.25cc of blood loss. He was observed for several minutes post injection and given a handout from [facility] regarding when and where to seek help if side effects are experienced      Wife noted his right sided weakness  improved again since the last clinic visit. No changes otherwise from my 1/30/2023 clinic noted     RTC in 3 months for Botox

## 2023-09-11 RX ORDER — MIRABEGRON 25 MG/1
TABLET, FILM COATED, EXTENDED RELEASE ORAL
Qty: 90 TABLET | Refills: 1 | Status: SHIPPED | OUTPATIENT
Start: 2023-09-11 | End: 2024-03-15

## 2023-09-11 RX ORDER — TAMSULOSIN HYDROCHLORIDE 0.4 MG/1
1 CAPSULE ORAL DAILY
Qty: 90 CAPSULE | Refills: 1 | Status: SHIPPED | OUTPATIENT
Start: 2023-09-11 | End: 2023-10-19

## 2023-09-11 NOTE — TELEPHONE ENCOUNTER
LOV 9/7/2022  Scheduled visit 11/7/2023    Requested Prescriptions     Pending Prescriptions Disp Refills    MYRBETRIQ 25 mg Tb24 ER tablet [Pharmacy Med Name: MYRBETRIQ ER 25 MG TABLET] 90 tablet 1     Sig: TAKE 1 TABLET BY MOUTH EVERY DAY    tamsulosin (FLOMAX) 0.4 mg Cap 90 capsule 1     Sig: Take 1 capsule (0.4 mg total) by mouth once daily.

## 2023-09-27 ENCOUNTER — PATIENT OUTREACH (OUTPATIENT)
Dept: ADMINISTRATIVE | Facility: HOSPITAL | Age: 68
End: 2023-09-27
Payer: MEDICARE

## 2023-10-19 RX ORDER — TAMSULOSIN HYDROCHLORIDE 0.4 MG/1
1 CAPSULE ORAL
Qty: 90 CAPSULE | Refills: 1 | Status: SHIPPED | OUTPATIENT
Start: 2023-10-19 | End: 2024-03-15

## 2023-10-19 RX ORDER — POTASSIUM CHLORIDE 1500 MG/1
TABLET, EXTENDED RELEASE ORAL
Qty: 180 TABLET | Refills: 1 | Status: SHIPPED | OUTPATIENT
Start: 2023-10-19 | End: 2024-03-19

## 2023-11-07 ENCOUNTER — OFFICE VISIT (OUTPATIENT)
Dept: INTERNAL MEDICINE | Facility: CLINIC | Age: 68
End: 2023-11-07
Payer: MEDICARE

## 2023-11-07 ENCOUNTER — HOSPITAL ENCOUNTER (OUTPATIENT)
Dept: RADIOLOGY | Facility: HOSPITAL | Age: 68
Discharge: HOME | End: 2023-11-07
Attending: NURSE PRACTITIONER
Payer: MEDICARE

## 2023-11-07 VITALS
DIASTOLIC BLOOD PRESSURE: 70 MMHG | HEIGHT: 68 IN | HEART RATE: 61 BPM | RESPIRATION RATE: 20 BRPM | OXYGEN SATURATION: 95 % | SYSTOLIC BLOOD PRESSURE: 126 MMHG | BODY MASS INDEX: 37.88 KG/M2

## 2023-11-07 DIAGNOSIS — I72.9 ANEURYSM: ICD-10-CM

## 2023-11-07 DIAGNOSIS — E66.01 MORBID OBESITY WITH BMI OF 40.0-44.9, ADULT: Primary | ICD-10-CM

## 2023-11-07 DIAGNOSIS — J84.10 LUNG GRANULOMA: ICD-10-CM

## 2023-11-07 DIAGNOSIS — F02.818 MAJOR NEUROCOGNITIVE DISORDER DUE TO ANOTHER MEDICAL CONDITION WITH BEHAVIORAL DISTURBANCE: ICD-10-CM

## 2023-11-07 DIAGNOSIS — I10 HYPERTENSION, UNSPECIFIED TYPE: ICD-10-CM

## 2023-11-07 DIAGNOSIS — I69.359 HEMIPARESIS AFFECTING DOMINANT SIDE AS LATE EFFECT OF CEREBROVASCULAR ACCIDENT (CVA): ICD-10-CM

## 2023-11-07 DIAGNOSIS — K50.90 CROHN'S DISEASE WITHOUT COMPLICATION, UNSPECIFIED GASTROINTESTINAL TRACT LOCATION: ICD-10-CM

## 2023-11-07 DIAGNOSIS — Z12.5 SCREENING FOR PROSTATE CANCER: ICD-10-CM

## 2023-11-07 PROCEDURE — 3288F PR FALLS RISK ASSESSMENT DOCUMENTED: ICD-10-PCS | Mod: HCNC,CPTII,S$GLB, | Performed by: NURSE PRACTITIONER

## 2023-11-07 PROCEDURE — 1160F PR REVIEW ALL MEDS BY PRESCRIBER/CLIN PHARMACIST DOCUMENTED: ICD-10-PCS | Mod: HCNC,CPTII,S$GLB, | Performed by: NURSE PRACTITIONER

## 2023-11-07 PROCEDURE — 1101F PT FALLS ASSESS-DOCD LE1/YR: CPT | Mod: HCNC,CPTII,S$GLB, | Performed by: NURSE PRACTITIONER

## 2023-11-07 PROCEDURE — 1101F PR PT FALLS ASSESS DOC 0-1 FALLS W/OUT INJ PAST YR: ICD-10-PCS | Mod: HCNC,CPTII,S$GLB, | Performed by: NURSE PRACTITIONER

## 2023-11-07 PROCEDURE — 3074F PR MOST RECENT SYSTOLIC BLOOD PRESSURE < 130 MM HG: ICD-10-PCS | Mod: HCNC,CPTII,S$GLB, | Performed by: NURSE PRACTITIONER

## 2023-11-07 PROCEDURE — 4010F ACE/ARB THERAPY RXD/TAKEN: CPT | Mod: HCNC,CPTII,S$GLB, | Performed by: NURSE PRACTITIONER

## 2023-11-07 PROCEDURE — 73110 X-RAY EXAM OF WRIST: CPT | Mod: 26,HCNC,RT, | Performed by: RADIOLOGY

## 2023-11-07 PROCEDURE — 90694 FLU VACCINE - QUADRIVALENT - ADJUVANTED: ICD-10-PCS | Mod: HCNC,S$GLB,, | Performed by: NURSE PRACTITIONER

## 2023-11-07 PROCEDURE — 73110 XR WRIST COMPLETE 3 VIEWS RIGHT: ICD-10-PCS | Mod: 26,HCNC,RT, | Performed by: RADIOLOGY

## 2023-11-07 PROCEDURE — 3078F PR MOST RECENT DIASTOLIC BLOOD PRESSURE < 80 MM HG: ICD-10-PCS | Mod: HCNC,CPTII,S$GLB, | Performed by: NURSE PRACTITIONER

## 2023-11-07 PROCEDURE — 3288F FALL RISK ASSESSMENT DOCD: CPT | Mod: HCNC,CPTII,S$GLB, | Performed by: NURSE PRACTITIONER

## 2023-11-07 PROCEDURE — 3074F SYST BP LT 130 MM HG: CPT | Mod: HCNC,CPTII,S$GLB, | Performed by: NURSE PRACTITIONER

## 2023-11-07 PROCEDURE — 3008F BODY MASS INDEX DOCD: CPT | Mod: HCNC,CPTII,S$GLB, | Performed by: NURSE PRACTITIONER

## 2023-11-07 PROCEDURE — 3008F PR BODY MASS INDEX (BMI) DOCUMENTED: ICD-10-PCS | Mod: HCNC,CPTII,S$GLB, | Performed by: NURSE PRACTITIONER

## 2023-11-07 PROCEDURE — 1125F AMNT PAIN NOTED PAIN PRSNT: CPT | Mod: HCNC,CPTII,S$GLB, | Performed by: NURSE PRACTITIONER

## 2023-11-07 PROCEDURE — 99999 PR PBB SHADOW E&M-EST. PATIENT-LVL V: CPT | Mod: PBBFAC,HCNC,, | Performed by: NURSE PRACTITIONER

## 2023-11-07 PROCEDURE — 99999 PR PBB SHADOW E&M-EST. PATIENT-LVL V: ICD-10-PCS | Mod: PBBFAC,HCNC,, | Performed by: NURSE PRACTITIONER

## 2023-11-07 PROCEDURE — G0008 ADMIN INFLUENZA VIRUS VAC: HCPCS | Mod: HCNC,S$GLB,, | Performed by: NURSE PRACTITIONER

## 2023-11-07 PROCEDURE — 1125F PR PAIN SEVERITY QUANTIFIED, PAIN PRESENT: ICD-10-PCS | Mod: HCNC,CPTII,S$GLB, | Performed by: NURSE PRACTITIONER

## 2023-11-07 PROCEDURE — 99214 OFFICE O/P EST MOD 30 MIN: CPT | Mod: HCNC,S$GLB,, | Performed by: NURSE PRACTITIONER

## 2023-11-07 PROCEDURE — 1159F MED LIST DOCD IN RCRD: CPT | Mod: HCNC,CPTII,S$GLB, | Performed by: NURSE PRACTITIONER

## 2023-11-07 PROCEDURE — 4010F PR ACE/ARB THEARPY RXD/TAKEN: ICD-10-PCS | Mod: HCNC,CPTII,S$GLB, | Performed by: NURSE PRACTITIONER

## 2023-11-07 PROCEDURE — 3078F DIAST BP <80 MM HG: CPT | Mod: HCNC,CPTII,S$GLB, | Performed by: NURSE PRACTITIONER

## 2023-11-07 PROCEDURE — 99214 PR OFFICE/OUTPT VISIT, EST, LEVL IV, 30-39 MIN: ICD-10-PCS | Mod: HCNC,S$GLB,, | Performed by: NURSE PRACTITIONER

## 2023-11-07 PROCEDURE — 1160F RVW MEDS BY RX/DR IN RCRD: CPT | Mod: HCNC,CPTII,S$GLB, | Performed by: NURSE PRACTITIONER

## 2023-11-07 PROCEDURE — 73110 X-RAY EXAM OF WRIST: CPT | Mod: TC,HCNC,RT

## 2023-11-07 PROCEDURE — 90694 VACC AIIV4 NO PRSRV 0.5ML IM: CPT | Mod: HCNC,S$GLB,, | Performed by: NURSE PRACTITIONER

## 2023-11-07 PROCEDURE — 1159F PR MEDICATION LIST DOCUMENTED IN MEDICAL RECORD: ICD-10-PCS | Mod: HCNC,CPTII,S$GLB, | Performed by: NURSE PRACTITIONER

## 2023-11-07 PROCEDURE — G0008 FLU VACCINE - QUADRIVALENT - ADJUVANTED: ICD-10-PCS | Mod: HCNC,S$GLB,, | Performed by: NURSE PRACTITIONER

## 2023-11-07 NOTE — PROGRESS NOTES
Subjective:       Patient ID: Reymundo Dang is a 68 y.o. male.    Chief Complaint: Annual Exam    HPI: Pt presents to clinic today known to me with c/o right wrist pain. He has been seen by Dr arce neuro and Dr Wallace [facility]   C/o right arm pain at times not daily. No injury   Last labs was 9/2022- due for repeat   Review of Systems   Constitutional:  Negative for chills and fever.   HENT:  Negative for congestion, postnasal drip and sore throat.    Eyes:  Negative for photophobia.   Respiratory:  Negative for chest tightness and shortness of breath.    Cardiovascular:  Negative for chest pain.   Gastrointestinal:  Negative for abdominal distention, abdominal pain, blood in stool and vomiting.   Genitourinary:  Negative for dysuria, flank pain and hematuria.   Musculoskeletal:  Negative for back pain.        Right wrist pain   Skin:  Negative for pallor.   Neurological:  Negative for dizziness, seizures, facial asymmetry, speech difficulty and numbness.   Hematological:  Does not bruise/bleed easily.   Psychiatric/Behavioral:  Negative for agitation and suicidal ideas. The patient is not nervous/anxious.        Objective:      Physical Exam  Vitals and nursing note reviewed.   Constitutional:       Appearance: He is well-developed. He is obese.   HENT:      Head: Normocephalic and atraumatic.   Neck:      Vascular: No JVD.   Cardiovascular:      Rate and Rhythm: Normal rate and regular rhythm.      Heart sounds: Normal heart sounds. No murmur heard.  Pulmonary:      Effort: Pulmonary effort is normal. No respiratory distress.      Breath sounds: Normal breath sounds. No wheezing.   Abdominal:      General: Bowel sounds are normal. There is distension.      Palpations: Abdomen is soft. There is no mass.      Tenderness: There is no abdominal tenderness.   Skin:     General: Skin is warm and dry.   Neurological:      Mental Status: He is alert and oriented to person, place, and time.   Psychiatric:          Behavior: Behavior normal.         Thought Content: Thought content normal.         Judgment: Judgment normal.         Assessment:       1. Morbid obesity with BMI of 40.0-44.9, adult    2. Hemiparesis affecting dominant side as late effect of cerebrovascular accident (CVA)    3. Crohn's disease without complication, unspecified gastrointestinal tract location    4. Aneurysm    5. Major neurocognitive disorder due to another medical condition with behavioral disturbance    6. Lung granuloma    7. Hypertension, unspecified type    8. Screening for prostate cancer        Plan:     Problem List Items Addressed This Visit       AneurysmCt head 3/2022 Aneurysm clip at the anterior skull base. On botox for headaches due to aneurysm with Dr Ramos     Crohn's diseaseNo longer taking asacol. nO longer follows with GI- he takes immodium BID     Hemiparesis affecting dominant side as late effect of cerebrovascular accident (CVA)    C/o right arm pain . Check x ray to be sure as he has had some slides to floor, no falls. But could be from CVA- consider gabapentin prn     Morbid obesity with BMI of 40.0-44.9, adult - Primary needs weight loss from diet due to mobility deficits     Major neurocognitive disorder due to another medical condition with behavioral disturbance   Cont with neuro and psych   Other Visit Diagnoses       Lung granuloma            Ct 1/2015 > A calcified granuloma is seen in the medial last that of the right upper lobe    Flu shot today

## 2023-11-29 ENCOUNTER — PATIENT OUTREACH (OUTPATIENT)
Dept: ADMINISTRATIVE | Facility: HOSPITAL | Age: 68
End: 2023-11-29
Payer: MEDICARE

## 2023-11-29 NOTE — PROGRESS NOTES
Chart reviewed, immunization record updated.  No new results noted on [facility] or [facility] web site.  Care Everywhere updated.   Patient care coordination note updated.   Upcoming PCP visit updated.  Next PCP visit 05/07/2024.  LOV with PCP 11/07/2023.   Contacted patient to discuss Enhanced Annual Wellness Visit and Colorectal cancer screening.  Spoke to patient's wife, Yareli, states they will call clinic when ready to schedule.

## 2023-12-21 ENCOUNTER — PROCEDURE VISIT (OUTPATIENT)
Dept: NEUROLOGY | Facility: CLINIC | Age: 68
End: 2023-12-21
Payer: MEDICARE

## 2023-12-21 DIAGNOSIS — G43.719 INTRACTABLE CHRONIC MIGRAINE WITHOUT AURA AND WITHOUT STATUS MIGRAINOSUS: Primary | ICD-10-CM

## 2023-12-21 PROCEDURE — 64615 PR CHEMODENERVATION OF MUSCLE FOR CHRONIC MIGRAINE: ICD-10-PCS | Mod: HCNC,S$GLB,, | Performed by: PSYCHIATRY & NEUROLOGY

## 2023-12-21 PROCEDURE — 64615 CHEMODENERV MUSC MIGRAINE: CPT | Mod: HCNC,S$GLB,, | Performed by: PSYCHIATRY & NEUROLOGY

## 2023-12-21 NOTE — PROCEDURES
BOTOX PROCEDURE NOTE       Date of Procedure: 12/21/2023      Reason for Procedure: Chronic Migraine and post CVA spasticity on the right          Procedure Details:  Informed consent was obtained prior to performing this procedure. Two patient identifiers were confirmed with the patient prior to performing injections. A time out to determine correct patient and and agreement on procedure performed was conducted prior to the injections     The patient's head and upper neck and upper right arm was cleansed with alcohol rub and 200 Units of Botox (diluted 1:1) was injected in the following bilateral muscles:    10 units in corregator* (total over 2 sites), 5 units in Procerus, 20 units Frontalis* (over 4 sites), 40 units in Temporalis* (over 4 sites), 30 units in occipitalis* (over 6 sites), 20 units in the cervical paraspinal muscles* (over 4 sites), and 30 units in Trapezius* (over 4 sites).      ADDED AS PRIOR:  20 units were added to the right deltoid and 20 units in the right levator scapulae for upper limb spasticity from prior CVA. 5 units given to right pectoral major muscle for spasticity as well.    *= denotes bilateral injections        Medications used: botulinum toxin 200 units diluted 1:1 with normal saline used to dilute Botox       The patient tolerated the procedure well with no more than 0.25cc of blood loss. He was observed for several minutes post injection and given a handout from [facility] regarding when and where to seek help if side effects are experienced      Wife noted his right sided weakness  improved again since the last clinic visit. No changes otherwise from my 1/30/2023 clinic noted    Patient is not having any breakthrough symptoms at this time. Will try to space to q4 month injections     RTC in 4months for Botox

## 2024-01-11 ENCOUNTER — PATIENT OUTREACH (OUTPATIENT)
Dept: ADMINISTRATIVE | Facility: HOSPITAL | Age: 69
End: 2024-01-11
Payer: MEDICARE

## 2024-01-11 DIAGNOSIS — Z00.00 ENCOUNTER FOR MEDICARE ANNUAL WELLNESS EXAM: ICD-10-CM

## 2024-01-11 DIAGNOSIS — Z12.11 COLON CANCER SCREENING: Primary | ICD-10-CM

## 2024-01-11 NOTE — PROGRESS NOTES
Chart reviewed, immunization record updated.  No new results noted on [facility] or [facility] web site.  Care Everywhere updated.   Patient care coordination note updated.   Upcoming PCP visit updated.  Next PCP visit 05/07/2024.  LOV with PCP 11/07/2023.  Contacted patient to discuss Colorectal cancer screening and Annual Wellness Visit.  Spoke to patient's wife, Yareli, states Fit Kit can be sent for patient to complete.   Fit kit order placed.   FitKit was given to patient on 1/11/2024 12:11 PM

## 2024-01-18 ENCOUNTER — PATIENT OUTREACH (OUTPATIENT)
Dept: ADMINISTRATIVE | Facility: HOSPITAL | Age: 69
End: 2024-01-18
Payer: MEDICARE

## 2024-01-22 RX ORDER — PRIMIDONE 50 MG/1
50 TABLET ORAL 2 TIMES DAILY
Qty: 180 TABLET | Refills: 3 | Status: SHIPPED | OUTPATIENT
Start: 2024-01-22

## 2024-02-12 NOTE — TELEPHONE ENCOUNTER
Medications phoned in during your absence.       Requested Prescriptions     Pending Prescriptions Disp Refills    amLODIPine (NORVASC) 10 MG tablet [Pharmacy Med Name: AMLODIPINE BESYLATE 10 MG TAB] 90 tablet 0     Sig: TAKE 1 TABLET BY MOUTH EVERY DAY    lisinopriL (PRINIVIL,ZESTRIL) 40 MG tablet [Pharmacy Med Name: LISINOPRIL 40 MG TABLET] 90 tablet 0     Sig: TAKE 1 TABLET BY MOUTH EVERY DAY

## 2024-02-19 RX ORDER — LISINOPRIL 40 MG/1
TABLET ORAL
Qty: 90 TABLET | Refills: 0 | Status: SHIPPED | OUTPATIENT
Start: 2024-02-19 | End: 2024-05-14

## 2024-02-19 RX ORDER — AMLODIPINE BESYLATE 10 MG/1
TABLET ORAL
Qty: 90 TABLET | Refills: 0 | Status: SHIPPED | OUTPATIENT
Start: 2024-02-19 | End: 2024-05-14

## 2024-02-26 ENCOUNTER — LAB VISIT (OUTPATIENT)
Dept: LAB | Facility: HOSPITAL | Age: 69
End: 2024-02-26
Payer: MEDICARE

## 2024-02-26 DIAGNOSIS — Z12.11 COLON CANCER SCREENING: ICD-10-CM

## 2024-02-26 PROCEDURE — 82274 ASSAY TEST FOR BLOOD FECAL: CPT | Mod: HCNC | Performed by: NURSE PRACTITIONER

## 2024-02-27 LAB — HEMOCCULT STL QL IA: NEGATIVE

## 2024-03-15 RX ORDER — MIRABEGRON 25 MG/1
TABLET, FILM COATED, EXTENDED RELEASE ORAL
Qty: 30 TABLET | Refills: 5 | Status: SHIPPED | OUTPATIENT
Start: 2024-03-15

## 2024-03-15 RX ORDER — TAMSULOSIN HYDROCHLORIDE 0.4 MG/1
1 CAPSULE ORAL
Qty: 90 CAPSULE | Refills: 1 | Status: SHIPPED | OUTPATIENT
Start: 2024-03-15

## 2024-03-15 RX ORDER — SIMVASTATIN 20 MG/1
TABLET, FILM COATED ORAL
Qty: 90 TABLET | Refills: 3 | Status: SHIPPED | OUTPATIENT
Start: 2024-03-15

## 2024-03-19 RX ORDER — POTASSIUM CHLORIDE 1500 MG/1
TABLET, EXTENDED RELEASE ORAL
Qty: 180 TABLET | Refills: 1 | Status: SHIPPED | OUTPATIENT
Start: 2024-03-19

## 2024-04-16 RX ORDER — OMEPRAZOLE 40 MG/1
CAPSULE, DELAYED RELEASE ORAL
Qty: 90 CAPSULE | Refills: 3 | Status: SHIPPED | OUTPATIENT
Start: 2024-04-16

## 2024-04-18 ENCOUNTER — PROCEDURE VISIT (OUTPATIENT)
Dept: NEUROLOGY | Facility: CLINIC | Age: 69
End: 2024-04-18
Payer: MEDICARE

## 2024-04-18 DIAGNOSIS — G43.719 INTRACTABLE CHRONIC MIGRAINE WITHOUT AURA AND WITHOUT STATUS MIGRAINOSUS: Primary | ICD-10-CM

## 2024-04-18 PROCEDURE — 64615 CHEMODENERV MUSC MIGRAINE: CPT | Mod: HCNC,S$GLB,, | Performed by: PSYCHIATRY & NEUROLOGY

## 2024-04-18 NOTE — PROCEDURES
BOTOX PROCEDURE NOTE       Date of Procedure: 4/18/2024      Reason for Procedure: Chronic Migraine and post CVA spasticity on the right          Procedure Details:  Informed consent was obtained prior to performing this procedure. Two patient identifiers were confirmed with the patient prior to performing injections. A time out to determine correct patient and and agreement on procedure performed was conducted prior to the injections     The patient's head and upper neck and upper right arm was cleansed with alcohol rub and 200 Units of Botox (diluted 1:1) was injected in the following bilateral muscles:    10 units in corregator* (total over 2 sites), 5 units in Procerus, 20 units Frontalis* (over 4 sites), 40 units in Temporalis* (over 4 sites), 30 units in occipitalis* (over 6 sites), 20 units in the cervical paraspinal muscles* (over 4 sites), and 30 units in Trapezius* (over 4 sites).      ADDED AS PRIOR:  20 units were added to the right deltoid and 20 units in the right levator scapulae for upper limb spasticity from prior CVA. 5 units given to right pectoral major muscle for spasticity as well.    *= denotes bilateral injections        Medications used: botulinum toxin 200 units diluted 1:1 with normal saline used to dilute Botox       The patient tolerated the procedure well with no more than 0.25cc of blood loss. He was observed for several minutes post injection and given a handout from [facility] regarding when and where to seek help if side effects are experienced      Wife noted his right sided weakness  improved again since the last clinic visit. No changes otherwise from my 1/30/2023 clinic noted     Patient is not having any breakthrough symptoms at this time. Spaced injection to q4 month injections and tolerating well. Will further space injections if able after the next visit     RTC in 4months for Botox

## 2024-05-07 ENCOUNTER — OFFICE VISIT (OUTPATIENT)
Dept: INTERNAL MEDICINE | Facility: CLINIC | Age: 69
End: 2024-05-07
Payer: MEDICARE

## 2024-05-07 ENCOUNTER — LAB VISIT (OUTPATIENT)
Dept: LAB | Facility: HOSPITAL | Age: 69
End: 2024-05-07
Attending: NURSE PRACTITIONER
Payer: MEDICARE

## 2024-05-07 VITALS
HEART RATE: 63 BPM | RESPIRATION RATE: 20 BRPM | BODY MASS INDEX: 37.88 KG/M2 | DIASTOLIC BLOOD PRESSURE: 84 MMHG | SYSTOLIC BLOOD PRESSURE: 120 MMHG | HEIGHT: 68 IN

## 2024-05-07 DIAGNOSIS — E66.01 MORBID OBESITY WITH BMI OF 40.0-44.9, ADULT: ICD-10-CM

## 2024-05-07 DIAGNOSIS — D69.2 SENILE PURPURA: ICD-10-CM

## 2024-05-07 DIAGNOSIS — E53.8 VITAMIN B 12 DEFICIENCY: ICD-10-CM

## 2024-05-07 DIAGNOSIS — I25.10 CORONARY ARTERY DISEASE INVOLVING NATIVE CORONARY ARTERY OF NATIVE HEART WITHOUT ANGINA PECTORIS: ICD-10-CM

## 2024-05-07 DIAGNOSIS — R79.9 ABNORMAL FINDING OF BLOOD CHEMISTRY, UNSPECIFIED: ICD-10-CM

## 2024-05-07 DIAGNOSIS — I72.9 ANEURYSM: ICD-10-CM

## 2024-05-07 DIAGNOSIS — I70.0 AORTIC ATHEROSCLEROSIS: ICD-10-CM

## 2024-05-07 DIAGNOSIS — I69.359 HEMIPARESIS AFFECTING DOMINANT SIDE AS LATE EFFECT OF CEREBROVASCULAR ACCIDENT (CVA): Primary | ICD-10-CM

## 2024-05-07 DIAGNOSIS — F02.818 MAJOR NEUROCOGNITIVE DISORDER DUE TO ANOTHER MEDICAL CONDITION WITH BEHAVIORAL DISTURBANCE: ICD-10-CM

## 2024-05-07 DIAGNOSIS — K50.90 CROHN'S DISEASE WITHOUT COMPLICATION, UNSPECIFIED GASTROINTESTINAL TRACT LOCATION: ICD-10-CM

## 2024-05-07 DIAGNOSIS — F33.41 RECURRENT MAJOR DEPRESSIVE DISORDER, IN PARTIAL REMISSION: ICD-10-CM

## 2024-05-07 PROBLEM — J84.10 LUNG GRANULOMA: Status: ACTIVE | Noted: 2024-05-07

## 2024-05-07 LAB
ALBUMIN SERPL BCP-MCNC: 3.7 G/DL (ref 3.5–5.2)
ALP SERPL-CCNC: 74 U/L (ref 55–135)
ALT SERPL W/O P-5'-P-CCNC: 13 U/L (ref 10–44)
ANION GAP SERPL CALC-SCNC: 9 MMOL/L (ref 8–16)
AST SERPL-CCNC: 12 U/L (ref 10–40)
BASOPHILS # BLD AUTO: 0.04 K/UL (ref 0–0.2)
BASOPHILS NFR BLD: 0.5 % (ref 0–1.9)
BILIRUB SERPL-MCNC: 0.5 MG/DL (ref 0.1–1)
BUN SERPL-MCNC: 10 MG/DL (ref 8–23)
CALCIUM SERPL-MCNC: 9 MG/DL (ref 8.7–10.5)
CHLORIDE SERPL-SCNC: 107 MMOL/L (ref 95–110)
CHOLEST SERPL-MCNC: 134 MG/DL (ref 120–199)
CHOLEST/HDLC SERPL: 3.2 {RATIO} (ref 2–5)
CO2 SERPL-SCNC: 31 MMOL/L (ref 23–29)
CREAT SERPL-MCNC: 0.8 MG/DL (ref 0.5–1.4)
DIFFERENTIAL METHOD BLD: NORMAL
EOSINOPHIL # BLD AUTO: 0.2 K/UL (ref 0–0.5)
EOSINOPHIL NFR BLD: 2.5 % (ref 0–8)
ERYTHROCYTE [DISTWIDTH] IN BLOOD BY AUTOMATED COUNT: 12.6 % (ref 11.5–14.5)
EST. GFR  (NO RACE VARIABLE): >60 ML/MIN/1.73 M^2
ESTIMATED AVG GLUCOSE: 105 MG/DL (ref 68–131)
GLUCOSE SERPL-MCNC: 103 MG/DL (ref 70–110)
HBA1C MFR BLD: 5.3 % (ref 4–5.6)
HCT VFR BLD AUTO: 46 % (ref 40–54)
HDLC SERPL-MCNC: 42 MG/DL (ref 40–75)
HDLC SERPL: 31.3 % (ref 20–50)
HGB BLD-MCNC: 15.4 G/DL (ref 14–18)
IMM GRANULOCYTES # BLD AUTO: 0.03 K/UL (ref 0–0.04)
IMM GRANULOCYTES NFR BLD AUTO: 0.4 % (ref 0–0.5)
LDLC SERPL CALC-MCNC: 61.4 MG/DL (ref 63–159)
LYMPHOCYTES # BLD AUTO: 2.2 K/UL (ref 1–4.8)
LYMPHOCYTES NFR BLD: 27.4 % (ref 18–48)
MCH RBC QN AUTO: 29.7 PG (ref 27–31)
MCHC RBC AUTO-ENTMCNC: 33.5 G/DL (ref 32–36)
MCV RBC AUTO: 89 FL (ref 82–98)
MONOCYTES # BLD AUTO: 0.8 K/UL (ref 0.3–1)
MONOCYTES NFR BLD: 9.6 % (ref 4–15)
NEUTROPHILS # BLD AUTO: 4.8 K/UL (ref 1.8–7.7)
NEUTROPHILS NFR BLD: 59.6 % (ref 38–73)
NONHDLC SERPL-MCNC: 92 MG/DL
NRBC BLD-RTO: 0 /100 WBC
PLATELET # BLD AUTO: 257 K/UL (ref 150–450)
PMV BLD AUTO: 10.1 FL (ref 9.2–12.9)
POTASSIUM SERPL-SCNC: 3.9 MMOL/L (ref 3.5–5.1)
PROT SERPL-MCNC: 6.4 G/DL (ref 6–8.4)
RBC # BLD AUTO: 5.19 M/UL (ref 4.6–6.2)
SODIUM SERPL-SCNC: 147 MMOL/L (ref 136–145)
TRIGL SERPL-MCNC: 153 MG/DL (ref 30–150)
VIT B12 SERPL-MCNC: >2000 PG/ML (ref 210–950)
WBC # BLD AUTO: 7.98 K/UL (ref 3.9–12.7)

## 2024-05-07 PROCEDURE — 80061 LIPID PANEL: CPT | Mod: HCNC | Performed by: NURSE PRACTITIONER

## 2024-05-07 PROCEDURE — 4010F ACE/ARB THERAPY RXD/TAKEN: CPT | Mod: HCNC,CPTII,S$GLB, | Performed by: NURSE PRACTITIONER

## 2024-05-07 PROCEDURE — 3079F DIAST BP 80-89 MM HG: CPT | Mod: HCNC,CPTII,S$GLB, | Performed by: NURSE PRACTITIONER

## 2024-05-07 PROCEDURE — 1126F AMNT PAIN NOTED NONE PRSNT: CPT | Mod: HCNC,CPTII,S$GLB, | Performed by: NURSE PRACTITIONER

## 2024-05-07 PROCEDURE — 99999 PR PBB SHADOW E&M-EST. PATIENT-LVL IV: CPT | Mod: PBBFAC,HCNC,, | Performed by: NURSE PRACTITIONER

## 2024-05-07 PROCEDURE — 3288F FALL RISK ASSESSMENT DOCD: CPT | Mod: HCNC,CPTII,S$GLB, | Performed by: NURSE PRACTITIONER

## 2024-05-07 PROCEDURE — 82607 VITAMIN B-12: CPT | Mod: HCNC | Performed by: NURSE PRACTITIONER

## 2024-05-07 PROCEDURE — 80053 COMPREHEN METABOLIC PANEL: CPT | Mod: HCNC | Performed by: NURSE PRACTITIONER

## 2024-05-07 PROCEDURE — 1159F MED LIST DOCD IN RCRD: CPT | Mod: HCNC,CPTII,S$GLB, | Performed by: NURSE PRACTITIONER

## 2024-05-07 PROCEDURE — 83036 HEMOGLOBIN GLYCOSYLATED A1C: CPT | Mod: HCNC | Performed by: NURSE PRACTITIONER

## 2024-05-07 PROCEDURE — 3074F SYST BP LT 130 MM HG: CPT | Mod: HCNC,CPTII,S$GLB, | Performed by: NURSE PRACTITIONER

## 2024-05-07 PROCEDURE — 36415 COLL VENOUS BLD VENIPUNCTURE: CPT | Mod: HCNC | Performed by: NURSE PRACTITIONER

## 2024-05-07 PROCEDURE — 1101F PT FALLS ASSESS-DOCD LE1/YR: CPT | Mod: HCNC,CPTII,S$GLB, | Performed by: NURSE PRACTITIONER

## 2024-05-07 PROCEDURE — 3008F BODY MASS INDEX DOCD: CPT | Mod: HCNC,CPTII,S$GLB, | Performed by: NURSE PRACTITIONER

## 2024-05-07 PROCEDURE — 85025 COMPLETE CBC W/AUTO DIFF WBC: CPT | Mod: HCNC | Performed by: NURSE PRACTITIONER

## 2024-05-07 PROCEDURE — 99214 OFFICE O/P EST MOD 30 MIN: CPT | Mod: HCNC,S$GLB,, | Performed by: NURSE PRACTITIONER

## 2024-05-07 RX ORDER — ONABOTULINUMTOXINA 100 [USP'U]/1
INJECTION, POWDER, LYOPHILIZED, FOR SOLUTION INTRADERMAL; INTRAMUSCULAR
COMMUNITY
Start: 2023-12-21

## 2024-05-07 NOTE — PROGRESS NOTES
Subjective:       Patient ID: Reymundo Dang is a 69 y.o. male.    Chief Complaint: Follow-up (6 months)    HPI: Pt presents to clinic today known to me with his wife. He has a lot of trouble expressing what he wants to say he has pretty much become non verbal. He needs assistance to stand/transfer and dress. He uses shower chair and bathes himself except a few spots she assist with. Has urinal and bedside commode but both require assistance from Ms downs. Right sided weakness. Also still following with Dr Wallace for psych and fany Ramos fore botox for migraines -   Review of Systems   Reason unable to perform ROS: non verbal- Mrs Downs says he does not complain of anything really- just frustrated with inability to express himself.       Objective:      Physical Exam  Vitals and nursing note reviewed.   Constitutional:       Appearance: He is obese.      Comments: He is in w/c brace on right lower ext    HENT:      Head: Normocephalic and atraumatic.   Cardiovascular:      Rate and Rhythm: Normal rate and regular rhythm.      Heart sounds: No murmur heard.  Pulmonary:      Effort: Pulmonary effort is normal. No respiratory distress.      Breath sounds: Normal breath sounds. No wheezing.   Abdominal:      Palpations: Abdomen is soft.   Musculoskeletal:         General: Swelling present.      Comments: Trace edema right lower ext-    Skin:     General: Skin is warm and dry.      Capillary Refill: Capillary refill takes less than 2 seconds.      Findings: Bruising present.   Neurological:      Mental Status: He is alert.         Assessment:       1. Hemiparesis affecting dominant side as late effect of cerebrovascular accident (CVA)    2. Morbid obesity with BMI of 40.0-44.9, adult    3. Crohn's disease without complication, unspecified gastrointestinal tract location    4. Major neurocognitive disorder due to another medical condition with behavioral disturbance    5. Recurrent major depressive  disorder, in partial remission    6. Coronary artery disease involving native coronary artery of native heart without angina pectoris    7. Aneurysm    8. Vitamin B 12 deficiency    9. Abnormal finding of blood chemistry, unspecified    10. Aortic atherosclerosis    11. Senile purpura        Plan:     Problem List Items Addressed This Visit       Aneurysm- CT 3/2022 Aneurysm clip at the anterior skull base.     Crohn's disease- no issues with loose bowels currently- no longer following with GI- does not take anything for it     Hemiparesis affecting dominant side as late effect of cerebrovascular accident (CVA) - Primary- no asa or plavix due to aneurysm on statin      Coronary artery disease involving native coronary artery of native heart without angina pectoris    Relevant Orders    Lipid Panel  On statin no asa or plavix due to aneurysm     Morbid obesity with BMI of 40.0-44.9, adult    Relevant Orders    CBC Auto Differential    Comprehensive Metabolic Panel  Does not move much- needs weight loss will check A1C     Recurrent major depressive disorder, in partial remission- following with [facility]  On wellbutrin and lexapro and trintellix     Major neurocognitive disorder due to another medical condition with behavioral disturbancefollowing with [facility]  On wellbutrin and lexapro and trintellix   Also has xanax daily          Other Visit Diagnoses       Vitamin B 12 deficiency        Relevant Orders    Vitamin B12        Aortic athersclerosis ct abd >>2019  There is moderate calcification of the aorta without aneurysm.  No acute bony abnormality.       Fit kit negative 2024

## 2024-05-14 RX ORDER — LISINOPRIL 40 MG/1
TABLET ORAL
Qty: 90 TABLET | Refills: 0 | Status: SHIPPED | OUTPATIENT
Start: 2024-05-14

## 2024-05-14 RX ORDER — METOPROLOL TARTRATE 100 MG/1
TABLET ORAL
Qty: 180 TABLET | Refills: 3 | Status: SHIPPED | OUTPATIENT
Start: 2024-05-14

## 2024-05-14 RX ORDER — AMLODIPINE BESYLATE 10 MG/1
TABLET ORAL
Qty: 90 TABLET | Refills: 0 | Status: SHIPPED | OUTPATIENT
Start: 2024-05-14

## 2024-05-14 RX ORDER — FUROSEMIDE 20 MG/1
TABLET ORAL
Qty: 90 TABLET | Refills: 3 | Status: SHIPPED | OUTPATIENT
Start: 2024-05-14

## 2024-08-12 RX ORDER — AMLODIPINE BESYLATE 10 MG/1
TABLET ORAL
Qty: 90 TABLET | Refills: 1 | Status: SHIPPED | OUTPATIENT
Start: 2024-08-12

## 2024-08-12 RX ORDER — LISINOPRIL 40 MG/1
TABLET ORAL
Qty: 90 TABLET | Refills: 1 | Status: SHIPPED | OUTPATIENT
Start: 2024-08-12

## 2024-08-19 RX ORDER — TAMSULOSIN HYDROCHLORIDE 0.4 MG/1
1 CAPSULE ORAL
Qty: 90 CAPSULE | Refills: 1 | Status: SHIPPED | OUTPATIENT
Start: 2024-08-19

## 2024-09-24 RX ORDER — POTASSIUM CHLORIDE 20 MEQ/1
40 TABLET, EXTENDED RELEASE ORAL
Qty: 180 TABLET | Refills: 1 | Status: SHIPPED | OUTPATIENT
Start: 2024-09-24

## 2024-09-24 RX ORDER — MIRABEGRON 25 MG/1
TABLET, FILM COATED, EXTENDED RELEASE ORAL
Qty: 30 TABLET | Refills: 5 | Status: SHIPPED | OUTPATIENT
Start: 2024-09-24

## 2025-01-13 DIAGNOSIS — Z00.00 ENCOUNTER FOR MEDICARE ANNUAL WELLNESS EXAM: ICD-10-CM

## 2025-01-27 NOTE — PROGRESS NOTES
Subjective:       Patient ID: Reymundo Dang is a 64 y.o. male.    Chief Complaint: Urinary Tract Infection    Reymundo Dang is a 64 y.o. Male new to Urology.  Has a PMH of Crohn's disease, CVA with right sided hemiparesis, Hyperlipidemia, HTN, RLS, and organic brain disorder    He is here today due to recurrent UTI's  He was recently admitted due to UTI with fever;  (-) blood cultures.    05/24/2019: ESCHERICHIA COLI ESBL >100,000 cfu/ml   04/27/2019: ESCHERICHIA COLI ESBL >100,000 cfu/ml     IMAGING:  Renal u/s on 06/07/2019 only revealed left renal cysts    He states no issues with urination until this year.  Reports a TURP years ago at [facility]    No FOS is good;   Nocturia 0.  No hematuria.                           PSA                      1.2                 05/24/2019          (+) UTI       PSA                      0.38                02/05/2019                                       Past Medical History:  No date: Aneurysm      Comment:  s/p craniotomy prior to rupture 1990  No date: Crohn's disease  No date: CVA (cerebral vascular accident)      Comment:  unknown date, left sided with right sided hemiparesis  No date: Edema  No date: History of organic brain syndrome  No date: Hyperlipidemia  No date: Hypertension  No date: Restless legs syndrome (RLS)    Past Surgical History:  No date: BRAIN SURGERY      Comment:  Stent Aneurysm  No date: CERVICAL SPINE SURGERY  No date: CHOLECYSTECTOMY  No date: HERNIA REPAIR  No date: SHOULDER SURGERY      Comment:  Right    Review of patient's family history indicates:  Problem: Glaucoma      Relation: Mother          Age of Onset: (Not Specified)  Problem: Clotting disorder      Relation: Mother          Age of Onset: (Not Specified)  Problem: Aneurysm      Relation: Father          Age of Onset: (Not Specified)  Problem: Lung cancer      Relation: Brother          Age of Onset: (Not Specified)      Social History    Socioeconomic History       Marital status:       Spouse name: Not on file      Number of children: Not on file      Years of education: Not on file      Highest education level: Not on file    Occupational History      Not on file    Social Needs      Financial resource strain: Not on file      Food insecurity:        Worry: Not on file        Inability: Not on file      Transportation needs:        Medical: Not on file        Non-medical: Not on file    Tobacco Use      Smoking status: Never Smoker      Smokeless tobacco: Never Used    Substance and Sexual Activity      Alcohol use: No      Drug use: No      Sexual activity: Yes        Partners: Female    Lifestyle      Physical activity:        Days per week: Not on file        Minutes per session: Not on file      Stress: Not on file    Relationships      Social connections:        Talks on phone: Not on file        Gets together: Not on file        Attends Sikhism service: Not on file        Active member of club or organization: Not on file        Attends meetings of clubs or organizations: Not on file        Relationship status: Not on file    Other Topics      Concerns:        Not on file    Social History Narrative      Not on file      Allergies:  Aspirin and Sulfa (sulfonamide antibiotics)    Medications:  Current Outpatient Medications:   acetaminophen/diphenhydramine (ACETAMINOPHEN PM ORAL), Take 2 tablets by mouth nightly as needed., Disp: , Rfl:   ALPRAZolam (XANAX) 0.5 MG tablet, TAKE 1 TABLET BY MOUTH TWICE A DAY AS NEEDED, Disp: 60 tablet, Rfl: 0  buPROPion (WELLBUTRIN XL) 300 MG 24 hr tablet, Take 300 mg by mouth once daily., Disp: , Rfl:   cyanocobalamin (VITAMIN B-12) 1000 MCG tablet, Take 1 tablet by mouth once daily., Disp: , Rfl:   Lactobacillus acidophilus 1 billion cell Tab, Take 1 tablet by mouth 2 (two) times daily., Disp: 60 tablet, Rfl: 0  lisinopril (PRINIVIL,ZESTRIL) 40 MG tablet, Take 1 tablet (40 mg total) by mouth once daily., Disp: 30 tablet,  Rfl: 0  mesalamine (ASACOL HD) 800 mg TbEC, Take 1 tablet (800 mg total) by mouth once daily., Disp: 30 tablet, Rfl: 11  metoprolol tartrate (LOPRESSOR) 100 MG tablet, Take 1 tablet (100 mg total) by mouth 2 (two) times daily., Disp: 90 tablet, Rfl: 3  mirabegron (MYRBETRIQ) 25 mg Tb24 ER tablet, Take 1 tablet (25 mg total) by mouth once daily., Disp: 30 tablet, Rfl: 11  MULTIVITAMIN W-MINERALS/LUTEIN (CENTRUM SILVER ORAL), Take 1 tablet by mouth once daily.  , Disp: , Rfl:   omeprazole (PRILOSEC) 40 MG capsule, Take 40 mg by mouth once daily., Disp: , Rfl:   potassium chloride SA (K-DUR,KLOR-CON) 20 MEQ tablet, Take 2 tablets (40 mEq total) by mouth once daily., Disp: 60 tablet, Rfl: 0  primidone (MYSOLINE) 50 MG Tab, Take 2 tablets (100 mg total) by mouth 2 (two) times daily., Disp: 60 tablet, Rfl: 0  promethazine (PHENERGAN) 25 MG tablet, Take 1 tablet (25 mg total) by mouth every 6 (six) hours as needed for Nausea., Disp: 20 tablet, Rfl: 0  pyridoxine HCl, vitamin B6, (VITAMIN B-6 ORAL), Take 1 tablet by mouth once daily., Disp: , Rfl:   simvastatin (ZOCOR) 20 MG tablet, Take 1 tablet (20 mg total) by mouth every evening., Disp: 30 tablet, Rfl: 11  tamsulosin (FLOMAX) 0.4 mg Cap, Take 1 capsule (0.4 mg total) by mouth once daily., Disp: 30 capsule, Rfl: 0  vitamin E 400 UNIT capsule, Take 400 Units by mouth once daily., Disp: , Rfl:    vortioxetine (TRINTELLIX) 10 mg Tab, Take 1 tablet by mouth once daily., Disp: , Rfl:   amlodipine (NORVASC) 10 MG tablet, Take 1 tablet (10 mg total) by mouth once daily., Disp: 90 tablet, Rfl: 3  hydrochlorothiazide (HYDRODIURIL) 25 MG tablet, Take 1 tablet (25 mg total) by mouth once daily., Disp: 90 tablet, Rfl: 3          Review of Systems   Constitutional: Negative for activity change, appetite change, chills and fever.   HENT: Negative for facial swelling and trouble swallowing.    Eyes: Negative for visual disturbance.   Respiratory: Negative for chest  tightness and shortness of breath.    Cardiovascular: Negative for chest pain and palpitations.   Gastrointestinal: Negative.  Negative for abdominal pain, constipation, diarrhea, nausea and vomiting.   Genitourinary: Negative for difficulty urinating, dysuria, flank pain, hematuria, penile pain, penile swelling, scrotal swelling and testicular pain.        Pleased with how he urinates.     Musculoskeletal: Positive for arthralgias. Negative for back pain, gait problem, myalgias and neck stiffness.   Skin: Negative for rash.   Neurological: Negative for dizziness and speech difficulty.        Hz CVA   Hematological: Does not bruise/bleed easily.   Psychiatric/Behavioral: Negative for behavioral problems.       Objective:      Physical Exam   Nursing note and vitals reviewed.  Constitutional: He is oriented to person, place, and time. Vital signs are normal. He appears well-developed and well-nourished. He is active and cooperative.  Non-toxic appearance. He does not have a sickly appearance.   Urine dipped clear of infection in the office today.  PVR in the office today by the nurse was 0.     HENT:   Head: Normocephalic and atraumatic.   Right Ear: External ear normal.   Left Ear: External ear normal.   Nose: Nose normal.   Mouth/Throat: Mucous membranes are normal.   Eyes: Conjunctivae and lids are normal. No scleral icterus.   Neck: Trachea normal, normal range of motion and full passive range of motion without pain. Neck supple. No JVD present. No tracheal deviation present.   Cardiovascular: Normal rate, S1 normal and S2 normal.    Pulmonary/Chest: Effort normal. No respiratory distress. He exhibits no tenderness.   Abdominal: Soft. Normal appearance and bowel sounds are normal. There is no hepatosplenomegaly. There is no tenderness. There is no CVA tenderness.   Genitourinary: Rectum normal, testes normal and penis normal. Rectal exam shows no external hemorrhoid, no mass and no tenderness. Prostate is  enlarged. Prostate is not tender. Right testis shows no mass, no swelling and no tenderness. Left testis shows no mass, no swelling and no tenderness. Uncircumcised. No phimosis, paraphimosis, hypospadias, penile erythema or penile tenderness. No discharge found.       Musculoskeletal:   Right sided weakness.     Lymphadenopathy: No inguinal adenopathy noted on the right or left side.   Neurological: He is alert and oriented to person, place, and time. He has normal strength.   Skin: Skin is warm, dry and intact.     Psychiatric: He has a normal mood and affect. His behavior is normal. Judgment and thought content normal.       Assessment:       1. BPH with urinary obstruction    2. Recurrent UTI        Plan:         I spent 30 minutes with the patient of which more than half was spent in direct consultation with the patient in regards to our treatment and plan.    Education and recommendations of today's plan of care including home remedies.   We discussed his LUTS; UTI's and possible contributory factors.  Continue good water intake  Daily probiotic  D-Mannose 2 grams daily  Cysto with Dr. Guicho HIGGINBOTHAM/michelle based on cysto findings.     oral

## 2025-02-05 ENCOUNTER — TELEPHONE (OUTPATIENT)
Dept: INTERNAL MEDICINE | Facility: CLINIC | Age: 70
End: 2025-02-05
Payer: MEDICARE

## 2025-02-11 RX ORDER — AMLODIPINE BESYLATE 10 MG/1
TABLET ORAL
Qty: 90 TABLET | Refills: 1 | Status: SHIPPED | OUTPATIENT
Start: 2025-02-11

## 2025-02-11 RX ORDER — PRIMIDONE 50 MG/1
50 TABLET ORAL 2 TIMES DAILY
Qty: 180 TABLET | Refills: 3 | Status: SHIPPED | OUTPATIENT
Start: 2025-02-11

## 2025-02-11 RX ORDER — LISINOPRIL 40 MG/1
TABLET ORAL
Qty: 90 TABLET | Refills: 1 | Status: SHIPPED | OUTPATIENT
Start: 2025-02-11

## 2025-04-10 DIAGNOSIS — N32.81 OAB (OVERACTIVE BLADDER): Primary | ICD-10-CM

## 2025-04-10 RX ORDER — MIRABEGRON 25 MG/1
25 TABLET, FILM COATED, EXTENDED RELEASE ORAL
Qty: 90 TABLET | Refills: 2 | Status: SHIPPED | OUTPATIENT
Start: 2025-04-10

## 2025-04-24 RX ORDER — TAMSULOSIN HYDROCHLORIDE 0.4 MG/1
1 CAPSULE ORAL
Qty: 90 CAPSULE | Refills: 1 | Status: SHIPPED | OUTPATIENT
Start: 2025-04-24

## 2025-05-06 RX ORDER — SIMVASTATIN 20 MG/1
20 TABLET, FILM COATED ORAL NIGHTLY
Qty: 90 TABLET | Refills: 3 | Status: SHIPPED | OUTPATIENT
Start: 2025-05-06

## 2025-05-08 DIAGNOSIS — I10 HYPERTENSION: ICD-10-CM

## 2025-05-08 DIAGNOSIS — K21.9 GASTROESOPHAGEAL REFLUX DISEASE, UNSPECIFIED WHETHER ESOPHAGITIS PRESENT: Primary | ICD-10-CM

## 2025-05-08 RX ORDER — OMEPRAZOLE 40 MG/1
40 CAPSULE, DELAYED RELEASE ORAL
Qty: 90 CAPSULE | Refills: 3 | Status: SHIPPED | OUTPATIENT
Start: 2025-05-08

## 2025-05-12 ENCOUNTER — PATIENT MESSAGE (OUTPATIENT)
Dept: ADMINISTRATIVE | Facility: HOSPITAL | Age: 70
End: 2025-05-12
Payer: MEDICARE

## 2025-05-16 ENCOUNTER — TELEPHONE (OUTPATIENT)
Dept: INTERNAL MEDICINE | Facility: CLINIC | Age: 70
End: 2025-05-16
Payer: MEDICARE

## 2025-05-16 DIAGNOSIS — Z00.00 ANNUAL PHYSICAL EXAM: Primary | ICD-10-CM

## 2025-05-16 NOTE — TELEPHONE ENCOUNTER
Pt never ready pt portal message on needing appt. Called and spoke with wife about scheduling annual. She states pt does not get out of bed at all and would not be able to come in for a visit. Please advise on next steps.

## 2025-05-19 NOTE — TELEPHONE ENCOUNTER
I placed a referral to care at home and will see of he is in an area where we can get a provider out to him

## 2025-05-27 ENCOUNTER — TELEPHONE (OUTPATIENT)
Dept: INTERNAL MEDICINE | Facility: CLINIC | Age: 70
End: 2025-05-27
Payer: MEDICARE

## 2025-06-01 RX ORDER — POTASSIUM CHLORIDE 20 MEQ/1
40 TABLET, EXTENDED RELEASE ORAL
Qty: 180 TABLET | Refills: 1 | Status: SHIPPED | OUTPATIENT
Start: 2025-06-01

## 2025-06-01 RX ORDER — METOPROLOL TARTRATE 100 MG/1
100 TABLET ORAL 2 TIMES DAILY
Qty: 180 TABLET | Refills: 3 | Status: SHIPPED | OUTPATIENT
Start: 2025-06-01

## 2025-06-01 RX ORDER — FUROSEMIDE 20 MG/1
20 TABLET ORAL
Qty: 90 TABLET | Refills: 3 | Status: SHIPPED | OUTPATIENT
Start: 2025-06-01

## 2025-06-09 ENCOUNTER — PATIENT MESSAGE (OUTPATIENT)
Dept: ADMINISTRATIVE | Facility: HOSPITAL | Age: 70
End: 2025-06-09
Payer: MEDICARE

## 2025-06-19 DIAGNOSIS — E66.01 MORBID (SEVERE) OBESITY DUE TO EXCESS CALORIES: ICD-10-CM

## 2025-06-19 DIAGNOSIS — R54 AGE-RELATED PHYSICAL DEBILITY: Primary | ICD-10-CM

## 2025-08-11 ENCOUNTER — PATIENT OUTREACH (OUTPATIENT)
Dept: ADMINISTRATIVE | Facility: HOSPITAL | Age: 70
End: 2025-08-11
Payer: MEDICARE